# Patient Record
Sex: FEMALE | Race: WHITE | NOT HISPANIC OR LATINO | Employment: UNEMPLOYED | URBAN - NONMETROPOLITAN AREA
[De-identification: names, ages, dates, MRNs, and addresses within clinical notes are randomized per-mention and may not be internally consistent; named-entity substitution may affect disease eponyms.]

---

## 2017-02-08 ENCOUNTER — HOSPITAL ENCOUNTER (EMERGENCY)
Facility: HOSPITAL | Age: 37
Discharge: HOME | End: 2017-02-08
Attending: EMERGENCY MEDICINE | Admitting: EMERGENCY MEDICINE
Payer: COMMERCIAL

## 2017-02-08 VITALS
RESPIRATION RATE: 18 BRPM | DIASTOLIC BLOOD PRESSURE: 70 MMHG | SYSTOLIC BLOOD PRESSURE: 130 MMHG | BODY MASS INDEX: 21.79 KG/M2 | TEMPERATURE: 97 F | OXYGEN SATURATION: 100 % | HEART RATE: 70 BPM | WEIGHT: 135 LBS

## 2017-02-08 DIAGNOSIS — R53.83 FATIGUE: ICD-10-CM

## 2017-02-08 DIAGNOSIS — G47.00 INSOMNIA: Primary | ICD-10-CM

## 2017-02-08 LAB
ANION GAP SERPL CALCULATED.3IONS-SCNC: 9 MMOL/L (ref 4–13)
BASOPHILS # BLD AUTO: 0.02 THOUSANDS/ΜL (ref 0–0.1)
BASOPHILS NFR BLD AUTO: 0 % (ref 0–1)
BUN SERPL-MCNC: 7 MG/DL (ref 5–25)
CALCIUM SERPL-MCNC: 8.6 MG/DL (ref 8.3–10.1)
CHLORIDE SERPL-SCNC: 106 MMOL/L (ref 100–108)
CO2 SERPL-SCNC: 28 MMOL/L (ref 21–32)
CREAT SERPL-MCNC: 0.68 MG/DL (ref 0.6–1.3)
EOSINOPHIL # BLD AUTO: 0.14 THOUSAND/ΜL (ref 0–0.61)
EOSINOPHIL NFR BLD AUTO: 2 % (ref 0–6)
ERYTHROCYTE [DISTWIDTH] IN BLOOD BY AUTOMATED COUNT: 12.4 % (ref 11.6–15.1)
GFR SERPL CREATININE-BSD FRML MDRD: >60 ML/MIN/1.73SQ M
GLUCOSE SERPL-MCNC: 90 MG/DL (ref 65–140)
HCG UR QL: NORMAL
HCT VFR BLD AUTO: 37.8 % (ref 34.8–46.1)
HGB BLD-MCNC: 12.5 G/DL (ref 11.5–15.4)
LYMPHOCYTES # BLD AUTO: 1.35 THOUSANDS/ΜL (ref 0.6–4.47)
LYMPHOCYTES NFR BLD AUTO: 22 % (ref 14–44)
MAGNESIUM SERPL-MCNC: 1.9 MG/DL (ref 1.6–2.6)
MCH RBC QN AUTO: 31.4 PG (ref 26.8–34.3)
MCHC RBC AUTO-ENTMCNC: 33.1 G/DL (ref 31.4–37.4)
MCV RBC AUTO: 95 FL (ref 82–98)
MONOCYTES # BLD AUTO: 0.35 THOUSAND/ΜL (ref 0.17–1.22)
MONOCYTES NFR BLD AUTO: 6 % (ref 4–12)
NEUTROPHILS # BLD AUTO: 4.25 THOUSANDS/ΜL (ref 1.85–7.62)
NEUTS SEG NFR BLD AUTO: 70 % (ref 43–75)
PLATELET # BLD AUTO: 181 THOUSANDS/UL (ref 149–390)
PMV BLD AUTO: 9.4 FL (ref 8.9–12.7)
POTASSIUM SERPL-SCNC: 3.8 MMOL/L (ref 3.5–5.3)
RBC # BLD AUTO: 3.98 MILLION/UL (ref 3.81–5.12)
SODIUM SERPL-SCNC: 143 MMOL/L (ref 136–145)
TSH SERPL DL<=0.05 MIU/L-ACNC: 0.64 UIU/ML (ref 0.36–3.74)
WBC # BLD AUTO: 6.11 THOUSAND/UL (ref 4.31–10.16)

## 2017-02-08 PROCEDURE — 81025 URINE PREGNANCY TEST: CPT | Performed by: EMERGENCY MEDICINE

## 2017-02-08 PROCEDURE — 80048 BASIC METABOLIC PNL TOTAL CA: CPT | Performed by: EMERGENCY MEDICINE

## 2017-02-08 PROCEDURE — 36415 COLL VENOUS BLD VENIPUNCTURE: CPT | Performed by: EMERGENCY MEDICINE

## 2017-02-08 PROCEDURE — 83735 ASSAY OF MAGNESIUM: CPT | Performed by: EMERGENCY MEDICINE

## 2017-02-08 PROCEDURE — 84443 ASSAY THYROID STIM HORMONE: CPT | Performed by: EMERGENCY MEDICINE

## 2017-02-08 PROCEDURE — 99283 EMERGENCY DEPT VISIT LOW MDM: CPT

## 2017-02-08 PROCEDURE — 85025 COMPLETE CBC W/AUTO DIFF WBC: CPT | Performed by: EMERGENCY MEDICINE

## 2017-02-08 RX ORDER — ZALEPLON 5 MG/1
5 CAPSULE ORAL
COMMUNITY
End: 2017-06-17

## 2017-02-08 RX ORDER — ZALEPLON 10 MG/1
10 CAPSULE ORAL
Qty: 30 CAPSULE | Refills: 0 | Status: SHIPPED | OUTPATIENT
Start: 2017-02-08 | End: 2018-11-28

## 2017-06-17 ENCOUNTER — HOSPITAL ENCOUNTER (EMERGENCY)
Facility: HOSPITAL | Age: 37
Discharge: HOME | End: 2017-06-17
Attending: EMERGENCY MEDICINE
Payer: COMMERCIAL

## 2017-06-17 ENCOUNTER — APPOINTMENT (EMERGENCY)
Dept: CT IMAGING | Facility: HOSPITAL | Age: 37
End: 2017-06-17
Payer: COMMERCIAL

## 2017-06-17 VITALS
BODY MASS INDEX: 22.6 KG/M2 | DIASTOLIC BLOOD PRESSURE: 59 MMHG | HEART RATE: 50 BPM | TEMPERATURE: 97.9 F | OXYGEN SATURATION: 99 % | RESPIRATION RATE: 18 BRPM | HEIGHT: 66 IN | WEIGHT: 140.65 LBS | SYSTOLIC BLOOD PRESSURE: 91 MMHG

## 2017-06-17 DIAGNOSIS — N39.0 URINARY TRACT INFECTION: ICD-10-CM

## 2017-06-17 DIAGNOSIS — R51.9 HEADACHE: Primary | ICD-10-CM

## 2017-06-17 LAB
BACTERIA UR QL AUTO: ABNORMAL /HPF
BILIRUB UR QL STRIP: NEGATIVE
CLARITY UR: ABNORMAL
COLOR UR: ABNORMAL
GLUCOSE UR STRIP-MCNC: NEGATIVE MG/DL
HCG UR QL: NEGATIVE
HGB UR QL STRIP.AUTO: NEGATIVE
KETONES UR STRIP-MCNC: NEGATIVE MG/DL
LEUKOCYTE ESTERASE UR QL STRIP: NEGATIVE
NITRITE UR QL STRIP: POSITIVE
NON-SQ EPI CELLS URNS QL MICRO: ABNORMAL /HPF
PH UR STRIP.AUTO: 7.5 [PH] (ref 4.5–8)
PROT UR STRIP-MCNC: NEGATIVE MG/DL
RBC #/AREA URNS AUTO: ABNORMAL /HPF
SP GR UR STRIP.AUTO: 1.01 (ref 1–1.03)
UROBILINOGEN UR QL STRIP.AUTO: 0.2 E.U./DL
WBC #/AREA URNS AUTO: ABNORMAL /HPF

## 2017-06-17 PROCEDURE — 99284 EMERGENCY DEPT VISIT MOD MDM: CPT

## 2017-06-17 PROCEDURE — 81025 URINE PREGNANCY TEST: CPT | Performed by: EMERGENCY MEDICINE

## 2017-06-17 PROCEDURE — 96375 TX/PRO/DX INJ NEW DRUG ADDON: CPT

## 2017-06-17 PROCEDURE — 70496 CT ANGIOGRAPHY HEAD: CPT

## 2017-06-17 PROCEDURE — 96374 THER/PROPH/DIAG INJ IV PUSH: CPT

## 2017-06-17 PROCEDURE — 81001 URINALYSIS AUTO W/SCOPE: CPT | Performed by: EMERGENCY MEDICINE

## 2017-06-17 RX ORDER — KETOROLAC TROMETHAMINE 30 MG/ML
30 INJECTION, SOLUTION INTRAMUSCULAR; INTRAVENOUS ONCE
Status: COMPLETED | OUTPATIENT
Start: 2017-06-17 | End: 2017-06-17

## 2017-06-17 RX ORDER — BUPRENORPHINE HYDROCHLORIDE AND NALOXONE HYDROCHLORIDE DIHYDRATE 2; .5 MG/1; MG/1
0.5 TABLET SUBLINGUAL 2 TIMES DAILY
COMMUNITY

## 2017-06-17 RX ORDER — NITROFURANTOIN 25; 75 MG/1; MG/1
100 CAPSULE ORAL 2 TIMES DAILY
Qty: 14 CAPSULE | Refills: 0 | Status: SHIPPED | OUTPATIENT
Start: 2017-06-17 | End: 2017-06-24

## 2017-06-17 RX ORDER — METOCLOPRAMIDE HYDROCHLORIDE 5 MG/ML
10 INJECTION INTRAMUSCULAR; INTRAVENOUS ONCE
Status: COMPLETED | OUTPATIENT
Start: 2017-06-17 | End: 2017-06-17

## 2017-06-17 RX ORDER — BUTALBITAL, ACETAMINOPHEN AND CAFFEINE 50; 325; 40 MG/1; MG/1; MG/1
2 TABLET ORAL EVERY 6 HOURS PRN
Qty: 30 TABLET | Refills: 0 | Status: SHIPPED | OUTPATIENT
Start: 2017-06-17 | End: 2018-11-28

## 2017-06-17 RX ADMIN — IOHEXOL 100 ML: 350 INJECTION, SOLUTION INTRAVENOUS at 14:57

## 2017-06-17 RX ADMIN — METOCLOPRAMIDE 10 MG: 5 INJECTION, SOLUTION INTRAMUSCULAR; INTRAVENOUS at 14:24

## 2017-06-17 RX ADMIN — KETOROLAC TROMETHAMINE 30 MG: 30 INJECTION, SOLUTION INTRAMUSCULAR at 14:24

## 2017-12-20 ENCOUNTER — ALLSCRIPTS OFFICE VISIT (OUTPATIENT)
Dept: FAMILY MEDICINE CLINIC | Facility: CLINIC | Age: 37
End: 2017-12-20
Payer: COMMERCIAL

## 2017-12-20 PROCEDURE — T1015 CLINIC SERVICE: HCPCS | Performed by: FAMILY MEDICINE

## 2017-12-27 NOTE — PROGRESS NOTES
Assessment   1  Burning sensation of mouth (528 9) (R20 8)    Plan   Burning sensation of mouth    · Lidocaine Viscous 2 % Mouth/Throat Solution; USE 5ML EVERY 2 HOURS AS    NEEDED    Discussion/Summary      Burning sensation amounts-will trial lidocaine viscous wash  Did discuss with patient's this may not resolve current problem  Smoking cessation advised  Contributing aggravating factor  Will follow up in 4 weeks if symptoms persist     The patient was counseled regarding instructions for management,-- importance of compliance with treatment  total time of encounter was 20 minutes-- and-- 10 minutes was spent counseling  Possible side effects of new medications were reviewed with the patient/guardian today  The treatment plan was reviewed with the patient/guardian  The patient/guardian understands and agrees with the treatment plan      Chief Complaint   pt states her tongue burns, she states its been like that for 2 weeks  History of Present Illness   HPI: 40year old here today for her tongue burning, started two weeks ago  She reports her dog having same problems  Dog was treated for tongue infection, unsure of name  She reports dumping water down kitchen sink and is questing passing infection to her  She reports the burning is constant, she denies injury to tongue  Review of Systems        Constitutional: No fever, no chills, feels well, no tiredness, no recent weight gain or loss  ENT: no ear ache, no loss of hearing, no nosebleeds or nasal discharge, no sore throat or hoarseness  Cardiovascular: no complaints of slow or fast heart rate, no chest pain, no palpitations, no leg claudication or lower extremity edema  Respiratory: no complaints of shortness of breath, no wheezing, no dyspnea on exertion, no orthopnea or PND  Breasts: no complaints of breast pain, breast lump or nipple discharge        Gastrointestinal: no complaints of abdominal pain, no constipation, no nausea or diarrhea, no vomiting, no bloody stools  Genitourinary: no complaints of dysuria, no incontinence, no pelvic pain, no dysmenorrhea, no vaginal discharge or abnormal vaginal bleeding  Musculoskeletal: no complaints of arthralgia, no myalgia, no joint swelling or stiffness, no limb pain or swelling  Integumentary: no complaints of skin rash or lesion, no itching or dry skin, no skin wounds  Neurological: no complaints of headache, no confusion, no numbness or tingling, no dizziness or fainting  Other Symptoms: Burning tongue sensation  Active Problems   1  Abdominal bloating (787 3) (R14 0)   2  Acute lateral meniscal tear (836 1) (S83 289A)   3  Acute medial meniscus tear of right knee (836 0) (S83 241A)   4  Alcoholism (303 90) (F10 20)   5  Chronic constipation (564 00) (K59 09)   6  Chronic Lyme Disease (088 81)   7  Chronic pain due to trauma (338 21) (G89 21)   8  Constipation (564 00) (K59 00)   9  Dry eyes (375 15) (H04 123)   10  Dysuria (788 1) (R30 0)   11  Female pelvic pain (625 9) (R10 2)   12  Flu vaccine need (V04 81) (Z23)   13  Hepatitis (573 3) (K75 9)   14  History of fracture of clavicle (V15 51) (Z87 81)   15  IBS (irritable bowel syndrome) (564 1) (K58 9)   16  Insomnia (780 52) (G47 00)   17  Leukopenia (288 50) (D72 819)   18  Liver enlargement (789 1) (R16 0)   19  Misuse of drugs (305 90) (F19 99)   20  Musculoskeletal pain, chronic (729 1,338 29) (M79 1,G89 29)   21  Nonspecific abnormal toxicological finding (796 0) (R89 2)   22  Opioid abuse, in remission (305 53) (F11 11)   23  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)   24  Painful orthopaedic hardware (996 78) (T84 84XA)   25  Right clavicle fracture (810 00) (S42 001A)   26  Skin lesion (709 9) (L98 9)   27  Status post medial meniscectomy of right knee (V45 89) (Z98 890)   28  Thrombocytopenia (287 5) (D69 6)   29  Urinary frequency (788 41) (R35 0)   30  Urinary tract infection (599 0) (N39 0)   31   UTI (lower urinary tract infection) (599 0) (N39 0)   32  Well woman exam with routine gynecological exam (V72 31) (Z01 419)    Past Medical History   1  Alcoholism (303 90) (F10 20)   2  History of nausea and vomiting (V12 79) (Z87 898)   3  History of Lyme disease (088 81) (A69 20)   4  History of Opiate dependence (304 00) (F11 20)   5  UTI (lower urinary tract infection) (599 0) (N39 0)  Active Problems And Past Medical History Reviewed: The active problems and past medical history were reviewed and updated today  Family History   Mother    1  No pertinent family history  Father    2  No pertinent family history  Paternal Grandmother    1  Family history of Breast cancer   4  Family history of Cancer  Family History Reviewed: The family history was reviewed and updated today  Social History    · Alcohol Use (History)   · 1 litre of voda every 2 days   · Denied: Caffeine Use   · Current Every Day Smoker (305 1)   · Denied: Drug Use (305 90)   · History of Using Intravenous Drugs And Sharing Roslyn  The social history was reviewed and updated today  The social history was reviewed and is unchanged  Surgical History   1  History of Appendectomy (47 0)   2  History of Tonsillectomy (28 2)   3  History of Tubal Ligation  Surgical History Reviewed: The surgical history was reviewed and updated today  Current Meds    1  Promethazine HCl TABS; Therapy: (Recorded:67Mql3962) to Recorded   2  Suboxone 8-2 MG Sublingual Film; Take as directed by Dr Georgie Johnson; Therapy: [id number] to Recorded     The medication list was reviewed and updated today  Allergies   1  Penicillins    Vitals    Recorded: 65Kdj9773 02:32PM   Temperature 97 4 F    Heart Rate 66    Respiration 18    Systolic 261    Diastolic 74    Height 5 ft 5 in    Patient Refused Weight Yes Yes   O2 Saturation 98      Physical Exam        Constitutional      General appearance: No acute distress, well appearing and well nourished  Ears, Nose, Mouth, and Throat      External inspection of ears and nose: Normal        Otoscopic examination: Tympanic membranes translucent with normal light reflex  Canals patent without erythema  Nasal mucosa, septum, and turbinates: Normal without edema or erythema  Oropharynx: Normal with no erythema, edema, exudate or lesions  Pulmonary      Respiratory effort: No increased work of breathing or signs of respiratory distress  Auscultation of lungs: Clear to auscultation  Cardiovascular      Palpation of heart: Normal PMI, no thrills  Auscultation of heart: Normal rate and rhythm, normal S1 and S2, without murmurs  Examination of extremities for edema and/or varicosities: Normal        Abdomen      Abdomen: Non-tender, no masses  Liver and spleen: No hepatomegaly or splenomegaly  Lymphatic      Palpation of lymph nodes in neck: No lymphadenopathy  Musculoskeletal      Gait and station: Normal        Digits and nails: Normal without clubbing or cyanosis  Inspection/palpation of joints, bones, and muscles: Normal        Skin      Skin and subcutaneous tissue: Normal without rashes or lesions  Neurologic      Cranial nerves: Cranial nerves 2-12 intact  Reflexes: 2+ and symmetric  Sensation: No sensory loss  Psychiatric      Orientation to person, place, and time: Normal        Mood and affect: Normal           Future Appointments      Date/Time Provider Specialty Site   01/10/2018 10:00 AM Alana Sullivan, [facility]     Signatures    Electronically signed by :  BELLE Hernandez; Dec 20 2017  3:17PM EST                       (Author)     Electronically signed by : Floy Dakin, DO; Dec 26 2017 12:33PM EST

## 2018-01-06 ENCOUNTER — APPOINTMENT (EMERGENCY)
Dept: RADIOLOGY | Facility: HOSPITAL | Age: 38
End: 2018-01-06
Payer: COMMERCIAL

## 2018-01-06 ENCOUNTER — HOSPITAL ENCOUNTER (EMERGENCY)
Facility: HOSPITAL | Age: 38
Discharge: HOME | End: 2018-01-06
Admitting: EMERGENCY MEDICINE
Payer: COMMERCIAL

## 2018-01-06 VITALS
DIASTOLIC BLOOD PRESSURE: 70 MMHG | RESPIRATION RATE: 18 BRPM | BODY MASS INDEX: 21.21 KG/M2 | TEMPERATURE: 98.2 F | SYSTOLIC BLOOD PRESSURE: 113 MMHG | OXYGEN SATURATION: 100 % | HEART RATE: 78 BPM | WEIGHT: 131.39 LBS

## 2018-01-06 DIAGNOSIS — S61.459A DOG BITE, HAND: Primary | ICD-10-CM
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
## 2018-01-06 PROCEDURE — 90715 TDAP VACCINE 7 YRS/> IM: CPT | Performed by: PHYSICIAN ASSISTANT

## 2018-01-06 PROCEDURE — 73130 X-RAY EXAM OF HAND: CPT

## 2018-01-06 PROCEDURE — 90471 IMMUNIZATION ADMIN: CPT

## 2018-01-06 PROCEDURE — 99283 EMERGENCY DEPT VISIT LOW MDM: CPT

## 2018-01-06 RX ORDER — DOXYCYCLINE HYCLATE 100 MG/1
100 CAPSULE ORAL ONCE
Status: COMPLETED | OUTPATIENT
Start: 2018-01-06 | End: 2018-01-06

## 2018-01-06 RX ORDER — DOXYCYCLINE 100 MG/1
100 CAPSULE ORAL 2 TIMES DAILY
Qty: 28 CAPSULE | Refills: 0 | Status: SHIPPED | OUTPATIENT
Start: 2018-01-06 | End: 2018-01-20

## 2018-01-06 RX ADMIN — TETANUS TOXOID, REDUCED DIPHTHERIA TOXOID AND ACELLULAR PERTUSSIS VACCINE, ADSORBED 0.5 ML: 5; 2.5; 8; 8; 2.5 SUSPENSION INTRAMUSCULAR at 11:05

## 2018-01-06 RX ADMIN — DOXYCYCLINE HYCLATE 100 MG: 100 CAPSULE ORAL at 11:02

## 2018-01-06 NOTE — ED PROVIDER NOTES
History  Chief Complaint   Patient presents with    Dog Bite     dogbite right hand last night  Patient states was playing with own dog and was bit on right hand  Right hand swollen and painful; today     Patient presents to the emergency department today offering a chief complaint of right hand pain and swelling status post dog bite last evening around 2100 hours  Patient states the dog is her household head, she believes it is up-to-date on all of its immunizations, she was poking at the dog and playing with the dog while her  was eating food and the dog bit the right hand  She sustained a small puncture wound of the right hand and admits to localized swelling  She denies range of motion or sensation deficits  Prior to Admission Medications   Prescriptions Last Dose Informant Patient Reported? Taking? buprenorphine-naloxone (SUBOXONE) 2-0 5 mg per SL tablet   Yes No   Sig: Place 0 5 tablets under the tongue daily   butalbital-acetaminophen-caffeine (FIORICET) -40 mg per tablet   No No   Sig: Take 2 tablets by mouth every 6 (six) hours as needed for headaches   zaleplon (SONATA) 10 MG capsule   No No   Sig: Take 1 capsule by mouth daily at bedtime for 30 doses      Facility-Administered Medications: None       Past Medical History:   Diagnosis Date    Back pain     Chronic constipation 6/27/2016    Infectious viral hepatitis     Lyme disease        Past Surgical History:   Procedure Laterality Date    APPENDECTOMY      CLAVICLE SURGERY      KNEE ARTHROSCOPY      MULTIPLE TOOTH EXTRACTIONS      [facility] COLONOSCOPY FLX DX W/COLLJ SPEC WHEN PFRMD N/A 6/27/2016    Procedure: COLONOSCOPY;  Surgeon: Sindhu Hardin MD;  Location: [facility];  Service: Colorectal    TONSILLECTOMY AND ADENOIDECTOMY      TUBAL LIGATION         History reviewed  No pertinent family history  I have reviewed and agree with the history as documented      Social History   Substance Use Topics    Smoking status: Current Every Day Smoker     Packs/day: 0 50     Types: Cigarettes    Smokeless tobacco: Never Used    Alcohol use Yes      Comment: social        Review of Systems   Constitutional: Negative  HENT: Negative  Eyes: Negative  Respiratory: Negative  Cardiovascular: Negative  Gastrointestinal: Negative  Endocrine: Negative  Genitourinary: Negative  Musculoskeletal: Positive for joint swelling  Skin: Positive for wound  Allergic/Immunologic: Negative  Neurological: Negative  Hematological: Negative  Psychiatric/Behavioral: Negative  All other systems reviewed and are negative  Physical Exam  ED Triage Vitals   Temperature Pulse Respirations Blood Pressure SpO2   01/06/18 0952 01/06/18 0952 01/06/18 0952 01/06/18 0952 01/06/18 0952   98 2 °F (36 8 °C) 78 18 113/70 100 %      Temp Source Heart Rate Source Patient Position - Orthostatic VS BP Location FiO2 (%)   01/06/18 0952 01/06/18 0952 01/06/18 0952 01/06/18 0952 --   Temporal Monitor Sitting Left arm       Pain Score       01/06/18 0900       9           Orthostatic Vital Signs  Vitals:    01/06/18 0952   BP: 113/70   Pulse: 78   Patient Position - Orthostatic VS: Sitting       Physical Exam   Constitutional: She is oriented to person, place, and time  She appears well-developed and well-nourished  No distress  HENT:   Head: Atraumatic  Eyes: Pupils are equal, round, and reactive to light  Cardiovascular: Normal rate  Pulmonary/Chest: Effort normal    Abdominal: Soft  Musculoskeletal: She exhibits edema and tenderness  She exhibits no deformity  Patient exhibits tenderness and soft tissue swelling the dorsum of the right hand  Patient has normal range of motion of the digits and sensation distally with normal capillary refill  No erythema is noted  Neurological: She is alert and oriented to person, place, and time  Skin: Capillary refill takes less than 2 seconds  No rash noted   She is not diaphoretic  0 5 cm puncture wound on the dorsum of the right hand the area of the 3rd metacarpal   Psychiatric: She has a normal mood and affect  Vitals reviewed  ED Medications  Medications   tetanus-diphtheria-acellular pertussis (BOOSTRIX) IM injection 0 5 mL (not administered)   doxycycline hyclate (VIBRAMYCIN) capsule 100 mg (not administered)       Diagnostic Studies  Results Reviewed     None                 XR hand 3+ views RIGHT   Final Result by Ricki Marie MD (01/06 1038)      No acute osseous abnormality  Workstation performed: [id number]                    Procedures  Procedures       Phone Contacts  ED Phone Contact    ED Course  ED Course as of Jan 06 1055   Sat Jan 06, 2018   1023 We will x-ray to rule out foreign body for osseous abnormalities, cleansed extensively with Betadine, and started on antibiotics  Patient states her tetanus was updated last year    1027 I looked at the patient's records from last year and did not see any evidence of a tetanus update, we will updated today    9624 FINDINGS:    There is no acute fracture or dislocation  No degenerative changes  No lytic or blastic lesions are seen  Soft tissue swelling overlying the metacarpals  Impression       No acute osseous abnormality  1055 Wound was cleansed extensively with Betadine and dressing placed                                MDM  CritCare Time    Disposition  Final diagnoses:   Dog bite, hand     Time reflects when diagnosis was documented in both MDM as applicable and the Disposition within this note     Time User Action Codes Description Comment    1/6/2018 10:53 AM Odette Colón Add [E45 839S,  E26  0XXA] Dog bite, hand       ED Disposition     ED Disposition Condition Comment    Discharge  Jennifer Sharyn discharge to home/self care      Condition at discharge: Good        Follow-up Information     Follow up With Specialties Details Why [address], CRNP Family Medicine In 2 days For wound re-check Gil Mcmahon  [address]  [phone]          Patient's Medications   Discharge Prescriptions    DOXYCYCLINE MONOHYDRATE (MONODOX) 100 MG CAPSULE    Take 1 capsule by mouth 2 (two) times a day for 14 days       Start Date: 1/6/2018  End Date: 1/20/2018       Order Dose: 100 mg       Quantity: 28 capsule    Refills: 0     No discharge procedures on file      ED Provider  Electronically Signed by           Jas Angulo PA-C  01/06/18 [address] H[address] Davila PA-C  01/06/18 1052

## 2018-01-06 NOTE — DISCHARGE INSTRUCTIONS
Animal Bite   WHAT YOU NEED TO KNOW:   Animal bite injuries range from shallow cuts to deep, life-threatening wounds  An animal can cut or puncture the skin when it bites  Your skin may be torn from your body  Your skin may swell or bruise even if the bite does not break the skin  Animal bites occur more often on the hands, arms, legs, and face  Bites from dogs and cats are the most common injuries  DISCHARGE INSTRUCTIONS:   Seek care immediately if:   · You have a fever  · Your wound is red, swollen, and draining pus  · You see red streaks on the skin around the wound  · You can no longer move the bitten area  · Your heartbeat and breathing are much faster than usual     · You feel dizzy and confused  Contact your healthcare provider if:   · Your pain does not get better, even after you take pain medicine  · You have nightmares or flashbacks about the animal bite  · You have questions or concerns about your condition or care  Medicines: You may need any of the following:  · Antibiotics  prevent or treat a bacterial infection  · Prescription pain medicine  may be given  Ask how to take this medicine safely  · A tetanus vaccine  may be needed to prevent tetanus  Tetanus is a life-threatening bacterial infection that affects the nerves and muscles  The bacteria can be spread through animal bites  · A rabies vaccine  may be needed to prevent rabies  Rabies is a life-threatening viral infection  The virus can be spread through animal bites  · Take your medicine as directed  Contact your healthcare provider if you think your medicine is not helping or if you have side effects  Tell him or her if you are allergic to any medicine  Keep a list of the medicines, vitamins, and herbs you take  Include the amounts, and when and why you take them  Bring the list or the pill bottles to follow-up visits  Carry your medicine list with you in case of an emergency    Follow up with your healthcare provider in 1 to 2 days: You may need to return to have your stitches removed  Write down your questions so you remember to ask them during your visits  Self-care:   · Apply antibiotic ointment as directed  This helps prevent infection in minor skin wounds  It is available without a doctor's order  · Keep the wound clean and covered  Wash the wound every day with soap and water or germ-killing cleanser  Ask your healthcare provider about the kinds of bandages to use  · Apply ice on your wound  Ice helps decrease swelling and pain  Ice may also help prevent tissue damage  Use an ice pack, or put crushed ice in a plastic bag  Cover it with a towel and place it on your wound for 15 to 20 minutes every hour or as directed  · Elevate the wound area  Raise your wound above the level of your heart as often as you can  This will help decrease swelling and pain  Prop your wound on pillows or blankets to keep it elevated comfortably  Prevent another animal bite:   · Learn to recognize the signs of a scared or angry pet  Avoid quick, sudden movements  · Do not step between animals that are fighting  · Do not leave a pet alone with a young child  · Do not disturb an animal while it eats, sleeps, or cares for its young  · Do not approach an animal you do not know, especially one that is tied up or caged  · Stay away from animals that seem sick or act strangely  · Do not feed or capture wild animals  © 2017 [address] Information is for End User's use only and may not be sold, redistributed or otherwise used for commercial purposes  All illustrations and images included in CareNotes® are the copyrighted property of [employer] , [employer]  or [employer] O[employer]  The above information is an  only  It is not intended as medical advice for individual conditions or treatments   Talk to your doctor, nurse or pharmacist before following any medical regimen to see if it is safe and effective for you

## 2018-01-10 ENCOUNTER — GENERIC CONVERSION - ENCOUNTER (OUTPATIENT)
Dept: OTHER | Facility: OTHER | Age: 38
End: 2018-01-10

## 2018-01-10 ENCOUNTER — APPOINTMENT (OUTPATIENT)
Dept: FAMILY MEDICINE CLINIC | Facility: CLINIC | Age: 38
End: 2018-01-10
Payer: COMMERCIAL

## 2018-01-10 DIAGNOSIS — R20.8 OTHER DISTURBANCES OF SKIN SENSATION: ICD-10-CM

## 2018-01-10 PROCEDURE — T1015 CLINIC SERVICE: HCPCS | Performed by: FAMILY MEDICINE

## 2018-01-12 ENCOUNTER — APPOINTMENT (OUTPATIENT)
Dept: LAB | Facility: CLINIC | Age: 38
End: 2018-01-12
Payer: COMMERCIAL

## 2018-01-12 ENCOUNTER — TRANSCRIBE ORDERS (OUTPATIENT)
Dept: LAB | Facility: CLINIC | Age: 38
End: 2018-01-12

## 2018-01-12 DIAGNOSIS — R20.8 OTHER DISTURBANCES OF SKIN SENSATION: ICD-10-CM

## 2018-01-12 LAB
ERYTHROCYTE [DISTWIDTH] IN BLOOD BY AUTOMATED COUNT: 12.7 % (ref 11.6–15.1)
EST. AVERAGE GLUCOSE BLD GHB EST-MCNC: 100 MG/DL
HBA1C MFR BLD: 5.1 % (ref 4.2–6.3)
HCT VFR BLD AUTO: 38.8 % (ref 34.8–46.1)
HGB BLD-MCNC: 13.9 G/DL (ref 11.5–15.4)
IRON SERPL-MCNC: 65 UG/DL (ref 50–170)
MCH RBC QN AUTO: 34.4 PG (ref 26.8–34.3)
MCHC RBC AUTO-ENTMCNC: 35.8 G/DL (ref 31.4–37.4)
MCV RBC AUTO: 96 FL (ref 82–98)
PLATELET # BLD AUTO: 167 THOUSANDS/UL (ref 149–390)
PMV BLD AUTO: 11 FL (ref 8.9–12.7)
RBC # BLD AUTO: 4.04 MILLION/UL (ref 3.81–5.12)
VIT B12 SERPL-MCNC: 388 PG/ML (ref 100–900)
WBC # BLD AUTO: 4.74 THOUSAND/UL (ref 4.31–10.16)

## 2018-01-12 PROCEDURE — 36415 COLL VENOUS BLD VENIPUNCTURE: CPT

## 2018-01-12 PROCEDURE — 85027 COMPLETE CBC AUTOMATED: CPT

## 2018-01-12 PROCEDURE — 83540 ASSAY OF IRON: CPT

## 2018-01-12 PROCEDURE — 84630 ASSAY OF ZINC: CPT

## 2018-01-12 PROCEDURE — 83036 HEMOGLOBIN GLYCOSYLATED A1C: CPT

## 2018-01-12 PROCEDURE — 82607 VITAMIN B-12: CPT

## 2018-01-13 NOTE — RESULT NOTES
Message   please forward this report to Dr Akhil Johnson  Thanks     Verified Results  [facility] DEFACOGRAPHY [id number] 09:03AM Ronda Montanez Order Number: [id number]     Test Name Result Flag Reference   [facility] DEFACOGRAPHY (Report)     DEFECOGRAPHY     INDICATION: Constipation  COMPARISON: None     IMAGES: 727     FLUOROSCOPY TIME: 0 7 minutes     FINDINGS:     The study was performed during video fluoroscopy  After placement of contrast into the rectosigmoid , images were obtained following provocative maneuvers of coughing and Valsalva maneuver  Images were then obtained during active evacuation  Initial images reveal normal caliber and appearance of the rectosigmoid  On attempted voiding, moderate anterior rectocele is identified  The patient was unable to void during the exam despite multiple attempts  IMPRESSION:     Patient was unable to void during the exam despite multiple attempts  Moderate anterior rectocele was identified  Workstation performed: [id number]     Signed by:    Margarita Taylor MD   3/31/16   [phone]

## 2018-01-16 LAB — ZINC SERPL-MCNC: 74 UG/DL (ref 56–134)

## 2018-01-22 ENCOUNTER — ALLSCRIPTS OFFICE VISIT (OUTPATIENT)
Dept: FAMILY MEDICINE CLINIC | Facility: CLINIC | Age: 38
End: 2018-01-22
Payer: COMMERCIAL

## 2018-01-22 PROCEDURE — T1015 CLINIC SERVICE: HCPCS | Performed by: FAMILY MEDICINE

## 2018-01-23 VITALS
RESPIRATION RATE: 18 BRPM | HEART RATE: 66 BPM | HEIGHT: 65 IN | OXYGEN SATURATION: 98 % | TEMPERATURE: 97.4 F | SYSTOLIC BLOOD PRESSURE: 102 MMHG | DIASTOLIC BLOOD PRESSURE: 74 MMHG

## 2018-01-24 VITALS
TEMPERATURE: 98.1 F | DIASTOLIC BLOOD PRESSURE: 68 MMHG | RESPIRATION RATE: 18 BRPM | HEART RATE: 60 BPM | SYSTOLIC BLOOD PRESSURE: 96 MMHG | HEIGHT: 65 IN | OXYGEN SATURATION: 98 %

## 2018-01-24 NOTE — PROGRESS NOTES
Assessment   1  Burning sensation of mouth (528 9) (R20 8)    Plan   Burning sensation of mouth    · Otolaryngology Follow Up Evaluation and Treatment  Follow-up  Status: Hold For -    Scheduling  Requested for: [id number]    Discussion/Summary      Hand out provided on burning tongue cessation, further natural remedies suggested including baking soda, raw honey, and capsaicin powder  Refer to ENT for any further workup  The patient was counseled regarding instructions for management,-- importance of compliance with treatment  total time of encounter was 20 minutes-- and-- 10 minutes was spent counseling  Possible side effects of new medications were reviewed with the patient/guardian today  Chief Complaint   Patient is here today for a follow up and to go over labs  History of Present Illness   71-year-old female reports the office today for follow-up appointment  Patient has been having a burning sensation to her tongue over the last month  Recently had blood work completed  She has tried over-the-counter natural remedies for burning relief, she does not want any further medication  She has been avoiding salts and she feels as though this has aggravated the burning sensation  Review of Systems        Constitutional: No fever, no chills, feels well, no tiredness, no recent weight gain or weight loss  Eyes: No complaints of eye pain, no red eyes, no eyesight problems, no discharge, no dry eyes, no itching of eyes  ENT: no complaints of earache, no loss of hearing, no nose bleeds, no nasal discharge, no sore throat, no hoarseness  Cardiovascular: No complaints of slow heart rate, no fast heart rate, no chest pain, no palpitations, no leg claudication, no lower extremity edema  Respiratory: No complaints of shortness of breath, no wheezing, no cough, no SOB on exertion, no orthopnea, no PND        Gastrointestinal: No complaints of abdominal pain, no constipation, no nausea or vomiting, no diarrhea, no bloody stools  Genitourinary: No complaints of dysuria, no incontinence, no pelvic pain, no dysmenorrhea, no vaginal discharge or bleeding  Musculoskeletal: No complaints of arthralgias, no myalgias, no joint swelling or stiffness, no limb pain or swelling  Integumentary: No complaints of skin rash or lesions, no itching, no skin wounds, no breast pain or lump  Neurological: No complaints of headache, no confusion, no convulsions, no numbness, no dizziness or fainting, no tingling, no limb weakness, no difficulty walking  Psychiatric: Not suicidal, no sleep disturbance, no anxiety or depression, no change in personality, no emotional problems  Endocrine: No complaints of proptosis, no hot flashes, no muscle weakness, no deepening of the voice, no feelings of weakness  Hematologic/Lymphatic: No complaints of swollen glands, no swollen glands in the neck, does not bleed easily, does not bruise easily  Other Symptoms: burning tongue  Active Problems   1  Abdominal bloating (787 3) (R14 0)   2  Acute lateral meniscal tear (836 1) (S83 289A)   3  Acute medial meniscus tear of right knee (836 0) (S83 241A)   4  Alcoholism (303 90) (F10 20)   5  Burning sensation of mouth (528 9) (R20 8)   6  Chronic constipation (564 00) (K59 09)   7  Chronic Lyme Disease (088 81)   8  Chronic pain due to trauma (338 21) (G89 21)   9  Constipation (564 00) (K59 00)   10  Dry eyes (375 15) (H04 123)   11  Dry skin dermatitis (692 89) (L85 3)   12  Dysuria (788 1) (R30 0)   13  Female pelvic pain (625 9) (R10 2)   14  Flu vaccine need (V04 81) (Z23)   15  Hepatitis (573 3) (K75 9)   16  History of fracture of clavicle (V15 51) (Z87 81)   17  IBS (irritable bowel syndrome) (564 1) (K58 9)   18  Insomnia (780 52) (G47 00)   19  Leukopenia (288 50) (D72 819)   20  Liver enlargement (789 1) (R16 0)   21  Misuse of drugs (305 90) (F19 99)   22   Musculoskeletal pain, chronic (729 1,338 29) (M79 1,G89 29)   23  Nonspecific abnormal toxicological finding (796 0) (R89 2)   24  Opioid abuse, in remission (305 53) (F11 11)   25  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)   26  Painful orthopaedic hardware (996 78) (T84 84XA)   27  Right clavicle fracture (810 00) (S42 001A)   28  Skin lesion (709 9) (L98 9)   29  Status post medial meniscectomy of right knee (V45 89) (Z98 890)   30  Thrombocytopenia (287 5) (D69 6)   31  Urinary frequency (788 41) (R35 0)   32  Urinary tract infection (599 0) (N39 0)   33  UTI (lower urinary tract infection) (599 0) (N39 0)   34  Well woman exam with routine gynecological exam (V72 31) (Z01 419)    Past Medical History   1  Alcoholism (303 90) (F10 20)   2  History of nausea and vomiting (V12 79) (Z87 898)   3  History of Lyme disease (088 81) (A69 20)   4  History of Opiate dependence (304 00) (F11 20)   5  UTI (lower urinary tract infection) (599 0) (N39 0)     The active problems and past medical history were reviewed and updated today  Surgical History   1  History of Appendectomy (47 0)   2  History of Tonsillectomy (28 2)   3  History of Tubal Ligation     The surgical history was reviewed and updated today  Family History   Mother    1  No pertinent family history  Father    2  No pertinent family history  Paternal Grandmother    1  Family history of Breast cancer   4  Family history of Cancer     The family history was reviewed and updated today  Social History    · Alcohol Use (History)   · Denied: Caffeine Use   · Current Every Day Smoker (305 1)   · Denied: Drug Use (305 90)   · History of Using Intravenous Drugs And Sharing [place]  The social history was reviewed and updated today  The social history was reviewed and is unchanged  Current Meds    1  Eucerin External Lotion; Apply to affected area 2-3 times daily as needed; Therapy: 67JBG1896 to (Last Rx:10Jan2018)  Requested for: 69QXI6771 Ordered   2   Lidocaine Viscous 2 % Mouth/Throat Solution; USE 5ML EVERY 2 HOURS AS NEEDED; Therapy: 33Nmv1729 to (Evaluate:74Muz3431)  Requested for: 09Coo4169; Last     Rx:61Ykm2348 Ordered   3  Promethazine HCl - 50 MG Oral Tablet; TAKE 1 TABLET AT BEDTIME; Therapy: (Recorded:10Jan2018) to Recorded   4  Suboxone 8-2 MG Sublingual Film; Take as directed by Dr Roya Cristobal; Therapy: 08OMU2622 to Recorded   5  Zaleplon 10 MG Oral Capsule; TAKE 1 CAPSULE AT BEDTIME; Therapy: (Recorded:10Jan2018) to Recorded     The medication list was reviewed and updated today  Allergies   1  Penicillins    Vitals   Vital Signs    Recorded: 76PHS8814 11:06AM   Temperature 98 2 F    Heart Rate 65    Systolic 241    Diastolic 70    Height 5 ft 5 in    Patient Refused Weight Yes Yes   O2 Saturation 98      Physical Exam        Constitutional      General appearance: No acute distress, well appearing and well nourished  Pulmonary      Respiratory effort: No increased work of breathing or signs of respiratory distress  Auscultation of lungs: Clear to auscultation  Cardiovascular      Auscultation of heart: Normal rate and rhythm, normal S1 and S2, without murmurs  Lymphatic      Palpation of lymph nodes in neck: No lymphadenopathy  Skin      Skin and subcutaneous tissue: Normal without rashes or lesions  Psychiatric      Orientation to person, place, and time: Normal        Mood and affect: Normal           Signatures    Electronically signed by :  BELLE Patel; Jan 22 2018 12:21PM EST                       (Author)     Electronically signed by : Ebony Rouse DO; Jan 24 2018  6:59AM EST

## 2018-11-28 ENCOUNTER — OFFICE VISIT (OUTPATIENT)
Dept: FAMILY MEDICINE CLINIC | Facility: CLINIC | Age: 38
End: 2018-11-28
Payer: COMMERCIAL

## 2018-11-28 ENCOUNTER — HOSPITAL ENCOUNTER (OUTPATIENT)
Dept: RADIOLOGY | Facility: HOSPITAL | Age: 38
Discharge: HOME | End: 2018-11-28
Payer: COMMERCIAL

## 2018-11-28 VITALS
TEMPERATURE: 97.7 F | OXYGEN SATURATION: 99 % | RESPIRATION RATE: 18 BRPM | BODY MASS INDEX: 18.99 KG/M2 | HEART RATE: 72 BPM | WEIGHT: 114 LBS | HEIGHT: 65 IN | DIASTOLIC BLOOD PRESSURE: 72 MMHG | SYSTOLIC BLOOD PRESSURE: 102 MMHG

## 2018-11-28 DIAGNOSIS — M54.6 ACUTE BILATERAL THORACIC BACK PAIN: ICD-10-CM

## 2018-11-28 DIAGNOSIS — M54.6 ACUTE BILATERAL THORACIC BACK PAIN: Primary | ICD-10-CM

## 2018-11-28 PROCEDURE — 72072 X-RAY EXAM THORAC SPINE 3VWS: CPT

## 2018-11-28 PROCEDURE — T1015 CLINIC SERVICE: HCPCS | Performed by: FAMILY MEDICINE

## 2018-11-28 PROCEDURE — 72110 X-RAY EXAM L-2 SPINE 4/>VWS: CPT

## 2018-11-28 RX ORDER — BUSPIRONE HYDROCHLORIDE 10 MG/1
10 TABLET ORAL 2 TIMES DAILY
Refills: 1 | COMMUNITY
Start: 2018-09-04 | End: 2021-06-08 | Stop reason: ALTCHOICE

## 2018-11-28 RX ORDER — TOPIRAMATE 50 MG/1
50 TABLET, FILM COATED ORAL DAILY
Refills: 5 | COMMUNITY
Start: 2018-10-31

## 2018-11-28 RX ORDER — PROMETHAZINE HCL 50 MG
TABLET ORAL
Refills: 1 | COMMUNITY
Start: 2018-11-15

## 2018-11-28 RX ORDER — CALCIUM CARBONATE 300MG(750)
30 TABLET,CHEWABLE ORAL 2 TIMES DAILY
Qty: 30 EACH | Refills: 0 | Status: SHIPPED | OUTPATIENT
Start: 2018-11-28

## 2018-11-28 NOTE — PROGRESS NOTES
OFFICE VISIT  Omayra Whitten 41 y o  female MRN: [id number]      Assessment / Plan:  Diagnoses and all orders for this visit:    Acute bilateral thoracic back pain  -     XR spine lumbar minimum 4 views non injury; Future  -     XR spine thoracic 3 vw; Future  -     Ambulatory referral to Physical Therapy; Future  -     Nerve Stimulator (STANDARD TENS) VICKY; by Does not apply route 2 (two) times a day  -     Nerve Stimulator (TENS THERAPY REPLACE BACK PADS) MISC; 30 pad by Does not apply route 2 (two) times a day          Reason For Visit / Chief Complaint  Chief Complaint   Patient presents with    Back Pain     She states her back hurts really bad and she can't stand up straight  HPI:  Omayra Whitten is a 41 y o  female who presents today for lower back pain  She reports having lower back pain  She has treated for a uti one month ago  She reports having upper and lower back pain  She is employed at [employer], standing for long period of times  Just bought gel inserts, has not started wearing  Historical Information   Past Medical History:   Diagnosis Date    Back pain     Chronic constipation 6/27/2016    Infectious viral hepatitis     Lyme disease      Past Surgical History:   Procedure Laterality Date    APPENDECTOMY      CLAVICLE SURGERY      KNEE ARTHROSCOPY      MULTIPLE TOOTH EXTRACTIONS      TX COLONOSCOPY FLX DX W/COLLJ SPEC WHEN PFRMD N/A 6/27/2016    Procedure: COLONOSCOPY;  Surgeon: Tito Stevens MD;  Location: [facility];  Service: Colorectal    TONSILLECTOMY AND ADENOIDECTOMY      TUBAL LIGATION       Social History   History   Alcohol Use    Yes     Comment: social     History   Drug Use No     History   Smoking Status    Current Every Day Smoker    Packs/day: 0 50    Types: Cigarettes   Smokeless Tobacco    Never Used     No family history on file      Meds/Allergies   Allergies   Allergen Reactions    Penicillins GI Intolerance     Other reaction(s): Unknown Reaction Meds:    Current Outpatient Prescriptions:     buprenorphine-naloxone (SUBOXONE) 2-0 5 mg per SL tablet, Place 0 5 tablets under the tongue daily, Disp: , Rfl:     busPIRone (BUSPAR) 10 mg tablet, Take 10 mg by mouth 2 (two) times a day, Disp: , Rfl: 1    Nerve Stimulator (STANDARD TENS) VICKY, by Does not apply route 2 (two) times a day, Disp: 1 Device, Rfl: 0    Nerve Stimulator (TENS THERAPY REPLACE BACK PADS) MISC, 30 pad by Does not apply route 2 (two) times a day, Disp: 30 each, Rfl: 0    promethazine (PHENERGAN) 50 MG tablet, TAKE 1 TO 2 TABS AT BEDTIME AS NEEDED FOR INSOMNIA, Disp: , Rfl: 1    topiramate (TOPAMAX) 50 MG tablet, TAKE 1 TABLET BY MOUTH EVERY DAY FOR HEADACHE/SLEEP, Disp: , Rfl: 5      REVIEW OF SYSTEMS  Review of Systems   Constitutional: Negative for chills, fatigue and fever  HENT: Negative for congestion, ear discharge, ear pain, sore throat, trouble swallowing and voice change  Eyes: Negative for pain and redness  Respiratory: Negative for cough, chest tightness, shortness of breath and wheezing  Gastrointestinal: Negative for abdominal pain, blood in stool, constipation, diarrhea, nausea and vomiting  Endocrine: Negative for cold intolerance, heat intolerance, polydipsia, polyphagia and polyuria  Genitourinary: Negative for decreased urine volume, dysuria, frequency and urgency  Musculoskeletal: Positive for arthralgias and back pain  Negative for myalgias and neck pain  Skin: Negative for color change and rash  Neurological: Negative for dizziness, syncope, weakness, light-headedness, numbness and headaches  Psychiatric/Behavioral: Negative for sleep disturbance and suicidal ideas  The patient is not nervous/anxious              Current Vitals:   Blood Pressure: 102/72 (11/28/18 1319)  Pulse: 72 (11/28/18 1319)  Temperature: 97 7 °F (36 5 °C) (11/28/18 1319)  Respirations: 18 (11/28/18 1319)  Weight - Scale: 51 7 kg (114 lb) (11/28/18 1319)  SpO2: 99 % (11/28/18 1316)  [unfilled]    PHYSICAL EXAMS:  Physical Exam   Constitutional: She is oriented to person, place, and time  She appears well-developed and well-nourished  HENT:   Head: Normocephalic  Right Ear: External ear normal    Left Ear: External ear normal    Mouth/Throat: Oropharynx is clear and moist    Eyes: Pupils are equal, round, and reactive to light  Conjunctivae are normal    Neck: Neck supple  Cardiovascular: Normal rate and regular rhythm  Pulmonary/Chest: Effort normal and breath sounds normal    Abdominal: Soft  Bowel sounds are normal  She exhibits no distension  There is no tenderness  Musculoskeletal: Normal range of motion  She exhibits tenderness  Neurological: She is alert and oriented to person, place, and time  Skin: Skin is warm and dry  Psychiatric: She has a normal mood and affect  Follow up at this office in 2 weeks     Counseling / Coordination of Care  Total floor / unit time spent today 20 minutes  Greater than 50% of total time was spent with the patient and / or family counseling and / or coordination of care

## 2018-11-29 DIAGNOSIS — M51.36 DDD (DEGENERATIVE DISC DISEASE), LUMBAR: ICD-10-CM

## 2018-11-29 DIAGNOSIS — M51.36 DDD (DEGENERATIVE DISC DISEASE), LUMBAR: Primary | ICD-10-CM

## 2018-11-30 ENCOUNTER — TELEPHONE (OUTPATIENT)
Dept: FAMILY MEDICINE CLINIC | Facility: CLINIC | Age: 38
End: 2018-11-30

## 2018-12-01 ENCOUNTER — APPOINTMENT (EMERGENCY)
Dept: CT IMAGING | Facility: HOSPITAL | Age: 38
End: 2018-12-01
Payer: COMMERCIAL

## 2018-12-01 ENCOUNTER — HOSPITAL ENCOUNTER (EMERGENCY)
Facility: HOSPITAL | Age: 38
Discharge: HOME | End: 2018-12-01
Attending: EMERGENCY MEDICINE
Payer: COMMERCIAL

## 2018-12-01 VITALS
HEART RATE: 81 BPM | SYSTOLIC BLOOD PRESSURE: 117 MMHG | BODY MASS INDEX: 18.64 KG/M2 | OXYGEN SATURATION: 99 % | TEMPERATURE: 98.7 F | HEIGHT: 66 IN | WEIGHT: 116 LBS | DIASTOLIC BLOOD PRESSURE: 64 MMHG | RESPIRATION RATE: 18 BRPM

## 2018-12-01 DIAGNOSIS — N20.0 RENAL CALCULI: Primary | ICD-10-CM

## 2018-12-01 LAB
BILIRUB UR QL STRIP: NEGATIVE
CLARITY UR: NORMAL
COLOR UR: YELLOW
EXT PREG TEST URINE: NEGATIVE
GLUCOSE UR STRIP-MCNC: NEGATIVE MG/DL
HGB UR QL STRIP.AUTO: NEGATIVE
KETONES UR STRIP-MCNC: NEGATIVE MG/DL
LEUKOCYTE ESTERASE UR QL STRIP: NEGATIVE
NITRITE UR QL STRIP: NEGATIVE
PH UR STRIP.AUTO: 7.5 [PH] (ref 4.5–8)
PROT UR STRIP-MCNC: NEGATIVE MG/DL
SP GR UR STRIP.AUTO: 1.01 (ref 1–1.03)
UROBILINOGEN UR QL STRIP.AUTO: 0.2 E.U./DL

## 2018-12-01 PROCEDURE — 99284 EMERGENCY DEPT VISIT MOD MDM: CPT

## 2018-12-01 PROCEDURE — 81003 URINALYSIS AUTO W/O SCOPE: CPT | Performed by: PHYSICIAN ASSISTANT

## 2018-12-01 PROCEDURE — 74176 CT ABD & PELVIS W/O CONTRAST: CPT

## 2018-12-01 PROCEDURE — 81025 URINE PREGNANCY TEST: CPT | Performed by: PHYSICIAN ASSISTANT

## 2018-12-01 NOTE — ED PROVIDER NOTES
History  Chief Complaint   Patient presents with    Back Pain     Had outpatient x-ray on wednesday  Received a call  yesterday which notified her she had a kidney stone  Patient presents to the emergency department today offering a chief complaint bilateral back pain  She has been experiencing this back pain for many months  Saw primary care about a month ago for urinary symptoms and completed an unknown antibiotic  Then followed with another primary care this week and had thoracic and lumbar x-rays ordered  Lumbar x-rays normal   Thoracic x-rays revealed a 3 mm radiopaque density possibly in the area of the renal system  She was phone this and told that she had a kidney stone  She presents here for medication to dissolve her kidney stone  No history of fever or chills  No abdominal pain  She does admit to difficulty with urination however denies vaginal bleeding or discharge  Prior to Admission Medications   Prescriptions Last Dose Informant Patient Reported? Taking?    Nerve Stimulator (STANDARD TENS) VICKY   No No   Sig: by Does not apply route 2 (two) times a day   Nerve Stimulator (TENS THERAPY REPLACE BACK PADS) MISC   No No   Si pad by Does not apply route 2 (two) times a day   Nerve Stimulator VICKY   No No   Sig: by Does not apply route 2 (two) times a day   Nerve Stimulator Supplies MISC   No No   Si pad by Does not apply route 2 (two) times a day   buprenorphine-naloxone (SUBOXONE) 2-0 5 mg per SL tablet   Yes No   Sig: Place 0 5 tablets under the tongue daily   busPIRone (BUSPAR) 10 mg tablet   Yes No   Sig: Take 10 mg by mouth 2 (two) times a day   promethazine (PHENERGAN) 50 MG tablet   Yes No   Sig: TAKE 1 TO 2 TABS AT BEDTIME AS NEEDED FOR INSOMNIA   topiramate (TOPAMAX) 50 MG tablet   Yes No   Sig: TAKE 1 TABLET BY MOUTH EVERY DAY FOR HEADACHE/SLEEP      Facility-Administered Medications: None       Past Medical History:   Diagnosis Date    Back pain     Chronic constipation 6/27/2016    Infectious viral hepatitis     Lyme disease        Past Surgical History:   Procedure Laterality Date    APPENDECTOMY      CLAVICLE SURGERY      KNEE ARTHROSCOPY      MULTIPLE TOOTH EXTRACTIONS      CA COLONOSCOPY FLX DX W/COLLJ SPEC WHEN PFRMD N/A 6/27/2016    Procedure: COLONOSCOPY;  Surgeon: Britton Rivera MD;  Location: [facility];  Service: Colorectal    TONSILLECTOMY AND ADENOIDECTOMY      TUBAL LIGATION         History reviewed  No pertinent family history  I have reviewed and agree with the history as documented  Social History   Substance Use Topics    Smoking status: Current Every Day Smoker     Packs/day: 0 50     Types: Cigarettes    Smokeless tobacco: Never Used    Alcohol use Yes      Comment: social        Review of Systems   Constitutional: Negative  HENT: Negative  Eyes: Negative  Respiratory: Negative  Gastrointestinal: Negative  Endocrine: Negative  Genitourinary: Positive for difficulty urinating  Negative for decreased urine volume, dyspareunia, dysuria, enuresis, flank pain, frequency, genital sores, hematuria, menstrual problem, pelvic pain, urgency, vaginal bleeding, vaginal discharge and vaginal pain  Musculoskeletal: Positive for back pain  Negative for arthralgias, gait problem, joint swelling, myalgias, neck pain and neck stiffness  Skin: Negative  Allergic/Immunologic: Negative  Neurological: Negative  Hematological: Negative  Psychiatric/Behavioral: Negative  All other systems reviewed and are negative  Physical Exam  Physical Exam   Constitutional: She is oriented to person, place, and time  She appears well-developed and well-nourished  No distress  Eyes: Pupils are equal, round, and reactive to light  Neck: Normal range of motion  Cardiovascular: Normal rate, regular rhythm, normal heart sounds and intact distal pulses  Exam reveals no gallop and no friction rub      No murmur heard   Pulmonary/Chest: Effort normal and breath sounds normal  No respiratory distress  She has no wheezes  She has no rales  She exhibits no tenderness  Abdominal: Soft  Bowel sounds are normal  She exhibits no distension and no mass  There is no tenderness  There is no rebound and no guarding  No hernia  Musculoskeletal: She exhibits tenderness  She exhibits no edema or deformity  Patient exhibits generalized bilateral thoracic and lumbar tenderness   Neurological: She is alert and oriented to person, place, and time  Skin: Skin is warm  Capillary refill takes less than 2 seconds  She is not diaphoretic  Psychiatric: She has a normal mood and affect  Vitals reviewed        Vital Signs  ED Triage Vitals [12/01/18 1330]   Temperature Pulse Respirations Blood Pressure SpO2   98 7 °F (37 1 °C) 81 18 117/64 99 %      Temp Source Heart Rate Source Patient Position - Orthostatic VS BP Location FiO2 (%)   Temporal Monitor -- -- --      Pain Score       Worst Possible Pain           Vitals:    12/01/18 1330   BP: 117/64   Pulse: 81       Visual Acuity      ED Medications  Medications - No data to display    Diagnostic Studies  Results Reviewed     Procedure Component Value Units Date/Time    UA w Reflex to Microscopic [[id number]] Collected:  12/01/18 1401    Lab Status:  Final result Specimen:  Urine from Urine, Clean Catch Updated:  12/01/18 1408     Color, UA Yellow     Clarity, UA Slightly Cloudy     Specific [place], UA 1 010     pH, UA 7 5     Leukocytes, UA Negative     Nitrite, UA Negative     Protein, UA Negative mg/dl      Glucose, UA Negative mg/dl      Ketones, UA Negative mg/dl      Urobilinogen, UA 0 2 E U /dl      Bilirubin, UA Negative     Blood, UA Negative    POCT pregnancy, urine [[id number]]  (Normal) Resulted:  12/01/18 1403    Lab Status:  Final result Updated:  12/01/18 1403     EXT PREG TEST UR (Ref: Negative) Negative                 CT renal stone study abdomen pelvis wo contrast Final Result by Helen Yeager MD (12/01 1459)      No acute pathology  Tiny nonobstructing left renal calculus  Workstation performed: [id number]                    Procedures  Procedures       Phone Contacts  ED Phone Contact    ED Course  ED Course as of Dec 01 1510   Sat Dec 01, 2018   1404 PREGNANCY TEST URINE: Negative   1404 Blood Pressure: 117/64   1404 Temperature: 98 7 °F (37 1 °C)   1404 Pulse: 81   1405 Respirations: 18   1405 SpO2: 99 %   1437 PREGNANCY TEST URINE: Negative   1437 Blood, UA: Negative   1437 SL AMB POCT UROBILINOGEN: 0 2   1437 Glucose, UA: Negative   1437 Leukocytes, UA: Negative   1437 SL AMB SPECIFIC GRAVITY_URINE: 1 010                               MDM  CritCare Time    Disposition  Final diagnoses:   Renal calculi - Nonobstructing     Time reflects when diagnosis was documented in both MDM as applicable and the Disposition within this note     Time User Action Codes Description Comment    12/1/2018  3:09 PM Miah PALAFOX Add [N20 0] Renal calculi     12/1/2018  3:09 PM Miah PALAFOX Modify [N20 0] Renal calculi Nonobstructing      ED Disposition     ED Disposition Condition Comment    Discharge  Minnie Phillips discharge to home/self care  Condition at discharge: Good        Follow-up Information     Follow up With Specialties Details Why [address], [address] [facility], Nurse Practitioner Schedule an appointment as soon as possible for a visit  [facility]      Ryder Crabtree MD Urology Schedule an appointment as soon as possible for a visit As needed [address]  [facility] [address]  [phone]            Patient's Medications   Discharge Prescriptions    No medications on file     No discharge procedures on file      ED Provider  Electronically Signed by           Xander Lyn PA-C  12/01/18 1510

## 2018-12-06 ENCOUNTER — EVALUATION (OUTPATIENT)
Dept: PHYSICAL THERAPY | Facility: MEDICAL CENTER | Age: 38
End: 2018-12-06
Payer: COMMERCIAL

## 2018-12-06 DIAGNOSIS — M54.6 BILATERAL THORACIC BACK PAIN, UNSPECIFIED CHRONICITY: Primary | ICD-10-CM

## 2018-12-06 PROCEDURE — 97161 PT EVAL LOW COMPLEX 20 MIN: CPT

## 2018-12-06 PROCEDURE — G8991 OTHER PT/OT GOAL STATUS: HCPCS

## 2018-12-06 PROCEDURE — G8990 OTHER PT/OT CURRENT STATUS: HCPCS

## 2018-12-06 PROCEDURE — 97110 THERAPEUTIC EXERCISES: CPT
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
## 2018-12-12 ENCOUNTER — OFFICE VISIT (OUTPATIENT)
Dept: PHYSICAL THERAPY | Facility: MEDICAL CENTER | Age: 38
End: 2018-12-12
Payer: COMMERCIAL

## 2018-12-12 DIAGNOSIS — M54.6 BILATERAL THORACIC BACK PAIN, UNSPECIFIED CHRONICITY: Primary | ICD-10-CM

## 2018-12-12 PROCEDURE — 97110 THERAPEUTIC EXERCISES: CPT

## 2018-12-12 NOTE — PROGRESS NOTES
Daily Note     Today's date: 2018  Patient name: Artis Dumont  : 1980  MRN: [id number]  Referring provider: BELLE Morin  Dx:   Encounter Diagnosis     ICD-10-CM    1  Bilateral thoracic back pain, unspecified chronicity M54 6        Start Time: 1300  Stop Time: 1346  Total time in clinic (min): 46 minutes    Subjective: Pt c/o 5/10 Scapular and LBP today  Objective: See treatment diary below      Assessment: Tolerated treatment fair  Verbal and demonstrative cues for proper there ex performance  Issued handouts and reviewed with pt for HEP  Pt brought in home TENS unit and was instructed in proper electrode positioning for most effective relief  Patient would benefit from continued PT      Plan: Continue per plan of care         Precautions: hypermobility    Daily Treatment Diary         Exercise Diary        SKTC X 5 5x5"      Cat/ camel quadraped x5 x7      quadraped extremity extension on extremity at a time X 3 x5      Bridges  10 x 10 sec hold 15 x5"      Abdominal curls X 5 x7      thera band pulls Yellow x 30 Yellow x20

## 2018-12-19 ENCOUNTER — OFFICE VISIT (OUTPATIENT)
Dept: PHYSICAL THERAPY | Facility: MEDICAL CENTER | Age: 38
End: 2018-12-19
Payer: COMMERCIAL

## 2018-12-19 ENCOUNTER — OFFICE VISIT (OUTPATIENT)
Dept: FAMILY MEDICINE CLINIC | Facility: CLINIC | Age: 38
End: 2018-12-19

## 2018-12-19 VITALS
RESPIRATION RATE: 18 BRPM | WEIGHT: 115 LBS | DIASTOLIC BLOOD PRESSURE: 60 MMHG | SYSTOLIC BLOOD PRESSURE: 108 MMHG | HEIGHT: 66 IN | OXYGEN SATURATION: 98 % | TEMPERATURE: 98.3 F | HEART RATE: 78 BPM | BODY MASS INDEX: 18.48 KG/M2

## 2018-12-19 DIAGNOSIS — M54.6 BILATERAL THORACIC BACK PAIN, UNSPECIFIED CHRONICITY: Primary | ICD-10-CM

## 2018-12-19 DIAGNOSIS — M54.50 ACUTE BILATERAL LOW BACK PAIN WITHOUT SCIATICA: Primary | ICD-10-CM

## 2018-12-19 PROCEDURE — T1015 CLINIC SERVICE: HCPCS | Performed by: FAMILY MEDICINE

## 2018-12-19 PROCEDURE — 97110 THERAPEUTIC EXERCISES: CPT

## 2018-12-19 RX ORDER — LIDOCAINE 50 MG/G
1 PATCH TOPICAL DAILY
Qty: 30 PATCH | Refills: 0 | Status: SHIPPED | OUTPATIENT
Start: 2018-12-19 | End: 2021-04-19 | Stop reason: ALTCHOICE

## 2018-12-19 NOTE — PROGRESS NOTES
Daily Note     Today's date: 2018  Patient name: Katya Akers  : 1980  MRN: [id number]  Referring provider: BELLE Tinajero  Dx:   Encounter Diagnosis     ICD-10-CM    1  Bilateral thoracic back pain, unspecified chronicity M54 6        Start Time: 1107  Stop Time: 1140  Total time in clinic (min): 33 minutes    Subjective: I don't know why I stand like this  Patient has been seen for 3 outpatient physical therapy visits since her initial evaluation on 2018  Objective: See treatment diary below  Her posture at times is extremely odd at times with hyperlordosis in lumbar area increased hip flexion and forward trunk?    Lordosis can be reduced to neutral in supine  She uses a TNS unit at home  Assessment: Tolerated treatment well and Tried lumbar support to try to reduce lumbar lordosis, atient statedthis made her more uncomfortable  Patient would benefit from continued PT and core stabilzation exercises  She does not exhibit guarding during motions or transfers  Tried back support without improved results    Plan: Continue per plan of care  Patient may benefit from evaluation by physiatry          Precautions: hypermobility    Daily Treatment Diary     Reassessment 2019    Exercise Diary       SKTC X 5 5x5" 5 x 5sec     Cat/ camel quadraped x5 x7 X 10     quadraped extremity extension on extremity at a time X 3 x5 X 6     Bridges  10 x 10 sec hold 15 x5" 20 x 5     Abdominal curls X 5 x7 X 10     thera band pulls Yellow x 30 Yellow x20 Yellow x 30     DKTC   5 x 5 sec     Wall posture   10 sec

## 2018-12-19 NOTE — PROGRESS NOTES
OFFICE VISIT  Krysta Barahona 41 y o  female MRN: [id number]      Assessment / Plan:  Diagnoses and all orders for this visit:    Acute bilateral low back pain without sciatica  -     lidocaine (LIDODERM) 5 %; Apply 1 patch topically daily Remove & Discard patch within 12 hours or as directed by MD  -     Ambulatory referral to Physical Medicine Rehab; Future          Reason For Visit / Chief Complaint  Chief Complaint   Patient presents with    Follow-up        HPI:  Krysta Barahona is a 41 y o  female who presents today for low back pain  She is using a tens unit daily, she has started PT, 3 sessions  Therapy includes exercise  She reports feeling worse  Xrays on 11/28 mild intervertebral disc space narrowing at L4/L5 ad L5 and S1  She is unable to stand straight, she has opted not to use nsaids, muscle relaxers  Historical Information   Past Medical History:   Diagnosis Date    Back pain     Chronic constipation 6/27/2016    Infectious viral hepatitis     Lyme disease      Past Surgical History:   Procedure Laterality Date    APPENDECTOMY      CLAVICLE SURGERY      KNEE ARTHROSCOPY      MULTIPLE TOOTH EXTRACTIONS      NE COLONOSCOPY FLX DX W/COLLJ SPEC WHEN PFRMD N/A 6/27/2016    Procedure: COLONOSCOPY;  Surgeon: Anabel Zambrano MD;  Location: [facility];  Service: Colorectal    TONSILLECTOMY AND ADENOIDECTOMY      TUBAL LIGATION       Social History   History   Alcohol Use    Yes     Comment: social     History   Drug Use No     History   Smoking Status    Current Every Day Smoker    Packs/day: 0 50    Types: Cigarettes   Smokeless Tobacco    Never Used     No family history on file      Meds/Allergies   Allergies   Allergen Reactions    Penicillins GI Intolerance     Other reaction(s): Unknown Reaction       Meds:    Current Outpatient Prescriptions:     buprenorphine-naloxone (SUBOXONE) 2-0 5 mg per SL tablet, Place 0 5 tablets under the tongue daily, Disp: , Rfl:     busPIRone (BUSPAR) 10 mg tablet, Take 10 mg by mouth 2 (two) times a day, Disp: , Rfl: 1    lidocaine (LIDODERM) 5 %, Apply 1 patch topically daily Remove & Discard patch within 12 hours or as directed by MD, Disp: 30 patch, Rfl: 0    Nerve Stimulator (STANDARD TENS) VICKY, by Does not apply route 2 (two) times a day, Disp: 1 Device, Rfl: 0    Nerve Stimulator (TENS THERAPY REPLACE BACK PADS) MISC, 30 pad by Does not apply route 2 (two) times a day, Disp: 30 each, Rfl: 0    Nerve Stimulator VICKY, by Does not apply route 2 (two) times a day, Disp: 1 each, Rfl: 0    Nerve Stimulator Supplies MISC, 30 pad by Does not apply route 2 (two) times a day, Disp: 30 pad, Rfl: 0    promethazine (PHENERGAN) 50 MG tablet, TAKE 1 TO 2 TABS AT BEDTIME AS NEEDED FOR INSOMNIA, Disp: , Rfl: 1    topiramate (TOPAMAX) 50 MG tablet, TAKE 1 TABLET BY MOUTH EVERY DAY FOR HEADACHE/SLEEP, Disp: , Rfl: 5      REVIEW OF SYSTEMS  Review of Systems   Constitutional: Negative for chills, fatigue and fever  HENT: Negative for congestion, ear discharge, ear pain, sore throat, trouble swallowing and voice change  Eyes: Negative for pain and redness  Respiratory: Negative for cough, chest tightness, shortness of breath and wheezing  Gastrointestinal: Negative for abdominal pain, blood in stool, constipation, diarrhea, nausea and vomiting  Endocrine: Negative for cold intolerance, heat intolerance, polydipsia, polyphagia and polyuria  Genitourinary: Negative for decreased urine volume, dysuria, frequency and urgency  Musculoskeletal: Positive for back pain and gait problem  Negative for arthralgias, myalgias and neck pain  Skin: Negative for color change and rash  Neurological: Negative for dizziness, syncope, weakness, light-headedness, numbness and headaches  Psychiatric/Behavioral: Negative for sleep disturbance and suicidal ideas  The patient is not nervous/anxious              Current Vitals:   Blood Pressure: 108/60 (12/19/18 1356)  Pulse: 78 (12/19/18 1356)  Temperature: 98 3 °F (36 8 °C) (12/19/18 1356)  Respirations: 18 (12/19/18 1356)  Height: 5' 6" (167 6 cm) (12/19/18 1356)  Weight - Scale: 52 2 kg (115 lb) (12/19/18 1356)  SpO2: 98 % (12/19/18 1356)  [unfilled]    PHYSICAL EXAMS:  Physical Exam   Constitutional: She is oriented to person, place, and time  She appears well-developed and well-nourished  HENT:   Head: Normocephalic  Right Ear: External ear normal    Left Ear: External ear normal    Mouth/Throat: Oropharynx is clear and moist    Eyes: Pupils are equal, round, and reactive to light  Conjunctivae are normal    Neck: Neck supple  Cardiovascular: Normal rate and regular rhythm  Pulmonary/Chest: Effort normal and breath sounds normal    Abdominal: Soft  Bowel sounds are normal  She exhibits no distension  There is tenderness  Musculoskeletal: Normal range of motion  Right shoulder: She exhibits tenderness  Neurological: She is alert and oriented to person, place, and time  Skin: Skin is warm and dry  Psychiatric: She has a normal mood and affect  Follow up at this office in one month     Counseling / Coordination of Care  Total floor / unit time spent today 20 minutes  Greater than 50% of total time was spent with the patient and / or family counseling and / or coordination of care

## 2018-12-19 NOTE — LETTER
Subjective: I don't know why I stand like this  Patient has been seen for 3 outpatient physical therapy visits since her initial evaluation on 12/6 2018  Objective: See treatment diary below  Her posture at times is extremely odd at times with hyperlordosis in lumbar area increased hip flexion and forward trunk?    Lordosis can be reduced to neutral in supine  She uses a TNS unit at home  Assessment: Tolerated treatment well and Tried lumbar support to try to reduce lumbar lordosis, atient statedthis made her more uncomfortable  Patient would benefit from continued PT and core stabilzation exercises  She does not exhibit guarding during motions or transfers  Tried back support without improved results    Plan: Continue per plan of care  Patient may benefit from evaluation by physiatry          Precautions: hypermobility    Daily Treatment Diary     Reassessment 1/5/2019    Exercise Diary  12/6 12/12 12/19     SKTC X 5 5x5" 5 x 5sec     Cat/ camel quadraped x5 x7 X 10     quadraped extremity extension on extremity at a time X 3 x5 X 6     Bridges  10 x 10 sec hold 15 x5" 20 x 5     Abdominal curls X 5 x7 X 10     thera band pulls Yellow x 30 Yellow x20 Yellow x 30     DKTC   5 x 5 sec     Wall posture   10 sec

## 2018-12-20 ENCOUNTER — OFFICE VISIT (OUTPATIENT)
Dept: PHYSICAL THERAPY | Facility: MEDICAL CENTER | Age: 38
End: 2018-12-20
Payer: COMMERCIAL

## 2018-12-20 DIAGNOSIS — M54.6 BILATERAL THORACIC BACK PAIN, UNSPECIFIED CHRONICITY: Primary | ICD-10-CM

## 2018-12-20 PROCEDURE — 97110 THERAPEUTIC EXERCISES: CPT

## 2018-12-20 NOTE — PROGRESS NOTES
Daily Note     Today's date: 2018  Patient name: Hal Baca  : 1980  MRN: [id number]  Referring provider: BELLE Daniels  Dx:   Encounter Diagnosis     ICD-10-CM    1  Bilateral thoracic back pain, unspecified chronicity M54 6                   Subjective: Pt reports she had appt with [facility] yesterday [facility]  Objective: See treatment diary below      Assessment: Tolerated treatment well  Patient exhibited good technique with therapeutic exercises and would benefit from continued PT      Plan: Continue per plan of care         Precautions: hypermobility    Daily Treatment Diary     Reassessment 2019    Exercise Diary      SKTC X 5 5x5" 5 x 5sec 5x5"    Cat/ camel quadraped x5 x7 X 10 x15    quadraped extremity extension on extremity at a time X 3 x5 X 6 x10    Bridges  10 x 10 sec hold 15 x5" 20 x 5 25 x 5"    Abdominal curls X 5 x7 X 10 15 x5"    thera band pulls Yellow x 30 Yellow x20 Yellow x 30 Stand x20  Seated x10    DKTC   5 x 5 sec 5x5"    Wall posture   10 sec 20"    Supine resisted clamshells    blk x10

## 2018-12-26 ENCOUNTER — OFFICE VISIT (OUTPATIENT)
Dept: PHYSICAL THERAPY | Facility: MEDICAL CENTER | Age: 38
End: 2018-12-26
Payer: COMMERCIAL

## 2018-12-26 DIAGNOSIS — M54.6 BILATERAL THORACIC BACK PAIN, UNSPECIFIED CHRONICITY: Primary | ICD-10-CM

## 2018-12-26 PROCEDURE — 97110 THERAPEUTIC EXERCISES: CPT

## 2018-12-26 NOTE — PROGRESS NOTES
Daily Note     Today's date: 2018  Patient name: Chris Boyer  : 1980  MRN: [id number]  Referring provider: Darell Lennox, CRNP  Dx:   Encounter Diagnosis     ICD-10-CM    1  Bilateral thoracic back pain, unspecified chronicity M54 6                   Subjective: Pt c/o 6/10 LBP at present  States she plans to schedule with chiropractor  after the holidays  Reports no noticed  improvement with PT to date except for some decreased curve in LB in supine  Denies using TENS during short shifts at work due to difficulty with electrodes staying on  Objective: See treatment diary below      Assessment: Tolerated treatment well  Patient exhibited good technique with therapeutic exercises and would benefit from continued PT      Plan: Continue per plan of care             Precautions: hypermobility    Daily Treatment Diary     Reassessment 2019    Exercise Diary     SKTC X 5 5x5" 5 x 5sec 5x5" 5x5"   Cat/ camel quadraped x5 x7 X 10 x15 x20   quadraped extremity extension on extremity at a time X 3 x5 X 6 x10 x15 ea   Bridges  10 x 10 sec hold 15 x5" 20 x 5 25 x 5" 30 x5"   Abdominal curls X 5 x7 X 10 15 x5" 20 x5"   thera band pulls Yellow x 30 Yellow x20 Yellow x 30 Stand x20  Seated x10 Yellow x20  Red x10   DKTC   5 x 5 sec 5x5" 5x5"   Wall posture   10 sec 20" 30"   Supine resisted clamshells    blk x10 blk x15

## 2018-12-31 ENCOUNTER — APPOINTMENT (OUTPATIENT)
Dept: PHYSICAL THERAPY | Facility: MEDICAL CENTER | Age: 38
End: 2018-12-31
Payer: COMMERCIAL

## 2019-01-02 ENCOUNTER — OFFICE VISIT (OUTPATIENT)
Dept: FAMILY MEDICINE CLINIC | Facility: CLINIC | Age: 39
End: 2019-01-02
Payer: COMMERCIAL

## 2019-01-02 ENCOUNTER — OFFICE VISIT (OUTPATIENT)
Dept: PHYSICAL THERAPY | Facility: MEDICAL CENTER | Age: 39
End: 2019-01-02
Payer: COMMERCIAL

## 2019-01-02 VITALS
HEIGHT: 66 IN | TEMPERATURE: 98 F | DIASTOLIC BLOOD PRESSURE: 72 MMHG | OXYGEN SATURATION: 98 % | SYSTOLIC BLOOD PRESSURE: 118 MMHG | RESPIRATION RATE: 18 BRPM | HEART RATE: 79 BPM | BODY MASS INDEX: 18.48 KG/M2 | WEIGHT: 115 LBS

## 2019-01-02 DIAGNOSIS — G89.29 CHRONIC BILATERAL LOW BACK PAIN WITHOUT SCIATICA: Primary | ICD-10-CM

## 2019-01-02 DIAGNOSIS — M54.6 BILATERAL THORACIC BACK PAIN, UNSPECIFIED CHRONICITY: Primary | ICD-10-CM

## 2019-01-02 DIAGNOSIS — M54.50 CHRONIC BILATERAL LOW BACK PAIN WITHOUT SCIATICA: Primary | ICD-10-CM

## 2019-01-02 PROCEDURE — 97110 THERAPEUTIC EXERCISES: CPT

## 2019-01-02 PROCEDURE — T1015 CLINIC SERVICE: HCPCS | Performed by: FAMILY MEDICINE

## 2019-01-02 RX ORDER — LACTULOSE 10 G/15ML
SOLUTION ORAL
Refills: 5 | COMMUNITY
Start: 2018-12-13 | End: 2021-06-08 | Stop reason: ALTCHOICE

## 2019-01-02 NOTE — PROGRESS NOTES
OFFICE VISIT  Ramy Melendez 41 y o  female MRN: [id number]      Assessment / Plan:  Diagnoses and all orders for this visit:    Chronic bilateral low back pain without sciatica  -     MRI thoracic spine wo contrast; Future  -     MRI lumbar spine wo contrast; Future          Reason For Visit / Chief Complaint  Chief Complaint   Patient presents with    Follow-up     No changes  She states she will be seeing a chiropractor on Monday  HPI:  Ramy Melendez is a 41 y o  female who presents today for follow up  She has had gradual low back pain, she reports having upper and lower back pain, shooting, has gotten worse over the last week  She has attempted PT, but has been having middle back spasms  She has been attempting exercises if which has worsening the pain  She has tried the tens unit, nsaids, without relief  She is unable to stand straight  She will be seeing chiropractor next week  Historical Information   Past Medical History:   Diagnosis Date    Back pain     Chronic constipation 6/27/2016    Infectious viral hepatitis     Lyme disease      Past Surgical History:   Procedure Laterality Date    APPENDECTOMY      CLAVICLE SURGERY      KNEE ARTHROSCOPY      MULTIPLE TOOTH EXTRACTIONS      ID COLONOSCOPY FLX DX W/COLLJ SPEC WHEN PFRMD N/A 6/27/2016    Procedure: COLONOSCOPY;  Surgeon: Aaron Vitale MD;  Location: [facility];  Service: Colorectal    TONSILLECTOMY AND ADENOIDECTOMY      TUBAL LIGATION       Social History   History   Alcohol Use    Yes     Comment: social     History   Drug Use No     History   Smoking Status    Current Every Day Smoker    Packs/day: 0 50    Types: Cigarettes   Smokeless Tobacco    Never Used     No family history on file      Meds/Allergies   Allergies   Allergen Reactions    Penicillins GI Intolerance     Other reaction(s): Unknown Reaction       Meds:    Current Outpatient Prescriptions:     buprenorphine-naloxone (SUBOXONE) 2-0 5 mg per SL tablet, Place 0 5 tablets under the tongue daily, Disp: , Rfl:     busPIRone (BUSPAR) 10 mg tablet, Take 10 mg by mouth 2 (two) times a day, Disp: , Rfl: 1    lidocaine (LIDODERM) 5 %, Apply 1 patch topically daily Remove & Discard patch within 12 hours or as directed by MD, Disp: 30 patch, Rfl: 0    Nerve Stimulator (STANDARD TENS) VICKY, by Does not apply route 2 (two) times a day, Disp: 1 Device, Rfl: 0    Nerve Stimulator (TENS THERAPY REPLACE BACK PADS) MISC, 30 pad by Does not apply route 2 (two) times a day, Disp: 30 each, Rfl: 0    Nerve Stimulator VICKY, by Does not apply route 2 (two) times a day, Disp: 1 each, Rfl: 0    Nerve Stimulator Supplies MISC, 30 pad by Does not apply route 2 (two) times a day, Disp: 30 pad, Rfl: 0    promethazine (PHENERGAN) 50 MG tablet, TAKE 1 TO 2 TABS AT BEDTIME AS NEEDED FOR INSOMNIA, Disp: , Rfl: 1    topiramate (TOPAMAX) 50 MG tablet, TAKE 1 TABLET BY MOUTH EVERY DAY FOR HEADACHE/SLEEP, Disp: , Rfl: 5      REVIEW OF SYSTEMS  Review of Systems   Constitutional: Negative for chills, fatigue and fever  HENT: Negative for congestion, ear discharge, ear pain, sore throat, trouble swallowing and voice change  Eyes: Negative for pain and redness  Respiratory: Negative for cough, chest tightness, shortness of breath and wheezing  Gastrointestinal: Negative for abdominal pain, blood in stool, constipation, diarrhea, nausea and vomiting  Endocrine: Negative for cold intolerance, heat intolerance, polydipsia, polyphagia and polyuria  Genitourinary: Negative for decreased urine volume, dysuria, frequency and urgency  Musculoskeletal: Positive for back pain and gait problem  Negative for arthralgias, myalgias and neck pain  Skin: Negative for color change and rash  Neurological: Negative for dizziness, syncope, weakness, light-headedness, numbness and headaches  Psychiatric/Behavioral: Negative for sleep disturbance and suicidal ideas  The patient is not nervous/anxious  Current Vitals:   Blood Pressure: 118/72 (01/02/19 1316)  Pulse: 79 (01/02/19 1316)  Temperature: 98 °F (36 7 °C) (01/02/19 1316)  Respirations: 18 (01/02/19 1316)  Height: 5' 6" (167 6 cm) (01/02/19 1316)  Weight - Scale: 52 2 kg (115 lb) (01/02/19 1316)  SpO2: 98 % (01/02/19 1316)  [unfilled]    PHYSICAL EXAMS:  Physical Exam   Constitutional: She is oriented to person, place, and time  She appears well-developed and well-nourished  HENT:   Head: Normocephalic  Right Ear: External ear normal    Left Ear: External ear normal    Mouth/Throat: Oropharynx is clear and moist    Eyes: Pupils are equal, round, and reactive to light  Conjunctivae are normal    Neck: Neck supple  Cardiovascular: Normal rate and regular rhythm  Pulmonary/Chest: Effort normal and breath sounds normal    Abdominal: Soft  Bowel sounds are normal  She exhibits no distension  There is no tenderness  Musculoskeletal: Normal range of motion  She exhibits tenderness  Right shoulder: She exhibits tenderness  Neurological: She is alert and oriented to person, place, and time  Skin: Skin is warm and dry  Psychiatric: She has a normal mood and affect  Follow up at this office in one month    Counseling / Coordination of Care  Total floor / unit time spent today 20 minutes  Greater than 50% of total time was spent with the patient and / or family counseling and / or coordination of care

## 2019-01-02 NOTE — PROGRESS NOTES
Daily Note     Today's date: 2019  Patient name: Melissa Ramos  : 1980  MRN: [id number]  Referring provider: BELLE Polanco  Dx:   Encounter Diagnosis     ICD-10-CM    1  Bilateral thoracic back pain, unspecified chronicity M54 6                   Subjective: Has seen MD today  Referral to chiropractic and physiatry      Objective: See treatment diary below      Assessment: Tolerated treatment fair and poor proprioceptive ability  Cannot perform a plank  Patient demonstrated fatigue post treatment, would benefit from continued PT and needs constant verbal and tactile cueing to perform exercises correctly      Plan: Continue per plan of care  Progress treatment as tolerated          Reassessment 2019    Exercise Diary  2019       SKTC X 5       Cat/ camel quadraped x22       quadraped extremity extension on extremity at a time X 16       Bridges  30 x 10 sec hold       Abdominal curls 21 x 5 sec hold       thera band pulls Red x 30       DKTC 5 x 5 sec       Wall posture 35 sec       Supine resisted clamshells blk x 20       quadraped alt        plank 2 seconds

## 2019-01-09 ENCOUNTER — EVALUATION (OUTPATIENT)
Dept: PHYSICAL THERAPY | Facility: MEDICAL CENTER | Age: 39
End: 2019-01-09
Payer: COMMERCIAL

## 2019-01-09 DIAGNOSIS — M54.6 BILATERAL THORACIC BACK PAIN, UNSPECIFIED CHRONICITY: Primary | ICD-10-CM

## 2019-01-09 PROCEDURE — G8990 OTHER PT/OT CURRENT STATUS: HCPCS

## 2019-01-09 PROCEDURE — 97110 THERAPEUTIC EXERCISES: CPT

## 2019-01-09 PROCEDURE — G8991 OTHER PT/OT GOAL STATUS: HCPCS
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
## 2019-01-16 ENCOUNTER — OFFICE VISIT (OUTPATIENT)
Dept: PHYSICAL THERAPY | Facility: MEDICAL CENTER | Age: 39
End: 2019-01-16
Payer: COMMERCIAL

## 2019-01-16 DIAGNOSIS — M54.6 BILATERAL THORACIC BACK PAIN, UNSPECIFIED CHRONICITY: Primary | ICD-10-CM

## 2019-01-16 PROCEDURE — 97110 THERAPEUTIC EXERCISES: CPT

## 2019-01-16 NOTE — PROGRESS NOTES
Daily Note     Today's date: 2019  Patient name: Ariel Em  : 1980  MRN: [id number]  Referring provider: BELLE Dominguez  Dx:   Encounter Diagnosis     ICD-10-CM    1  Bilateral thoracic back pain, unspecified chronicity M54 6                   Subjective: Pt denies LBP today  States her back feels good even after working long hours past few days  Objective: See treatment diary below      Assessment: Tolerated treatment well  Improved core control noted with ex today  Patient exhibited good technique with therapeutic exercises and would benefit from continued PT      Plan: Continue per plan of care         Reassessment 2019    Exercise Diary  2019     SKTC X 5 X 5 5x5 sec     Cat/ camel quadraped x22 X 23 x25     quadraped extremity extension on extremity at a time X 16 X 17 x18'     Bridges  30 x 10 sec hold 30 x 10 sec hold 30 x10 sec  Hold      Abdominal curls 21 x 5 sec hold 22 x 5 sec 25 x5 sec     thera band pulls Red x 30 Red x 30 Red x30     DKTC 5 x 5 sec 5 x 5sec 5x5 sec     Wall posture 35 sec  1'     Supine resisted clamshells blk x 20 blk x 25 blk x30     quadraped alt  3 reps 4 reps     Plank on hands 2 seconds 10 sec 15 sec

## 2019-01-23 ENCOUNTER — OFFICE VISIT (OUTPATIENT)
Dept: PHYSICAL THERAPY | Facility: MEDICAL CENTER | Age: 39
End: 2019-01-23
Payer: COMMERCIAL

## 2019-01-23 DIAGNOSIS — M54.6 BILATERAL THORACIC BACK PAIN, UNSPECIFIED CHRONICITY: Primary | ICD-10-CM

## 2019-01-23 PROCEDURE — 97110 THERAPEUTIC EXERCISES: CPT

## 2019-01-23 NOTE — PROGRESS NOTES
Daily Note     Today's date: 2019  Patient name: Ivana Salcedo  : 1980  MRN: [id number]  Referring provider: BELLE Berger  Dx:   Encounter Diagnosis     ICD-10-CM    1  Bilateral thoracic back pain, unspecified chronicity M54 6                   Subjective: Pt continues to report noticed improvement, decreased pain LB  States she has had significant pain relief since 1/15  Objective: See treatment diary below      Assessment: Tolerated treatment well  Patient exhibited good technique with therapeutic exercises      Plan: Potential discharge next visit  As per conversation with Physical Therapist and pt      Reassessment 2019    Exercise Diary  2019    SKTC X 5 X 5 5x5 sec 5x5"    Cat/ camel quadraped x22 X 23 x25 x30    quadraped extremity extension on extremity at a time X 16 X 17 x18' ----    Bridges  30 x 10 sec hold 30 x 10 sec hold 30 x10 sec  Hold  30 x10"    Abdominal curls 21 x 5 sec hold 22 x 5 sec 25 x5 sec 30 x 5 sec    thera band pulls Red x 30 Red x 30 Red x30 green    DKTC 5 x 5 sec 5 x 5sec 5x5 sec 5x5 sec    Wall posture 35 sec  1'     Supine resisted clamshells blk x 20 blk x 25 blk x30 blk x30    quadraped alt  3 reps 4 reps X 7 reps    Plank on hands 2 seconds 10 sec 15 sec x25 sec

## 2019-01-30 ENCOUNTER — OFFICE VISIT (OUTPATIENT)
Dept: PHYSICAL THERAPY | Facility: MEDICAL CENTER | Age: 39
End: 2019-01-30
Payer: COMMERCIAL

## 2019-01-30 DIAGNOSIS — M54.6 BILATERAL THORACIC BACK PAIN, UNSPECIFIED CHRONICITY: Primary | ICD-10-CM

## 2019-01-30 PROCEDURE — 97110 THERAPEUTIC EXERCISES: CPT

## 2019-01-30 NOTE — PROGRESS NOTES
Daily Note     Today's date: 2019  Patient name: Jennifer Coles  : 1980  MRN: [id number]  Referring provider: BELLE Irvin  Dx:   Encounter Diagnosis     ICD-10-CM    1  Bilateral thoracic back pain, unspecified chronicity M54 6                   Subjective: Pt continues to deny LBP today  Reports today will be her last day of Physical Therapy as discussed last PT session  Objective: See treatment diary below      Assessment: Tolerated treatment well  Patient exhibited good technique with therapeutic exercises and significant improved posture noted with ex performance        Plan: Pt D/C'd from PT today as discussed last session with Patient and Physical Therapist       Reassessment 2019    Exercise Diary  2019   SKTC X 5 X 5 5x5 sec 5x5" 5x5" ea   Cat/ camel quadraped x22 X 23 x25 x30 x30   quadraped extremity extension on extremity at a time X 16 X 17 x18' ---- ----   Bridges  30 x 10 sec hold 30 x 10 sec hold 30 x10 sec  Hold  30 x10" 30 x10"   Abdominal curls 21 x 5 sec hold 22 x 5 sec 25 x5 sec 30 x 5 sec 30 x  5 sec   thera band pulls Red x 30 Red x 30 Red x30 Green x30 Green x30   DKTC 5 x 5 sec 5 x 5sec 5x5 sec 5x5 sec 5 x5 sec   Wall posture 35 sec  1' 1' 1'   Supine resisted clamshells blk x 20 blk x 25 blk x30 blk x30 blk x30    quadraped alt  3 reps 4 reps X 7 reps x8 reps   Plank on hands 2 seconds 10 sec 15 sec x25 sec x25 sec

## 2019-02-05 DIAGNOSIS — M54.6 ACUTE BILATERAL THORACIC BACK PAIN: ICD-10-CM

## 2019-02-05 DIAGNOSIS — M51.36 DDD (DEGENERATIVE DISC DISEASE), LUMBAR: Primary | ICD-10-CM

## 2019-02-28 DIAGNOSIS — M54.6 THORACIC SPINE PAIN: Primary | ICD-10-CM

## 2019-02-28 DIAGNOSIS — M51.36 DEGENERATION OF LUMBAR INTERVERTEBRAL DISC: ICD-10-CM

## 2019-04-17 ENCOUNTER — TRANSCRIBE ORDERS (OUTPATIENT)
Dept: ADMINISTRATIVE | Facility: HOSPITAL | Age: 39
End: 2019-04-17

## 2019-04-17 ENCOUNTER — APPOINTMENT (OUTPATIENT)
Dept: LAB | Facility: HOSPITAL | Age: 39
End: 2019-04-17
Payer: COMMERCIAL

## 2019-04-17 DIAGNOSIS — G47.00 INSOMNIA, UNSPECIFIED TYPE: ICD-10-CM

## 2019-04-17 DIAGNOSIS — Z72.0 TOBACCO USE: ICD-10-CM

## 2019-04-17 DIAGNOSIS — F15.11 HISTORY OF METHAMPHETAMINE ABUSE (HCC): ICD-10-CM

## 2019-04-17 DIAGNOSIS — F15.11 HISTORY OF METHAMPHETAMINE ABUSE (HCC): Primary | ICD-10-CM

## 2019-04-17 DIAGNOSIS — F11.29 OPIOID DEPENDENCE WITH OPIOID-INDUCED DISORDER (HCC): ICD-10-CM

## 2019-04-17 LAB
LIPASE SERPL-CCNC: 135 U/L (ref 73–393)
TSH SERPL DL<=0.05 MIU/L-ACNC: 1.29 UIU/ML (ref 0.36–3.74)

## 2019-04-17 PROCEDURE — 83036 HEMOGLOBIN GLYCOSYLATED A1C: CPT

## 2019-04-17 PROCEDURE — 82784 ASSAY IGA/IGD/IGG/IGM EACH: CPT

## 2019-04-17 PROCEDURE — 83516 IMMUNOASSAY NONANTIBODY: CPT

## 2019-04-17 PROCEDURE — 86255 FLUORESCENT ANTIBODY SCREEN: CPT

## 2019-04-17 PROCEDURE — 83690 ASSAY OF LIPASE: CPT

## 2019-04-17 PROCEDURE — 36415 COLL VENOUS BLD VENIPUNCTURE: CPT

## 2019-04-17 PROCEDURE — 84443 ASSAY THYROID STIM HORMONE: CPT

## 2019-04-18 LAB
EST. AVERAGE GLUCOSE BLD GHB EST-MCNC: 100 MG/DL
HBA1C MFR BLD: 5.1 % (ref 4.2–6.3)

## 2019-04-19 LAB
ENDOMYSIUM IGA SER QL: NEGATIVE
GLIADIN PEPTIDE IGA SER-ACNC: 4 UNITS (ref 0–19)
GLIADIN PEPTIDE IGG SER-ACNC: 4 UNITS (ref 0–19)
IGA SERPL-MCNC: 155 MG/DL (ref 87–352)
TTG IGA SER-ACNC: <2 U/ML (ref 0–3)
TTG IGG SER-ACNC: <2 U/ML (ref 0–5)

## 2019-04-26 ENCOUNTER — HOSPITAL ENCOUNTER (OUTPATIENT)
Dept: RADIOLOGY | Facility: HOSPITAL | Age: 39
Discharge: HOME | End: 2019-04-26
Payer: COMMERCIAL

## 2019-04-26 DIAGNOSIS — Z72.0 TOBACCO USE: ICD-10-CM

## 2019-04-26 DIAGNOSIS — F15.11 HISTORY OF METHAMPHETAMINE ABUSE (HCC): ICD-10-CM

## 2019-04-26 DIAGNOSIS — G47.00 INSOMNIA, UNSPECIFIED TYPE: ICD-10-CM

## 2019-04-26 DIAGNOSIS — F11.29 OPIOID DEPENDENCE WITH OPIOID-INDUCED DISORDER (HCC): ICD-10-CM

## 2019-04-26 PROCEDURE — 74249 HB CONTRST X-RAY UPPR GI TRACT (SMALL INTESTINE FOLLOW-THROUGH): CPT

## 2019-05-21 ENCOUNTER — EVALUATION (OUTPATIENT)
Dept: PHYSICAL THERAPY | Facility: CLINIC | Age: 39
End: 2019-05-21
Payer: COMMERCIAL

## 2019-05-21 DIAGNOSIS — R26.89 BALANCE PROBLEM: Primary | ICD-10-CM

## 2019-05-21 PROCEDURE — 97110 THERAPEUTIC EXERCISES: CPT | Performed by: PHYSICAL THERAPIST

## 2019-05-21 PROCEDURE — 97163 PT EVAL HIGH COMPLEX 45 MIN: CPT | Performed by: PHYSICAL THERAPIST

## 2019-05-23 ENCOUNTER — TRANSCRIBE ORDERS (OUTPATIENT)
Dept: PHYSICAL THERAPY | Facility: CLINIC | Age: 39
End: 2019-05-23

## 2019-05-23 DIAGNOSIS — R26.89 BALANCE PROBLEM: Primary | ICD-10-CM

## 2019-05-28 ENCOUNTER — APPOINTMENT (OUTPATIENT)
Dept: PHYSICAL THERAPY | Facility: CLINIC | Age: 39
End: 2019-05-28
Payer: COMMERCIAL

## 2019-05-29 ENCOUNTER — APPOINTMENT (OUTPATIENT)
Dept: LAB | Facility: HOSPITAL | Age: 39
End: 2019-05-29
Payer: COMMERCIAL

## 2019-05-29 ENCOUNTER — TRANSCRIBE ORDERS (OUTPATIENT)
Dept: ADMINISTRATIVE | Facility: HOSPITAL | Age: 39
End: 2019-05-29

## 2019-05-29 DIAGNOSIS — R26.81 UNSTEADY GAIT: ICD-10-CM

## 2019-05-29 DIAGNOSIS — R19.07 GENERALIZED ABDOMINAL SWELLING: Primary | ICD-10-CM

## 2019-05-29 DIAGNOSIS — F11.29 OPIOID DEPENDENCE WITH OPIOID-INDUCED DISORDER (HCC): ICD-10-CM

## 2019-05-29 DIAGNOSIS — K59.00 CONSTIPATION, UNSPECIFIED CONSTIPATION TYPE: ICD-10-CM

## 2019-05-29 DIAGNOSIS — G47.00 INSOMNIA, UNSPECIFIED TYPE: ICD-10-CM

## 2019-05-29 DIAGNOSIS — R19.07 GENERALIZED ABDOMINAL SWELLING: ICD-10-CM

## 2019-05-29 DIAGNOSIS — Z72.0 TOBACCO USER: ICD-10-CM

## 2019-05-29 DIAGNOSIS — F15.21 AMPHETAMINE DEPENDENCE, IN REMISSION (HCC): ICD-10-CM

## 2019-05-29 PROCEDURE — 36415 COLL VENOUS BLD VENIPUNCTURE: CPT

## 2019-05-29 PROCEDURE — 86618 LYME DISEASE ANTIBODY: CPT

## 2019-05-30 ENCOUNTER — APPOINTMENT (OUTPATIENT)
Dept: PHYSICAL THERAPY | Facility: CLINIC | Age: 39
End: 2019-05-30
Payer: COMMERCIAL

## 2019-05-30 LAB
B BURGDOR IGG SER IA-ACNC: 0.49
B BURGDOR IGM SER IA-ACNC: 0.53

## 2019-06-03 ENCOUNTER — OFFICE VISIT (OUTPATIENT)
Dept: PHYSICAL THERAPY | Facility: CLINIC | Age: 39
End: 2019-06-03
Payer: COMMERCIAL

## 2019-06-03 DIAGNOSIS — R26.9 NEUROLOGIC GAIT DYSFUNCTION: ICD-10-CM

## 2019-06-03 DIAGNOSIS — R26.89 BALANCE PROBLEM: Primary | ICD-10-CM

## 2019-06-03 PROCEDURE — 97112 NEUROMUSCULAR REEDUCATION: CPT | Performed by: PHYSICAL THERAPIST

## 2019-06-03 PROCEDURE — 97110 THERAPEUTIC EXERCISES: CPT | Performed by: PHYSICAL THERAPIST

## 2019-06-06 ENCOUNTER — APPOINTMENT (OUTPATIENT)
Dept: PHYSICAL THERAPY | Facility: CLINIC | Age: 39
End: 2019-06-06
Payer: COMMERCIAL

## 2019-06-20 ENCOUNTER — APPOINTMENT (OUTPATIENT)
Dept: PHYSICAL THERAPY | Facility: CLINIC | Age: 39
End: 2019-06-20
Payer: COMMERCIAL

## 2019-09-23 ENCOUNTER — TRANSCRIBE ORDERS (OUTPATIENT)
Dept: ADMINISTRATIVE | Facility: HOSPITAL | Age: 39
End: 2019-09-23

## 2019-09-23 ENCOUNTER — APPOINTMENT (OUTPATIENT)
Dept: LAB | Facility: HOSPITAL | Age: 39
End: 2019-09-23
Payer: COMMERCIAL

## 2019-09-23 DIAGNOSIS — K59.00 CONSTIPATION, UNSPECIFIED CONSTIPATION TYPE: ICD-10-CM

## 2019-09-23 DIAGNOSIS — R14.0 GASTRIC TYMPANY: ICD-10-CM

## 2019-09-23 DIAGNOSIS — K59.00 CONSTIPATION, UNSPECIFIED CONSTIPATION TYPE: Primary | ICD-10-CM

## 2019-09-23 LAB — IGA SERPL-MCNC: 194 MG/DL (ref 70–400)

## 2019-09-23 PROCEDURE — 36415 COLL VENOUS BLD VENIPUNCTURE: CPT

## 2019-09-23 PROCEDURE — 83516 IMMUNOASSAY NONANTIBODY: CPT

## 2019-09-23 PROCEDURE — 82784 ASSAY IGA/IGD/IGG/IGM EACH: CPT

## 2019-09-24 LAB — TTG IGA SER-ACNC: <2 U/ML (ref 0–3)

## 2019-11-04 ENCOUNTER — OFFICE VISIT (OUTPATIENT)
Dept: URGENT CARE | Facility: CLINIC | Age: 39
End: 2019-11-04
Payer: COMMERCIAL

## 2019-11-04 VITALS
WEIGHT: 114 LBS | BODY MASS INDEX: 18.32 KG/M2 | RESPIRATION RATE: 18 BRPM | SYSTOLIC BLOOD PRESSURE: 120 MMHG | DIASTOLIC BLOOD PRESSURE: 75 MMHG | TEMPERATURE: 98 F | HEART RATE: 88 BPM | OXYGEN SATURATION: 99 % | HEIGHT: 66 IN

## 2019-11-04 DIAGNOSIS — B02.9 HERPES ZOSTER WITHOUT COMPLICATION: Primary | ICD-10-CM

## 2019-11-04 PROCEDURE — 99213 OFFICE O/P EST LOW 20 MIN: CPT | Performed by: PHYSICIAN ASSISTANT

## 2019-11-04 PROCEDURE — S9088 SERVICES PROVIDED IN URGENT: HCPCS | Performed by: PHYSICIAN ASSISTANT

## 2019-11-04 RX ORDER — METHYLPREDNISOLONE 4 MG/1
TABLET ORAL
Qty: 1 EACH | Refills: 0 | Status: SHIPPED | OUTPATIENT
Start: 2019-11-04 | End: 2021-01-12 | Stop reason: ALTCHOICE

## 2019-11-04 RX ORDER — VALACYCLOVIR HYDROCHLORIDE 1 G/1
1000 TABLET, FILM COATED ORAL 3 TIMES DAILY
Qty: 21 TABLET | Refills: 0 | Status: SHIPPED | OUTPATIENT
Start: 2019-11-04 | End: 2021-01-12 | Stop reason: ALTCHOICE

## 2019-11-04 NOTE — PROGRESS NOTES
[facility]        NAME: Kady Osorio is a 46 y o  female  : 1980    MRN: [id number]  DATE: 2019  TIME: 1:34 PM    Assessment and Plan   Herpes zoster without complication [I19 6]  1  Herpes zoster without complication  methylPREDNISolone 4 MG tablet therapy pack    valACYclovir (VALTREX) 1,000 mg tablet         Patient Instructions     Differential diagnosis includes early herpes zoster, migraine, GCA  Will treat with glucocorticoids and also antivirals to cover for possible zoster as she her tingling is periorbital  However, discussed that if she develops any neuro symptoms she should   Follow up with PCP in 3-5 days  Proceed to  ER if symptoms worsen  Chief Complaint     Chief Complaint   Patient presents with    Numbness     States she has numbness left side scalp and left eyebrow  No pain no injury  History of Present Illness       45 y/o F presents for eval of L scalp numbness and tingling which extends down to L side of forehead onset last night  Pt states she was at work when symptoms occurred - she is 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  She does have h/o DDD in lumbar and thoracic spine, but no history or current neck pain  She denies any neuro sxs - no numbness or tingling in extremities, weakness, vision changes, headache  Pt states just pta area became burning and itchy  She notes +ve history of lyme  No history of migraines  No rash  Review of Systems   Review of Systems   All other systems reviewed and are negative          Current Medications       Current Outpatient Medications:     buprenorphine-naloxone (SUBOXONE) 2-0 5 mg per SL tablet, Place 0 5 tablets under the tongue daily, Disp: , Rfl:     busPIRone (BUSPAR) 10 mg tablet, Take 10 mg by mouth 2 (two) times a day, Disp: , Rfl: 1    Nerve Stimulator (STANDARD TENS) VICKY, by Does not apply route 2 (two) times a day, Disp: 1 Device, Rfl: 0    Nerve Stimulator (TENS THERAPY REPLACE BACK PADS) MISC, 30 pad by Does not apply route 2 (two) times a day, Disp: 30 each, Rfl: 0    Nerve Stimulator VICKY, by Does not apply route 2 (two) times a day, Disp: 1 each, Rfl: 0    Nerve Stimulator Supplies MISC, 30 pad by Does not apply route 2 (two) times a day, Disp: 30 pad, Rfl: 0    topiramate (TOPAMAX) 50 MG tablet, TAKE 1 TABLET BY MOUTH EVERY DAY FOR HEADACHE/SLEEP, Disp: , Rfl: 5    lactulose (CHRONULAC) 10 g/15 mL solution, TAKE 10ML BY MOUTH EVERY 3-4 DAYS, Disp: , Rfl: 5    lidocaine (LIDODERM) 5 %, Apply 1 patch topically daily Remove & Discard patch within 12 hours or as directed by MD (Patient not taking: Reported on 11/4/2019), Disp: 30 patch, Rfl: 0    methylPREDNISolone 4 MG tablet therapy pack, Use as directed on package, Disp: 1 each, Rfl: 0    promethazine (PHENERGAN) 50 MG tablet, TAKE 1 TO 2 TABS AT BEDTIME AS NEEDED FOR INSOMNIA, Disp: , Rfl: 1    valACYclovir (VALTREX) 1,000 mg tablet, Take 1 tablet (1,000 mg total) by mouth 3 (three) times a day for 7 days, Disp: 21 tablet, Rfl: 0    Current Allergies     Allergies as of 11/04/2019 - Reviewed 11/04/2019   Allergen Reaction Noted    Penicillins GI Intolerance 11/08/2013            The following portions of the patient's history were reviewed and updated as appropriate: allergies, current medications, past family history, past medical history, past social history, past surgical history and problem list      Past Medical History:   Diagnosis Date    Anxiety     Back pain     Chronic constipation 6/27/2016    Infectious viral hepatitis     Lyme disease        Past Surgical History:   Procedure Laterality Date    APPENDECTOMY      CLAVICLE SURGERY      KNEE ARTHROSCOPY      MULTIPLE TOOTH EXTRACTIONS      DC COLONOSCOPY FLX DX W/COLLJ SPEC WHEN PFRMD N/A 6/27/2016    Procedure: COLONOSCOPY;  Surgeon: Ramona Mota MD;  Location: [facility];  Service: Colorectal    TONSILLECTOMY AND ADENOIDECTOMY      TUBAL LIGATION         No family history on file  Medications have been verified  Objective   /75   Pulse 88   Temp 98 °F (36 7 °C) (Tympanic)   Resp 18   Ht 5' 6" (1 676 m)   Wt 51 7 kg (114 lb)   SpO2 99%   BMI 18 40 kg/m²        Physical Exam     Physical Exam   Constitutional: She is oriented to person, place, and time  She appears well-developed and well-nourished  No distress  HENT:   Head: Normocephalic and atraumatic  Right Ear: Hearing, tympanic membrane, external ear and ear canal normal    Left Ear: Hearing, tympanic membrane, external ear and ear canal normal    Nose: Nose normal    Mouth/Throat: Uvula is midline, oropharynx is clear and moist and mucous membranes are normal    Eyes: Pupils are equal, round, and reactive to light  Conjunctivae, EOM and lids are normal    Cardiovascular: Normal rate and regular rhythm  Exam reveals no gallop and no friction rub  No murmur heard  Pulmonary/Chest: Effort normal and breath sounds normal  No respiratory distress  She has no wheezes  She has no rales  Neurological: She is alert and oriented to person, place, and time  She has normal strength  No cranial nerve deficit or sensory deficit  She displays a negative Romberg sign  GCS eye subscore is 4  GCS verbal subscore is 5  GCS motor subscore is 6  Skin: Skin is warm and dry  No rash noted

## 2019-12-17 ENCOUNTER — TRANSCRIBE ORDERS (OUTPATIENT)
Dept: NEUROLOGY | Facility: CLINIC | Age: 39
End: 2019-12-17

## 2019-12-17 DIAGNOSIS — R26.89 SCISSOR GAIT: ICD-10-CM

## 2019-12-17 DIAGNOSIS — R26.9 UNSPECIFIED ABNORMALITIES OF GAIT AND MOBILITY: ICD-10-CM

## 2019-12-17 DIAGNOSIS — R26.89 BALANCE PROBLEMS: Primary | ICD-10-CM

## 2020-01-09 ENCOUNTER — TRANSCRIBE ORDERS (OUTPATIENT)
Dept: PHYSICAL THERAPY | Facility: CLINIC | Age: 40
End: 2020-01-09

## 2020-05-26 NOTE — DISCHARGE INSTRUCTIONS
----- Message from Trina Gómez sent at 5/26/2020  9:36 AM CDT -----  Contact: patient   Patient trying to schedule a well woman appointment. Call back number  [phone].Tks   Kidney Stones   WHAT YOU NEED TO KNOW:   Kidney stones form in the urinary system when the water and waste in your urine are out of balance  When this happens, certain types of waste crystals separate from the urine  The crystals build up and form kidney stones  You may have 1 or more kidney stones  DISCHARGE INSTRUCTIONS:   Return to the emergency department if:   · You have vomiting that is not relieved by medicine  Contact your healthcare provider if:   · You have a fever  · You have trouble passing urine  · You see blood in your urine  · You have severe pain  · You have any questions or concerns about your condition or care  Medicines:   · NSAIDs , such as ibuprofen, help decrease swelling, pain, and fever  This medicine is available with or without a doctor's order  NSAIDs can cause stomach bleeding or kidney problems in certain people  If you take blood thinner medicine, always ask your healthcare provider if NSAIDs are safe for you  Always read the medicine label and follow directions  · Prescription medicine  may be given  Ask how to take this medicine safely  · Medicines  to balance your electrolytes may be needed  · Take your medicine as directed  Contact your healthcare provider if you think your medicine is not helping or if you have side effects  Tell him or her if you are allergic to any medicine  Keep a list of the medicines, vitamins, and herbs you take  Include the amounts, and when and why you take them  Bring the list or the pill bottles to follow-up visits  Carry your medicine list with you in case of an emergency  Follow up with your healthcare provider as directed: You may need to return for more tests  Write down your questions so you remember to ask them during your visits  Self-care:   · Drink plenty of liquids  Your healthcare provider may tell you to drink at least 8 to 12 (eight-ounce) cups of liquids each day   This helps flush out the kidney stones when you urinate  Water is the best liquid to drink  · Strain your urine every time you go to the bathroom  Urinate through a strainer or a piece of thin cloth to catch the stones  Take the stones to your healthcare provider so they can be sent to the lab for tests  This will help your healthcare providers plan the best treatment for you  · Eat a variety of healthy foods  Healthy foods include fruits, vegetables, whole-grain breads, low-fat dairy products, beans, and fish  You may need to limit how much sodium (salt) or protein you eat  Ask for information about the best foods for you  · Stay active  Your stones may pass more easily by if you stay active  Ask about the best activities for you  After you pass your kidney stones:  Once you have passed your kidney stones, your healthcare provider may  order a 24-hour urine test  Results from a 24-hour urine test will help your healthcare provider plan ways to prevent more stones from forming  If you are told to do a 24-hour test, your healthcare provider will give you more instructions  © 2017 [facility] Information is for End User's use only and may not be sold, redistributed or otherwise used for commercial purposes  All illustrations and images included in CareNotes® are the copyrighted property of A D A M [employer] Inc  or [employer]  The above information is an  only  It is not intended as medical advice for individual conditions or treatments  Talk to your doctor, nurse or pharmacist before following any medical regimen to see if it is safe and effective for you

## 2020-06-08 ENCOUNTER — TELEPHONE (OUTPATIENT)
Dept: NEUROLOGY | Facility: CLINIC | Age: 40
End: 2020-06-08

## 2020-06-18 ENCOUNTER — LAB (OUTPATIENT)
Dept: LAB | Facility: HOSPITAL | Age: 40
End: 2020-06-18
Attending: PSYCHIATRY & NEUROLOGY
Payer: COMMERCIAL

## 2020-06-18 ENCOUNTER — CONSULT (OUTPATIENT)
Dept: NEUROLOGY | Facility: CLINIC | Age: 40
End: 2020-06-18
Payer: COMMERCIAL

## 2020-06-18 VITALS
SYSTOLIC BLOOD PRESSURE: 108 MMHG | WEIGHT: 115 LBS | DIASTOLIC BLOOD PRESSURE: 56 MMHG | HEIGHT: 66 IN | HEART RATE: 65 BPM | TEMPERATURE: 98.9 F | BODY MASS INDEX: 18.48 KG/M2

## 2020-06-18 DIAGNOSIS — R26.89 BALANCE PROBLEMS: ICD-10-CM

## 2020-06-18 DIAGNOSIS — M54.50 CHRONIC MIDLINE LOW BACK PAIN WITHOUT SCIATICA: ICD-10-CM

## 2020-06-18 DIAGNOSIS — R26.0 ATAXIC GAIT: ICD-10-CM

## 2020-06-18 DIAGNOSIS — R26.9 UNSPECIFIED ABNORMALITIES OF GAIT AND MOBILITY: ICD-10-CM

## 2020-06-18 DIAGNOSIS — G89.29 CHRONIC MIDLINE LOW BACK PAIN WITHOUT SCIATICA: ICD-10-CM

## 2020-06-18 DIAGNOSIS — R26.0 ATAXIC GAIT: Primary | ICD-10-CM

## 2020-06-18 LAB
ALBUMIN SERPL BCP-MCNC: 4.4 G/DL (ref 3.5–5)
ALP SERPL-CCNC: 110 U/L (ref 46–116)
ALT SERPL W P-5'-P-CCNC: 77 U/L (ref 12–78)
ANION GAP SERPL CALCULATED.3IONS-SCNC: 12 MMOL/L (ref 4–13)
AST SERPL W P-5'-P-CCNC: 124 U/L (ref 5–45)
BILIRUB SERPL-MCNC: 0.6 MG/DL (ref 0.2–1)
BUN SERPL-MCNC: 9 MG/DL (ref 5–25)
CALCIUM SERPL-MCNC: 9.5 MG/DL (ref 8.3–10.1)
CHLORIDE SERPL-SCNC: 103 MMOL/L (ref 100–108)
CK SERPL-CCNC: 71 U/L (ref 26–192)
CO2 SERPL-SCNC: 26 MMOL/L (ref 21–32)
CREAT SERPL-MCNC: 0.61 MG/DL (ref 0.6–1.3)
CRP SERPL QL: <0.5 MG/L
ERYTHROCYTE [SEDIMENTATION RATE] IN BLOOD: 9 MM/HOUR (ref 0–20)
GFR SERPL CREATININE-BSD FRML MDRD: 115 ML/MIN/1.73SQ M
GLUCOSE SERPL-MCNC: 114 MG/DL (ref 65–140)
POTASSIUM SERPL-SCNC: 3.4 MMOL/L (ref 3.5–5.3)
PROT SERPL-MCNC: 8 G/DL (ref 6.4–8.2)
SODIUM SERPL-SCNC: 141 MMOL/L (ref 136–145)
VIT B12 SERPL-MCNC: 507 PG/ML (ref 100–900)

## 2020-06-18 PROCEDURE — 99244 OFF/OP CNSLTJ NEW/EST MOD 40: CPT | Performed by: PSYCHIATRY & NEUROLOGY

## 2020-06-18 PROCEDURE — 84446 ASSAY OF VITAMIN E: CPT

## 2020-06-18 PROCEDURE — 80053 COMPREHEN METABOLIC PANEL: CPT

## 2020-06-18 PROCEDURE — 86140 C-REACTIVE PROTEIN: CPT

## 2020-06-18 PROCEDURE — 82550 ASSAY OF CK (CPK): CPT

## 2020-06-18 PROCEDURE — 36415 COLL VENOUS BLD VENIPUNCTURE: CPT

## 2020-06-18 PROCEDURE — 85652 RBC SED RATE AUTOMATED: CPT

## 2020-06-18 PROCEDURE — 82607 VITAMIN B-12: CPT

## 2020-06-18 RX ORDER — MELOXICAM 7.5 MG/1
TABLET ORAL
Qty: 60 TABLET | Refills: 3 | Status: SHIPPED | OUTPATIENT
Start: 2020-06-18 | End: 2021-06-08 | Stop reason: ALTCHOICE

## 2020-06-18 RX ORDER — CLONIDINE HYDROCHLORIDE 0.2 MG/1
0.2 TABLET ORAL
COMMUNITY

## 2020-06-20 LAB
A-TOCOPHEROL VIT E SERPL-MCNC: 8 MG/L (ref 5.9–19.4)
GAMMA TOCOPHEROL SERPL-MCNC: 1.4 MG/L (ref 0.7–4.9)

## 2020-06-22 ENCOUNTER — TELEPHONE (OUTPATIENT)
Dept: NEUROLOGY | Facility: CLINIC | Age: 40
End: 2020-06-22

## 2020-07-11 ENCOUNTER — HOSPITAL ENCOUNTER (OUTPATIENT)
Dept: MRI IMAGING | Facility: HOSPITAL | Age: 40
Discharge: HOME | End: 2020-07-11
Attending: PSYCHIATRY & NEUROLOGY
Payer: COMMERCIAL

## 2020-07-11 DIAGNOSIS — R26.9 UNSPECIFIED ABNORMALITIES OF GAIT AND MOBILITY: ICD-10-CM

## 2020-07-11 DIAGNOSIS — G89.29 CHRONIC MIDLINE LOW BACK PAIN WITHOUT SCIATICA: ICD-10-CM

## 2020-07-11 DIAGNOSIS — M54.50 CHRONIC MIDLINE LOW BACK PAIN WITHOUT SCIATICA: ICD-10-CM

## 2020-07-11 DIAGNOSIS — R26.89 BALANCE PROBLEMS: ICD-10-CM

## 2020-07-11 PROCEDURE — 72157 MRI CHEST SPINE W/O & W/DYE: CPT

## 2020-07-11 PROCEDURE — 72158 MRI LUMBAR SPINE W/O & W/DYE: CPT

## 2020-07-11 PROCEDURE — A9585 GADOBUTROL INJECTION: HCPCS | Performed by: PSYCHIATRY & NEUROLOGY

## 2020-07-11 RX ADMIN — GADOBUTROL 5 ML: 604.72 INJECTION INTRAVENOUS at 11:00

## 2020-07-14 NOTE — RESULT ENCOUNTER NOTE
Pt does not have MyChart  Please let her know that the MRI scans of her mid and lower back for her walking and pain problems show mainly mild degenerative changes of age  This includes the mention of mild disc bulging without compression or bottlenecking of the nerves or cord  I do not see a clear source for the back pain due to pinched nerves, or gait instability from spinal cord trauma  Her back pain may be more musculoskeletal as we thought  Next step would be to move upward and seeing if the brain or neck have signs of damage  If she is amenable to that and continues to have problems, we could order them  Please proceed with rest of the same plan including with meloxicam and PT  Thank you

## 2020-07-15 ENCOUNTER — TELEPHONE (OUTPATIENT)
Dept: NEUROLOGY | Facility: CLINIC | Age: 40
End: 2020-07-15

## 2020-07-15 DIAGNOSIS — R26.0 ATAXIC GAIT: Primary | ICD-10-CM

## 2020-07-15 DIAGNOSIS — G89.29 CHRONIC MIDLINE LOW BACK PAIN WITHOUT SCIATICA: ICD-10-CM

## 2020-07-15 DIAGNOSIS — R26.9 UNSPECIFIED ABNORMALITIES OF GAIT AND MOBILITY: ICD-10-CM

## 2020-07-15 DIAGNOSIS — M54.50 CHRONIC MIDLINE LOW BACK PAIN WITHOUT SCIATICA: ICD-10-CM

## 2020-07-15 NOTE — TELEPHONE ENCOUNTER
----- Message from [facility], RN sent at 7/15/2020 11:54 AM EDT -----      ----- Message -----  From: Robert Zambrano MD  Sent: 7/13/2020   8:19 PM EDT  To: [facility] [place] [facility]    Pt does not have MyChart  Please let her know that the MRI scans of her mid and lower back for her walking and pain problems show mainly mild degenerative changes of age  This includes the mention of mild disc bulging without compression or bottlenecking of the nerves or cord  I do not see a clear source for the back pain due to pinched nerves, or gait instability from spinal cord trauma  Her back pain may be more musculoskeletal as we thought  Next step would be to move upward and seeing if the brain or neck have signs of damage  If she is amenable to that and continues to have problems, we could order them  Please proceed with rest of the same plan including with meloxicam and PT  Thank you

## 2020-07-17 NOTE — TELEPHONE ENCOUNTER
Spoke to patient  Reviewed imaging results  Patient verbalized understanding with results and recommendations  Patient would like to proceed with imaging of neck and brain  Please order

## 2020-07-23 ENCOUNTER — TELEPHONE (OUTPATIENT)
Dept: NEUROLOGY | Facility: CLINIC | Age: 40
End: 2020-07-23

## 2020-07-23 NOTE — TELEPHONE ENCOUNTER
Patient calling requesting we send MRI results from 7/11/20 to PCP office   Faxed both thoracic and lumbar MRI reports to PCP Dr Kavitha Carcamo at [phone]

## 2020-08-01 ENCOUNTER — HOSPITAL ENCOUNTER (OUTPATIENT)
Dept: MRI IMAGING | Facility: HOSPITAL | Age: 40
Discharge: HOME | End: 2020-08-01
Attending: PSYCHIATRY & NEUROLOGY
Payer: COMMERCIAL

## 2020-08-01 DIAGNOSIS — R26.0 ATAXIC GAIT: ICD-10-CM

## 2020-08-01 DIAGNOSIS — R26.9 UNSPECIFIED ABNORMALITIES OF GAIT AND MOBILITY: ICD-10-CM

## 2020-08-01 DIAGNOSIS — G89.29 CHRONIC MIDLINE LOW BACK PAIN WITHOUT SCIATICA: ICD-10-CM

## 2020-08-01 DIAGNOSIS — M54.50 CHRONIC MIDLINE LOW BACK PAIN WITHOUT SCIATICA: ICD-10-CM

## 2020-08-01 PROCEDURE — 72156 MRI NECK SPINE W/O & W/DYE: CPT

## 2020-08-01 PROCEDURE — A9585 GADOBUTROL INJECTION: HCPCS | Performed by: PSYCHIATRY & NEUROLOGY

## 2020-08-01 PROCEDURE — 70553 MRI BRAIN STEM W/O & W/DYE: CPT

## 2020-08-01 RX ADMIN — GADOBUTROL 5 ML: 604.72 INJECTION INTRAVENOUS at 13:54

## 2020-08-03 ENCOUNTER — TELEPHONE (OUTPATIENT)
Dept: NEUROLOGY | Facility: CLINIC | Age: 40
End: 2020-08-03

## 2020-08-03 DIAGNOSIS — M54.50 CHRONIC MIDLINE LOW BACK PAIN WITHOUT SCIATICA: Primary | ICD-10-CM

## 2020-08-03 DIAGNOSIS — G37.9 DEMYELINATING CHANGES IN BRAIN (HCC): ICD-10-CM

## 2020-08-03 DIAGNOSIS — R26.0 ATAXIC GAIT: Primary | ICD-10-CM

## 2020-08-03 DIAGNOSIS — G89.29 CHRONIC MIDLINE LOW BACK PAIN WITHOUT SCIATICA: Primary | ICD-10-CM

## 2020-08-03 NOTE — TELEPHONE ENCOUNTER
Call received from radiology reading room  Immediate findings/addendum noted to MRI brain  Please advise if we can assist any further
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
## 2020-08-04 NOTE — TELEPHONE ENCOUNTER
Patient returning call  Discussed results with patient  Patient states understanding but wanted to let you know that her alcohol is no longer a problem  Lizbeth Felipe that this is in the past  Patient is agreeable to MRI of the brain  Please place order  Patient requesting callback once placed to schedule MRI

## 2020-08-14 RX ORDER — PREGABALIN 50 MG/1
50 CAPSULE ORAL 3 TIMES DAILY
Qty: 90 CAPSULE | Refills: 1 | Status: SHIPPED | OUTPATIENT
Start: 2020-08-14 | End: 2022-03-15

## 2020-08-14 NOTE — TELEPHONE ENCOUNTER
Patient calling in  Made her aware of new Mri order  Patient began getting tearful on the phone  Patient states that she is getting worse every day    She states her balance is off  She can hardly walk any longer  After walking for about 10 mins, she states her L leg gets numb  She states she can't walk or move  She takes 4-5 tabs of Mobic for it to be effective  She had to take crutches with her while she was on a trip with her dad  Is there a stronger dose or is there anything else that she can take that will be effective for her? Patient also noted that she was angered by the alcohol comment as she states she used to have alcoholism in the past but she hardly ever drinks now  She states she is very scared as her symptoms worsen every day and her legs keep giving out on her  She states she will schedule MRI after she calms down     Dr Yasmin Fernandez -  Please advise    [phone]   Dary Casillas to leave a detailed message

## 2020-08-14 NOTE — TELEPHONE ENCOUNTER
I don't know how to help her effectively at this time unless I have more information unfortunately  She is using many times the maximum amount of meloxicam advised for her age  I can offer her Lyrica 50mg TID to help in case there is a neurogenic component  Script is in, if she is interested  The point I made about alcohol is based upon her known hx of use before and the positive blood alcohol testing on records made available to us, including during the same traffic stop she said she was not drinking  She does have a complex medical history and it makes it difficult to tease apart what is the underlying cause of symptoms  If she is no longer drinking, then I apologize and mean no disrespect but given the clinical impression I have to consider every avenue that explains her symptoms  If she is having multiple falls due to leg numbness, then for safety sake, please go to ER for evaluation, especially for vascular claudication of some kind  We already examined her lumbar spine and found no compression of the cord or nerves  We have not examined the blood flow in the legs  If that cannot be done, I will order an ultrasound of the leg  If the contrasted MRI can be done and shows evidence of demyelination, we can then order the spinal tap, if she is willing, for confirmation

## 2020-08-14 NOTE — TELEPHONE ENCOUNTER
Called patient and discussed message  She is agreeable to trying lyrica  She is thankful for the detailed explanation of her plan of care  States that she will have her MRI completed the beginning of September  No further questions

## 2020-08-20 ENCOUNTER — TELEPHONE (OUTPATIENT)
Dept: NEUROLOGY | Facility: CLINIC | Age: 40
End: 2020-08-20

## 2020-08-20 DIAGNOSIS — M62.838 MUSCLE SPASM: Primary | ICD-10-CM

## 2020-08-20 NOTE — TELEPHONE ENCOUNTER
[id number] Louisa Leyva  Before making a new script I want to try finding a dose that works better for her  She still has many capsules of Lyrica on hand, since it has only been five days since we started it  She can go to 100mg TID of Lyrica (2 capsules taken at a time) of her current pill supply  Try this for a week, and if it works better, then new script with this final dose will be written for  Thanks

## 2020-08-20 NOTE — TELEPHONE ENCOUNTER
See encounter from 8/3 for more info  pt called and states that she started pregabalin and it is not helping    she saw pcp yesterday and they recommended that she call our office and see if a higher dose of pregabalin can be prescribed until she sees PM who pcp will be referring her to   pregabalin  50mg 1 tab tid  Please advise  [phone] to leave a detailed message

## 2020-08-20 NOTE — TELEPHONE ENCOUNTER
Called her back as requested  We advised to   - Reduce her meloxicam use to 2 tablets (15mg) a day at most if needed (max dose recommended in pharmacopedia)  Keeping it on because there is some noted benefit  - Advised to continue the plan with the Lyrica 100mg BID for the next four days  She will call back Monday 8/24/20 to report  - If increased Lyrica helps then continue dose and eliminate meloxicam  - Else if no better, then discontinue meloxicam and Lyrica   Plan then to start on tizanidine muscle relaxant 5mg according to following:    Week 1:  Start on 1 tab three times a day   Week 2:  Take 1 5 tab three times a day   Week 3:  Take 2 tab three times a day

## 2020-08-20 NOTE — TELEPHONE ENCOUNTER
Patient called the office requesting to speak with you about message on her chart      Best [phone]

## 2020-08-24 RX ORDER — CYCLOBENZAPRINE HCL 5 MG
TABLET ORAL
Qty: 180 TABLET | Refills: 1 | Status: SHIPPED | OUTPATIENT
Start: 2020-08-24 | End: 2020-11-05 | Stop reason: ALTCHOICE

## 2020-08-24 NOTE — TELEPHONE ENCOUNTER
Correction: I've changed my mind that instead of tizanidine, the titration instruction will be for Flexeril instead  First, discontinue meloxicam for a day  Then lower Lyrica to 100mg daily for a day then off before starting on Flexeril 5mg  Week 1:  Start on 1 tab three times a day  Week 2:  Take 1 5 tab three times a day  Week 3:  Take 2 tab three times a day    Script for Flexeril in with instructions also listed there

## 2020-08-24 NOTE — TELEPHONE ENCOUNTER
Patient calling back in to give update  States that she is still in the same amount of pain  She would like to try the two medications you recommended to her on 8/20  I only see tizanidine below  When we call her back, she needs tizanidine instructions below  Any other medication?   Also How would you like her to stop lyrica and meloxicam?    [phone]  ok to leave a detailed message

## 2020-08-26 NOTE — TELEPHONE ENCOUNTER
Pt called and advised pt of all of the below  She verbalized clear understanding of all instructions  Pt states that she took 2 tabs of lyrica (50 mg) and 1 tab flexeril (5 mg) this morning around 0900 and it made her feel really good  Denies side effects  "I haven't felt this good in a long time  I thank Dr Mane Khoury very much"  Pt is asking if she can take both meds together- lyrica 50 mg 2 tabs bid and flexeril 5 mg 1 tab bid?

## 2020-08-26 NOTE — TELEPHONE ENCOUNTER
Thank you for the update! Yes, she may take them both at the same time but take care in monitoring for any potential dizziness / drowsiness / dry mouth if she has those side effects after taking it a little longer  No other contraindications for their use together         We wish her well too

## 2020-09-03 ENCOUNTER — HOSPITAL ENCOUNTER (OUTPATIENT)
Dept: MRI IMAGING | Facility: HOSPITAL | Age: 40
Discharge: HOME | End: 2020-09-03
Attending: PSYCHIATRY & NEUROLOGY
Payer: COMMERCIAL

## 2020-09-03 DIAGNOSIS — G37.9 DEMYELINATING CHANGES IN BRAIN (HCC): ICD-10-CM

## 2020-09-03 DIAGNOSIS — R26.0 ATAXIC GAIT: ICD-10-CM

## 2020-09-03 PROCEDURE — A9585 GADOBUTROL INJECTION: HCPCS | Performed by: PSYCHIATRY & NEUROLOGY

## 2020-09-03 PROCEDURE — 70553 MRI BRAIN STEM W/O & W/DYE: CPT

## 2020-09-03 PROCEDURE — G1004 CDSM NDSC: HCPCS

## 2020-09-03 RX ADMIN — GADOBUTROL 5 ML: 604.72 INJECTION INTRAVENOUS at 14:47

## 2020-09-10 ENCOUNTER — TELEPHONE (OUTPATIENT)
Dept: NEUROLOGY | Facility: CLINIC | Age: 40
End: 2020-09-10

## 2020-09-10 NOTE — TELEPHONE ENCOUNTER
Patient did not answer, left message on the machine to call the office  I did review the MRI, continued evidence of potential white matter disease, question of CNS versus demyelination  I would like Patient to be seen by Dr Sophia Lopez   (apt on hold for pt  Nov9 8:30 in [facility])

## 2020-09-10 NOTE — TELEPHONE ENCOUNTER
Just update, if patient returns the call, the November 9th appointment is not available, there is a hold on November 10th at 12:30 p m

## 2020-09-21 NOTE — TELEPHONE ENCOUNTER
Patient calling in  She states she would like some more information regarding white matter disease  She does state she has a few questions and requesting to speak to Amelie Hayden, could you call pt at below #?  ty        [phone]   Ok to leave a detailed message

## 2020-09-30 ENCOUNTER — OFFICE VISIT (OUTPATIENT)
Dept: PHYSICAL THERAPY | Facility: CLINIC | Age: 40
End: 2020-09-30
Payer: COMMERCIAL

## 2020-09-30 DIAGNOSIS — M51.86 OTHER INTERVERTEBRAL DISC DISORDERS, LUMBAR REGION: ICD-10-CM

## 2020-09-30 DIAGNOSIS — R26.81 GAIT INSTABILITY: Primary | ICD-10-CM

## 2020-09-30 PROCEDURE — 97750 PHYSICAL PERFORMANCE TEST: CPT | Performed by: PHYSICAL THERAPIST

## 2020-10-10 DIAGNOSIS — M54.50 CHRONIC MIDLINE LOW BACK PAIN WITHOUT SCIATICA: ICD-10-CM

## 2020-10-10 DIAGNOSIS — G89.29 CHRONIC MIDLINE LOW BACK PAIN WITHOUT SCIATICA: ICD-10-CM

## 2020-10-23 DIAGNOSIS — M62.838 MUSCLE SPASM: ICD-10-CM

## 2020-11-05 ENCOUNTER — OFFICE VISIT (OUTPATIENT)
Dept: NEUROLOGY | Facility: CLINIC | Age: 40
End: 2020-11-05
Payer: COMMERCIAL

## 2020-11-05 VITALS
HEART RATE: 72 BPM | BODY MASS INDEX: 18.69 KG/M2 | SYSTOLIC BLOOD PRESSURE: 100 MMHG | DIASTOLIC BLOOD PRESSURE: 60 MMHG | TEMPERATURE: 98.5 F | WEIGHT: 115.8 LBS

## 2020-11-05 DIAGNOSIS — R26.9 UNSPECIFIED ABNORMALITIES OF GAIT AND MOBILITY: ICD-10-CM

## 2020-11-05 DIAGNOSIS — R90.89 ABNORMAL FINDING ON MRI OF BRAIN: ICD-10-CM

## 2020-11-05 DIAGNOSIS — R26.89 BALANCE PROBLEMS: Primary | ICD-10-CM

## 2020-11-05 PROCEDURE — 99215 OFFICE O/P EST HI 40 MIN: CPT | Performed by: NURSE PRACTITIONER

## 2020-11-05 RX ORDER — BACLOFEN 10 MG/1
10 TABLET ORAL 3 TIMES DAILY
Qty: 90 TABLET | Refills: 0 | Status: SHIPPED | OUTPATIENT
Start: 2020-11-05 | End: 2022-03-15

## 2020-11-05 RX ORDER — GLUCOSAMINE/D3/BOSWELLIA SERRA 1500MG-400
TABLET ORAL
COMMUNITY
End: 2021-06-08 | Stop reason: ALTCHOICE

## 2020-11-10 ENCOUNTER — OFFICE VISIT (OUTPATIENT)
Dept: NEUROLOGY | Facility: CLINIC | Age: 40
End: 2020-11-10
Payer: COMMERCIAL

## 2020-11-10 VITALS
RESPIRATION RATE: 14 BRPM | TEMPERATURE: 98.6 F | HEART RATE: 66 BPM | HEIGHT: 66 IN | WEIGHT: 116.8 LBS | SYSTOLIC BLOOD PRESSURE: 100 MMHG | BODY MASS INDEX: 18.77 KG/M2 | DIASTOLIC BLOOD PRESSURE: 66 MMHG

## 2020-11-10 DIAGNOSIS — R26.2 AMBULATORY DYSFUNCTION: ICD-10-CM

## 2020-11-10 DIAGNOSIS — G37.9 DEMYELINATING CHANGES IN BRAIN (HCC): ICD-10-CM

## 2020-11-10 DIAGNOSIS — G95.9 CERVICAL MYELOPATHY (HCC): ICD-10-CM

## 2020-11-10 DIAGNOSIS — N31.9 NEUROGENIC BLADDER: ICD-10-CM

## 2020-11-10 DIAGNOSIS — Z86.19 HISTORY OF LYME DISEASE: Primary | ICD-10-CM

## 2020-11-10 PROCEDURE — 99215 OFFICE O/P EST HI 40 MIN: CPT | Performed by: PSYCHIATRY & NEUROLOGY

## 2020-11-10 PROCEDURE — 3008F BODY MASS INDEX DOCD: CPT | Performed by: PSYCHIATRY & NEUROLOGY

## 2020-11-10 RX ORDER — DIAZEPAM 10 MG/1
TABLET ORAL
Qty: 2 TABLET | Refills: 0 | Status: SHIPPED | OUTPATIENT
Start: 2020-11-10 | End: 2021-04-19 | Stop reason: ALTCHOICE

## 2020-11-10 RX ORDER — DOCUSATE SODIUM 100 MG/1
100 CAPSULE, LIQUID FILLED ORAL 3 TIMES DAILY
COMMUNITY

## 2020-11-10 RX ORDER — IBUPROFEN 200 MG
200 TABLET ORAL AS NEEDED
COMMUNITY

## 2020-11-10 RX ORDER — ERGOCALCIFEROL 1.25 MG/1
50000 CAPSULE ORAL WEEKLY
Qty: 12 CAPSULE | Refills: 0 | Status: SHIPPED | OUTPATIENT
Start: 2020-11-10 | End: 2021-01-13

## 2020-11-10 RX ORDER — BIOTIN 1 MG
1 TABLET ORAL DAILY
COMMUNITY
End: 2021-06-08 | Stop reason: ALTCHOICE

## 2020-11-11 ENCOUNTER — TELEPHONE (OUTPATIENT)
Dept: NEUROLOGY | Facility: CLINIC | Age: 40
End: 2020-11-11

## 2020-11-12 ENCOUNTER — LAB (OUTPATIENT)
Dept: LAB | Facility: HOSPITAL | Age: 40
End: 2020-11-12
Attending: PSYCHIATRY & NEUROLOGY
Payer: COMMERCIAL

## 2020-11-12 DIAGNOSIS — G37.9 DEMYELINATING CHANGES IN BRAIN (HCC): ICD-10-CM

## 2020-11-12 DIAGNOSIS — G95.9 CERVICAL MYELOPATHY (HCC): ICD-10-CM

## 2020-11-12 PROCEDURE — 36415 COLL VENOUS BLD VENIPUNCTURE: CPT

## 2020-11-12 PROCEDURE — 86255 FLUORESCENT ANTIBODY SCREEN: CPT

## 2020-11-21 LAB
Lab: NORMAL
MISCELLANEOUS LAB TEST RESULT: NORMAL
STONE ANALYSIS-IMP: NORMAL

## 2020-11-25 ENCOUNTER — HOSPITAL ENCOUNTER (OUTPATIENT)
Dept: RADIOLOGY | Facility: HOSPITAL | Age: 40
Discharge: HOME | End: 2020-11-25
Attending: PSYCHIATRY & NEUROLOGY
Payer: COMMERCIAL

## 2020-11-25 ENCOUNTER — TELEPHONE (OUTPATIENT)
Dept: NEUROLOGY | Facility: CLINIC | Age: 40
End: 2020-11-25

## 2020-11-25 VITALS
BODY MASS INDEX: 18.48 KG/M2 | OXYGEN SATURATION: 96 % | RESPIRATION RATE: 20 BRPM | HEIGHT: 66 IN | DIASTOLIC BLOOD PRESSURE: 60 MMHG | WEIGHT: 115 LBS | HEART RATE: 65 BPM | TEMPERATURE: 98.2 F | SYSTOLIC BLOOD PRESSURE: 105 MMHG

## 2020-11-25 DIAGNOSIS — G95.9 CERVICAL MYELOPATHY (HCC): ICD-10-CM

## 2020-11-25 DIAGNOSIS — G37.9 DEMYELINATING CHANGES IN BRAIN (HCC): ICD-10-CM

## 2020-11-25 DIAGNOSIS — Z86.19 HISTORY OF LYME DISEASE: ICD-10-CM

## 2020-11-25 LAB
APPEARANCE CSF: NORMAL
GLUCOSE CSF-MCNC: 55 MG/DL (ref 50–80)
GRAM STN SPEC: NORMAL
INR PPP: 1.01 (ref 0.84–1.19)
PLATELET # BLD AUTO: 63 THOUSANDS/UL (ref 149–390)
PMV BLD AUTO: 10.8 FL (ref 8.9–12.7)
PROT CSF-MCNC: 37 MG/DL (ref 15–45)
PROTHROMBIN TIME: 13.3 SECONDS (ref 11.6–14.5)
RBC # CSF MANUAL: 0 UL (ref 0–10)
TOTAL CELLS COUNTED BLD: NO
TUBE # CSF: 4
WBC # CSF AUTO: 0 /UL (ref 0–5)

## 2020-11-25 PROCEDURE — 88108 CYTOPATH CONCENTRATE TECH: CPT | Performed by: PATHOLOGY

## 2020-11-25 PROCEDURE — 86592 SYPHILIS TEST NON-TREP QUAL: CPT | Performed by: PSYCHIATRY & NEUROLOGY

## 2020-11-25 PROCEDURE — 82042 OTHER SOURCE ALBUMIN QUAN EA: CPT | Performed by: PSYCHIATRY & NEUROLOGY

## 2020-11-25 PROCEDURE — 87070 CULTURE OTHR SPECIMN AEROBIC: CPT | Performed by: PSYCHIATRY & NEUROLOGY

## 2020-11-25 PROCEDURE — 89051 BODY FLUID CELL COUNT: CPT | Performed by: PSYCHIATRY & NEUROLOGY

## 2020-11-25 PROCEDURE — 86618 LYME DISEASE ANTIBODY: CPT | Performed by: PSYCHIATRY & NEUROLOGY

## 2020-11-25 PROCEDURE — 84157 ASSAY OF PROTEIN OTHER: CPT | Performed by: PSYCHIATRY & NEUROLOGY

## 2020-11-25 PROCEDURE — 85610 PROTHROMBIN TIME: CPT | Performed by: PSYCHIATRY & NEUROLOGY

## 2020-11-25 PROCEDURE — 85049 AUTOMATED PLATELET COUNT: CPT | Performed by: PSYCHIATRY & NEUROLOGY

## 2020-11-25 PROCEDURE — 83916 OLIGOCLONAL BANDS: CPT | Performed by: PSYCHIATRY & NEUROLOGY

## 2020-11-25 PROCEDURE — 82164 ANGIOTENSIN I ENZYME TEST: CPT | Performed by: PSYCHIATRY & NEUROLOGY

## 2020-11-25 PROCEDURE — 82945 GLUCOSE OTHER FLUID: CPT | Performed by: PSYCHIATRY & NEUROLOGY

## 2020-11-25 PROCEDURE — 83873 ASSAY OF CSF PROTEIN: CPT | Performed by: PSYCHIATRY & NEUROLOGY

## 2020-11-25 PROCEDURE — 89050 BODY FLUID CELL COUNT: CPT | Performed by: PSYCHIATRY & NEUROLOGY

## 2020-11-25 PROCEDURE — 82784 ASSAY IGA/IGD/IGG/IGM EACH: CPT | Performed by: PSYCHIATRY & NEUROLOGY

## 2020-11-25 PROCEDURE — 82040 ASSAY OF SERUM ALBUMIN: CPT | Performed by: PSYCHIATRY & NEUROLOGY

## 2020-11-25 PROCEDURE — 87798 DETECT AGENT NOS DNA AMP: CPT | Performed by: PSYCHIATRY & NEUROLOGY

## 2020-11-25 PROCEDURE — 62328 DX LMBR SPI PNXR W/FLUOR/CT: CPT

## 2020-11-25 RX ORDER — ACETAMINOPHEN 325 MG/1
650 TABLET ORAL EVERY 6 HOURS PRN
Status: DISCONTINUED | OUTPATIENT
Start: 2020-11-25 | End: 2020-11-26 | Stop reason: HOSPADM

## 2020-11-27 ENCOUNTER — TELEPHONE (OUTPATIENT)
Dept: RADIOLOGY | Facility: HOSPITAL | Age: 40
End: 2020-11-27

## 2020-11-28 LAB — BACTERIA CSF CULT: NO GROWTH

## 2020-11-30 LAB
ACE CSF-CCNC: <1.5 U/L (ref 0–2.5)
REAGIN AB CSF QL: NON REACTIVE

## 2020-12-01 LAB
ALB CSF/SERPL: 5 {RATIO} (ref 0–8)
ALBUMIN CSF-MCNC: 22 MG/DL (ref 11–48)
ALBUMIN SERPL-MCNC: 4.6 G/DL (ref 3.8–4.8)
IGG CSF-MCNC: 3.7 MG/DL (ref 0–8.6)
IGG SERPL-MCNC: 960 MG/DL (ref 586–1602)
IGG SYNTH RATE SER+CSF CALC-MRATE: 4.6 MG/DAY
IGG/ALB CLEAR SER+CSF-RTO: 0.8 (ref 0–0.7)
IGG/ALB CSF: 0.17 {RATIO} (ref 0–0.25)
MBP CSF-MCNC: 4.7 NG/ML (ref 0–3.7)
OLIGOCLONAL BANDS.IT SER+CSF QL: ABNORMAL

## 2020-12-02 LAB
B BURGDOR IGG CSF IA-ACNC: <0.08 INDEX (ref 0–0.09)
B BURGDOR IGM CSF IA-ACNC: <0.06 INDEX (ref 0–0.06)

## 2020-12-03 LAB
PCR AMPLIFICATION + DETECTION: NORMAL
WNV RNA SPEC QL NAA+PROBE: NEGATIVE

## 2020-12-10 ENCOUNTER — TELEPHONE (OUTPATIENT)
Dept: SURGICAL ONCOLOGY | Facility: CLINIC | Age: 40
End: 2020-12-10

## 2020-12-22 ENCOUNTER — EVALUATION (OUTPATIENT)
Dept: PHYSICAL THERAPY | Facility: CLINIC | Age: 40
End: 2020-12-22
Payer: COMMERCIAL

## 2020-12-22 DIAGNOSIS — M62.89 PELVIC FLOOR DYSFUNCTION: Primary | ICD-10-CM

## 2020-12-22 DIAGNOSIS — K59.09 CHRONIC CONSTIPATION: ICD-10-CM

## 2020-12-22 PROCEDURE — 97530 THERAPEUTIC ACTIVITIES: CPT | Performed by: PHYSICAL THERAPIST

## 2020-12-23 ENCOUNTER — TRANSCRIBE ORDERS (OUTPATIENT)
Dept: PHYSICAL THERAPY | Facility: CLINIC | Age: 40
End: 2020-12-23

## 2020-12-23 DIAGNOSIS — K59.09 CHRONIC CONSTIPATION: ICD-10-CM

## 2020-12-23 DIAGNOSIS — M62.89 PELVIC FLOOR DYSFUNCTION: Primary | ICD-10-CM

## 2020-12-23 PROCEDURE — 97163 PT EVAL HIGH COMPLEX 45 MIN: CPT | Performed by: PHYSICAL THERAPIST

## 2020-12-30 ENCOUNTER — OFFICE VISIT (OUTPATIENT)
Dept: PHYSICAL THERAPY | Facility: CLINIC | Age: 40
End: 2020-12-30
Payer: COMMERCIAL

## 2020-12-30 DIAGNOSIS — M62.89 PELVIC FLOOR DYSFUNCTION: ICD-10-CM

## 2020-12-30 DIAGNOSIS — K59.09 CHRONIC CONSTIPATION: Primary | ICD-10-CM

## 2020-12-30 PROCEDURE — 97140 MANUAL THERAPY 1/> REGIONS: CPT | Performed by: PHYSICAL THERAPIST

## 2020-12-30 PROCEDURE — 97112 NEUROMUSCULAR REEDUCATION: CPT | Performed by: PHYSICAL THERAPIST

## 2020-12-30 PROCEDURE — 97530 THERAPEUTIC ACTIVITIES: CPT | Performed by: PHYSICAL THERAPIST

## 2021-01-04 ENCOUNTER — OFFICE VISIT (OUTPATIENT)
Dept: PHYSICAL THERAPY | Facility: CLINIC | Age: 41
End: 2021-01-04
Payer: COMMERCIAL

## 2021-01-04 DIAGNOSIS — M62.89 PELVIC FLOOR DYSFUNCTION: ICD-10-CM

## 2021-01-04 DIAGNOSIS — K59.09 CHRONIC CONSTIPATION: Primary | ICD-10-CM

## 2021-01-04 PROCEDURE — 97110 THERAPEUTIC EXERCISES: CPT | Performed by: PHYSICAL THERAPIST

## 2021-01-04 PROCEDURE — 97112 NEUROMUSCULAR REEDUCATION: CPT | Performed by: PHYSICAL THERAPIST

## 2021-01-04 NOTE — PROGRESS NOTES
Daily Note     Today's date: 2021  Patient name: Gisselle Dawkins  : 1980  MRN: [id number]  Referring provider: Pablo Fregoso, REGINALD  Dx:   Encounter Diagnosis     ICD-10-CM    1  Chronic constipation  K59 09    2  Pelvic floor dysfunction  M62 89                   Subjective: Pt reporting that she scheduled a follow up with GI in March and is finding out if there is any counseling to talk to re: eating habits/fears  Objective: See treatment diary below      Assessment: Pt needing verbal cues for improved sequencing with diaphragmatic breathing and pelvic floor contraction on exhales  Improved technique with practice and pt reporting feeling more comfortable to continue at home  TA and pelvic tilt added to HEP for generalized strengthening  Plan: Continue per plan of care  Re-eval Date: 21    Date      Visit Count 1 2 3     FOTO Completed        Pain In See IE        Pain Out See IE             Precautions: GI      * Pt provided consent at beginning of session and piror to internal pelvic exam and application of Biofeedback     Manuals      *Pelvic exam   15 minutes                               Neuro Re-Ed         *Biofeedback   30 minutes       PFM + diaphragmatic breathing    Pillow prop under hips   X 10 PFM     TA    5" x 10      TA + marches    X 10 alt                              Ther Ex        Pelvic tilt    5" x 10      Daja pose    10" x 3                                                      Ther Activity        Education  Pelvic floor anatomy and function   X 25 minutes  Diaphragmatic breathing + PRM   X 15 minutes         Importance of team approach to address issues and possible need for follow up with MDs:  GI, urology, psych   X 15 minutes       Gait Training                        Modalities

## 2021-01-11 ENCOUNTER — OFFICE VISIT (OUTPATIENT)
Dept: PHYSICAL THERAPY | Facility: CLINIC | Age: 41
End: 2021-01-11
Payer: COMMERCIAL

## 2021-01-11 DIAGNOSIS — M62.89 PELVIC FLOOR DYSFUNCTION: ICD-10-CM

## 2021-01-11 DIAGNOSIS — K59.09 CHRONIC CONSTIPATION: Primary | ICD-10-CM

## 2021-01-11 PROCEDURE — 90913 BFB TRAINING EA ADDL 15 MIN: CPT | Performed by: PHYSICAL THERAPIST

## 2021-01-11 PROCEDURE — 90912 BFB TRAINING 1ST 15 MIN: CPT | Performed by: PHYSICAL THERAPIST

## 2021-01-11 PROCEDURE — 97112 NEUROMUSCULAR REEDUCATION: CPT | Performed by: PHYSICAL THERAPIST

## 2021-01-11 RX ORDER — CYCLOBENZAPRINE HCL 5 MG
TABLET ORAL
Qty: 180 TABLET | Refills: 1 | OUTPATIENT
Start: 2021-01-11

## 2021-01-11 NOTE — PROGRESS NOTES
Daily Note     Today's date: 2021  Patient name: Gallo Akers  : 1980  MRN: [id number]  Referring provider: No ref  provider found  Dx:   Encounter Diagnosis     ICD-10-CM    1  Chronic constipation  K59 09    2  Pelvic floor dysfunction  M62 89                   Subjective: Pt reporting that she feels like she does a better pelvic floor contraction without the pillow propped under her  Objective: See treatment diary below      Assessment: PT utilized biofeedback this session with pt demosntrating good improvement in peak/aveage work phases (outlined below)  She did however also demonstrate contraction of the pelvic floor when asked to relax and bare down; flower visualization used to show pt she is doing the opposite of what is needed for proper bowel and bladder voiding  Work Peak: 7 4   With diaphragmatic breathing  Work peak: 10 5  Work Avg: 3 4       Plan: Continue per plan of care  Re-eval Date: 21    Date     Visit Count 1 2 3 4    FOTO Completed        Pain In See IE        Pain Out See IE             Precautions: GI      * Pt provided consent at beginning of session and piror to internal pelvic exam and application of Biofeedback     Manuals     *Pelvic exam   15 minutes                               Neuro Re-Ed         *Biofeedback   30 minutes   45 minutes     PFM + diaphragmatic breathing    Pillow prop under hips   X 10 PFM     TA    5" x 10  Reviewed for HEP     TA + marches    X 10 alt      PFM relaxation techniques     Reviewed for home applications   Use of mirror or finger for tactile cues for proper 'bare down'                    Ther Ex        Pelvic tilt    5" x 10      Daja pose    10" x 3                                                      Ther Activity        Education  Pelvic floor anatomy and function   X 25 minutes  Diaphragmatic breathing + PRM   X 15 minutes         Importance of team approach to address issues and possible need for follow up with MDs:  GI, urology, psych   X 15 minutes       Gait Training                        Modalities

## 2021-01-12 ENCOUNTER — APPOINTMENT (OUTPATIENT)
Dept: LAB | Facility: HOSPITAL | Age: 41
End: 2021-01-12
Attending: INTERNAL MEDICINE
Payer: COMMERCIAL

## 2021-01-12 ENCOUNTER — CONSULT (OUTPATIENT)
Dept: HEMATOLOGY ONCOLOGY | Facility: CLINIC | Age: 41
End: 2021-01-12
Payer: COMMERCIAL

## 2021-01-12 VITALS
BODY MASS INDEX: 18.9 KG/M2 | RESPIRATION RATE: 18 BRPM | HEIGHT: 66 IN | DIASTOLIC BLOOD PRESSURE: 70 MMHG | WEIGHT: 117.6 LBS | OXYGEN SATURATION: 99 % | TEMPERATURE: 98.2 F | SYSTOLIC BLOOD PRESSURE: 108 MMHG | HEART RATE: 77 BPM

## 2021-01-12 DIAGNOSIS — G37.9 DEMYELINATING CHANGES IN BRAIN (HCC): ICD-10-CM

## 2021-01-12 DIAGNOSIS — R26.89 BALANCE PROBLEMS: ICD-10-CM

## 2021-01-12 DIAGNOSIS — F10.20 ALCOHOLISM (HCC): ICD-10-CM

## 2021-01-12 DIAGNOSIS — D69.6 THROMBOCYTOPENIA (HCC): ICD-10-CM

## 2021-01-12 DIAGNOSIS — D69.6 THROMBOCYTOPENIA (HCC): Primary | ICD-10-CM

## 2021-01-12 LAB
ALBUMIN SERPL BCP-MCNC: 3.5 G/DL (ref 3.5–5)
ALP SERPL-CCNC: 72 U/L (ref 46–116)
ALT SERPL W P-5'-P-CCNC: 31 U/L (ref 12–78)
ANION GAP SERPL CALCULATED.3IONS-SCNC: 11 MMOL/L (ref 4–13)
AST SERPL W P-5'-P-CCNC: 37 U/L (ref 5–45)
BASOPHILS # BLD AUTO: 0.02 THOUSANDS/ΜL (ref 0–0.1)
BASOPHILS NFR BLD AUTO: 1 % (ref 0–1)
BILIRUB SERPL-MCNC: 0.4 MG/DL (ref 0.2–1)
BUN SERPL-MCNC: 5 MG/DL (ref 5–25)
CALCIUM SERPL-MCNC: 8.7 MG/DL (ref 8.3–10.1)
CHLORIDE SERPL-SCNC: 106 MMOL/L (ref 100–108)
CO2 SERPL-SCNC: 25 MMOL/L (ref 21–32)
CREAT SERPL-MCNC: 0.63 MG/DL (ref 0.6–1.3)
EOSINOPHIL # BLD AUTO: 0.13 THOUSAND/ΜL (ref 0–0.61)
EOSINOPHIL NFR BLD AUTO: 4 % (ref 0–6)
ERYTHROCYTE [DISTWIDTH] IN BLOOD BY AUTOMATED COUNT: 12.2 % (ref 11.6–15.1)
GFR SERPL CREATININE-BSD FRML MDRD: 113 ML/MIN/1.73SQ M
GLUCOSE SERPL-MCNC: 72 MG/DL (ref 65–140)
HCT VFR BLD AUTO: 35 % (ref 34.8–46.1)
HGB BLD-MCNC: 11.6 G/DL (ref 11.5–15.4)
IMM GRANULOCYTES # BLD AUTO: 0 THOUSAND/UL (ref 0–0.2)
IMM GRANULOCYTES NFR BLD AUTO: 0 % (ref 0–2)
LYMPHOCYTES # BLD AUTO: 1.52 THOUSANDS/ΜL (ref 0.6–4.47)
LYMPHOCYTES NFR BLD AUTO: 44 % (ref 14–44)
MCH RBC QN AUTO: 32.9 PG (ref 26.8–34.3)
MCHC RBC AUTO-ENTMCNC: 33.1 G/DL (ref 31.4–37.4)
MCV RBC AUTO: 99 FL (ref 82–98)
MONOCYTES # BLD AUTO: 0.23 THOUSAND/ΜL (ref 0.17–1.22)
MONOCYTES NFR BLD AUTO: 7 % (ref 4–12)
NEUTROPHILS # BLD AUTO: 1.46 THOUSANDS/ΜL (ref 1.85–7.62)
NEUTS SEG NFR BLD AUTO: 44 % (ref 43–75)
NRBC BLD AUTO-RTO: 0 /100 WBCS
PLATELET # BLD AUTO: 54 THOUSANDS/UL (ref 149–390)
PMV BLD AUTO: 11 FL (ref 8.9–12.7)
POTASSIUM SERPL-SCNC: 3.5 MMOL/L (ref 3.5–5.3)
PROT SERPL-MCNC: 6.5 G/DL (ref 6.4–8.2)
RBC # BLD AUTO: 3.53 MILLION/UL (ref 3.81–5.12)
SODIUM SERPL-SCNC: 142 MMOL/L (ref 136–145)
WBC # BLD AUTO: 3.36 THOUSAND/UL (ref 4.31–10.16)

## 2021-01-12 PROCEDURE — 85025 COMPLETE CBC W/AUTO DIFF WBC: CPT

## 2021-01-12 PROCEDURE — 80053 COMPREHEN METABOLIC PANEL: CPT

## 2021-01-12 PROCEDURE — 36415 COLL VENOUS BLD VENIPUNCTURE: CPT

## 2021-01-12 PROCEDURE — 99245 OFF/OP CONSLTJ NEW/EST HI 55: CPT | Performed by: INTERNAL MEDICINE

## 2021-01-12 RX ORDER — CYCLOBENZAPRINE HCL 5 MG
5 TABLET ORAL 3 TIMES DAILY PRN
Status: ON HOLD | COMMUNITY
End: 2021-11-15 | Stop reason: ALTCHOICE

## 2021-01-12 NOTE — PROGRESS NOTES
Grzegorz Harper  1980  HEM ONC [id number]  [address] [facility] [address] [phone]    Chief Complaint   Patient presents with    Follow-up           Oncology History    No history exists  History of Present Illness:  Patient has been under evaluation for gait disturbance going back 2-3 years  Prior to spinal tap in November 2020 she had a platelet count of 63, normal PT INR  Prior values in 2014 show occasional borderline thrombocytopenia, 120 for example, and hemoglobin 7 6, white blood count 11 0  These were during hospital admission for trauma/fractures of the ribs  CT of the abdomen pelvis in 2014 showed no liver or spleen radiographic abnormalities  Patient tells me she had had HIV testing at sometime in the past   She is unsure of other hepatitis viral testing  Review of Systems   Constitutional: Negative for appetite change, diaphoresis, fatigue and fever  HENT: Negative for sinus pain  Eyes: Negative for discharge  Respiratory: Negative for cough and shortness of breath  Cardiovascular: Negative for chest pain  Gastrointestinal: Negative for abdominal pain, constipation and diarrhea  Endocrine: Negative for cold intolerance  Genitourinary: Negative for difficulty urinating and hematuria  Musculoskeletal: Negative for joint swelling  Skin: Negative for rash  Allergic/Immunologic: Negative for environmental allergies  Neurological: Negative for dizziness and headaches  Hematological: Negative for adenopathy  Psychiatric/Behavioral: Negative for agitation         Patient Active Problem List   Diagnosis    Chronic constipation    Encounter for screening colonoscopy    Alcoholism ([facility]    Lyme disease    Chronic pain due to trauma    IBS (irritable bowel syndrome)    Insomnia    Leukopenia    Musculoskeletal pain, chronic    Nondependent opioid abuse in remission ([facility]    Unspecified abnormalities of gait and mobility    Balance problems    Chronic midline low back pain without sciatica    History of Lyme disease    Cervical myelopathy (HCC)    Demyelinating changes in brain ([address]    Neurogenic bladder    Ambulatory dysfunction     Past Medical History:   Diagnosis Date    Anxiety     Back pain     Chronic constipation 6/27/2016    Infectious viral hepatitis     Lyme disease      Past Surgical History:   Procedure Laterality Date    APPENDECTOMY      CLAVICLE SURGERY      FL LUMBAR PUNCTURE DIAGNOSTIC  11/25/2020    KNEE ARTHROSCOPY      MULTIPLE TOOTH EXTRACTIONS      SD COLONOSCOPY FLX DX W/COLLJ SPEC WHEN PFRMD N/A 6/27/2016    Procedure: COLONOSCOPY;  Surgeon: Pieter Madrigal MD;  Location: [facility];  Service: Colorectal    TONSILLECTOMY AND ADENOIDECTOMY      TUBAL LIGATION       History reviewed  No pertinent family history    Social History     Socioeconomic History    Marital status: /Civil Union     Spouse name: Not on file    Number of children: Not on file    Years of education: Not on file    Highest education level: Not on file   Occupational History    Not on file   Social Needs    Financial resource strain: Not on file    Food insecurity     Worry: Not on file     Inability: Not on file   [employer] needs     Medical: Not on file     Non-medical: Not on file   Tobacco Use    Smoking status: Current Every Day Smoker     Packs/day: 0 50     Types: Cigarettes    Smokeless tobacco: Never Used   Substance and Sexual Activity    Alcohol use: Yes     Comment: social    Drug use: No    Sexual activity: Not on file   Lifestyle    Physical activity     Days per week: Not on file     Minutes per session: Not on file    Stress: Not on file   Relationships    Social connections     Talks on phone: Not on file     Gets together: Not on file     Attends [employer] service: Not on file     Active member of club or organization: Not on file Attends meetings of clubs or organizations: Not on file     Relationship status: Not on file    Intimate partner violence     Fear of current or ex partner: Not on file     Emotionally abused: Not on file     Physically abused: Not on file     Forced sexual activity: Not on file   Other Topics Concern    Not on file   Social History Narrative    Not on file       Current Outpatient Medications:     b complex vitamins tablet, Take 1 tablet by mouth daily, Disp: , Rfl:     baclofen 10 mg tablet, Take 1 tablet (10 mg total) by mouth 3 (three) times a day, Disp: 90 tablet, Rfl: 0    Boswellia-Glucosamine-Vit D (Osteo Bi-Flex One Per Day) TABS, Take by mouth, Disp: , Rfl:     buprenorphine-naloxone (SUBOXONE) 2-0 5 mg per SL tablet, Place 0 5 tablets under the tongue daily, Disp: , Rfl:     Cholecalciferol (Vitamin D3) 25 MCG (1000 UT) CAPS, Take 1 capsule by mouth daily, Disp: , Rfl:     cloNIDine (CATAPRES) 0 2 mg tablet, Take 0 2 mg by mouth 2 (two) times a day, Disp: , Rfl:     cyclobenzaprine (FLEXERIL) 5 mg tablet, Take 5 mg by mouth 3 (three) times a day as needed for muscle spasms, Disp: , Rfl:     docusate sodium (COLACE) 100 mg capsule, Take 100 mg by mouth 3 (three) times a day, Disp: , Rfl:     ergocalciferol (VITAMIN D2) 50,000 units, Take 1 capsule (50,000 Units total) by mouth once a week, Disp: 12 capsule, Rfl: 0    ibuprofen (MOTRIN) 200 mg tablet, Take 200 mg by mouth as needed for mild pain, Disp: , Rfl:     lidocaine (LIDODERM) 5 %, Apply 1 patch topically daily Remove & Discard patch within 12 hours or as directed by MD, Disp: 30 patch, Rfl: 0    Liver Extract (LIVER PO), Take 1 tablet by mouth daily, Disp: , Rfl:     lubiprostone (AMITIZA) 24 mcg capsule, Take 24 mcg by mouth 2 (two) times a day with meals, Disp: , Rfl:     meloxicam (MOBIC) 7 5 mg tablet, Take one tablet daily for a week prn for mid back pain, and can increase to 2 tabs a day if needed  , Disp: 60 tablet, Rfl: 3   Nerve Stimulator (STANDARD TENS) VICKY, by Does not apply route 2 (two) times a day, Disp: 1 Device, Rfl: 0    Nerve Stimulator (TENS THERAPY REPLACE BACK PADS) MISC, 30 pad by Does not apply route 2 (two) times a day, Disp: 30 each, Rfl: 0    Nerve Stimulator VICKY, by Does not apply route 2 (two) times a day, Disp: 1 each, Rfl: 0    Nerve Stimulator Supplies MISC, 30 pad by Does not apply route 2 (two) times a day, Disp: 30 pad, Rfl: 0    Omega-3 Fatty Acids (FISH OIL OMEGA-3 PO), Take 1,200 mg by mouth daily, Disp: , Rfl:     Potassium 99 MG TABS, Take 99 mg by mouth, Disp: , Rfl:     pregabalin (LYRICA) 50 mg capsule, Take 1 capsule (50 mg total) by mouth 3 (three) times a day, Disp: 90 capsule, Rfl: 1    Probiotic Product (PROBIOTIC DAILY PO), Take 1 tablet by mouth daily, Disp: , Rfl:     promethazine (PHENERGAN) 50 MG tablet, TAKE 1 TO 2 TABS AT BEDTIME AS NEEDED FOR INSOMNIA, Disp: , Rfl: 1    topiramate (TOPAMAX) 50 MG tablet, TAKE 1 TABLET BY MOUTH EVERY DAY FOR HEADACHE/SLEEP, Disp: , Rfl: 5    busPIRone (BUSPAR) 10 mg tablet, Take 10 mg by mouth 2 (two) times a day, Disp: , Rfl: 1    diazepam (VALIUM) 10 mg tablet, Take 1 tab 60 min prior to spinal tap, and second tab 30 min prior to LP if anxiety persists (Patient not taking: Reported on 1/12/2021), Disp: 2 tablet, Rfl: 0    lactulose (CHRONULAC) 10 g/15 mL solution, TAKE 10ML BY MOUTH EVERY 3-4 DAYS, Disp: , Rfl: 5  Allergies   Allergen Reactions    Penicillins GI Intolerance     Other reaction(s): Unknown Reaction     Vitals:    01/12/21 0848   BP: 108/70   Pulse: 77   Resp: 18   Temp: 98 2 °F (36 8 °C)   SpO2: 99%       Physical Exam  Constitutional:       Appearance: She is well-developed  HENT:      Head: Normocephalic and atraumatic  Eyes:      Pupils: Pupils are equal, round, and reactive to light  Neck:      Musculoskeletal: Neck supple  Cardiovascular:      Rate and Rhythm: Normal rate  Heart sounds: No murmur  Pulmonary:      Effort: No respiratory distress  Breath sounds: No wheezing or rales  Abdominal:      General: There is no distension  Palpations: Abdomen is soft  Tenderness: There is no abdominal tenderness  There is no rebound  Musculoskeletal:         General: No tenderness  Lymphadenopathy:      Cervical: No cervical adenopathy  Skin:     General: Skin is warm  Findings: No rash  Neurological:      Mental Status: She is alert and oriented to person, place, and time  Deep Tendon Reflexes: Reflexes normal    Psychiatric:         Thought Content: Thought content normal            Labs:  CBC, Coags, BMP, Mg, Phos      Imaging  No results found  I reviewed the above laboratory and imaging data  Discussion/Summary:  In summary, this is a 81-year-old female history of thrombocytopenia as outlined above  Previous leukocytosis and anemia during her 2014 hospitalization were probably related to trauma itself  By the time she left the hospital her numbers had improved/normalized  She has had infrequent CBCs since that time  She had 1 episode of thrombocytopenia as documented  Repeat CBC and differential with CMP are requested  She had been a heavy drinker until approximately 2013  Hepatopathy could certainly lead to decreased thrombopoietin and resultant thrombocytopenia  I would expect this to be more subacute in onset although the discontinuity of lab values may complicate temporal interpretation  If thrombocytopenia is persistent, further investigation would follow accordingly  If her CBC and differential are normal I would say that observation is more appropriate  Thrombocytopenia in November may have been related to some other medication/illness that had since been discontinued and or resolved  I reviewed the above with the patient  She voiced understanding and agreement  We will be in contact in the next 48 hours regarding further evaluation, as applicable

## 2021-01-13 RX ORDER — ERGOCALCIFEROL 1.25 MG/1
CAPSULE ORAL
Qty: 12 CAPSULE | Refills: 0 | Status: SHIPPED | OUTPATIENT
Start: 2021-01-13 | End: 2021-01-18 | Stop reason: SDUPTHER

## 2021-01-15 ENCOUNTER — TELEPHONE (OUTPATIENT)
Dept: NEUROLOGY | Facility: CLINIC | Age: 41
End: 2021-01-15

## 2021-01-15 NOTE — TELEPHONE ENCOUNTER
Pt made aware  She is agreeable to virtual visit  [email or web address] if any difficulties call pt at [phone] or call [phone]  appt changed to virtual and antione  aware

## 2021-01-15 NOTE — TELEPHONE ENCOUNTER
LMOM for pt to call back in reference to schedule appointment on 1/18/21 with Dr Emre Ramirez in [facility]  Provider is switching all in office visit to virtual video

## 2021-01-18 ENCOUNTER — TELEPHONE (OUTPATIENT)
Dept: NEUROLOGY | Facility: CLINIC | Age: 41
End: 2021-01-18

## 2021-01-18 ENCOUNTER — TELEMEDICINE (OUTPATIENT)
Dept: NEUROLOGY | Facility: CLINIC | Age: 41
End: 2021-01-18
Payer: COMMERCIAL

## 2021-01-18 VITALS — HEIGHT: 66 IN | WEIGHT: 113 LBS | BODY MASS INDEX: 18.16 KG/M2

## 2021-01-18 DIAGNOSIS — R26.2 AMBULATORY DYSFUNCTION: Primary | ICD-10-CM

## 2021-01-18 DIAGNOSIS — F10.20 ALCOHOLISM (HCC): ICD-10-CM

## 2021-01-18 DIAGNOSIS — G37.9 DEMYELINATING CHANGES IN BRAIN (HCC): ICD-10-CM

## 2021-01-18 DIAGNOSIS — D69.6 THROMBOCYTOPENIA (HCC): ICD-10-CM

## 2021-01-18 DIAGNOSIS — N31.9 NEUROGENIC BLADDER: ICD-10-CM

## 2021-01-18 PROCEDURE — 99215 OFFICE O/P EST HI 40 MIN: CPT | Performed by: PSYCHIATRY & NEUROLOGY

## 2021-01-18 RX ORDER — ERGOCALCIFEROL 1.25 MG/1
50000 CAPSULE ORAL WEEKLY
Qty: 12 CAPSULE | Refills: 0 | Status: SHIPPED | OUTPATIENT
Start: 2021-01-18 | End: 2021-06-08 | Stop reason: ALTCHOICE

## 2021-01-18 NOTE — PROGRESS NOTES
She has a [facility]  PATIENT:  Alisson Mckenzie  MRN:  [id number]  :  1980  DATE OF SERVICE:  2021  Virtual Regular Visit      Assessment/Plan:    Problem List Items Addressed This Visit        Nervous and Auditory    Demyelinating changes in brain [facility])    Relevant Medications    ergocalciferol (VITAMIN D2) 50,000 units    Other Relevant Orders    STRATIFY JCV(TM) AB W/RFX TO INHIBITION ASSAY       Other    Alcoholism ([facility] )    Neurogenic bladder    Ambulatory dysfunction - Primary    Thrombocytopenia ([facility] )               Reason for visit is Follow up Visit   Chief Complaint   Patient presents with    Virtual Regular Visit        Encounter provider Kai Edwards MD    Provider located at [address] [facility]  [place]  [phone]      Recent Visits  No visits were found meeting these conditions  Showing recent visits within past 7 days and meeting all other requirements     Today's Visits  Date Type Provider Dept   21 Telephone Kai Edwards MD [facility]   21 Telemedicine Kai Edwards MD [facility]   Showing today's visits and meeting all other requirements     Future Appointments  No visits were found meeting these conditions  Showing future appointments within next 150 days and meeting all other requirements        The patient was identified by name and date of birth  Alisson Mckenzie was informed that this is a telemedicine visit and that the visit is being conducted through [employer] and patient was informed that this is a secure, HIPAA-compliant platform  She agrees to proceed     My office door was closed  The patient was notified the following individuals were present in the room Dr Kay Alvarez  She acknowledged consent and understanding of privacy and security of the video platform   The patient has agreed to participate and understands they can discontinue the visit at any time  Patient is aware this is a billable service  Patient agrees to participate in a virtual check in via telephone/video visit instead of presenting to the office to address urgent/immediate medical needs  Patient is aware this is a billable service  Patient acknowledged consent and understood privacy and security of the virtual check -in visit  I informed the patient that I have reviewed the records in Epic and presented the opportunity to ask any questions regarding the visit today  The patient initiated communication and agreed to participate  We discussed the limitations of the telemedicine platform and that my medical advice and examination would be limited as a consequence, the patient expressed full understanding  The patient initiated communication and agreed to participate  Subjective  Genevieve Lainez is a 37 y o  female with recently diagnosed MS  HPI     Mrs Baron Lopez is a 36 yo female who has presented to [address] [place] [address] for follow-up on ambulatory dysfunction and CNS demyelination  Patient's mother was present during this virtual encounter with several questions were answered to the patient and her mother satisfaction  Patient was previously evaluated by Dr Carin Morataya and Christina Cortes  Patient was previously recognize having cervical cord demyelination bilaterally, at the level of C2-C3 and C4-C6, with left cerebellar demyelinating plaque and left periventricular lesion; concern is for demyelinating disease versus history of Lyme, headache, EtOH, chronic opioid use  Left cerebellar lesion with ataxic gait since childhood ( age 11-10 yo) - ADEM is considered in addition to NMO/MOG and MS  Lyme disease at age of 18 yo - may contribute for immune dysregulation  Patient had completed spinal tap and she is here today to discuss her findings     MRI T-spine has no signs of demyelination;     Serologic workup was reviewed with NMO/MOG negative; CSF MS Panel positive with 8 OCB  Alona Velásquez is f/u current sxs are numbness on hand, balance issues  Pt states her feet are always cold  Patient has numbness involving her right hand, with no new focal neurological deficit or progression of her ambulatory dysfunction since last office visit  Patient establish her care with hematology team Dr Wang Vazquez her evaluation of Thrombocytopenia  Patient has been working with physical therapy team for chronic constipation and pelvic floor dysfunction  Lamberto Co    Mrs Valerie Huntley has presented to [facility] [address] for follow-up on CNS demyelination  Ablation of her clinical presentation, radiographic findings, as well as CSF analysis suggest patient has multiple sclerosis; MS mimickers were rule out with extensive workup been completed, with negative NMO/MOG reported  Patient agreed to initiate disease modifying therapy to store progression of her multiple sclerosis, considering young age, as we extensively discussed immuno suppressive therapy during public health emergency  We briefly overview potential treatment choices for the patient considering her young age, [address] pandemic, as well as underlying liver dysfunction likely due to chronic alcohol use; we agreed patient would preferably avoid interferons, Aubagio, Gilenya which may also cause liver dysfunction  Patient was advised against B-cell depleting therapy up until she has vaccinated for COVID-19;   Best and most reasonable choice with the patient will be Tysabri infusion, with high efficacy and reasonable safety profile  Risk and benefits of medication been extensively discussed, patient will proceed with evaluation of LETI virus  Other concerns with initiating Tysabri was low platelet count, as we also recognized mild iatrogenic thrombocytopenia in the patient's taking Tysabri, which might be problematic initiating with this medication at this point    Patient will be signed Tysabri booklet with form in addition to [address] virus test     Dr Dougie Hewitt for 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  Patient should follow primary care physician to likely status her care with hematology team to rule out alcohol-induced level cirrhosis  Patient is to consider adjusting her diet, with eating more vegetable; patient is to continue working with physical therapy for pelvic floor dysfunction  Patient did have good response to steroid previously, we may consider intermittent use of steroids instead of initiating disease modifying therapy  Patient is to follow with [address] [place] [address] Neurology within 4 weeks  Patient is to continue practicing safety measures during the novel coronovirus public health emergency  While the Covid-19 vaccine was not specifically studied in MS patients, it is probably safe for patients with MS, off or on disease modifying therapy, 25years of age and older  The benefits  of the Covid-19 vaccine outweigh the risks in this evolving pandemic      Please follow the link below to the [facility] MS Society's statement on the vaccine in MS patients:  [email or web address]    Past Medical History:   Diagnosis Date    Anxiety     Back pain     Chronic constipation 6/27/2016    Dyssynergic defecation     Type 1    Infectious viral hepatitis     Lyme disease     Thrombocytopenia (HCC)        Past Surgical History:   Procedure Laterality Date    APPENDECTOMY      CLAVICLE SURGERY      FL LUMBAR PUNCTURE DIAGNOSTIC  11/25/2020    KNEE ARTHROSCOPY      MULTIPLE TOOTH EXTRACTIONS      [place] COLONOSCOPY FLX DX W/COLLJ SPEC WHEN PFRMD N/A 6/27/2016    Procedure: COLONOSCOPY;  Surgeon: Joe Loaiza MD;  Location: [facility];  Service: Colorectal    TONSILLECTOMY AND ADENOIDECTOMY      TUBAL LIGATION         Current Outpatient Medications   Medication Sig Dispense Refill    b complex vitamins tablet Take 1 tablet by mouth daily      baclofen 10 mg tablet Take 1 tablet (10 mg total) by mouth 3 (three) times a day (Patient taking differently: Take 10 mg by mouth as needed ) 90 tablet 0    Boswellia-Glucosamine-Vit D (Osteo Bi-Flex One Per Day) TABS Take by mouth      buprenorphine-naloxone (SUBOXONE) 2-0 5 mg per SL tablet Place 0 5 tablets under the tongue daily      busPIRone (BUSPAR) 10 mg tablet Take 10 mg by mouth 2 (two) times a day  1    Cholecalciferol (Vitamin D3) 25 MCG (1000 UT) CAPS Take 1 capsule by mouth daily      cloNIDine (CATAPRES) 0 2 mg tablet Take 0 2 mg by mouth 2 (two) times a day      cyclobenzaprine (FLEXERIL) 5 mg tablet Take 5 mg by mouth as needed for muscle spasms       docusate sodium (COLACE) 100 mg capsule Take 100 mg by mouth 3 (three) times a day      ibuprofen (MOTRIN) 200 mg tablet Take 200 mg by mouth as needed for mild pain      lidocaine (LIDODERM) 5 % Apply 1 patch topically daily Remove & Discard patch within 12 hours or as directed by MD 30 patch 0    Liver Extract (LIVER PO) Take 1 tablet by mouth daily      lubiprostone (AMITIZA) 24 mcg capsule Take 24 mcg by mouth 2 (two) times a day with meals      Nerve Stimulator (STANDARD TENS) VICKY by Does not apply route 2 (two) times a day 1 Device 0    Nerve Stimulator (TENS THERAPY REPLACE BACK PADS) MISC 30 pad by Does not apply route 2 (two) times a day 30 each 0    Nerve Stimulator VICKY by Does not apply route 2 (two) times a day 1 each 0    Nerve Stimulator Supplies MISC 30 pad by Does not apply route 2 (two) times a day 30 pad 0    Nutritional Supplements (METABOLIC LIVER FORMULA PO) Take 1 tablet by mouth daily      Omega-3 Fatty Acids (FISH OIL OMEGA-3 PO) Take 1,200 mg by mouth daily      Potassium 99 MG TABS Take 99 mg by mouth      Probiotic Product (PROBIOTIC DAILY PO) Take 1 tablet by mouth daily      promethazine (PHENERGAN) 50 MG tablet TAKE 1 TO 2 TABS AT BEDTIME AS NEEDED FOR INSOMNIA  1  topiramate (TOPAMAX) 50 MG tablet TAKE 1 TABLET BY MOUTH EVERY DAY FOR HEADACHE/SLEEP  5    diazepam (VALIUM) 10 mg tablet Take 1 tab 60 min prior to spinal tap, and second tab 30 min prior to LP if anxiety persists (Patient not taking: Reported on 1/12/2021) 2 tablet 0    ergocalciferol (VITAMIN D2) 50,000 units Take 1 capsule (50,000 Units total) by mouth once a week 12 capsule 0    lactulose (CHRONULAC) 10 g/15 mL solution TAKE 10ML BY MOUTH EVERY 3-4 DAYS  5    meloxicam (MOBIC) 7 5 mg tablet Take one tablet daily for a week prn for mid back pain, and can increase to 2 tabs a day if needed  (Patient not taking: Reported on 1/18/2021) 60 tablet 3    pregabalin (LYRICA) 50 mg capsule Take 1 capsule (50 mg total) by mouth 3 (three) times a day (Patient not taking: Reported on 1/18/2021) 90 capsule 1     No current facility-administered medications for this visit  Allergies   Allergen Reactions    Penicillins GI Intolerance     Other reaction(s): Unknown Reaction       Review of Systems   Constitutional: Negative  Negative for appetite change and fever  HENT: Negative  Negative for hearing loss, tinnitus, trouble swallowing and voice change  Eyes: Negative  Negative for photophobia and pain  Respiratory: Negative  Negative for shortness of breath  Cardiovascular: Negative  Negative for palpitations  Gastrointestinal: Negative  Negative for nausea and vomiting  Endocrine: Negative  Negative for cold intolerance  Genitourinary: Negative  Negative for dysuria, frequency and urgency  Musculoskeletal: Positive for gait problem  Negative for myalgias and neck pain  Skin: Negative  Negative for rash  Allergic/Immunologic: Negative  Neurological: Positive for numbness (fingers and feet  Feet feel cold)  Negative for dizziness, tremors, seizures, syncope, facial asymmetry, speech difficulty, weakness, light-headedness and headaches  Hematological: Negative    Does not bruise/bleed easily  Psychiatric/Behavioral: Negative  Negative for confusion, hallucinations and sleep disturbance  Video Exam    Vitals:    01/18/21 1107   Weight: 51 3 kg (113 lb)   Height: 5' 6" (1 676 m)       Physical Exam   CONSTITUTIONAL: NAD, pleasant  NECK: supple,PSYCH: appropriate mood and affect  NEUROLOGIC COMPREHENSIVE EXAM: Patient is oriented to person, place and time, NAD; appropriate affect  CN II, III, IV, V, VI, VII,VIII,IX,X,XI-XII intact with EOMI, PERRLA,  symmetric face noted  Motor: grossly intact UE/LE bilateral symmetric;  Coordination within normal limits on FTN and CECY testing; Tandem gait is abnormal  Romberg: negative  I spent 40 minutes with patient today in which greater than 50% of the time was spent in counseling/coordination of care regarding MS and related issues       VIRTUAL VISIT DISCLAIMER    Lily Joseph acknowledges that she has consented to an online visit or consultation  She understands that the online visit is based solely on information provided by her, and that, in the absence of a face-to-face physical evaluation by the physician, the diagnosis she receives is both limited and provisional in terms of accuracy and completeness  This is not intended to replace a full medical face-to-face evaluation by the physician  Lily Joseph understands and accepts these terms

## 2021-01-19 ENCOUNTER — OFFICE VISIT (OUTPATIENT)
Dept: PHYSICAL THERAPY | Facility: CLINIC | Age: 41
End: 2021-01-19
Payer: COMMERCIAL

## 2021-01-19 DIAGNOSIS — M62.89 PELVIC FLOOR DYSFUNCTION: ICD-10-CM

## 2021-01-19 DIAGNOSIS — K59.09 CHRONIC CONSTIPATION: Primary | ICD-10-CM

## 2021-01-19 PROCEDURE — 97530 THERAPEUTIC ACTIVITIES: CPT | Performed by: PHYSICAL THERAPIST

## 2021-01-19 PROCEDURE — 97112 NEUROMUSCULAR REEDUCATION: CPT | Performed by: PHYSICAL THERAPIST

## 2021-01-19 NOTE — PROGRESS NOTES
Daily Note     Today's date: 2021  Patient name: Luis Leon  : 1980  MRN: [id number]  Referring provider: No ref  provider found  Dx:   Encounter Diagnosis     ICD-10-CM    1  Chronic constipation  K59 09    2  Pelvic floor dysfunction  M62 89                   Subjective: Pt reporting that she saw the neurologist who told her that she does have MS  Objective: See treatment diary below      Assessment: Pt was unclear on home application for relaxation techniques; PT spent ample time reviewing today with handout provided and pt verbalized better understanding  Generalized weakness t/o B LE contributing to difficulty with hip strengthening to support the pelvic floor  Plan: Continue per plan of care  Re-eval Date: 21    Date    Visit Count 1 2 3 4 5   FOTO Completed        Pain In See IE        Pain Out See IE             Precautions: GI      * Pt provided consent at beginning of session and piror to internal pelvic exam and application of Biofeedback     Manuals    *Pelvic exam   15 minutes                               Neuro Re-Ed         *Biofeedback   30 minutes   45 minutes     PFM + diaphragmatic breathing    Pillow prop under hips   X 10 PFM  Reviewed for HEP    TA    5" x 10  Reviewed for HEP  5" x 10    TA + marches    X 10 alt      TA + hip fall outs      5" x 5 jemima    PFM relaxation techniques     Reviewed for home applications   Use of mirror or finger for tactile cues for proper 'bare down' Reviewed previous session home instruction as pt had follow up questions on application                    Ther Ex        Pelvic tilt    5" x 10      Daja pose    10" x 3      Glut sets      5" x 10                                            Ther Activity        Education  Pelvic floor anatomy and function   X 25 minutes  Diaphragmatic breathing + PRM   X 15 minutes         Importance of team approach to address issues and possible need for follow up with MDs:  GI, urology, psych   X 15 minutes       Gait Training                        Modalities

## 2021-01-21 ENCOUNTER — TELEPHONE (OUTPATIENT)
Dept: HEMATOLOGY ONCOLOGY | Facility: CLINIC | Age: 41
End: 2021-01-21

## 2021-01-22 ENCOUNTER — TELEPHONE (OUTPATIENT)
Dept: HEMATOLOGY ONCOLOGY | Facility: CLINIC | Age: 41
End: 2021-01-22

## 2021-01-22 DIAGNOSIS — D72.819 LEUKOPENIA, UNSPECIFIED TYPE: ICD-10-CM

## 2021-01-22 DIAGNOSIS — D69.6 THROMBOCYTOPENIA (HCC): Primary | ICD-10-CM

## 2021-01-22 NOTE — TELEPHONE ENCOUNTER
I talked with the patient by phone today  She is clinically stable  Plan is in place for treatment of multiple sclerosis, pending improvement in her blood counts  She has leukopenia and thrombocytopenia  Differential diagnosis includes substrate deficiencies, autoimmune processes, lymphoproliferative processes less likely  Peripheral blood work is requested  I asked her to call 2 days afterward to review the results and make further recommendations  Patient voiced understanding and agreement

## 2021-01-27 ENCOUNTER — LAB (OUTPATIENT)
Dept: LAB | Facility: HOSPITAL | Age: 41
End: 2021-01-27
Attending: INTERNAL MEDICINE
Payer: COMMERCIAL

## 2021-01-27 DIAGNOSIS — D69.6 THROMBOCYTOPENIA (HCC): ICD-10-CM

## 2021-01-27 DIAGNOSIS — D72.819 LEUKOPENIA, UNSPECIFIED TYPE: ICD-10-CM

## 2021-01-27 LAB
CRP SERPL QL: <0.5 MG/L
ERYTHROCYTE [SEDIMENTATION RATE] IN BLOOD: 6 MM/HOUR (ref 0–19)
FERRITIN SERPL-MCNC: 26 NG/ML (ref 8–388)
FOLATE SERPL-MCNC: >20 NG/ML (ref 3.1–17.5)
IRON SATN MFR SERPL: 43 %
IRON SERPL-MCNC: 156 UG/DL (ref 50–170)
TIBC SERPL-MCNC: 360 UG/DL (ref 250–450)

## 2021-01-27 PROCEDURE — 86140 C-REACTIVE PROTEIN: CPT

## 2021-01-27 PROCEDURE — 86803 HEPATITIS C AB TEST: CPT

## 2021-01-27 PROCEDURE — 86430 RHEUMATOID FACTOR TEST QUAL: CPT

## 2021-01-27 PROCEDURE — 87389 HIV-1 AG W/HIV-1&-2 AB AG IA: CPT

## 2021-01-27 PROCEDURE — 88184 FLOWCYTOMETRY/ TC 1 MARKER: CPT

## 2021-01-27 PROCEDURE — 88185 FLOWCYTOMETRY/TC ADD-ON: CPT

## 2021-01-27 PROCEDURE — 83540 ASSAY OF IRON: CPT

## 2021-01-27 PROCEDURE — 36415 COLL VENOUS BLD VENIPUNCTURE: CPT

## 2021-01-27 PROCEDURE — 82746 ASSAY OF FOLIC ACID SERUM: CPT

## 2021-01-27 PROCEDURE — 86038 ANTINUCLEAR ANTIBODIES: CPT

## 2021-01-27 PROCEDURE — 85652 RBC SED RATE AUTOMATED: CPT

## 2021-01-27 PROCEDURE — 82728 ASSAY OF FERRITIN: CPT

## 2021-01-27 PROCEDURE — 83918 ORGANIC ACIDS TOTAL QUANT: CPT

## 2021-01-27 PROCEDURE — 83550 IRON BINDING TEST: CPT

## 2021-01-27 NOTE — TELEPHONE ENCOUNTER
Patient questioning if/when she needs to get the MRIs that were ordered by Vega Polk back in Nov 2020  Please advise

## 2021-01-28 LAB
HCV AB SER QL: NORMAL
HIV 1+2 AB+HIV1 P24 AG SERPL QL IA: NORMAL
RHEUMATOID FACT SER QL LA: NEGATIVE

## 2021-01-28 NOTE — TELEPHONE ENCOUNTER
Patient calling in  She wants to confirm with Dr HILL that she starts tysabri prior to to getting MRIs? Please advise, thanks!     [phone]   Ok to leave a detailed message

## 2021-01-28 NOTE — TELEPHONE ENCOUNTER
Left patient detailed message regarding below  Provided her with number to Central Scheduling as well

## 2021-01-29 LAB — RYE IGE QN: NEGATIVE

## 2021-01-31 LAB — SCAN RESULT: NORMAL

## 2021-02-03 LAB
JCPYV AB SERPL QL IA: ABNORMAL
SL AMB INDEX VALUE: 0.22
SL AMB JCV AB BY INHIBITION: ABNORMAL

## 2021-02-03 NOTE — TELEPHONE ENCOUNTER
Patient called again regarding the timing of infusion and imaging  Informed her per Dr Arlin Schmitz, to get the infusion prior to f/u imaging  Patient verbalized understanding  She requested the MRI orders be mailed to her  Mailed them to her home as requested  She stated she had her labs done  Patient aware we will be in contact to schedule her for tysabri infusions

## 2021-02-04 ENCOUNTER — TRANSCRIBE ORDERS (OUTPATIENT)
Dept: ADMINISTRATIVE | Facility: HOSPITAL | Age: 41
End: 2021-02-04

## 2021-02-04 LAB
METHYLMALONATE SERPL-SCNC: 149 NMOL/L (ref 0–378)
SL AMB DISCLAIMER: NORMAL

## 2021-02-04 NOTE — TELEPHONE ENCOUNTER
Dr Princess Banuelos, please advise if pt needs any additional labs completed  Lata, please assist pt with Tysabri start form  Pt cannot start Tysabri until start form is submitted  Benefits investigation will occur to see if PA is needed prior to scheduling

## 2021-02-04 NOTE — TELEPHONE ENCOUNTER
Patient calling in wondering if there are other labs that she should have completed? Dr HILL - any further recommendations?     [phone]   Ok to leave a detailed message     Janis cosby patient is wondering when she can begin [place]

## 2021-02-09 ENCOUNTER — TELEMEDICINE (OUTPATIENT)
Dept: HEMATOLOGY ONCOLOGY | Facility: CLINIC | Age: 41
End: 2021-02-09
Payer: COMMERCIAL

## 2021-02-09 ENCOUNTER — TELEPHONE (OUTPATIENT)
Dept: HEMATOLOGY ONCOLOGY | Facility: CLINIC | Age: 41
End: 2021-02-09

## 2021-02-09 DIAGNOSIS — D69.6 THROMBOCYTOPENIA (HCC): Primary | ICD-10-CM

## 2021-02-09 PROCEDURE — 99214 OFFICE O/P EST MOD 30 MIN: CPT | Performed by: INTERNAL MEDICINE

## 2021-02-09 NOTE — LETTER
February 9, 2021     Abiola Holland DO  [address]  [address] [id number]    Patient: Drew Loya   YOB: 1980   Date of Visit: 2/9/2021       Dear Dr Rolanda Weber: Thank you for referring Drew Loya to me for evaluation  Below are my notes for this consultation  If you have questions, please do not hesitate to call me  I look forward to following your patient along with you  Sincerely,        Nikolay Browne DO        CC: MD Nikolay Edmond DO  2/9/2021 11:02 AM  Incomplete    Virtual Regular Visit      Assessment/Plan:  In summary, this is a 55-year-old female history of thrombocytopenia and mild leukopenia  Recent blood work showed normal sed rate, CRP, VARINDER, rheumatoid factor, HCV, HIV  Flow cytometry was likewise negative  In short, there is no plausible explanation for her thrombocytopenia and borderline leukopenia at this time  I note that on previous CMP she had an AST of 124  Most recent abdominal imaging in December 2018 was a CT scan which showed no hepatic or splenic abnormalities  She reports that she does have 2 mixed drinks every night  This could be the cause of her cytopenias  I asked her to stop that completely at this time  Repeat CBC in 1 week  I asked her to call a day after the blood work is done  If platelets have improved/normalized alcohol is the likely culprit here  If not, ultrasound of the abdomen or bone marrow biopsy would be reasonable to consider  I reviewed the above with the patient  I reviewed her medications, medical conditions, laboratory studies      Problem List Items Addressed This Visit     None               Reason for visit is   Chief Complaint   Patient presents with    Virtual Regular Visit        Encounter provider Nikolay Browne DO    Provider located at [address] [place] [address] [place] [address]  [phone]      Recent Visits  No visits were found meeting these conditions  Showing recent visits within past 7 days and meeting all other requirements     Future Appointments  No visits were found meeting these conditions  Showing future appointments within next 150 days and meeting all other requirements        The patient was identified by name and date of birth  Dylan Wray was informed that this is a telemedicine visit and that the visit is being conducted through [facility] and patient was informed that this is a secure, HIPAA-compliant platform  She agrees to proceed     My office door was closed  No one else was in the room  She acknowledged consent and understanding of privacy and security of the video platform  The patient has agreed to participate and understands they can discontinue the visit at any time  Patient is aware this is a billable service  Subjective  Dylan Wray is a 37 y o  female  With a history of thrombocytopenia  He is clinically stable  No new medications  She has no bleeding symptoms  Nakita Serum HPI see above      Past Medical History:   Diagnosis Date    Anxiety     Back pain     Chronic constipation 6/27/2016    Dyssynergic defecation     Type 1    Infectious viral hepatitis     Lyme disease     Thrombocytopenia (HCC)        Past Surgical History:   Procedure Laterality Date    APPENDECTOMY      CLAVICLE SURGERY      FL LUMBAR PUNCTURE DIAGNOSTIC  11/25/2020    KNEE ARTHROSCOPY      MULTIPLE TOOTH EXTRACTIONS      DE COLONOSCOPY FLX DX W/COLLJ SPEC WHEN PFRMD N/A 6/27/2016    Procedure: COLONOSCOPY;  Surgeon: Adalberto Lee MD;  Location: [facility];  Service: Colorectal    TONSILLECTOMY AND ADENOIDECTOMY      TUBAL LIGATION         Current Outpatient Medications   Medication Sig Dispense Refill    b complex vitamins tablet Take 1 tablet by mouth daily      baclofen 10 mg tablet Take 1 tablet (10 mg total) by mouth 3 (three) times a day (Patient taking differently: Take 10 mg by mouth as needed ) 90 tablet 0    Boswellia-Glucosamine-Vit D (Osteo Bi-Flex One Per Day) TABS Take by mouth      buprenorphine-naloxone (SUBOXONE) 2-0 5 mg per SL tablet Place 0 5 tablets under the tongue daily      busPIRone (BUSPAR) 10 mg tablet Take 10 mg by mouth 2 (two) times a day  1    Cholecalciferol (Vitamin D3) 25 MCG (1000 UT) CAPS Take 1 capsule by mouth daily      cloNIDine (CATAPRES) 0 2 mg tablet Take 0 2 mg by mouth 2 (two) times a day      cyclobenzaprine (FLEXERIL) 5 mg tablet Take 5 mg by mouth as needed for muscle spasms       diazepam (VALIUM) 10 mg tablet Take 1 tab 60 min prior to spinal tap, and second tab 30 min prior to LP if anxiety persists (Patient not taking: Reported on 1/12/2021) 2 tablet 0    docusate sodium (COLACE) 100 mg capsule Take 100 mg by mouth 3 (three) times a day      ergocalciferol (VITAMIN D2) 50,000 units Take 1 capsule (50,000 Units total) by mouth once a week 12 capsule 0    ibuprofen (MOTRIN) 200 mg tablet Take 200 mg by mouth as needed for mild pain      lactulose (CHRONULAC) 10 g/15 mL solution TAKE 10ML BY MOUTH EVERY 3-4 DAYS  5    lidocaine (LIDODERM) 5 % Apply 1 patch topically daily Remove & Discard patch within 12 hours or as directed by MD 30 patch 0    Liver Extract (LIVER PO) Take 1 tablet by mouth daily      lubiprostone (AMITIZA) 24 mcg capsule Take 24 mcg by mouth 2 (two) times a day with meals      meloxicam (MOBIC) 7 5 mg tablet Take one tablet daily for a week prn for mid back pain, and can increase to 2 tabs a day if needed   (Patient not taking: Reported on 1/18/2021) 60 tablet 3    Nerve Stimulator (STANDARD TENS) VICKY by Does not apply route 2 (two) times a day 1 Device 0    Nerve Stimulator (TENS THERAPY REPLACE BACK PADS) MISC 30 pad by Does not apply route 2 (two) times a day 30 each 0    Nerve Stimulator VICKY by Does not apply route 2 (two) times a day 1 each 0    Nerve Stimulator Supplies MISC 30 pad by Does not apply route 2 (two) times a day 30 pad 0    Nutritional Supplements (METABOLIC LIVER FORMULA PO) Take 1 tablet by mouth daily      Omega-3 Fatty Acids (FISH OIL OMEGA-3 PO) Take 1,200 mg by mouth daily      Potassium 99 MG TABS Take 99 mg by mouth      pregabalin (LYRICA) 50 mg capsule Take 1 capsule (50 mg total) by mouth 3 (three) times a day (Patient not taking: Reported on 1/18/2021) 90 capsule 1    Probiotic Product (PROBIOTIC DAILY PO) Take 1 tablet by mouth daily      promethazine (PHENERGAN) 50 MG tablet TAKE 1 TO 2 TABS AT BEDTIME AS NEEDED FOR INSOMNIA  1    topiramate (TOPAMAX) 50 MG tablet TAKE 1 TABLET BY MOUTH EVERY DAY FOR HEADACHE/SLEEP  5     No current facility-administered medications for this visit  Allergies   Allergen Reactions    Penicillins GI Intolerance     Other reaction(s): Unknown Reaction       Review of Systems   Constitutional: Negative for appetite change, diaphoresis, fatigue and fever  HENT: Negative for sinus pain  Eyes: Negative for discharge  Respiratory: Negative for cough and shortness of breath  Cardiovascular: Negative for chest pain  Gastrointestinal: Negative for abdominal pain, constipation and diarrhea  Endocrine: Negative for cold intolerance  Genitourinary: Negative for difficulty urinating and hematuria  Musculoskeletal: Negative for joint swelling  Skin: Negative for rash  Allergic/Immunologic: Negative for environmental allergies  Neurological: Negative for dizziness and headaches  Hematological: Negative for adenopathy  Psychiatric/Behavioral: Negative for agitation  Video Exam    There were no vitals filed for this visit  Physical Exam  Constitutional:       Appearance: She is well-developed  HENT:      Head: Atraumatic  Neck:      Musculoskeletal: Normal range of motion  Pulmonary:      Effort: Pulmonary effort is normal    Skin:     Findings: No rash     Neurological:      Mental Status: She is alert and oriented to person, place, and time  I spent 25 minutes directly with the patient during this visit      VIRTUAL VISIT DISCLAIMER    Jamar Hunter acknowledges that she has consented to an online visit or consultation  She understands that the online visit is based solely on information provided by her, and that, in the absence of a face-to-face physical evaluation by the physician, the diagnosis she receives is both limited and provisional in terms of accuracy and completeness  This is not intended to replace a full medical face-to-face evaluation by the physician  Jamar Hunter understands and accepts these terms  Sarah Shirley DO  2/9/2021 10:59 AM  Signed    Virtual Regular Visit      Assessment/Plan:  In summary, this is a 81-year-old female history of thrombocytopenia and mild leukopenia  Recent blood work showed normal sed rate, CRP, VARINDER, rheumatoid factor, HCV, HIV  Flow cytometry was likewise negative  In short, there is no plausible explanation for her thrombocytopenia and borderline leukopenia at this time  I note that on previous CMP she had an AST of 124  Most recent abdominal imaging in December 2018 was a CT scan which showed no hepatic or splenic abnormalities  She reports that she does have 2 mixed drinks every night  This could be the cause of her cytopenias  I asked her to stop that completely at this time  Repeat CBC in 1 week  I asked her to call a day after the blood work is done  If platelets have improved/normalized alcohol is the likely culprit here  If not, ultrasound of the abdomen or bone marrow biopsy would be reasonable to consider  I reviewed the above with the patient  I reviewed her medications, medical conditions, laboratory studies      Problem List Items Addressed This Visit     None               Reason for visit is   Chief Complaint   Patient presents with    Virtual Regular Visit        Encounter provider Sarah Shirley DO    Provider located at [address] [place] [id number] [phone]      Recent Visits  No visits were found meeting these conditions  Showing recent visits within past 7 days and meeting all other requirements     Future Appointments  No visits were found meeting these conditions  Showing future appointments within next 150 days and meeting all other requirements        The patient was identified by name and date of birth  Timothy Jackson was informed that this is a telemedicine visit and that the visit is being conducted through [facility] and patient was informed that this is a secure, HIPAA-compliant platform  She agrees to proceed     My office door was closed  No one else was in the room  She acknowledged consent and understanding of privacy and security of the video platform  The patient has agreed to participate and understands they can discontinue the visit at any time  Patient is aware this is a billable service  Subjective  Timothy Jackson is a 37 y o  female  With a history of thrombocytopenia  He is clinically stable  No new medications  She has no bleeding symptoms  Donovan Youssef HPI see above      Past Medical History:   Diagnosis Date    Anxiety     Back pain     Chronic constipation 6/27/2016    Dyssynergic defecation     Type 1    Infectious viral hepatitis     Lyme disease     Thrombocytopenia (HCC)        Past Surgical History:   Procedure Laterality Date    APPENDECTOMY      CLAVICLE SURGERY      FL LUMBAR PUNCTURE DIAGNOSTIC  11/25/2020    KNEE ARTHROSCOPY      MULTIPLE TOOTH EXTRACTIONS      AL COLONOSCOPY FLX DX W/COLLJ SPEC WHEN PFRMD N/A 6/27/2016    Procedure: COLONOSCOPY;  Surgeon: Tiny Johnson MD;  [facility]: [facility];  Service: Colorectal    TONSILLECTOMY AND ADENOIDECTOMY      TUBAL LIGATION         Current Outpatient Medications   Medication Sig Dispense Refill    b complex vitamins tablet Take 1 tablet by mouth daily      baclofen 10 mg tablet Take 1 tablet (10 mg total) by mouth 3 (three) times a day (Patient taking differently: Take 10 mg by mouth as needed ) 90 tablet 0    Boswellia-Glucosamine-Vit D (Osteo Bi-Flex One Per Day) TABS Take by mouth      buprenorphine-naloxone (SUBOXONE) 2-0 5 mg per SL tablet Place 0 5 tablets under the tongue daily      busPIRone (BUSPAR) 10 mg tablet Take 10 mg by mouth 2 (two) times a day  1    Cholecalciferol (Vitamin D3) 25 MCG (1000 UT) CAPS Take 1 capsule by mouth daily      cloNIDine (CATAPRES) 0 2 mg tablet Take 0 2 mg by mouth 2 (two) times a day      cyclobenzaprine (FLEXERIL) 5 mg tablet Take 5 mg by mouth as needed for muscle spasms       diazepam (VALIUM) 10 mg tablet Take 1 tab 60 min prior to spinal tap, and second tab 30 min prior to LP if anxiety persists (Patient not taking: Reported on 1/12/2021) 2 tablet 0    docusate sodium (COLACE) 100 mg capsule Take 100 mg by mouth 3 (three) times a day      ergocalciferol (VITAMIN D2) 50,000 units Take 1 capsule (50,000 Units total) by mouth once a week 12 capsule 0    ibuprofen (MOTRIN) 200 mg tablet Take 200 mg by mouth as needed for mild pain      lactulose (CHRONULAC) 10 g/15 mL solution TAKE 10ML BY MOUTH EVERY 3-4 DAYS  5    lidocaine (LIDODERM) 5 % Apply 1 patch topically daily Remove & Discard patch within 12 hours or as directed by MD 30 patch 0    Liver Extract (LIVER PO) Take 1 tablet by mouth daily      lubiprostone (AMITIZA) 24 mcg capsule Take 24 mcg by mouth 2 (two) times a day with meals      meloxicam (MOBIC) 7 5 mg tablet Take one tablet daily for a week prn for mid back pain, and can increase to 2 tabs a day if needed   (Patient not taking: Reported on 1/18/2021) 60 tablet 3    Nerve Stimulator (STANDARD TENS) VICKY by Does not apply route 2 (two) times a day 1 Device 0    Nerve Stimulator (TENS THERAPY REPLACE BACK PADS) MISC 30 pad by Does not apply route 2 (two) times a day 30 each 0    Nerve Stimulator VICKY by Does not apply route 2 (two) times a day 1 each 0    Nerve Stimulator Supplies MISC 30 pad by Does not apply route 2 (two) times a day 30 pad 0    Nutritional Supplements (METABOLIC LIVER FORMULA PO) Take 1 tablet by mouth daily      Omega-3 Fatty Acids (FISH OIL OMEGA-3 PO) Take 1,200 mg by mouth daily      Potassium 99 MG TABS Take 99 mg by mouth      pregabalin (LYRICA) 50 mg capsule Take 1 capsule (50 mg total) by mouth 3 (three) times a day (Patient not taking: Reported on 1/18/2021) 90 capsule 1    Probiotic Product (PROBIOTIC DAILY PO) Take 1 tablet by mouth daily      promethazine (PHENERGAN) 50 MG tablet TAKE 1 TO 2 TABS AT BEDTIME AS NEEDED FOR INSOMNIA  1    topiramate (TOPAMAX) 50 MG tablet TAKE 1 TABLET BY MOUTH EVERY DAY FOR HEADACHE/SLEEP  5     No current facility-administered medications for this visit  Allergies   Allergen Reactions    Penicillins GI Intolerance     Other reaction(s): Unknown Reaction       Review of Systems    Video Exam    There were no vitals filed for this visit  Physical Exam     I spent 25 minutes directly with the patient during this visit      VIRTUAL VISIT DISCLAIMER    Claudene Epp acknowledges that she has consented to an online visit or consultation  She understands that the online visit is based solely on information provided by her, and that, in the absence of a face-to-face physical evaluation by the physician, the diagnosis she receives is both limited and provisional in terms of accuracy and completeness  This is not intended to replace a full medical face-to-face evaluation by the physician  Claudene Epp understands and accepts these terms

## 2021-02-09 NOTE — PROGRESS NOTES
Virtual Regular Visit      Assessment/Plan:  In summary, this is a 50-year-old female history of thrombocytopenia and mild leukopenia  Recent blood work showed normal sed rate, CRP, VARINDER, rheumatoid factor, HCV, HIV  Flow cytometry was likewise negative  In short, there is no plausible explanation for her thrombocytopenia and borderline leukopenia at this time  I note that on previous CMP she had an AST of 124  Most recent abdominal imaging in December 2018 was a CT scan which showed no hepatic or splenic abnormalities  She reports that she does have 2 mixed drinks every night  This could be the cause of her cytopenias  I asked her to stop that completely at this time  Repeat CBC in 1 week  I asked her to call a day after the blood work is done  If platelets have improved/normalized alcohol is the likely culprit here  If not, ultrasound of the abdomen or bone marrow biopsy would be reasonable to consider  I reviewed the above with the patient  I reviewed her medications, medical conditions, laboratory studies  Problem List Items Addressed This Visit     None               Reason for visit is   Chief Complaint   Patient presents with    Virtual Regular Visit        Encounter provider Alix Mortimer, DO    Provider located at [address] [place] [id number] [phone]      Recent Visits  No visits were found meeting these conditions  Showing recent visits within past 7 days and meeting all other requirements     Future Appointments  No visits were found meeting these conditions  Showing future appointments within next 150 days and meeting all other requirements        The patient was identified by name and date of birth  Timothy Jackson was informed that this is a telemedicine visit and that the visit is being conducted through [facility] and patient was informed that this is a secure, HIPAA-compliant platform  She agrees to proceed     My office door was closed  No one else was in the room  She acknowledged consent and understanding of privacy and security of the video platform  The patient has agreed to participate and understands they can discontinue the visit at any time  Patient is aware this is a billable service  Subjective  Elana Irvin is a 37 y o  female  With a history of thrombocytopenia  He is clinically stable  No new medications  She has no bleeding symptoms  Galindo Carey HPI see above      Past Medical History:   Diagnosis Date    Anxiety     Back pain     Chronic constipation 6/27/2016    Dyssynergic defecation     Type 1    Infectious viral hepatitis     Lyme disease     Thrombocytopenia (HCC)        Past Surgical History:   Procedure Laterality Date    APPENDECTOMY      CLAVICLE SURGERY      FL LUMBAR PUNCTURE DIAGNOSTIC  11/25/2020    KNEE ARTHROSCOPY      MULTIPLE TOOTH EXTRACTIONS      AZ COLONOSCOPY FLX DX W/COLLJ SPEC WHEN PFRMD N/A 6/27/2016    Procedure: COLONOSCOPY;  Surgeon: Daniel Lynn MD;  [facility]: [facility];  Service: Colorectal    TONSILLECTOMY AND ADENOIDECTOMY      TUBAL LIGATION         Current Outpatient Medications   Medication Sig Dispense Refill    b complex vitamins tablet Take 1 tablet by mouth daily      baclofen 10 mg tablet Take 1 tablet (10 mg total) by mouth 3 (three) times a day (Patient taking differently: Take 10 mg by mouth as needed ) 90 tablet 0    Boswellia-Glucosamine-Vit D (Osteo Bi-Flex One Per Day) TABS Take by mouth      buprenorphine-naloxone (SUBOXONE) 2-0 5 mg per SL tablet Place 0 5 tablets under the tongue daily      busPIRone (BUSPAR) 10 mg tablet Take 10 mg by mouth 2 (two) times a day  1    Cholecalciferol (Vitamin D3) 25 MCG (1000 UT) CAPS Take 1 capsule by mouth daily      cloNIDine (CATAPRES) 0 2 mg tablet Take 0 2 mg by mouth 2 (two) times a day      cyclobenzaprine (FLEXERIL) 5 mg tablet Take 5 mg by mouth as needed for muscle spasms       diazepam (VALIUM) 10 mg tablet Take 1 tab 60 min prior to spinal tap, and second tab 30 min prior to LP if anxiety persists (Patient not taking: Reported on 1/12/2021) 2 tablet 0    docusate sodium (COLACE) 100 mg capsule Take 100 mg by mouth 3 (three) times a day      ergocalciferol (VITAMIN D2) 50,000 units Take 1 capsule (50,000 Units total) by mouth once a week 12 capsule 0    ibuprofen (MOTRIN) 200 mg tablet Take 200 mg by mouth as needed for mild pain      lactulose (CHRONULAC) 10 g/15 mL solution TAKE 10ML BY MOUTH EVERY 3-4 DAYS  5    lidocaine (LIDODERM) 5 % Apply 1 patch topically daily Remove & Discard patch within 12 hours or as directed by MD 30 patch 0    Liver Extract (LIVER PO) Take 1 tablet by mouth daily      lubiprostone (AMITIZA) 24 mcg capsule Take 24 mcg by mouth 2 (two) times a day with meals      meloxicam (MOBIC) 7 5 mg tablet Take one tablet daily for a week prn for mid back pain, and can increase to 2 tabs a day if needed   (Patient not taking: Reported on 1/18/2021) 60 tablet 3    Nerve Stimulator (STANDARD TENS) VICKY by Does not apply route 2 (two) times a day 1 Device 0    Nerve Stimulator (TENS THERAPY REPLACE BACK PADS) MISC 30 pad by Does not apply route 2 (two) times a day 30 each 0    Nerve Stimulator VICKY by Does not apply route 2 (two) times a day 1 each 0    Nerve Stimulator Supplies MISC 30 pad by Does not apply route 2 (two) times a day 30 pad 0    Nutritional Supplements (METABOLIC LIVER FORMULA PO) Take 1 tablet by mouth daily      Omega-3 Fatty Acids (FISH OIL OMEGA-3 PO) Take 1,200 mg by mouth daily      Potassium 99 MG TABS Take 99 mg by mouth      pregabalin (LYRICA) 50 mg capsule Take 1 capsule (50 mg total) by mouth 3 (three) times a day (Patient not taking: Reported on 1/18/2021) 90 capsule 1    Probiotic Product (PROBIOTIC DAILY PO) Take 1 tablet by mouth daily      promethazine (PHENERGAN) 50 MG tablet TAKE 1 TO 2 TABS AT BEDTIME AS NEEDED FOR INSOMNIA  1    topiramate (TOPAMAX) 50 MG tablet TAKE 1 TABLET BY MOUTH EVERY DAY FOR HEADACHE/SLEEP  5     No current facility-administered medications for this visit  Allergies   Allergen Reactions    Penicillins GI Intolerance     Other reaction(s): Unknown Reaction       Review of Systems   Constitutional: Negative for appetite change, diaphoresis, fatigue and fever  HENT: Negative for sinus pain  Eyes: Negative for discharge  Respiratory: Negative for cough and shortness of breath  Cardiovascular: Negative for chest pain  Gastrointestinal: Negative for abdominal pain, constipation and diarrhea  Endocrine: Negative for cold intolerance  Genitourinary: Negative for difficulty urinating and hematuria  Musculoskeletal: Negative for joint swelling  Skin: Negative for rash  Allergic/Immunologic: Negative for environmental allergies  Neurological: Negative for dizziness and headaches  Hematological: Negative for adenopathy  Psychiatric/Behavioral: Negative for agitation  Video Exam    There were no vitals filed for this visit  Physical Exam  Constitutional:       Appearance: She is well-developed  HENT:      Head: Atraumatic  Neck:      Musculoskeletal: Normal range of motion  Pulmonary:      Effort: Pulmonary effort is normal    Skin:     Findings: No rash  Neurological:      Mental Status: She is alert and oriented to person, place, and time  I spent 25 minutes directly with the patient during this visit      VIRTUAL VISIT DISCLAIMER    Delia Dubois acknowledges that she has consented to an online visit or consultation  She understands that the online visit is based solely on information provided by her, and that, in the absence of a face-to-face physical evaluation by the physician, the diagnosis she receives is both limited and provisional in terms of accuracy and completeness   This is not intended to replace a full medical face-to-face evaluation by the physician  eG Andrew understands and accepts these terms

## 2021-02-10 ENCOUNTER — OFFICE VISIT (OUTPATIENT)
Dept: PHYSICAL THERAPY | Facility: HOME HEALTHCARE | Age: 41
End: 2021-02-10
Payer: COMMERCIAL

## 2021-02-10 DIAGNOSIS — K59.09 CHRONIC CONSTIPATION: Primary | ICD-10-CM

## 2021-02-10 DIAGNOSIS — M62.89 PELVIC FLOOR DYSFUNCTION: ICD-10-CM

## 2021-02-10 PROCEDURE — 97164 PT RE-EVAL EST PLAN CARE: CPT | Performed by: PHYSICAL THERAPIST

## 2021-02-10 PROCEDURE — 97530 THERAPEUTIC ACTIVITIES: CPT | Performed by: PHYSICAL THERAPIST

## 2021-02-10 PROCEDURE — 97112 NEUROMUSCULAR REEDUCATION: CPT | Performed by: PHYSICAL THERAPIST

## 2021-02-10 NOTE — PROGRESS NOTES
PT Discharge    Today's date: 2/10/2021  Patient name: Adri Logan  : 1980  MRN: [id number]  Referring provider: Candace Vela PA-C  Dx:   Encounter Diagnosis     ICD-10-CM    1  Chronic constipation  K59 09    2  Pelvic floor dysfunction  M62 89                   Assessment  Assessment details: Pt Adri oLgan is a 45 y o  who presents to OPPT with s/s consistent with pelvic floor dysfunction with mixed urinary and bowel involvement  She has chronic constipation, leading to pain and straining with both bowel and bladder voiding  Significant GI concerns with abdominal pain, bloating, and constipation with eating almost all foods per pt report  PT will address the noted impairments by performing pelvic floor and hip strengthening, stretching, breathing exercises, biofeedback, functional activities and manual techniques to allow the patient to return to her PLOF  Update 2/10/21: Pt notes that she doesn't really notice any changes since starting therapy  She notes that she is trying the home program and feels like the breathing does help some when trying to void but continued straining and hesitancy noted  PT feels pt would benefit from return to neurology to discuss MS dx impact on pelvic floor and also suggested seeing urology to assess bladder structure/function  Pt verbalized understanding  She will be D/C from OPPT at this time due to lack of significant progress at present  She will return to therapy as needed after further MD assessment  Impairments: lacks appropriate home exercise program  Other impairment: pelvic floor dysfunction     Goals  STGs to be met in 4 weeks:  * Patient will be compliant with introductory HEP as prescribed  - met  * Patient will report 40% less pain with activities  - not met  LTGs to be met by discharge:  * Normalize findings on sEMG to indicate PFM strength average of at least 12uV and resting average < 2 5uV   - not met  * Implements relaxation strategies on a daily basis  - not met  * Patient will report 90 % reduction in discomfort with either palpation or intimacy  - not met  * Patient will be proficient and compliant with use of vaginal dilator (s) for progression of self PFM stretching in 3-4 visits if indicated during treatment   - not met  * Patient will be compliant with comprehensive home exercise program for self management of condition - not met     Plan  Plan details: Pt to be D/C from OPPT   Duration in visits: 1  Plan of Care beginning date: 2/10/2021  Plan of Care expiration date: 2/10/2021  Treatment plan discussed with: patient        PT Pelvic Floor Subjective:   History of Present Illness:   Pt reporting that she has been having issues with constipation since she was 89 years old  She notes that the problem has been worsening over the past several years and "can only eat egg whites and certain frozen veggies"  She states that she is currently doing an enema every other day for bowel movements; without enema use she would not have bowel movements without manual extraction  She notes that she strains a lot when having to void (urine and bowel) and it can be painful at times  She has been to multiple doctors, with various testing/imaging completed with (-) findings  She was finally diagnosed with dyssynergic defecation type 1  Neurology follow ups 3/3/21 and 7/1/21   MRI updated scheduled for 3/1/21  GI follow up 5/11/21Quality of life: poor    Social Support:     Lives with:  Spouse    Relationship status: /committed  Diet and Exercise:      Exercise type: no activity  OB/ gyn History    Gestational History:     Number of prior pregnancies: 3    Delivery Type: vaginal delivery      Delivery Complications:  Tearing with 1st birth   Bladder Function:     Voiding Difficulties positive for: hesitancy and straining      Voiding Difficulties comments:     Urinary leakage: no urine leakage    Nocturia (episodes per night): 0    Fluid Intake Type:   Water Intake (ounces): Water: 48,   Bowel Function:     Voiding DIfficulties: urgency, painful defecating, unfinished feeling after defecating and constipation      Bowel frequency: every 2 days    Furnas Stool Scale: type 1 and type 2    Stool softener use: stool softeners    Enema use: enema  Sexual Function:     Sexually Active:  Sexually active    Pain during intercourse: No    Pain:     At best pain ratin    At worst pain rating:  10    Onset:  More than 2 years ago    Aggravating factors: Bowel movements, urination and eating certain foods    Progression:  Worsening  Treatments:     Current treatment: physical therapy    Patient Goals:     Patient goals for therapy:  Decreased pain, fully empty bladder or bowels, improved bladder or bowel function, improved comfort, relaxation, improved quality of life and improved pain management      Objective           Re-eval Date: 21    Date 2/10       Visit Count 6       FOTO        Pain In        Pain Out             Precautions: GI      * Pt provided consent at beginning of session and piror to internal pelvic exam and application of Biofeedback     Manuals 2/10       *Pelvic exam                                 Neuro Re-Ed         *Biofeedback         PFM + diaphragmatic breathing  Reviewed for HEP        TA         TA + marches         TA + hip fall outs         PFM relaxation techniques                         Ther Ex        Pelvic tilt         Daja pose         Glut sets                                                 Ther Activity        Education  Bladder anatomy and function; discussed possible urology consult                Gait Training                        Modalities

## 2021-02-11 NOTE — TELEPHONE ENCOUNTER
No updates received on pt's start form  Bita and spoke with Yvan  She reports they spoke with someone in our office yesterday- provider signature needs to be updated on pages 3 and 5  Will look into this tomorrow

## 2021-02-12 ENCOUNTER — TELEPHONE (OUTPATIENT)
Dept: UROLOGY | Facility: CLINIC | Age: 41
End: 2021-02-12

## 2021-02-12 NOTE — TELEPHONE ENCOUNTER
NP referred for Bladder issue by outside Doctor Dr Hernandez [place]  DX: abdominal distention    Would it be ok to schedule an appt  with you?

## 2021-02-12 NOTE — TELEPHONE ENCOUNTER
After review of start form, multiple questions were not answered on page 3  Date was also missing for provider's signature  Info on Tysabri infusion site is missing from page 5  Requested closest  infusion center to the patient that is touch authorized  Form updated and faxed to [employer]

## 2021-02-15 NOTE — TELEPHONE ENCOUNTER
April 15 at 1:30 with iwona in the [place] office spot on hold and I cant not override    Time date location confirmed

## 2021-02-16 ENCOUNTER — TELEPHONE (OUTPATIENT)
Dept: OTHER | Facility: OTHER | Age: 41
End: 2021-02-16

## 2021-02-16 ENCOUNTER — LAB (OUTPATIENT)
Dept: LAB | Facility: HOSPITAL | Age: 41
End: 2021-02-16
Attending: INTERNAL MEDICINE
Payer: COMMERCIAL

## 2021-02-16 DIAGNOSIS — D69.6 THROMBOCYTOPENIA (HCC): ICD-10-CM

## 2021-02-16 LAB
BASOPHILS # BLD AUTO: 0.03 THOUSANDS/ΜL (ref 0–0.1)
BASOPHILS NFR BLD AUTO: 1 % (ref 0–1)
EOSINOPHIL # BLD AUTO: 0.18 THOUSAND/ΜL (ref 0–0.61)
EOSINOPHIL NFR BLD AUTO: 4 % (ref 0–6)
ERYTHROCYTE [DISTWIDTH] IN BLOOD BY AUTOMATED COUNT: 11.6 % (ref 11.6–15.1)
HCT VFR BLD AUTO: 36.3 % (ref 34.8–46.1)
HGB BLD-MCNC: 12 G/DL (ref 11.5–15.4)
IMM GRANULOCYTES # BLD AUTO: 0.02 THOUSAND/UL (ref 0–0.2)
IMM GRANULOCYTES NFR BLD AUTO: 1 % (ref 0–2)
LYMPHOCYTES # BLD AUTO: 1.72 THOUSANDS/ΜL (ref 0.6–4.47)
LYMPHOCYTES NFR BLD AUTO: 39 % (ref 14–44)
MCH RBC QN AUTO: 33.3 PG (ref 26.8–34.3)
MCHC RBC AUTO-ENTMCNC: 33.1 G/DL (ref 31.4–37.4)
MCV RBC AUTO: 101 FL (ref 82–98)
MONOCYTES # BLD AUTO: 0.36 THOUSAND/ΜL (ref 0.17–1.22)
MONOCYTES NFR BLD AUTO: 8 % (ref 4–12)
NEUTROPHILS # BLD AUTO: 2.12 THOUSANDS/ΜL (ref 1.85–7.62)
NEUTS SEG NFR BLD AUTO: 47 % (ref 43–75)
NRBC BLD AUTO-RTO: 0 /100 WBCS
PLATELET # BLD AUTO: 71 THOUSANDS/UL (ref 149–390)
PMV BLD AUTO: 11.2 FL (ref 8.9–12.7)
RBC # BLD AUTO: 3.6 MILLION/UL (ref 3.81–5.12)
WBC # BLD AUTO: 4.43 THOUSAND/UL (ref 4.31–10.16)

## 2021-02-16 PROCEDURE — 85025 COMPLETE CBC W/AUTO DIFF WBC: CPT

## 2021-02-16 PROCEDURE — 36415 COLL VENOUS BLD VENIPUNCTURE: CPT

## 2021-02-16 NOTE — TELEPHONE ENCOUNTER
Received call from Graciela Santos with [address]  She reports form was not yet received  She does note that some of the company was off yesterday and they may not have gotten to pt's form yet  Form faxed again to ensure this is received  Graciela Santos is watching for this

## 2021-02-17 DIAGNOSIS — D69.6 THROMBOCYTOPENIA (HCC): Primary | ICD-10-CM

## 2021-02-17 DIAGNOSIS — D72.819 LEUKOPENIA, UNSPECIFIED TYPE: ICD-10-CM

## 2021-02-17 NOTE — TELEPHONE ENCOUNTER
Spoke with pt and let her know per Dr Guevara Filter PLT count went up to 71k  He thinks this is related to alcohol, pt is to remain drinking no alcohol and recheck cbc in 2 weeks  She voiced understanding

## 2021-02-17 NOTE — TELEPHONE ENCOUNTER
Received call from [place] with [phone]  Pt's start form was received and they are actively working on this  Consent is on file

## 2021-02-22 NOTE — TELEPHONE ENCOUNTER
Completed [facility] PA form and signed by Dr Kyle Diez with cover sheet, last office note, recent lab results, JCV result, and MRI results  Awaiting determination

## 2021-02-22 NOTE — TELEPHONE ENCOUNTER
Romeo Ferris from [facility] called and states that they only received the cover sheet       Ritu Vásquez, can you pls refax to [phone]        thanks

## 2021-02-23 DIAGNOSIS — G35 MS (MULTIPLE SCLEROSIS) (HCC): Primary | ICD-10-CM

## 2021-02-23 DIAGNOSIS — G37.9 DEMYELINATING CHANGES IN BRAIN (HCC): Primary | ICD-10-CM

## 2021-02-23 RX ORDER — SODIUM CHLORIDE 9 MG/ML
20 INJECTION, SOLUTION INTRAVENOUS ONCE
Status: CANCELLED | OUTPATIENT
Start: 2021-03-03

## 2021-02-23 RX ORDER — ACETAMINOPHEN 325 MG/1
650 TABLET ORAL ONCE
Status: CANCELLED | OUTPATIENT
Start: 2021-03-03

## 2021-02-23 NOTE — TELEPHONE ENCOUNTER
Duncan Guadalupe with [facility] calling to confirm receipt of PA request  Confirmed provider address and fax #

## 2021-02-23 NOTE — TELEPHONE ENCOUNTER
Received voicemail from Shefali Irwin  Also received faxed approval:    Tysabri is approved with [employer] from 2/23/21 to 8/29/21, Johnny Zavaleta # [id number]  Buy and bill was requested, request has been approved  Therapy plan entered and sent for signature  Dr Laurie Dinh- please also add MS diagnosis to pt's chart as this must be associated with therapy plan for insurance purposes

## 2021-02-24 NOTE — TELEPHONE ENCOUNTER
Called [facility], pt is scheduled for the following:    3/3/21 at 10:30am    Left detailed message for patient making her aware  Asked her to call back to confirm receipt of message and to provide her with further details

## 2021-02-24 NOTE — TELEPHONE ENCOUNTER
Referral updated with approval information  Called [facility] center, reached voicemail  Will try again later

## 2021-02-24 NOTE — TELEPHONE ENCOUNTER
Pt is scheduled for her first Tysabri infusion on 3/3/21 (if pt accepts appt)  Infusions are ordered every 4 weeks  Last CBC 2/16/21 CMP 1/12/21  Last JCV 1/28/21 0 22 (final result negative)  Last MRI brain 9/3/20    Okay to proceed with infusion? Alee Siddiqui is approved with [employer] from 2/23/21 to 8/29/21, Tonio Manuel # [id number]  Touch auth valid at [facility] infusion center  Awaiting call back from pt to confirm appt  If she accepts, may need to reschedule neuro appt scheduled that same day

## 2021-02-25 ENCOUNTER — TELEPHONE (OUTPATIENT)
Dept: NEUROLOGY | Facility: CLINIC | Age: 41
End: 2021-02-25

## 2021-02-25 NOTE — TELEPHONE ENCOUNTER
Called patient confirming the appointment with [facility] in the [place] office on 3/3/21 at 12:30 pm  Since patient will be having her infusion that day she would like to make the visit virtual and do it through [employer]  She is asking that when we call her to prep her chart for the visit to either leave a message with the number to call back or call her back 10 minutes later  I stated that we could call her around 11:30 to review everything in the chart so she will be all settled

## 2021-02-25 NOTE — TELEPHONE ENCOUNTER
Received voicemail from the patient asking for a call back  Notes she has neuro appt scheduled at a similar time to the infusion  Left pt a voicemail asking for call back confirming if she is okay with scheduled infusion  Offered to reschedule neuro appt to virtual visit or reschedule date or time depending on pt preference  Awaiting call back

## 2021-02-25 NOTE — TELEPHONE ENCOUNTER
[facility]  Called pt, she is agreeable to infusion appt and would like to keep her neuro appt as scheduled  She is agreeable to a virtual video visit as she will likely still be in the infusion center  Provided her with instructions on using [employer]  Email: [email or web address]    Phone # on file is correct  [facility] will add pt to TEAMS

## 2021-03-01 ENCOUNTER — HOSPITAL ENCOUNTER (OUTPATIENT)
Dept: MRI IMAGING | Facility: HOSPITAL | Age: 41
Discharge: HOME | End: 2021-03-01
Payer: COMMERCIAL

## 2021-03-01 DIAGNOSIS — R26.89 BALANCE PROBLEMS: ICD-10-CM

## 2021-03-01 DIAGNOSIS — R90.89 ABNORMAL FINDING ON MRI OF BRAIN: ICD-10-CM

## 2021-03-01 PROCEDURE — 70551 MRI BRAIN STEM W/O DYE: CPT

## 2021-03-01 PROCEDURE — 72141 MRI NECK SPINE W/O DYE: CPT

## 2021-03-01 PROCEDURE — G1004 CDSM NDSC: HCPCS

## 2021-03-02 ENCOUNTER — TELEPHONE (OUTPATIENT)
Dept: HEMATOLOGY ONCOLOGY | Facility: CLINIC | Age: 41
End: 2021-03-02

## 2021-03-02 ENCOUNTER — TELEPHONE (OUTPATIENT)
Dept: NEUROLOGY | Facility: CLINIC | Age: 41
End: 2021-03-02

## 2021-03-02 ENCOUNTER — APPOINTMENT (OUTPATIENT)
Dept: LAB | Facility: HOSPITAL | Age: 41
End: 2021-03-02
Attending: INTERNAL MEDICINE
Payer: COMMERCIAL

## 2021-03-02 DIAGNOSIS — D72.819 LEUKOPENIA, UNSPECIFIED TYPE: ICD-10-CM

## 2021-03-02 DIAGNOSIS — D69.6 THROMBOCYTOPENIA (HCC): ICD-10-CM

## 2021-03-02 LAB
BASOPHILS # BLD AUTO: 0.04 THOUSANDS/ΜL (ref 0–0.1)
BASOPHILS NFR BLD AUTO: 1 % (ref 0–1)
EOSINOPHIL # BLD AUTO: 0.12 THOUSAND/ΜL (ref 0–0.61)
EOSINOPHIL NFR BLD AUTO: 3 % (ref 0–6)
ERYTHROCYTE [DISTWIDTH] IN BLOOD BY AUTOMATED COUNT: 11.4 % (ref 11.6–15.1)
HCT VFR BLD AUTO: 37.7 % (ref 34.8–46.1)
HGB BLD-MCNC: 12.6 G/DL (ref 11.5–15.4)
IMM GRANULOCYTES # BLD AUTO: 0.01 THOUSAND/UL (ref 0–0.2)
IMM GRANULOCYTES NFR BLD AUTO: 0 % (ref 0–2)
LYMPHOCYTES # BLD AUTO: 1.41 THOUSANDS/ΜL (ref 0.6–4.47)
LYMPHOCYTES NFR BLD AUTO: 32 % (ref 14–44)
MCH RBC QN AUTO: 33.2 PG (ref 26.8–34.3)
MCHC RBC AUTO-ENTMCNC: 33.4 G/DL (ref 31.4–37.4)
MCV RBC AUTO: 100 FL (ref 82–98)
MONOCYTES # BLD AUTO: 0.27 THOUSAND/ΜL (ref 0.17–1.22)
MONOCYTES NFR BLD AUTO: 6 % (ref 4–12)
NEUTROPHILS # BLD AUTO: 2.59 THOUSANDS/ΜL (ref 1.85–7.62)
NEUTS SEG NFR BLD AUTO: 58 % (ref 43–75)
NRBC BLD AUTO-RTO: 0 /100 WBCS
PLATELET # BLD AUTO: 69 THOUSANDS/UL (ref 149–390)
PMV BLD AUTO: 11.1 FL (ref 8.9–12.7)
RBC # BLD AUTO: 3.79 MILLION/UL (ref 3.81–5.12)
WBC # BLD AUTO: 4.44 THOUSAND/UL (ref 4.31–10.16)

## 2021-03-02 PROCEDURE — 85025 COMPLETE CBC W/AUTO DIFF WBC: CPT

## 2021-03-02 PROCEDURE — 36415 COLL VENOUS BLD VENIPUNCTURE: CPT

## 2021-03-02 NOTE — TELEPHONE ENCOUNTER
Discussed with Dr Iman Cardenas  Discussed platelet count with Brad Watts  Pt denies bleeding  Brad Watts discussed her bloating with me and I recommended that she sees her PCP or GI  She was in agreement with this

## 2021-03-02 NOTE — TELEPHONE ENCOUNTER
Patient calling because she got her blood work done today and is upset because she said it came back low  Said that you wanted it to be above 100  Please review  Patient is asking if she should proceed with her infusion tomorrow  I advised that she should plan on keeping her infusion until she hears back from our office  Patient states understanding  Please advise    [phone]: Geno Haji to leave a detailed message

## 2021-03-02 NOTE — TELEPHONE ENCOUNTER
Patient would like their lab results     Person calling in  Patient   Lab Draw Date 03/02   Lab Draw Place [place]    Call Back Number [phone]

## 2021-03-02 NOTE — TELEPHONE ENCOUNTER
Please reach the patient - platelet count is low, agree with the patient, and I appreciate she completed her evaluation by hematology team already     We will proceed with Tysabri tomorrow as scheduled, despite her findings

## 2021-03-02 NOTE — TELEPHONE ENCOUNTER
Patient advised that her platelets are 69 on today's CBC  Patient is questioning if she will be able to get her treatment tomorrow? Patient advised to call her MS- Dr Noelle Cardenas to see if she can get her treatment tomorrow  I did not see holding parameters in [place]  Denies any active signs of bleeding  Patient reports abdominal bloating post her menses that has not resolved  Per patient's request please review with Dr Jose Alberto Epperson what can be done for her low platelet levels  Patient reports that she did not have any alcohol this week prior to her blood work  Christie's call back # [phone] (H)

## 2021-03-03 ENCOUNTER — HOSPITAL ENCOUNTER (OUTPATIENT)
Dept: INFUSION CENTER | Facility: CLINIC | Age: 41
Discharge: HOME | End: 2021-03-03
Payer: COMMERCIAL

## 2021-03-03 ENCOUNTER — TELEMEDICINE (OUTPATIENT)
Dept: NEUROLOGY | Facility: CLINIC | Age: 41
End: 2021-03-03
Payer: COMMERCIAL

## 2021-03-03 VITALS
SYSTOLIC BLOOD PRESSURE: 109 MMHG | HEART RATE: 66 BPM | RESPIRATION RATE: 18 BRPM | DIASTOLIC BLOOD PRESSURE: 70 MMHG | TEMPERATURE: 98.2 F

## 2021-03-03 VITALS — WEIGHT: 113 LBS | HEIGHT: 66 IN | BODY MASS INDEX: 18.16 KG/M2

## 2021-03-03 DIAGNOSIS — R26.2 AMBULATORY DYSFUNCTION: ICD-10-CM

## 2021-03-03 DIAGNOSIS — G35 MS (MULTIPLE SCLEROSIS) (HCC): Primary | ICD-10-CM

## 2021-03-03 DIAGNOSIS — R41.3 COMPLAINTS OF MEMORY DISTURBANCE: ICD-10-CM

## 2021-03-03 LAB
ALBUMIN SERPL BCP-MCNC: 3.7 G/DL (ref 3.5–5.7)
ALP SERPL-CCNC: 62 U/L (ref 46–116)
ALT SERPL W P-5'-P-CCNC: 10 U/L (ref 12–78)
ANION GAP SERPL CALCULATED.3IONS-SCNC: 8 MMOL/L (ref 4–13)
AST SERPL W P-5'-P-CCNC: 28 U/L (ref 5–45)
BASOPHILS # BLD AUTO: 0.02 THOUSANDS/ΜL (ref 0–0.1)
BASOPHILS NFR BLD AUTO: 1 % (ref 0–1)
BILIRUB SERPL-MCNC: 0.7 MG/DL (ref 0.2–1)
BUN SERPL-MCNC: 7 MG/DL (ref 5–25)
CALCIUM SERPL-MCNC: 10.3 MG/DL (ref 8.3–10.1)
CHLORIDE SERPL-SCNC: 106 MMOL/L (ref 98–108)
CO2 SERPL-SCNC: 28 MMOL/L (ref 21–32)
CREAT SERPL-MCNC: 0.9 MG/DL (ref 0.6–1.3)
EOSINOPHIL # BLD AUTO: 0.12 THOUSAND/ΜL (ref 0–0.61)
EOSINOPHIL NFR BLD AUTO: 3 % (ref 0–6)
ERYTHROCYTE [DISTWIDTH] IN BLOOD BY AUTOMATED COUNT: 11.2 % (ref 11.6–15.1)
GFR SERPL CREATININE-BSD FRML MDRD: 80 ML/MIN/1.73SQ M
GLUCOSE SERPL-MCNC: 86 MG/DL (ref 65–140)
HCT VFR BLD AUTO: 38.9 % (ref 34.8–46.1)
HGB BLD-MCNC: 13.3 G/DL (ref 11.5–15.4)
LYMPHOCYTES # BLD AUTO: 1.29 THOUSANDS/ΜL (ref 0.6–4.47)
LYMPHOCYTES NFR BLD AUTO: 30 % (ref 14–44)
MCH RBC QN AUTO: 32.9 PG (ref 26.8–34.3)
MCHC RBC AUTO-ENTMCNC: 34.2 G/DL (ref 31.4–37.4)
MCV RBC AUTO: 96 FL (ref 82–98)
MONOCYTES # BLD AUTO: 0.23 THOUSAND/ΜL (ref 0.17–1.22)
MONOCYTES NFR BLD AUTO: 5 % (ref 4–12)
NEUTROPHILS # BLD AUTO: 2.66 THOUSANDS/ΜL (ref 1.85–7.62)
NEUTS SEG NFR BLD AUTO: 61 % (ref 43–75)
PLATELET # BLD AUTO: 64 THOUSANDS/UL (ref 149–390)
PMV BLD AUTO: 11.1 FL (ref 8.9–12.7)
POTASSIUM SERPL-SCNC: 3.7 MMOL/L (ref 3.5–5.3)
PROT SERPL-MCNC: 7.4 G/DL (ref 6.4–8.2)
RBC # BLD AUTO: 4.04 MILLION/UL (ref 3.81–5.12)
SODIUM SERPL-SCNC: 142 MMOL/L (ref 136–145)
WBC # BLD AUTO: 4.32 THOUSAND/UL (ref 4.31–10.16)

## 2021-03-03 PROCEDURE — 85025 COMPLETE CBC W/AUTO DIFF WBC: CPT | Performed by: PSYCHIATRY & NEUROLOGY

## 2021-03-03 PROCEDURE — 96367 TX/PROPH/DG ADDL SEQ IV INF: CPT

## 2021-03-03 PROCEDURE — 99214 OFFICE O/P EST MOD 30 MIN: CPT | Performed by: PHYSICIAN ASSISTANT

## 2021-03-03 PROCEDURE — 96413 CHEMO IV INFUSION 1 HR: CPT

## 2021-03-03 PROCEDURE — 80053 COMPREHEN METABOLIC PANEL: CPT | Performed by: PSYCHIATRY & NEUROLOGY

## 2021-03-03 RX ORDER — ACETAMINOPHEN 325 MG/1
650 TABLET ORAL ONCE
Status: CANCELLED | OUTPATIENT
Start: 2021-03-31

## 2021-03-03 RX ORDER — SODIUM CHLORIDE 9 MG/ML
20 INJECTION, SOLUTION INTRAVENOUS ONCE
Status: CANCELLED | OUTPATIENT
Start: 2021-03-31

## 2021-03-03 RX ORDER — ACETAMINOPHEN 325 MG/1
650 TABLET ORAL ONCE
Status: COMPLETED | OUTPATIENT
Start: 2021-03-03 | End: 2021-03-03

## 2021-03-03 RX ORDER — SODIUM CHLORIDE 9 MG/ML
20 INJECTION, SOLUTION INTRAVENOUS ONCE
Status: COMPLETED | OUTPATIENT
Start: 2021-03-03 | End: 2021-03-03

## 2021-03-03 RX ADMIN — DIPHENHYDRAMINE HYDROCHLORIDE 50 MG: 50 INJECTION, SOLUTION INTRAMUSCULAR; INTRAVENOUS at 11:40

## 2021-03-03 RX ADMIN — NATALIZUMAB 300 MG: 300 INJECTION INTRAVENOUS at 12:13

## 2021-03-03 RX ADMIN — SODIUM CHLORIDE 20 ML/HR: 0.9 INJECTION, SOLUTION INTRAVENOUS at 11:28

## 2021-03-03 RX ADMIN — ACETAMINOPHEN 650 MG: 325 TABLET, FILM COATED ORAL at 11:42

## 2021-03-03 NOTE — PATIENT INSTRUCTIONS
Continue monthly Tysabri infusions  Our office will contact you with the results of MRIs  Will refer to PT to work on balance, gait  Agree with seeing urology  Will order additional labs due to memory complaints  Follow up in July with Nadira as scheduled, or sooner if needed  Call the office for any new or worsening symptoms

## 2021-03-03 NOTE — PROGRESS NOTES
Patient tolerated Tysabri without incident  Remained as additional hour for observation  Denies any discomforts  Aware of next appointment  AVS given to patient  Mother driving home

## 2021-03-03 NOTE — PROGRESS NOTES
Patient arrived on unit for first Tysabri  Patient stated that she has been having increased balance issues, falls, difficulty urinating and increased constipation  Dr Paula Carrizales aware of these symptoms  Answered all of patient's questions prior to initiating therapy  MS preinfusion checklist reviewed with patient  Cleared for treatment today  CBC/CMP drawn as ordered   Treatment initiated

## 2021-03-03 NOTE — PROGRESS NOTES
Virtual Regular Visit      Assessment/Plan:  45-year-old female who initially presented to our [facility] with abnormal gait and balance and was found to have white matter lesions in the brain and cervical spine, as well as a positive  MS panel in her CSF  Diagnosis of MS made  She has longstanding issues with balance and gait, as well as history of Lyme disease, history of chronic opioid and alcohol abuse  Decision was made to start the patient on Tysabri as her 1st disease modifying therapy  She is actually currently in the infusion center getting her 1st Tysabri infusion right now  She is JCV negative as of Jan 2021  Repeat JCV in 6 months  She had an MRI brain and cervical spine completed a few days ago,   Formal radiology read is pending  Will contact her with the results  She has several ongoing complaints,  Most notable is her ambulatory dysfunction  Will refer her to PT to work on balance and gait  She also complains of some difficulty focusing and memory difficulties  Will order some labs including B12, B1, TSH  Explained her long history of alcohol abuse may have something to do with this  Exam was extremely limited today due to she was in the infusion center and I could not do a formal exam on her  She will return to the clinic in 3 months or sooner if needed  She was advised to call the office for any new or worsening symptoms      Problem List Items Addressed This Visit        Nervous and Auditory    MS (multiple sclerosis) ([facility] [address] ) - Primary    Relevant Orders    Ambulatory referral to Physical Therapy    Vitamin D 25 hydroxy       Other    Ambulatory dysfunction    Relevant Orders    Ambulatory referral to Physical Therapy      Other Visit Diagnoses     Complaints of memory disturbance        Relevant Orders    TSH, 3rd generation with Free T4 reflex    Vitamin B12    Vitamin B1, whole blood               Reason for visit is   Chief Complaint   Patient presents with  Virtual Regular Visit        Encounter provider Ricky Snider PA-C    Provider located at [address]  [facility] [address]      Recent Visits  Date Type Provider Dept   03/09/21 Telephone Gabbie Plunkett RN [facility]   Showing recent visits within past 7 days and meeting all other requirements     Future Appointments  No visits were found meeting these conditions  Showing future appointments within next 150 days and meeting all other requirements        The patient was identified by name and date of birth  Kevin Matias was informed that this is a telemedicine visit and that the visit is being conducted through [facility] and patient was informed that this is a secure, HIPAA-compliant platform  She agrees to proceed     My office door was closed  No one else was in the room  She acknowledged consent and understanding of privacy and security of the video platform  The patient has agreed to participate and understands they can discontinue the visit at any time  Patient is aware this is a billable service  Subjective  Kevin Matias is a 37 y o  female who presents today for MS follow up  She was last seen via telemedicine on 1/18/21  Of note, patient is currently at the infusion center and actively receiving an infusion of Tysabri during the visit today  She had not wanted to cancel our visit  Unable to perform a thorough exam due to she was hooked up to the IV and in a bed, unable to prop up her phone for me to examine her  To review, patient initially presented to our office in June 2020 and was seen by Dr Steve Bonilla for evaluation of gait and balance difficulty  She had reported altered gait since childhood without falls, supposed history of Lyme disease treated in her teenage years, along with multi-trauma in 2014    She also has self admitted history of prescription opioid addiction in the past for chronic pain, heavy alcohol intake  Patient had been pulled over by police over a year before her consult him here and failed a DUI test at that time  She went to balance therapy afterwards and the therapist noticed incoordination  She also reported chronic back pain starting in 2017, along with leg weakness  She also reported some difficulty controlling her bowels  Eventual testing revealed MRI brain compatible with subtle white matter hyperintensities in the immediate periventricular region of both cerebral hemispheres and subtle changes within the left cerebellum  MRI cervical spine demonstrated moderate, abnormal cord signal within the lateral aspects of the cervical cord bilaterally at C2-3, C4-6 suspicious for chronic demyelinating disease  MRI thoracic spine with normal cord signal   MRI lumbar spine with only mild degenerative spondylosis  She was then seen in the [place] [address] center  She had a lumbar puncture in November of 2020  CSF Lyme was negative  MS panel with 8 oligoclonal bands, elevated IgG synthesis rate and elevated myelin basic protein  NMO negative  JCV negative 1/28/21 with index 0 22  Decision was made for patient to start Tysabri  As mentioned above, today is her first infusion  Today, patient reports she is about the same  She still continues to complain of difficulty with balance and gait  She has not had PT recently  She has an upcoming appointment with Urology for neurogenic bladder  She complains of difficulty focusing and trouble with her memory  She says she has not been drinking alcohol  She had MRI brain and C-spine completed on 03/01/2021, which have not yet been read by Radiology  Denies any new symptoms at this time        Past Medical History:   Diagnosis Date    Anxiety     Back pain     Chronic constipation 6/27/2016    Dyssynergic defecation     Type 1    Infectious viral hepatitis     Lyme disease     Multiple sclerosis (HCC)     Thrombocytopenia (HCC)        Past Surgical History:   Procedure Laterality Date    APPENDECTOMY      CLAVICLE SURGERY      FL LUMBAR PUNCTURE DIAGNOSTIC  11/25/2020    KNEE ARTHROSCOPY      MULTIPLE TOOTH EXTRACTIONS      SD COLONOSCOPY FLX DX W/COLLJ SPEC WHEN PFRMD N/A 6/27/2016    Procedure: COLONOSCOPY;  Surgeon: Zulma Dos Santos MD;  Location: [facility];  Service: Colorectal    TONSILLECTOMY AND ADENOIDECTOMY      TUBAL LIGATION         Current Outpatient Medications   Medication Sig Dispense Refill    b complex vitamins tablet Take 1 tablet by mouth daily      baclofen 10 mg tablet Take 1 tablet (10 mg total) by mouth 3 (three) times a day (Patient taking differently: Take 10 mg by mouth as needed ) 90 tablet 0    Boswellia-Glucosamine-Vit D (Osteo Bi-Flex One Per Day) TABS Take by mouth      buprenorphine-naloxone (SUBOXONE) 2-0 5 mg per SL tablet Place 0 5 tablets under the tongue daily      Cholecalciferol (Vitamin D3) 25 MCG (1000 UT) CAPS Take 1 capsule by mouth daily      cloNIDine (CATAPRES) 0 2 mg tablet Take 0 2 mg by mouth daily at bedtime       cyclobenzaprine (FLEXERIL) 5 mg tablet Take 5 mg by mouth as needed for muscle spasms       docusate sodium (COLACE) 100 mg capsule Take 100 mg by mouth 3 (three) times a day      ergocalciferol (VITAMIN D2) 50,000 units Take 1 capsule (50,000 Units total) by mouth once a week 12 capsule 0    ibuprofen (MOTRIN) 200 mg tablet Take 200 mg by mouth as needed for mild pain      lidocaine (LIDODERM) 5 % Apply 1 patch topically daily Remove & Discard patch within 12 hours or as directed by MD 30 patch 0    Liver Extract (LIVER PO) Take 1 tablet by mouth daily      lubiprostone (AMITIZA) 24 mcg capsule Take 24 mcg by mouth 2 (two) times a day with meals      Nerve Stimulator (STANDARD TENS) VICKY by Does not apply route 2 (two) times a day 1 Device 0    Nerve Stimulator (TENS THERAPY REPLACE BACK PADS) MISC 30 pad by Does not apply route 2 (two) times a day 30 each 0    Nerve Stimulator VICKY by Does not apply route 2 (two) times a day 1 each 0    Nerve Stimulator Supplies MISC 30 pad by Does not apply route 2 (two) times a day 30 pad 0    Nutritional Supplements (METABOLIC LIVER FORMULA PO) Take 1 tablet by mouth daily      Omega-3 Fatty Acids (FISH OIL OMEGA-3 PO) Take 1,200 mg by mouth daily      Potassium 99 MG TABS Take 99 mg by mouth      Probiotic Product (PROBIOTIC DAILY PO) Take 1 tablet by mouth daily      promethazine (PHENERGAN) 50 MG tablet TAKE 1 TO 2 TABS AT BEDTIME AS NEEDED FOR INSOMNIA  1    topiramate (TOPAMAX) 50 MG tablet TAKE 1 TABLET BY MOUTH EVERY DAY FOR HEADACHE/SLEEP  5    busPIRone (BUSPAR) 10 mg tablet Take 10 mg by mouth 2 (two) times a day  1    diazepam (VALIUM) 10 mg tablet Take 1 tab 60 min prior to spinal tap, and second tab 30 min prior to LP if anxiety persists (Patient not taking: Reported on 1/12/2021) 2 tablet 0    lactulose (CHRONULAC) 10 g/15 mL solution TAKE 10ML BY MOUTH EVERY 3-4 DAYS  5    meloxicam (MOBIC) 7 5 mg tablet Take one tablet daily for a week prn for mid back pain, and can increase to 2 tabs a day if needed  60 tablet 3    pregabalin (LYRICA) 50 mg capsule Take 1 capsule (50 mg total) by mouth 3 (three) times a day 90 capsule 1     No current facility-administered medications for this visit  Allergies   Allergen Reactions    Penicillins GI Intolerance     Other reaction(s): Unknown Reaction       Review of Systems   Constitutional: Negative  Negative for appetite change and fever  HENT: Negative  Negative for hearing loss, tinnitus, trouble swallowing and voice change  Eyes: Negative  Negative for photophobia and pain  Respiratory: Negative  Negative for shortness of breath  Cardiovascular: Negative  Negative for palpitations  Gastrointestinal: Positive for constipation  Negative for nausea and vomiting  Endocrine: Negative  Negative for cold intolerance     Genitourinary: Positive for difficulty urinating  Negative for dysuria, frequency and urgency  Musculoskeletal: Negative  Negative for myalgias and neck pain  Skin: Negative  Negative for rash  Allergic/Immunologic: Negative  Neurological: Negative  Negative for dizziness, tremors, seizures, syncope, facial asymmetry, speech difficulty, weakness, light-headedness, numbness and headaches  Hematological: Negative  Does not bruise/bleed easily  Psychiatric/Behavioral: Negative  Negative for confusion, hallucinations and sleep disturbance  Memory issues       Video Exam    Vitals:    03/03/21 0857   Weight: 51 3 kg (113 lb)   Height: 5' 6" (1 676 m)       Physical Exam  Constitutional:       Appearance: Normal appearance  Neurological:      Mental Status: She is alert  Comments: Mental status: AO x 3, able to follow commands, no dysarthria or aphasia    CN 1: not tested  CN 2: not tested  CN 3, 4, 6: no noticeable palsy, EOMs intact  CN 5: not tested  CN 7: face symmetrical  CN 8: hearing intact to voice  CN 9: not tested  CN 10: not tested  CN 11: shoulder shrug symmetric   CN 12: tongue midline     Motor:  No formal motor exam done due to nature of today's exam   Gait not tested as she was at the infusion center on an IV   Psychiatric:         Mood and Affect: Mood normal          Behavior: Behavior normal           I spent 20 minutes directly with the patient during this visit      VIRTUAL VISIT DISCLAIMER    Carmen Hendricks acknowledges that she has consented to an online visit or consultation  She understands that the online visit is based solely on information provided by her, and that, in the absence of a face-to-face physical evaluation by the physician, the diagnosis she receives is both limited and provisional in terms of accuracy and completeness  This is not intended to replace a full medical face-to-face evaluation by the physician  Carmen Hendricks understands and accepts these terms

## 2021-03-09 ENCOUNTER — TELEPHONE (OUTPATIENT)
Dept: NEUROLOGY | Facility: CLINIC | Age: 41
End: 2021-03-09

## 2021-03-09 NOTE — TELEPHONE ENCOUNTER
Yes, that is fine  When I saw her via telemedicine last week the MRIs were still pending  I ordered labs at her visit, not due to the MRI results  MRI brain is stable, no disease progression  MRI c-spine is also stable, 2 lesions noted that were there previously

## 2021-03-09 NOTE — TELEPHONE ENCOUNTER
Patient calling regarding MRI results from 3/1  Advised patient that labs were ordered  Patient requesting [employer] vincent  Provided activation code  Danyell Bunch for me to go over MRI results w/patient?

## 2021-03-11 ENCOUNTER — TELEPHONE (OUTPATIENT)
Dept: NEUROLOGY | Facility: CLINIC | Age: 41
End: 2021-03-11

## 2021-03-19 ENCOUNTER — TELEPHONE (OUTPATIENT)
Dept: NEUROLOGY | Facility: CLINIC | Age: 41
End: 2021-03-19

## 2021-03-19 NOTE — TELEPHONE ENCOUNTER
Pt is scheduled for Tysabri on 3/31/21  Last infusion 3/3/21, infusions every 4 weeks  Confirmed scheduling is correct  Last CBC/CMP 3/3/21  Last JCV 1/28/21 0 22 (final result negative)  Last MRI 3/1/21    Okay to proceed with infusion? Melina Bone is approved with [employer] from 2/23/21 to 8/29/21, Kamran Galloway # [id number]      Touch auth valid at [facility] infusion center

## 2021-03-25 RX ORDER — MELOXICAM 7.5 MG/1
TABLET ORAL
Qty: 60 TABLET | Refills: 3 | OUTPATIENT
Start: 2021-03-25

## 2021-03-26 NOTE — TELEPHONE ENCOUNTER
Received forwarded TT from Dr Iván Olivia:    Peg Hutchins([facility])  Hi, Dr Christin Hudson! Kady Osorio is scheduled with us for Tysabri next wednesday 3/31/21  Was Tysabri ruled out as a possible cause of her thrombocytopenia? Per 1/18/21 office note, "Patient establish her care with hematology team Dr Megan Pina her evaluation of Thrombocytopenia " Pt had history of thrombocytopenia prior to starting Tysabri  Discussed with Dr Iván Olivia, no further concerns at this time  Will continue to monitor the pt  Message sent to Katie Kaiser

## 2021-03-30 ENCOUNTER — APPOINTMENT (OUTPATIENT)
Dept: LAB | Facility: HOSPITAL | Age: 41
End: 2021-03-30
Attending: PSYCHIATRY & NEUROLOGY
Payer: COMMERCIAL

## 2021-03-30 DIAGNOSIS — G35 MS (MULTIPLE SCLEROSIS) (HCC): ICD-10-CM

## 2021-03-30 LAB
ALBUMIN SERPL BCP-MCNC: 3.9 G/DL (ref 3.5–5)
ALP SERPL-CCNC: 82 U/L (ref 46–116)
ALT SERPL W P-5'-P-CCNC: 24 U/L (ref 12–78)
ANION GAP SERPL CALCULATED.3IONS-SCNC: 9 MMOL/L (ref 4–13)
AST SERPL W P-5'-P-CCNC: 22 U/L (ref 5–45)
BASOPHILS # BLD AUTO: 0.05 THOUSANDS/ΜL (ref 0–0.1)
BASOPHILS NFR BLD AUTO: 1 % (ref 0–1)
BILIRUB SERPL-MCNC: 0.5 MG/DL (ref 0.2–1)
BUN SERPL-MCNC: 10 MG/DL (ref 5–25)
CALCIUM SERPL-MCNC: 10.1 MG/DL (ref 8.3–10.1)
CHLORIDE SERPL-SCNC: 104 MMOL/L (ref 100–108)
CO2 SERPL-SCNC: 29 MMOL/L (ref 21–32)
CREAT SERPL-MCNC: 0.78 MG/DL (ref 0.6–1.3)
EOSINOPHIL # BLD AUTO: 0.19 THOUSAND/ΜL (ref 0–0.61)
EOSINOPHIL NFR BLD AUTO: 3 % (ref 0–6)
ERYTHROCYTE [DISTWIDTH] IN BLOOD BY AUTOMATED COUNT: 11.4 % (ref 11.6–15.1)
GFR SERPL CREATININE-BSD FRML MDRD: 95 ML/MIN/1.73SQ M
GLUCOSE SERPL-MCNC: 103 MG/DL (ref 65–140)
HCT VFR BLD AUTO: 37.2 % (ref 34.8–46.1)
HGB BLD-MCNC: 12.6 G/DL (ref 11.5–15.4)
IMM GRANULOCYTES # BLD AUTO: 0.03 THOUSAND/UL (ref 0–0.2)
IMM GRANULOCYTES NFR BLD AUTO: 1 % (ref 0–2)
LYMPHOCYTES # BLD AUTO: 2.67 THOUSANDS/ΜL (ref 0.6–4.47)
LYMPHOCYTES NFR BLD AUTO: 40 % (ref 14–44)
MCH RBC QN AUTO: 32.9 PG (ref 26.8–34.3)
MCHC RBC AUTO-ENTMCNC: 33.9 G/DL (ref 31.4–37.4)
MCV RBC AUTO: 97 FL (ref 82–98)
MONOCYTES # BLD AUTO: 0.44 THOUSAND/ΜL (ref 0.17–1.22)
MONOCYTES NFR BLD AUTO: 7 % (ref 4–12)
NEUTROPHILS # BLD AUTO: 3.28 THOUSANDS/ΜL (ref 1.85–7.62)
NEUTS SEG NFR BLD AUTO: 48 % (ref 43–75)
NRBC BLD AUTO-RTO: 0 /100 WBCS
PLATELET # BLD AUTO: 66 THOUSANDS/UL (ref 149–390)
PMV BLD AUTO: 11 FL (ref 8.9–12.7)
POTASSIUM SERPL-SCNC: 3.7 MMOL/L (ref 3.5–5.3)
PROT SERPL-MCNC: 7.5 G/DL (ref 6.4–8.2)
RBC # BLD AUTO: 3.83 MILLION/UL (ref 3.81–5.12)
SODIUM SERPL-SCNC: 142 MMOL/L (ref 136–145)
WBC # BLD AUTO: 6.66 THOUSAND/UL (ref 4.31–10.16)

## 2021-03-30 PROCEDURE — 80053 COMPREHEN METABOLIC PANEL: CPT

## 2021-03-30 PROCEDURE — 36415 COLL VENOUS BLD VENIPUNCTURE: CPT

## 2021-03-30 PROCEDURE — 85025 COMPLETE CBC W/AUTO DIFF WBC: CPT

## 2021-03-30 NOTE — TELEPHONE ENCOUNTER
Original refill request was sent 5 months ago  Not refilled since 6/18/20  Called pt, she is not taking meloxicam anymore  She is aware to call PCP for refill if needed

## 2021-03-31 ENCOUNTER — HOSPITAL ENCOUNTER (OUTPATIENT)
Dept: INFUSION CENTER | Facility: CLINIC | Age: 41
Discharge: HOME | End: 2021-03-31
Payer: COMMERCIAL

## 2021-03-31 VITALS
RESPIRATION RATE: 20 BRPM | HEART RATE: 76 BPM | TEMPERATURE: 98 F | SYSTOLIC BLOOD PRESSURE: 110 MMHG | DIASTOLIC BLOOD PRESSURE: 72 MMHG

## 2021-03-31 DIAGNOSIS — G35 MS (MULTIPLE SCLEROSIS) (HCC): Primary | ICD-10-CM

## 2021-03-31 PROCEDURE — 96367 TX/PROPH/DG ADDL SEQ IV INF: CPT

## 2021-03-31 PROCEDURE — 96413 CHEMO IV INFUSION 1 HR: CPT

## 2021-03-31 RX ORDER — ACETAMINOPHEN 325 MG/1
650 TABLET ORAL ONCE
Status: CANCELLED | OUTPATIENT
Start: 2021-04-27

## 2021-03-31 RX ORDER — SODIUM CHLORIDE 9 MG/ML
20 INJECTION, SOLUTION INTRAVENOUS ONCE
Status: COMPLETED | OUTPATIENT
Start: 2021-03-31 | End: 2021-03-31

## 2021-03-31 RX ORDER — SODIUM CHLORIDE 9 MG/ML
20 INJECTION, SOLUTION INTRAVENOUS ONCE
Status: CANCELLED | OUTPATIENT
Start: 2021-04-27

## 2021-03-31 RX ORDER — ACETAMINOPHEN 325 MG/1
650 TABLET ORAL ONCE
Status: COMPLETED | OUTPATIENT
Start: 2021-03-31 | End: 2021-03-31

## 2021-03-31 RX ADMIN — NATALIZUMAB 300 MG: 300 INJECTION INTRAVENOUS at 13:18

## 2021-03-31 RX ADMIN — DIPHENHYDRAMINE HYDROCHLORIDE 50 MG: 50 INJECTION, SOLUTION INTRAMUSCULAR; INTRAVENOUS at 12:50

## 2021-03-31 RX ADMIN — ACETAMINOPHEN 650 MG: 325 TABLET, FILM COATED ORAL at 12:55

## 2021-03-31 RX ADMIN — SODIUM CHLORIDE 20 ML/HR: 0.9 INJECTION, SOLUTION INTRAVENOUS at 12:50

## 2021-03-31 NOTE — PLAN OF CARE
Problem: Potential for Falls  Goal: Patient will remain free of falls  Description: INTERVENTIONS:  - Assess patient frequently for physical needs  -  Identify cognitive and physical deficits and behaviors that affect risk of falls    -  [place] fall precautions as indicated by assessment   - Educate patient/family on patient safety including physical limitations  - Instruct patient to call for assistance with activity based on assessment  - Modify environment to reduce risk of injury  - Consider OT/PT consult to assist with strengthening/mobility  Outcome: Progressing

## 2021-03-31 NOTE — PROGRESS NOTES
Pt arrived to unit without complaint  Pt tolerated Tysabri without incident  Pt observed for an hour as ordered  AVS provided, aware of next appt  Pt left unit in stable condition

## 2021-04-03 NOTE — PROGRESS NOTES
PT Evaluation     Today's date: 2021  Patient name: Ann Hamilton  : 1980  MRN: [id number]  Referring provider: Kraig Ponce PA-C  Dx:   Encounter Diagnosis     ICD-10-CM    1  MS (multiple sclerosis) ([facility] [address] )  [address] Ambulatory referral to Physical Therapy   2  Ambulatory dysfunction  R26 2 Ambulatory referral to Physical Therapy                  Assessment  Assessment details: Patient is a 37y o  year old female presenting to OPPT s/p diagnosis of multiple sclerosis, gait dysfunction, LE weakness  Patient demonstrates decreased strength, endurance, balance, limited endurance and functional independence  Patient is unable to return to work at this time and requires assistance for ADLs and IADLs  She will benefit from skilled PT interventions to maximize return to PLOF and independence as able  Thank you for this referral and the ability to participate in the care of this patient  Impairments: abnormal coordination, abnormal gait, abnormal movement, activity intolerance, impaired balance, impaired physical strength, lacks appropriate home exercise program, safety issue and weight-bearing intolerance  Understanding of Dx/Px/POC: good   Prognosis: good  Prognosis details: Positive prognostic indicators include positive attitude toward recovery and good understanding of diagnosis and treatment plan options  Negative prognostic indicators include chronicity of symptoms and co-morbidities  Goals  In 4 weeks, patient will:  1  Demonstrate ability to perform 30 minutes of activity without requiring seated rest break  2   Demonstrate ability to maintain upright balance unsupported by UEs while reaching above shoulder height without resistance to promote stability with ADLs  3  Demonstrate ability to lift up to 10 lbs from  knees to waist unsupported by UEs to promote stability with ADLs    4   Demonstrate increased strength of bilateral LEs to allow for improved transfer quality and stability    In 4-10 weeks, patient will:  1  Demonstrate reduced fall risk based on outcome measures  2  Demonstrate consistent carryover with HEP  3  Return to community ambulation with least restrictive AD for at least 500+ feet      Plan  Patient would benefit from: skilled physical therapy  Other planned modality interventions: Modalities prn for symptom management  Planned therapy interventions: manual therapy, neuromuscular re-education, therapeutic activities, therapeutic exercise, gait training and home exercise program  Frequency: 2x week  Duration in visits: 8  Duration in weeks: 4  Plan of Care beginning date: 4/5/2021  Plan of Care expiration date: 5/5/2021  Treatment plan discussed with: patient and PTA        Subjective Evaluation    History of Present Illness  Mechanism of injury: 62-year-old female who initially presented to [facility] [address] [place] [address] with abnormal gait and balance and was found to have white matter lesions in the brain and cervical spine, as well as a positive  MS panel in her CSF  Diagnosis of MS made  She has longstanding issues with balance and gait, as well as history of Lyme disease, history of chronic opioid and alcohol abuse      Decision was made to start the patient on Tysabri as her 1st disease modifying therapy  She is actually currently in the infusion center getting her 1st Tysabri infusion right now  She is JCV negative as of Jan 2021  Repeat JCV in 6 months      She had an MRI brain and cervical spine completed a few days ago,   Formal radiology read is pending  Will contact her with the results        She has several ongoing complaints,  Most notable is her ambulatory dysfunction  Will refer her to PT to work on balance and gait  She also complains of some difficulty focusing and memory difficulties  Will order some labs including B12, B1, TSH  Explained her long history of   alcohol abuse may have something to do with this      Last infusion 3/31/2021, scheduled every 28 days  Functional deficits:  Standing  Cooking  Cleaning  Walking  Multiple and frequent falls  Quality of life: fair    Pain  Current pain ratin  At best pain ratin  At worst pain rating: 10  Location: Every joint in her body, whole body  Quality: pressure (Legs get weak, back gets painful)  Relieving factors: rest  Aggravating factors: standing, walking, stair climbing and lifting  Progression: worsening    Social Support  Steps to enter house: yes (FF)  Stairs in house: yes   Lives in: multiple-level home  Lives with: young children and spouse    Employment status: not working  Hand dominance: right      Diagnostic Tests    FCE comments: MRI BRAIN WITHOUT CONTRAST     INDICATION:  R26 89: Other abnormalities of gait and mobility  R90 89: Other abnormal findings on diagnostic imaging of central nervous system  Demyelinating disease      COMPARISON:  9/3/2020     TECHNIQUE:  Sagittal T1, axial T2, axial FLAIR, axial T1  Axial diffusion-weighted imaging  Axial [place], Sagittal FLAIR CUBE      IMAGE QUALITY:  Diagnostic      FINDINGS:     BRAIN PARENCHYMA:  The appearance of the brain is stable  A few scattered periventricular and subcortical white matter lesions are unchanged  No new or infratentorial signal abnormality      Exam ordered without contrast      There is no discrete mass, mass effect or midline shift  There is no intracranial hemorrhage  There is no evidence of acute infarction      VENTRICLES:  Normal      SELLA AND PITUITARY GLAND:  Normal      ORBITS:  Partial obscured by dental artifact      PARANASAL SINUSES:  Partial obscured by dental artifact  Visualized paranasal sinuses are free of signal abnormality      VASCULATURE:  Evaluation of the major intracranial vasculature demonstrates appropriate flow voids      CALVARIUM AND SKULL BASE:  Normal      EXTRACRANIAL SOFT TISSUES:  Normal      IMPRESSION:     1  No acute infarction, intracranial hemorrhage or mass effect    2  Minimal, chronic white matter signal abnormality correlating to the history of demyelinating disease  Overall plaque burden is very mild  No evidence of progressive demyelination  MRI CERVICAL SPINE WITHOUT CONTRAST     INDICATION: R26 89: Other abnormalities of gait and mobility  R90 89: Other abnormal findings on diagnostic imaging of central nervous system      COMPARISON:  8/1/2020     TECHNIQUE:  Sagittal T1, sagittal T2, sagittal inversion recovery, axial T2, axial  2D merge     IMAGE QUALITY:  Diagnostic     FINDINGS:     ALIGNMENT:  Normal alignment of the cervical spine  No compression fracture  No subluxation  No scoliosis      MARROW SIGNAL:  Normal marrow signal is identified within the visualized bony structures  No discrete marrow lesion      CERVICAL AND VISUALIZED THORACIC CORD:  Comparison the spinal cord is stable with bilateral laterally located T2 hyperintense lesions noted at the C2-3 levels and C4-C6 levels, stable  No definite cord expansion or new cord signal abnormality        PREVERTEBRAL AND PARASPINAL SOFT TISSUES:  Normal      VISUALIZED POSTERIOR FOSSA:  The visualized posterior fossa demonstrates no abnormal signal      CERVICAL DISC SPACES:     C2-C3:  Normal      C3-C4:  Normal      C4-C5:  Normal      C5-C6:  Small central disc herniation, protrusion type  Mild central canal narrowing  Neural foramina patent bilaterally      C6-C7:  Normal      C7-T1:  Normal      UPPER THORACIC DISC SPACES:  Normal      IMPRESSION:        1  Stable cord signal abnormality is nonspecific possibly chronic demyelinating disease  Other etiologies including subacute combined degeneration could be considered in the differential diagnosis  Does the patient have vitamin B12 deficiency? 2    Minimal spondylosis is stable             Treatments  Previous treatment: medication  Current treatment: physical therapy  Patient Goals  Patient goals for therapy: increased strength, independence with ADLs/IADLs, improved balance, decreased pain, return to sport/leisure activities and increased motion          Objective     Concurrent Complaints  Positive for headaches, visual change, memory loss (Fogginess, lost memories) and peripheral neuropathy (Heaviness)  Strength/Myotome Testing     Left Hip   Planes of Motion   Flexion: 3  Extension: 3-  Abduction: 3    Right Hip   Planes of Motion   Flexion: 3  Extension: 3  Abduction: 3    Left Knee   Flexion: 3  Extension: 3    Right Knee   Flexion: 3+  Extension: 3+    Left Ankle/Foot   Dorsiflexion: 3+  Plantar flexion: 3+    Right Ankle/Foot   Dorsiflexion: 3+  Plantar flexion: 3+  Neuro Exam:     Dizziness  Negative for disequilibrium, oscillopsia, light-headedness and diplopia  Headaches   Patient reports headaches: Yes     Frequency: Feels like she is getting them more recently, contributes to lack of sleep    Oculomotor exam   Oculomotor ROM: WNL  Resting nystagmus: not present   Gaze holding nystagmus: not present left  and not present right  Smooth pursuits: within normal limits  Vertical saccades: normal  Horizontal saccades: normal  Convergence: abnormal    Sensation   Light touch LE: left impaired and right impaired  Proprioception LE: left impaired and right impaired    Coordination   Rapid alternating movements: UE impaired and LE impaired    Transfers Sit to stand: independent Sit to supine: independent Supine to sit: independent   Roll: independentInto the car: independent Out of the car: independent Chair to floor: independent   Floor to chair: independent     Functional outcomes   Functional outcome comment: Mini Best     Sit to Stand  1=Moderate: Comes to stand WITH use of hands on first attempt     Rise to Toes  1=Moderate:  Heels up, but not full range (smaller than when holding hands) OR noticeable instability for 3 seconds     Stand on one leg  1=Moderate: < 20 seconds  Right:  8 seconds  Left:  0 seconds      Compensatory Stepping Correction - Forward  1=Moderate: More than one step used to recover equilibrium    Compensatory Stepping Correction - Backward  1=Moderate: More than one step used to recover equilibrium     Compensatory Stepping Correction - Lateral  1=Moderate: Several steps to recover equilibrium    Stance (Feet Together); Eyes open, Firm surface  2= Normal:  30 seconds     Stance (Feet Together); Eyes closed, Foam surface  0=Severe:  Unable    Incline - Eyes Closed  1=Moderate:  Stands independently <30 sec OR aligns with surface    Change in gait speed  0= Severe: Unable to achieve significant change in walking speed AND signs of imbalance     Walk with Head Turns - Horizontal  1= Moderate:  Performs head turns with reduction in gait speed     Walk with Pivot Turns  0= Severe:  Cannot turn with feet close at any speed without imbalance     Step over obstacles  1= Moderate: step over box but touches box OR displays cautious behavior by slowing gait     Timed up and go with dual task (3 meter walk)  TU 9 Seconds  Dual Task TU 8 Seconds  1= Moderate: Dual Task affects counting OR walking (>10%) when compared to the TUG without Dual Task    Total Score:     Functional outcome gait comment: Using walking stick, intermittent knee buckling    LEs fatigue easily             Precautions: Falls, multiple sclerosis  Re-eval Date: 2021    Date        Visit Count 1       FOTO See IE       Pain In See IE       Pain Out See IE               Manuals        LEs prn                               Neuro Re-Ed        Dynavision        Natus         Obstacle Course 15 mins walking, WBOS, NBOS, foam, inclines, EO an EC       Star Drill        PWR focus                        Ther Ex Vitals  110/62, HR 73       Aerobic L3  5 mins       SLR x 4        HR/TR        Mini Squats        Single Leg progression                                        Ther Activity        Kitchen/ADL                Gait Training        Divided Attention Head turns        Modalities         prn

## 2021-04-05 ENCOUNTER — EVALUATION (OUTPATIENT)
Dept: PHYSICAL THERAPY | Facility: CLINIC | Age: 41
End: 2021-04-05
Payer: COMMERCIAL

## 2021-04-05 DIAGNOSIS — R26.2 AMBULATORY DYSFUNCTION: ICD-10-CM

## 2021-04-05 DIAGNOSIS — G35 MS (MULTIPLE SCLEROSIS) (HCC): ICD-10-CM

## 2021-04-05 PROCEDURE — 97112 NEUROMUSCULAR REEDUCATION: CPT | Performed by: PHYSICAL THERAPIST

## 2021-04-05 PROCEDURE — 97162 PT EVAL MOD COMPLEX 30 MIN: CPT | Performed by: PHYSICAL THERAPIST

## 2021-04-09 NOTE — PROGRESS NOTES
Daily Note     Today's date: 2021  Patient name: Aime Butterfield  : 1980  MRN: [id number]  Referring provider: Charlotte Raphael PA-C  Dx:   Encounter Diagnosis     ICD-10-CM    1  MS (multiple sclerosis) ([facility] [address] )  G35    2  Ambulatory dysfunction  R26 2                   Subjective: Reports she believes she has a ball at home, but the kids may have wrecked it  Will look for it  Objective: See treatment diary below      Assessment: Tolerated treatment well  Patient demonstrated fatigue post treatment and would benefit from continued PT  Difficulty with core stabilization program on uneven surfaces  Motivated to improve  Plan: Continue per plan of care  Precautions: Falls, multiple sclerosis  Re-eval Date: 2021    Date       Visit Count 1 2      FOTO See IE       Pain In See IE 0      Pain Out See IE 0              Manuals        LEs prn Gentle stretch with nerve glide at end range for HS, HC  Trial of piriformis, but unable to tolerate    10 mins                              Neuro Re-Ed        Dynavision        Natus         Obstacle Course 15 mins walking, WBOS, NBOS, foam, inclines, EO an EC       Star Drill        PWR focus        Dynamic balance  Seated on red disc  Biceps 2x10, 1#  Punch 2x10, 1#  GHJ ABd 2x10, 2#  Focus on AH              Ther Ex Vitals  110/62, HR 73       Aerobic L3  5 mins       SLR x 4  Seated on disc  juan hip ADd  Hip ABd with L1 band  March AROM  2x10 ea      HR/TR  Seated on disc  2x10 ea      Mini Squats        Single Leg progression                                        Ther Activity        Kitchen/ADL                Gait Training        Divided Attention        Head turns        Modalities         prn

## 2021-04-12 ENCOUNTER — TELEPHONE (OUTPATIENT)
Dept: UROLOGY | Facility: CLINIC | Age: 41
End: 2021-04-12

## 2021-04-12 ENCOUNTER — OFFICE VISIT (OUTPATIENT)
Dept: PHYSICAL THERAPY | Facility: CLINIC | Age: 41
End: 2021-04-12
Payer: COMMERCIAL

## 2021-04-12 DIAGNOSIS — G35 MS (MULTIPLE SCLEROSIS) (HCC): Primary | ICD-10-CM

## 2021-04-12 DIAGNOSIS — R26.2 AMBULATORY DYSFUNCTION: ICD-10-CM

## 2021-04-12 PROCEDURE — 97112 NEUROMUSCULAR REEDUCATION: CPT | Performed by: PHYSICAL THERAPIST

## 2021-04-12 PROCEDURE — 97110 THERAPEUTIC EXERCISES: CPT | Performed by: PHYSICAL THERAPIST

## 2021-04-12 PROCEDURE — 97140 MANUAL THERAPY 1/> REGIONS: CPT | Performed by: PHYSICAL THERAPIST

## 2021-04-12 NOTE — PROGRESS NOTES
Daily Note     Today's date: 2021  Patient name: Katrin Berumen  : 1980  MRN: [id number]  Referring provider: Sherice Lopez PA-C  Dx: No diagnosis found  Subjective: ***      Objective: See treatment diary below      Assessment: Tolerated treatment {Tolerated treatment :}   Patient {assessment:[id number]}      Plan: {PLAN:}     Precautions: Falls, multiple sclerosis  Re-eval Date: 2021    Date       Visit Count 1 2      FOTO See IE       Pain In See IE 0      Pain Out See IE 0              Manuals        LEs prn                               Neuro Re-Ed        Dynavision        Natus         Obstacle Course 15 mins walking, WBOS, NBOS, foam, inclines, EO an EC       Star Drill        PWR focus        Dynamic balance  Seated on red disc  Biceps 2x10, 1#  Punch 2x10, 1#  GHJ ABd 2x10, 2#  Focus on AH              Ther Ex Vitals  110/62, HR 73       Aerobic L3  5 mins       SLR x 4  Seated on disc  juan hip ADd  Hip ABd with L1 band  March with L1 band  2x10 ea      HR/TR  Seated on disc        Mini Squats        Single Leg progression                                        Ther Activity        Kitchen/ADL                Gait Training        Divided Attention        Head turns        Modalities         prn

## 2021-04-13 ENCOUNTER — APPOINTMENT (OUTPATIENT)
Dept: PHYSICAL THERAPY | Facility: CLINIC | Age: 41
End: 2021-04-13
Payer: COMMERCIAL

## 2021-04-15 ENCOUNTER — OFFICE VISIT (OUTPATIENT)
Dept: UROLOGY | Facility: CLINIC | Age: 41
End: 2021-04-15
Payer: COMMERCIAL

## 2021-04-15 VITALS
DIASTOLIC BLOOD PRESSURE: 80 MMHG | WEIGHT: 113 LBS | HEIGHT: 66 IN | BODY MASS INDEX: 18.16 KG/M2 | SYSTOLIC BLOOD PRESSURE: 122 MMHG

## 2021-04-15 DIAGNOSIS — R39.198 ABNORMAL URINARY STREAM: ICD-10-CM

## 2021-04-15 DIAGNOSIS — R14.0 ABDOMINAL DISTENTION: Primary | ICD-10-CM

## 2021-04-15 LAB — POST-VOID RESIDUAL VOLUME, ML POC: 93 ML

## 2021-04-15 PROCEDURE — 51798 US URINE CAPACITY MEASURE: CPT | Performed by: PHYSICIAN ASSISTANT

## 2021-04-15 PROCEDURE — 87086 URINE CULTURE/COLONY COUNT: CPT | Performed by: PHYSICIAN ASSISTANT

## 2021-04-15 PROCEDURE — 99244 OFF/OP CNSLTJ NEW/EST MOD 40: CPT | Performed by: PHYSICIAN ASSISTANT

## 2021-04-15 PROCEDURE — 87186 SC STD MICRODIL/AGAR DIL: CPT | Performed by: PHYSICIAN ASSISTANT

## 2021-04-15 PROCEDURE — 87077 CULTURE AEROBIC IDENTIFY: CPT | Performed by: PHYSICIAN ASSISTANT

## 2021-04-15 RX ORDER — NITROFURANTOIN 25; 75 MG/1; MG/1
100 CAPSULE ORAL 2 TIMES DAILY
Qty: 10 CAPSULE | Refills: 0 | Status: SHIPPED | OUTPATIENT
Start: 2021-04-15 | End: 2021-04-20

## 2021-04-15 NOTE — PROGRESS NOTES
4/15/2021      No chief complaint on file  Assessment and Plan    37 y o  female    1  Abnormal urinary stream  - poct urine dip with +leuk+nitr+blood  - pvr 93ml    Recommend cystoscopy to evaluate for any physical urethral obstruction, followed by urodynamics to assess her bladder capacity and contractility  At end of visit she also has positive urinalysis with leuk/nitr/blood will begin empiric macrobid and followup c&s Monday  History of Present Illness  Ivana Salcedo is a 37 y o  female here for evaluation of   New consultation urinary some symptoms  She reports no frequency urgency dysuria or incontinence  She feels a general sensation of abdominal bloating worse for the past 2 weeks is somewhat concerned for possible UTI  For months/years she feels her urinary stream is hesitant and she has to strain/bear down to void more times than not  She feels it takes long time to empty  Once going, the stream is good and consistent  PVR today is 93 mL  She has not had gross hematuria nor urinary tract infection the past   She has not been catheterized for retention in the past aside for during a trauma/injury in 2014  She does have risk factors for neurogenic bladder with history of Lyme disease and multiple sclerosis managed with tysabri infusions  She had one documented e coli UTI in 2016 but no frequent infections  She is sexually active with her  with no dyspareunia  She does not feel any bulge or tissue in the vagina or behind the urethra  She has been working several months with [facility] at [facility] for constipation after referral from GI but has not noted improvement in her urination at all  Review of Systems   Constitutional: Negative for activity change, appetite change, chills, fever and unexpected weight change  HENT: Negative  Respiratory: Negative  Negative for shortness of breath  Cardiovascular: Negative  Negative for chest pain     Gastrointestinal: Positive for constipation  Negative for abdominal pain, diarrhea, nausea and vomiting  Bloating   Endocrine: Negative  Genitourinary: Positive for difficulty urinating  Negative for decreased urine volume, dyspareunia, dysuria, flank pain, frequency, hematuria, pelvic pain, urgency and vaginal pain  Musculoskeletal: Negative for back pain and gait problem  Skin: Negative  Allergic/Immunologic: Negative  Neurological: Negative  Hematological: Negative for adenopathy  Does not bruise/bleed easily  Urinary Incontinence Screening      Most Recent Value   Urinary Incontinence   Urinary Incontinence? No   Incomplete emptying? No   Urinary frequency? No   Urinary urgency? No   Urinary hesitancy? Yes   Dysuria (painful difficult urination)? No   Nocturia (waking up to use the bathroom)? No   Straining (having to push to go)? Yes   Weak stream?  No   Intermittent stream?  No   Post void dribbling? No               Vitals  Vitals:    04/15/21 1331   BP: 122/80   Weight: 51 3 kg (113 lb)   Height: 5' 6" (1 676 m)       Physical Exam  Vitals signs and nursing note reviewed  Constitutional:       General: She is not in acute distress  Appearance: Normal appearance  She is well-developed  She is not diaphoretic  HENT:      Head: Normocephalic and atraumatic  Pulmonary:      Effort: Pulmonary effort is normal    Musculoskeletal:      Right lower leg: No edema  Left lower leg: No edema  Skin:     General: Skin is warm and dry  Neurological:      General: No focal deficit present  Mental Status: She is alert and oriented to person, place, and time  Gait: Gait abnormal (walks with cane)     Psychiatric:         Mood and Affect: Mood normal          Speech: Speech normal          Behavior: Behavior normal            Past History  Past Medical History:   Diagnosis Date    Anxiety     Back pain     Chronic constipation 6/27/2016    Dyssynergic defecation     Type 1    Infectious viral hepatitis     Lyme disease     Multiple sclerosis (HCC)     Thrombocytopenia ([facility] [address] )      Social History     Socioeconomic History    Marital status: /Civil Union     Spouse name: Not on file    Number of children: Not on file    Years of education: Not on file    Highest education level: Not on file   Occupational History    Not on file   Social Needs    Financial resource strain: Not on file    Food insecurity     Worry: Not on file     Inability: Not on file   [employer] needs     Medical: Not on file     Non-medical: Not on file   Tobacco Use    Smoking status: Current Every Day Smoker     Packs/day: 0 50     Types: Cigarettes    Smokeless tobacco: Never Used   Substance and Sexual Activity    Alcohol use: Yes     Comment: social    Drug use: No    Sexual activity: Not on file   Lifestyle    Physical activity     Days per week: Not on file     Minutes per session: Not on file    Stress: Not on file   Relationships    Social connections     Talks on phone: Not on file     Gets together: Not on file     Attends Christianity service: Not on file     Active member of club or organization: Not on file     Attends meetings of clubs or organizations: Not on file     Relationship status: Not on file    Intimate partner violence     Fear of current or ex partner: Not on file     Emotionally abused: Not on file     Physically abused: Not on file     Forced sexual activity: Not on file   Other Topics Concern    Not on file   Social History Narrative    Not on file     Social History     Tobacco Use   Smoking Status Current Every Day Smoker    Packs/day: 0 50    Types: Cigarettes   Smokeless Tobacco Never Used     No family history on file      The following portions of the patient's history were reviewed and updated as appropriate: allergies, current medications, past medical history, past social history, past surgical history and problem list     Results  Recent Results (from the past 1 hour(s))   POCT Measure PVR    Collection Time: 04/15/21  1:37 PM   Result Value Ref Range    POST-VOID RESIDUAL VOLUME, ML POC 93 mL   ]  No results found for: PSA  Lab Results   Component Value Date    GLUCOSE 81 11/12/2015    CALCIUM 10 1 03/30/2021     11/12/2015    K 3 7 03/30/2021    CO2 29 03/30/2021     03/30/2021    BUN 10 03/30/2021    CREATININE 0 78 03/30/2021     Lab Results   Component Value Date    WBC 6 66 03/30/2021    HGB 12 6 03/30/2021    HCT 37 2 03/30/2021    MCV 97 03/30/2021    PLT 66 (L) 03/30/2021

## 2021-04-16 ENCOUNTER — TELEPHONE (OUTPATIENT)
Dept: NEUROLOGY | Facility: CLINIC | Age: 41
End: 2021-04-16

## 2021-04-16 NOTE — TELEPHONE ENCOUNTER
Pt is scheduled for Tysabri on 4/28/21  Last infusion 3/31/21, infusions every 4 weeks  Confirmed scheduling is correct  Last CBC/CMP 3/30/21  Last JCV 1/28/21 0 22 (final result negative)  Last MRI 3/1/21    Pt saw urology yesterday and was given nitrofurantoin for 5 days  Okay to proceed with infusion? Or would you like to postpone? Tysabri is approved with [employer] from 2/23/21 to 8/29/21, Karyna Wade # [id number]      Touch auth valid until 9/1/21 at [facility]

## 2021-04-17 LAB — BACTERIA UR CULT: ABNORMAL

## 2021-04-19 ENCOUNTER — TELEPHONE (OUTPATIENT)
Dept: UROLOGY | Facility: CLINIC | Age: 41
End: 2021-04-19

## 2021-04-19 ENCOUNTER — PROCEDURE VISIT (OUTPATIENT)
Dept: UROLOGY | Facility: CLINIC | Age: 41
End: 2021-04-19
Payer: COMMERCIAL

## 2021-04-19 VITALS
SYSTOLIC BLOOD PRESSURE: 124 MMHG | DIASTOLIC BLOOD PRESSURE: 70 MMHG | BODY MASS INDEX: 18.16 KG/M2 | WEIGHT: 113 LBS | HEIGHT: 66 IN

## 2021-04-19 DIAGNOSIS — N31.9 NEUROGENIC BLADDER: ICD-10-CM

## 2021-04-19 DIAGNOSIS — R39.198 ABNORMAL URINARY STREAM: Primary | ICD-10-CM

## 2021-04-19 DIAGNOSIS — R39.11 URINARY HESITANCY: ICD-10-CM

## 2021-04-19 PROCEDURE — 52000 CYSTOURETHROSCOPY: CPT | Performed by: UROLOGY

## 2021-04-19 PROCEDURE — 99214 OFFICE O/P EST MOD 30 MIN: CPT | Performed by: UROLOGY

## 2021-04-19 RX ORDER — CIPROFLOXACIN 500 MG/1
500 TABLET, FILM COATED ORAL ONCE
Qty: 1 TABLET | Refills: 0 | Status: SHIPPED | OUTPATIENT
Start: 2021-04-19 | End: 2021-04-19

## 2021-04-19 NOTE — PROGRESS NOTES
[address] [facility]  [facility]  [address] [facility]  [place], [address] [place] [address]  [phone]  www  [facility]  org      NAME: Hoang Perez  AGE: 37 y o  SEX: female  : 1980   MRN: [id number]    DATE: 2021  TIME: 3:07 PM    Assessment and Plan:    Neurogenic bladder due to multiple sclerosis, with hesitancy and weakness of stream   Her only incontinence is some mild postvoid dribbling occasionally  Also has neurogenic bowel with lower extremity muscle wasting and gait dysfunction  We will check a renal ultrasound and I will send her the results  We will check a urodynamic study as well to make sure that her bladder compliance is normal   Otherwise I currently do not have anything to help her with the hesitancy weakness of the stream, but I do wish to make sure that her compliance is normal and she does not have hydronephrosis  She also has a history of a renal calculus which we assess with the ultrasound  From the previous report, it was tiny and did not require treatment  Follow-up 6 months  Recent E coli UTI:  Treated with Macrobid, will take an extra dose of Cipro for prophylaxis for today's procedure  Chief Complaint     Chief Complaint   Patient presents with    Cystoscopy       History of Present Illness   52-year-old woman with multiple sclerosis, dyssynergy defecation, and multiple other medical problems was seen by Clarissa Franco 4/15/2021 for an abnormal urinary stream   Dipstick showed positive leukocytes, positive nitrites and blood  PVR was 93 cc  She was recommended for cystoscopy to rule out any physical urethral obstruction followed by urodynamics  She was started empirically on Macrobid  Urine culture showed greater than 100,000 colonies of E coli which is sensitive to nitrofurantoin  Upon interviewing the patient, it seems that her multiple sclerosis is quite severe    She has significant ambulating difficulties and uses a stick to assist with ambulation  She has lost a lot of muscle mass in her lower extremities to include her thighs  She used to be an avid hiker and runner  She has to push for quite a long time to initiate her stream and it takes her a long time to empty her bladder  Her bowels are the same way  She uses an enema every other day to have bowel movements  She has been working with physical therapist in pelvic health through [facility] for quite some time  She denies actual culture positive urinary tract infections, but she has had several instances where she felt she had a urinary tract infection but they just went away  She has had 3 children, all vaginal deliveries  She is currently menstruating  No recent upper tract imaging  Cystoscopy     Date/Time 4/19/2021 3:05 PM     Performed by  Penelope Mccrary MD     Authorized by Penelope Mccrary MD      Universal Protocol:  Consent: Verbal consent obtained  Written consent obtained  Procedure Details:  Procedure type: cystoscopy    Additional Procedure Details: Cystoscopy Procedure Note        Pre-operative Diagnosis: Weak urinary stream    Post-operative Diagnosis: same neurogenic bladder    Procedure: Flexible cystoscopy    Surgeon: Cuong Pulido MD    Anesthesia: 1% Xylocaine per urethra    EBL: Minimal    Complications: none    Procedure Details   The risks, benefits, complications, treatment options, and expected outcomes were discussed with the patient  The patient concurred with the proposed plan, giving informed consent  Cystoscopy was performed today under local anesthesia, using sterile technique  The patient was placed in the supine position, prepped with Betadine, and draped in the usual sterile fashion  The flexible cystocope was used to inspect both the urethra and bladder    Findings:  Urethra:  Normal without stricture  No lesions  Bladder:  Smooth, not trabeculated and there were no stones tumors or other lesions    The orifices were orthotopic and intact  Specimens: none                 Complications:  None           Disposition: To home            Condition:  Stable          The following portions of the patient's history were reviewed and updated as appropriate: allergies, current medications, past family history, past medical history, past social history, past surgical history and problem list   Past Medical History:   Diagnosis Date    Anxiety     Back pain     Chronic constipation 6/27/2016    Dyssynergic defecation     Type 1    Infectious viral hepatitis     Lyme disease     [facility] sclerosis [facility] [address]     Thrombocytopenia ([facility] [address] [facility]      Past Surgical History:   Procedure Laterality Date    APPENDECTOMY      CLAVICLE SURGERY      FL LUMBAR PUNCTURE DIAGNOSTIC  11/25/2020    KNEE ARTHROSCOPY      MULTIPLE TOOTH EXTRACTIONS      ID COLONOSCOPY FLX DX W/COLLJ SPEC WHEN PFRMD N/A 6/27/2016    Procedure: COLONOSCOPY;  Surgeon: Dedra Burleson MD;  Location: [facility] MAIN [facility];  Service: Colorectal    TONSILLECTOMY AND ADENOIDECTOMY      TUBAL LIGATION       shoulder  Review of Systems   Review of Systems   Gastrointestinal: Positive for abdominal distention and constipation     Genitourinary:        As per HPI       Active Problem List     Patient Active Problem List   Diagnosis    Chronic constipation    Encounter for screening colonoscopy    Alcoholism ([facility] [address] [facility]    Lyme disease    Chronic pain due to trauma    IBS (irritable bowel syndrome)    Insomnia    Leukopenia    Musculoskeletal pain, chronic    Nondependent opioid abuse in remission ([facility] [address] [facility]    Unspecified abnormalities of gait and mobility    Balance problems    Chronic midline low back pain without sciatica    History of Lyme disease    Cervical myelopathy (HCC)    MS (multiple sclerosis) (HCC)    Neurogenic bladder    Ambulatory dysfunction    Thrombocytopenia (HCC)       Objective   /70   Ht 5' 6" (1 676 m)   Wt 51 3 kg (113 lb)   BMI 18 24 kg/m² Physical Exam  Vitals signs reviewed  Constitutional:       Appearance: Normal appearance  HENT:      Head: Normocephalic and atraumatic  Eyes:      Extraocular Movements: Extraocular movements intact  Neck:      Musculoskeletal: Normal range of motion  Pulmonary:      Effort: Pulmonary effort is normal    Abdominal:      General: There is distension  Palpations: There is no mass  Tenderness: There is no abdominal tenderness  There is no right CVA tenderness, left CVA tenderness, guarding or rebound  Hernia: No hernia is present  Genitourinary:     General: Normal vulva  Vagina: No vaginal discharge  Comments: Actively menstruating  Normal urethral meatus  No lesions  Musculoskeletal: Normal range of motion  Skin:     Coloration: Skin is not jaundiced or pale  Neurological:      General: No focal deficit present  Mental Status: She is alert and oriented to person, place, and time  Psychiatric:         Mood and Affect: Mood normal          Behavior: Behavior normal          Thought Content:  Thought content normal          Judgment: Judgment normal              Current Medications     Current Outpatient Medications:     b complex vitamins tablet, Take 1 tablet by mouth daily, Disp: , Rfl:     baclofen 10 mg tablet, Take 1 tablet (10 mg total) by mouth 3 (three) times a day (Patient taking differently: Take 10 mg by mouth as needed ), Disp: 90 tablet, Rfl: 0    Boswellia-Glucosamine-Vit D (Osteo Bi-Flex One Per Day) TABS, Take by mouth, Disp: , Rfl:     buprenorphine-naloxone (SUBOXONE) 2-0 5 mg per SL tablet, Place 0 5 tablets under the tongue daily, Disp: , Rfl:     busPIRone (BUSPAR) 10 mg tablet, Take 10 mg by mouth 2 (two) times a day, Disp: , Rfl: 1    Cholecalciferol (Vitamin D3) 25 MCG (1000 UT) CAPS, Take 1 capsule by mouth daily, Disp: , Rfl:     ciprofloxacin (Cipro) 500 mg tablet, Take 1 tablet (500 mg total) by mouth once for 1 dose Take 1 dose after procedure, Disp: 1 tablet, Rfl: 0    cloNIDine (CATAPRES) 0 2 mg tablet, Take 0 2 mg by mouth daily at bedtime , Disp: , Rfl:     cyclobenzaprine (FLEXERIL) 5 mg tablet, Take 5 mg by mouth as needed for muscle spasms , Disp: , Rfl:     diazepam (VALIUM) 10 mg tablet, Take 1 tab 60 min prior to spinal tap, and second tab 30 min prior to LP if anxiety persists (Patient not taking: Reported on 1/12/2021), Disp: 2 tablet, Rfl: 0    docusate sodium (COLACE) 100 mg capsule, Take 100 mg by mouth 3 (three) times a day, Disp: , Rfl:     ergocalciferol (VITAMIN D2) 50,000 units, Take 1 capsule (50,000 Units total) by mouth once a week, Disp: 12 capsule, Rfl: 0    ibuprofen (MOTRIN) 200 mg tablet, Take 200 mg by mouth as needed for mild pain, Disp: , Rfl:     lactulose (CHRONULAC) 10 g/15 mL solution, TAKE 10ML BY MOUTH EVERY 3-4 DAYS, Disp: , Rfl: 5    lidocaine (LIDODERM) 5 %, Apply 1 patch topically daily Remove & Discard patch within 12 hours or as directed by MD (Patient not taking: Reported on 3/31/2021), Disp: 30 patch, Rfl: 0    Liver Extract (LIVER PO), Take 1 tablet by mouth daily, Disp: , Rfl:     lubiprostone (AMITIZA) 24 mcg capsule, Take 24 mcg by mouth 2 (two) times a day with meals, Disp: , Rfl:     meloxicam (MOBIC) 7 5 mg tablet, Take one tablet daily for a week prn for mid back pain, and can increase to 2 tabs a day if needed   (Patient not taking: Reported on 3/31/2021), Disp: 60 tablet, Rfl: 3    Nerve Stimulator (STANDARD TENS) VICKY, by Does not apply route 2 (two) times a day, Disp: 1 Device, Rfl: 0    Nerve Stimulator (TENS THERAPY REPLACE BACK PADS) MISC, 30 pad by Does not apply route 2 (two) times a day, Disp: 30 each, Rfl: 0    Nerve Stimulator VICKY, by Does not apply route 2 (two) times a day, Disp: 1 each, Rfl: 0    Nerve Stimulator Supplies MISC, 30 pad by Does not apply route 2 (two) times a day, Disp: 30 pad, Rfl: 0    nitrofurantoin (MACROBID) 100 mg capsule, Take 1 capsule (100 mg total) by mouth 2 (two) times a day for 5 days, Disp: 10 capsule, Rfl: 0    Nutritional Supplements (METABOLIC LIVER FORMULA PO), Take 1 tablet by mouth daily, Disp: , Rfl:     Omega-3 Fatty Acids (FISH OIL OMEGA-3 PO), Take 1,200 mg by mouth daily, Disp: , Rfl:     Potassium 99 MG TABS, Take 99 mg by mouth, Disp: , Rfl:     pregabalin (LYRICA) 50 mg capsule, Take 1 capsule (50 mg total) by mouth 3 (three) times a day, Disp: 90 capsule, Rfl: 1    Probiotic Product (PROBIOTIC DAILY PO), Take 1 tablet by mouth daily, Disp: , Rfl:     promethazine (PHENERGAN) 50 MG tablet, TAKE 1 TO 2 TABS AT BEDTIME AS NEEDED FOR INSOMNIA, Disp: , Rfl: 1    topiramate (TOPAMAX) 50 MG tablet, TAKE 1 TABLET BY MOUTH EVERY DAY FOR HEADACHE/SLEEP, Disp: , Rfl: 5        Janny Wren MD

## 2021-04-19 NOTE — PATIENT INSTRUCTIONS
Finish the Macrodantin and take the Cipro after you leaves the office as directed  Have the urodynamics done and the renal ultrasound I will send you the results

## 2021-04-19 NOTE — LETTER
2021     Ramses King DO  [address]  [address] [place] [address]    Patient: Ivana Salcedo   YOB: 1980   Date of Visit: 2021       Dear Dr Isis Cannon: Thank you for referring Ivana Salcedo to me for evaluation  Below are my notes for this consultation  If you have questions, please do not hesitate to call me  I look forward to following your patient along with you  Sincerely,        Colin Philip MD        CC: No Recipients  Colin Philip MD  2021  3:32 PM  Sign when Signing Visit  [address] [facility]  [place] [phone]  www  [facility]  org      NAME: Ivana Salcedo  AGE: 37 y o  SEX: female  : 1980   MRN: [id number]    DATE: 2021  TIME: 3:07 PM    Assessment and Plan:    Neurogenic bladder due to multiple sclerosis, with hesitancy and weakness of stream   Her only incontinence is some mild postvoid dribbling occasionally  Also has neurogenic bowel with lower extremity muscle wasting and gait dysfunction  We will check a renal ultrasound and I will send her the results  We will check a urodynamic study as well to make sure that her bladder compliance is normal   Otherwise I currently do not have anything to help her with the hesitancy weakness of the stream, but I do wish to make sure that her compliance is normal and she does not have hydronephrosis  She also has a history of a renal calculus which we assess with the ultrasound  From the previous report, it was tiny and did not require treatment  Follow-up 6 months  Recent E coli UTI:  Treated with Macrobid, will take an extra dose of Cipro for prophylaxis for today's procedure                 Chief Complaint     Chief Complaint   Patient presents with    Cystoscopy       History of Present Illness   62-year-old woman with multiple sclerosis, dyssynergy defecation, and multiple other medical problems was seen by Yohan Torres 4/15/2021 for an abnormal urinary stream   Dipstick showed positive leukocytes, positive nitrites and blood  PVR was 93 cc  She was recommended for cystoscopy to rule out any physical urethral obstruction followed by urodynamics  She was started empirically on Macrobid  Urine culture showed greater than 100,000 colonies of E coli which is sensitive to nitrofurantoin  Upon interviewing the patient, it seems that her multiple sclerosis is quite severe  She has significant ambulating difficulties and uses a stick to assist with ambulation  She has lost a lot of muscle mass in her lower extremities to include her thighs  She used to be an avid hiker and runner  She has to push for quite a long time to initiate her stream and it takes her a long time to empty her bladder  Her bowels are the same way  She uses an enema every other day to have bowel movements  She has been working with physical therapist in pelvic health through [facility] for quite some time  She denies actual culture positive urinary tract infections, but she has had several instances where she felt she had a urinary tract infection but they just went away  She has had 3 children, all vaginal deliveries  She is currently menstruating  No recent upper tract imaging  Cystoscopy     Date/Time 4/19/2021 3:05 PM     Performed by  Deepika Maguire MD     Authorized by Deepika Maguire MD      Universal Protocol:  Consent: Verbal consent obtained  Written consent obtained        Procedure Details:  Procedure type: cystoscopy    Additional Procedure Details: Cystoscopy Procedure Note        Pre-operative Diagnosis: Weak urinary stream    Post-operative Diagnosis: same neurogenic bladder    Procedure: Flexible cystoscopy    Surgeon: Cristian Hughes MD    Anesthesia: 1% Xylocaine per urethra    EBL: Minimal    Complications: none    Procedure Details   The risks, benefits, complications, treatment options, and expected outcomes were discussed with the patient  The patient concurred with the proposed plan, giving informed consent  Cystoscopy was performed today under local anesthesia, using sterile technique  The patient was placed in the supine position, prepped with Betadine, and draped in the usual sterile fashion  The flexible cystocope was used to inspect both the urethra and bladder    Findings:  Urethra:  Normal without stricture  No lesions  Bladder:  Smooth, not trabeculated and there were no stones tumors or other lesions  The orifices were orthotopic and intact  Specimens: none                 Complications:  None           Disposition: To home            Condition:  Stable          The following portions of the patient's history were reviewed and updated as appropriate: allergies, current medications, past family history, past medical history, past social history, past surgical history and problem list   Past Medical History:   Diagnosis Date    Anxiety     Back pain     Chronic constipation 6/27/2016    Dyssynergic defecation     Type 1    Infectious viral hepatitis     Lyme disease     [facility] sclerosis ([facility] [address] [facility]     Thrombocytopenia ([facility] [address] )      Past Surgical History:   Procedure Laterality Date    APPENDECTOMY      CLAVICLE SURGERY      FL LUMBAR PUNCTURE DIAGNOSTIC  11/25/2020    KNEE ARTHROSCOPY      MULTIPLE TOOTH EXTRACTIONS      DE COLONOSCOPY FLX DX W/COLLJ SPEC WHEN PFRMD N/A 6/27/2016    Procedure: COLONOSCOPY;  Surgeon: Анна Mckeon MD;  Location: [facility];  Service: Colorectal    TONSILLECTOMY AND ADENOIDECTOMY      TUBAL LIGATION       shoulder  Review of Systems   Review of Systems   Gastrointestinal: Positive for abdominal distention and constipation     Genitourinary:        As per HPI       Active Problem List     Patient Active Problem List   Diagnosis    Chronic constipation    Encounter for screening colonoscopy    Alcoholism ([facility] [address] [facility]    Lyme disease    Chronic pain due to trauma    IBS (irritable bowel syndrome)    Insomnia    Leukopenia    Musculoskeletal pain, chronic    Nondependent opioid abuse in remission ([facility] [address] [facility]    Unspecified abnormalities of gait and mobility    Balance problems    Chronic midline low back pain without sciatica    History of Lyme disease    Cervical myelopathy (HCC)    MS (multiple sclerosis) (HCC)    Neurogenic bladder    Ambulatory dysfunction    Thrombocytopenia (HCC)       Objective   /70   Ht 5' 6" (1 676 m)   Wt 51 3 kg (113 lb)   BMI 18 24 kg/m²     Physical Exam  Vitals signs reviewed  Constitutional:       Appearance: Normal appearance  HENT:      Head: Normocephalic and atraumatic  Eyes:      Extraocular Movements: Extraocular movements intact  Neck:      Musculoskeletal: Normal range of motion  Pulmonary:      Effort: Pulmonary effort is normal    Abdominal:      General: There is distension  Palpations: There is no mass  Tenderness: There is no abdominal tenderness  There is no right CVA tenderness, left CVA tenderness, guarding or rebound  Hernia: No hernia is present  Genitourinary:     General: Normal vulva  Vagina: No vaginal discharge  Comments: Actively menstruating  Normal urethral meatus  No lesions  Musculoskeletal: Normal range of motion  Skin:     Coloration: Skin is not jaundiced or pale  Neurological:      General: No focal deficit present  Mental Status: She is alert and oriented to person, place, and time  Psychiatric:         Mood and Affect: Mood normal          Behavior: Behavior normal          Thought Content:  Thought content normal          Judgment: Judgment normal              Current Medications     Current Outpatient Medications:     b complex vitamins tablet, Take 1 tablet by mouth daily, Disp: , Rfl:     baclofen 10 mg tablet, Take 1 tablet (10 mg total) by mouth 3 (three) times a day (Patient taking differently: Take 10 mg by mouth as needed ), Disp: 90 tablet, Rfl: 0    Boswellia-Glucosamine-Vit D (Osteo Bi-Flex One Per Day) TABS, Take by mouth, Disp: , Rfl:     buprenorphine-naloxone (SUBOXONE) 2-0 5 mg per SL tablet, Place 0 5 tablets under the tongue daily, Disp: , Rfl:     busPIRone (BUSPAR) 10 mg tablet, Take 10 mg by mouth 2 (two) times a day, Disp: , Rfl: 1    Cholecalciferol (Vitamin D3) 25 MCG (1000 UT) CAPS, Take 1 capsule by mouth daily, Disp: , Rfl:     ciprofloxacin (Cipro) 500 mg tablet, Take 1 tablet (500 mg total) by mouth once for 1 dose Take 1 dose after procedure, Disp: 1 tablet, Rfl: 0    cloNIDine (CATAPRES) 0 2 mg tablet, Take 0 2 mg by mouth daily at bedtime , Disp: , Rfl:     cyclobenzaprine (FLEXERIL) 5 mg tablet, Take 5 mg by mouth as needed for muscle spasms , Disp: , Rfl:     diazepam (VALIUM) 10 mg tablet, Take 1 tab 60 min prior to spinal tap, and second tab 30 min prior to LP if anxiety persists (Patient not taking: Reported on 1/12/2021), Disp: 2 tablet, Rfl: 0    docusate sodium (COLACE) 100 mg capsule, Take 100 mg by mouth 3 (three) times a day, Disp: , Rfl:     ergocalciferol (VITAMIN D2) 50,000 units, Take 1 capsule (50,000 Units total) by mouth once a week, Disp: 12 capsule, Rfl: 0    ibuprofen (MOTRIN) 200 mg tablet, Take 200 mg by mouth as needed for mild pain, Disp: , Rfl:     lactulose (CHRONULAC) 10 g/15 mL solution, TAKE 10ML BY MOUTH EVERY 3-4 DAYS, Disp: , Rfl: 5    lidocaine (LIDODERM) 5 %, Apply 1 patch topically daily Remove & Discard patch within 12 hours or as directed by MD (Patient not taking: Reported on 3/31/2021), Disp: 30 patch, Rfl: 0    Liver Extract (LIVER PO), Take 1 tablet by mouth daily, Disp: , Rfl:     lubiprostone (AMITIZA) 24 mcg capsule, Take 24 mcg by mouth 2 (two) times a day with meals, Disp: , Rfl:     meloxicam (MOBIC) 7 5 mg tablet, Take one tablet daily for a week prn for mid back pain, and can increase to 2 tabs a day if needed   (Patient not taking: Reported on 3/31/2021), Disp: 60 tablet, Rfl: 3    Nerve Stimulator (STANDARD TENS) VICKY, by Does not apply route 2 (two) times a day, Disp: 1 Device, Rfl: 0    Nerve Stimulator (TENS THERAPY REPLACE BACK PADS) MISC, 30 pad by Does not apply route 2 (two) times a day, Disp: 30 each, Rfl: 0    Nerve Stimulator VICKY, by Does not apply route 2 (two) times a day, Disp: 1 each, Rfl: 0    Nerve Stimulator Supplies MISC, 30 pad by Does not apply route 2 (two) times a day, Disp: 30 pad, Rfl: 0    nitrofurantoin (MACROBID) 100 mg capsule, Take 1 capsule (100 mg total) by mouth 2 (two) times a day for 5 days, Disp: 10 capsule, Rfl: 0    Nutritional Supplements (METABOLIC LIVER FORMULA PO), Take 1 tablet by mouth daily, Disp: , Rfl:     Omega-3 Fatty Acids (FISH OIL OMEGA-3 PO), Take 1,200 mg by mouth daily, Disp: , Rfl:     Potassium 99 MG TABS, Take 99 mg by mouth, Disp: , Rfl:     pregabalin (LYRICA) 50 mg capsule, Take 1 capsule (50 mg total) by mouth 3 (three) times a day, Disp: 90 capsule, Rfl: 1    Probiotic Product (PROBIOTIC DAILY PO), Take 1 tablet by mouth daily, Disp: , Rfl:     promethazine (PHENERGAN) 50 MG tablet, TAKE 1 TO 2 TABS AT BEDTIME AS NEEDED FOR INSOMNIA, Disp: , Rfl: 1    topiramate (TOPAMAX) 50 MG tablet, TAKE 1 TABLET BY MOUTH EVERY DAY FOR HEADACHE/SLEEP, Disp: , Rfl: 5        Zana Westbrook MD

## 2021-04-20 NOTE — TELEPHONE ENCOUNTER
Patient with h/o NGB and multiple sclerosis  Urodynamics ordered for further evaluation  Called and spoke with patient  Offered several date in may and patient had conflicting appts  Scheduled 6/23 at the [facility] office for urodynamic testing  This was the first available [place] date that patient did not have a conflicting appt  My chart message sent with what to expect for urodynamics testing but patient is requesting the information mailed  She confirmed appt details and has 6 month fu scheduled with Dr Dalton Kearns already

## 2021-04-20 NOTE — TELEPHONE ENCOUNTER
Attempted to contact [facility] and reached their voicemail  Will call back later to reschedule the patient

## 2021-04-20 NOTE — PROGRESS NOTES
Daily Note     Today's date: 2021  Patient name: Jie Bains  : 1980  MRN: [id number]  Referring provider: Valerie Herndon PA-C  Dx:   Encounter Diagnosis     ICD-10-CM    1  MS (multiple sclerosis) ([facility] [address] )  G35    2  Ambulatory dysfunction  R26 2                   Subjective: Got very weak after last session  Also saw urology and had a procedure  Pending another appointment in [facility], also has kidney and bladder scan upcoming  Infusion pushed back because of infection  Objective: See treatment diary below      Assessment: Tolerated treatment fair  Patient demonstrated fatigue post treatment and would benefit from continued PT  Program modified for patient tolerance  Plan: Continue per plan of care        Precautions: Falls, multiple sclerosis  Re-eval Date: 2021    Date      Visit Count 1 2 3     FOTO See IE       Pain In See IE 0      Pain Out See IE 0              Manuals        LEs prn                               Neuro Re-Ed        Dynavision        Natus         Obstacle Course 15 mins walking, WBOS, NBOS, foam, inclines, EO an EC       Star Drill        PWR focus        Dynamic balance  Seated on red disc  Biceps 2x10, 1#  Punch 2x10, 1#  GHJ ABd 2x10, 2#  Focus on AH Seated on red disc  Biceps 2x10, 2#  Punch 2x10, 1#  GHJ ABd 2x10, 2#  Tricep extn  Orange Tband  1x10  MTP  Orange Tband  1x10  Focus on AH             Ther Ex Vitals  110/62, HR 73       Aerobic L3  5 mins       SLR x 4  Seated on disc  juan hip ADd  Hip ABd with L1 band  March with L1 band  2x10 ea Seated on yellow foam  juan hip ADd  Hip ABd with L1 band  March with L1 band  2x10 ea     HR/TR  Seated on disc        Mini Squats        Single Leg progression                                        Ther Activity        Kitchen/ADL                Gait Training        Divided Attention        Head turns        Modalities         prn

## 2021-04-20 NOTE — TELEPHONE ENCOUNTER
Called [facility] and spoke with Mar  Pt has been rescheduled to 5/5/21 at 10am      Pt made aware, she is agreeable  She will call our office with any problems  Also provided her with infusion center phone # in case she would need to reschedule  Plan date updated and sent for signature

## 2021-04-21 ENCOUNTER — OFFICE VISIT (OUTPATIENT)
Dept: PHYSICAL THERAPY | Facility: CLINIC | Age: 41
End: 2021-04-21
Payer: COMMERCIAL

## 2021-04-21 DIAGNOSIS — R26.2 AMBULATORY DYSFUNCTION: ICD-10-CM

## 2021-04-21 DIAGNOSIS — G35 MS (MULTIPLE SCLEROSIS) (HCC): Primary | ICD-10-CM

## 2021-04-21 PROCEDURE — 97110 THERAPEUTIC EXERCISES: CPT | Performed by: PHYSICAL THERAPIST

## 2021-04-21 PROCEDURE — 97112 NEUROMUSCULAR REEDUCATION: CPT | Performed by: PHYSICAL THERAPIST

## 2021-04-22 ENCOUNTER — TELEPHONE (OUTPATIENT)
Dept: NEUROLOGY | Facility: CLINIC | Age: 41
End: 2021-04-22

## 2021-04-22 NOTE — TELEPHONE ENCOUNTER
Patient called to request refill of Vitamin D2  Patient was ordered labs in March  Advised patient to have labs completed  Dr Steven Natarajan - Not sure if therapy is complete  I did not pend script  Do you want to wait for Vitamin D level? Patient is trying to get SSI benefits  She was denied with the reason given that "her MS will go away within 12 months "     I told patient we do not get involved in permanent disability cases  Patient wants to know if we will write a generic letter to her stating that her MS will NOT go away in 12 months  That it is a lifelong diagnosis  Dr Steven Natarajan- please advise  [phone] to leave detailed msg

## 2021-04-23 DIAGNOSIS — G35 MS (MULTIPLE SCLEROSIS) (HCC): Primary | ICD-10-CM

## 2021-04-23 RX ORDER — ACETAMINOPHEN 325 MG/1
650 TABLET ORAL ONCE
Status: CANCELLED | OUTPATIENT
Start: 2021-05-05

## 2021-04-23 RX ORDER — SODIUM CHLORIDE 9 MG/ML
20 INJECTION, SOLUTION INTRAVENOUS ONCE
Status: CANCELLED | OUTPATIENT
Start: 2021-05-05

## 2021-04-23 NOTE — TELEPHONE ENCOUNTER
Will check with patient closer to infusion to ensure no further infection/ illness and that UTI has resolved

## 2021-04-26 ENCOUNTER — APPOINTMENT (OUTPATIENT)
Dept: LAB | Facility: HOSPITAL | Age: 41
End: 2021-04-26
Payer: COMMERCIAL

## 2021-04-26 DIAGNOSIS — G35 MS (MULTIPLE SCLEROSIS) (HCC): ICD-10-CM

## 2021-04-26 DIAGNOSIS — R41.3 COMPLAINTS OF MEMORY DISTURBANCE: ICD-10-CM

## 2021-04-26 LAB
25(OH)D3 SERPL-MCNC: 110.2 NG/ML (ref 30–100)
ALBUMIN SERPL BCP-MCNC: 4 G/DL (ref 3.5–5)
ALP SERPL-CCNC: 81 U/L (ref 46–116)
ALT SERPL W P-5'-P-CCNC: 21 U/L (ref 12–78)
ANION GAP SERPL CALCULATED.3IONS-SCNC: 8 MMOL/L (ref 4–13)
AST SERPL W P-5'-P-CCNC: 22 U/L (ref 5–45)
BASOPHILS # BLD AUTO: 0.03 THOUSANDS/ΜL (ref 0–0.1)
BASOPHILS NFR BLD AUTO: 1 % (ref 0–1)
BILIRUB SERPL-MCNC: 0.62 MG/DL (ref 0.2–1)
BUN SERPL-MCNC: 11 MG/DL (ref 5–25)
CALCIUM SERPL-MCNC: 9.3 MG/DL (ref 8.3–10.1)
CHLORIDE SERPL-SCNC: 105 MMOL/L (ref 100–108)
CO2 SERPL-SCNC: 30 MMOL/L (ref 21–32)
CREAT SERPL-MCNC: 0.95 MG/DL (ref 0.6–1.3)
EOSINOPHIL # BLD AUTO: 0.21 THOUSAND/ΜL (ref 0–0.61)
EOSINOPHIL NFR BLD AUTO: 4 % (ref 0–6)
ERYTHROCYTE [DISTWIDTH] IN BLOOD BY AUTOMATED COUNT: 12.2 % (ref 11.6–15.1)
GFR SERPL CREATININE-BSD FRML MDRD: 75 ML/MIN/1.73SQ M
GLUCOSE SERPL-MCNC: 83 MG/DL (ref 65–140)
HCT VFR BLD AUTO: 32.8 % (ref 34.8–46.1)
HGB BLD-MCNC: 11 G/DL (ref 11.5–15.4)
IMM GRANULOCYTES # BLD AUTO: 0.01 THOUSAND/UL (ref 0–0.2)
IMM GRANULOCYTES NFR BLD AUTO: 0 % (ref 0–2)
LYMPHOCYTES # BLD AUTO: 2.11 THOUSANDS/ΜL (ref 0.6–4.47)
LYMPHOCYTES NFR BLD AUTO: 40 % (ref 14–44)
MCH RBC QN AUTO: 32.4 PG (ref 26.8–34.3)
MCHC RBC AUTO-ENTMCNC: 33.5 G/DL (ref 31.4–37.4)
MCV RBC AUTO: 97 FL (ref 82–98)
MONOCYTES # BLD AUTO: 0.38 THOUSAND/ΜL (ref 0.17–1.22)
MONOCYTES NFR BLD AUTO: 7 % (ref 4–12)
NEUTROPHILS # BLD AUTO: 2.57 THOUSANDS/ΜL (ref 1.85–7.62)
NEUTS SEG NFR BLD AUTO: 48 % (ref 43–75)
NRBC BLD AUTO-RTO: 1 /100 WBCS
PLATELET # BLD AUTO: 83 THOUSANDS/UL (ref 149–390)
PMV BLD AUTO: 10.5 FL (ref 8.9–12.7)
POTASSIUM SERPL-SCNC: 3.3 MMOL/L (ref 3.5–5.3)
PROT SERPL-MCNC: 7.3 G/DL (ref 6.4–8.2)
RBC # BLD AUTO: 3.39 MILLION/UL (ref 3.81–5.12)
SODIUM SERPL-SCNC: 143 MMOL/L (ref 136–145)
TSH SERPL DL<=0.05 MIU/L-ACNC: 0.99 UIU/ML (ref 0.36–3.74)
VIT B12 SERPL-MCNC: 597 PG/ML (ref 100–900)
WBC # BLD AUTO: 5.31 THOUSAND/UL (ref 4.31–10.16)

## 2021-04-26 PROCEDURE — 82607 VITAMIN B-12: CPT

## 2021-04-26 PROCEDURE — 84443 ASSAY THYROID STIM HORMONE: CPT

## 2021-04-26 PROCEDURE — 36415 COLL VENOUS BLD VENIPUNCTURE: CPT

## 2021-04-26 PROCEDURE — 80053 COMPREHEN METABOLIC PANEL: CPT

## 2021-04-26 PROCEDURE — 82306 VITAMIN D 25 HYDROXY: CPT

## 2021-04-26 PROCEDURE — 84425 ASSAY OF VITAMIN B-1: CPT

## 2021-04-26 PROCEDURE — 85025 COMPLETE CBC W/AUTO DIFF WBC: CPT

## 2021-04-26 NOTE — TELEPHONE ENCOUNTER
Patricia Lewis - is there advise on the patient case?  Patient may consider re-submitting her SSi documents with Office visit notes are available for her

## 2021-04-26 NOTE — TELEPHONE ENCOUNTER
MSW spoke with the patient  She explained that she is in the final stages of applying for disability and has been working with a   As stated below in Lexie's note, she was denied this time because it was stated that her "MS will go away in 12 months"  Patient became emotional      MSW explained that unfortunately we do not get involved with legal documents  MSW advised [facility] notes to be used  Patient's concern is that they do not clearly state there is no cure for MS in the notes  MSW stated that MSW will reach out to Dr Maura Mixon and ask if she would sign off on a letter if a credible resources was cited  Patient expressed gratitude  MSW will follow up after Dr Brain Spicer response

## 2021-04-27 ENCOUNTER — TELEPHONE (OUTPATIENT)
Dept: NEUROLOGY | Facility: CLINIC | Age: 41
End: 2021-04-27

## 2021-04-27 DIAGNOSIS — G89.29 CHRONIC MIDLINE LOW BACK PAIN WITHOUT SCIATICA: ICD-10-CM

## 2021-04-27 DIAGNOSIS — R26.2 AMBULATORY DYSFUNCTION: ICD-10-CM

## 2021-04-27 DIAGNOSIS — M54.50 CHRONIC MIDLINE LOW BACK PAIN WITHOUT SCIATICA: ICD-10-CM

## 2021-04-27 DIAGNOSIS — G35 MS (MULTIPLE SCLEROSIS) (HCC): Primary | ICD-10-CM

## 2021-04-27 NOTE — TELEPHONE ENCOUNTER
Low back pain likely unrelated to her MS  Looks like she had MRI lumbar spine done last July with some degenerative changes seen  Has she done PT recently for her low back? Would suggest trying PT

## 2021-04-27 NOTE — TELEPHONE ENCOUNTER
Spoke with patient  Advised of results and recommendations below  Patient verbalized understanding  Patient is complaining of lower back pain  She states she has had this pain for appx two years at least  Patient thought this pain was work related (standing on feet, heavy lifting, etc); however, patient has not worked in one year and pain remains the same  Patient only takes OTC medications for pain relief      Patient takes Flexeril 5mg PRN    Patient is not taking the following medications:    Lyrica 50mg (states this is ineffective)  Meloxicam 7 5mg (adverse side effects)  Baclofen 10mg (adverse side effects)      MRI c-spine 3/2021  MRI l-spine  7/2020

## 2021-04-27 NOTE — TELEPHONE ENCOUNTER
Pt made aware, she verbalizes understanding  She states that she has not tried PT for her back but would be agreeable to trying  Notes she is currently in PT for balance problems only but does not feel this is helpful  Asking if new referral can be placed for PT for her back  Pt then notes her legs feel weak with any physical exertion  Notes she can only walk with crutches or a walking stick  This has been occurring for a year now  Also notes constant numbness and tingling in her legs, they feel heavy to her  Reports continued issues with constipation and the need to push to urinate  States she is seeing urology and GI for these ongoing issues  Notes worsening fatigue recently  Denies changes in vision or increased stress  Pt did have recent UTI and has completed her antibiotics  She is feeling better  Last Tysabri infusion 3/31/21  Last MRIs 3/1/21  Pt reports she previously had PO steroids that helped with weakness in her legs but eventually wore off  Does not believe she had IV steroids  Denies diabetes or psych history  Denies current drug use  Please advise if there are further recommendations       [phone]  Okay to leave detailed message

## 2021-04-27 NOTE — TELEPHONE ENCOUNTER
----- Message from Ying Keating PA-C sent at 4/27/2021 11:38 AM EDT -----  Please let patient know B12 level is normal   Vit D level is high, and should not need the Rx vit D anymore    Would have her stop Vit D completely for 2 weeks, then start taking OTC vit D3 2000IU daily

## 2021-04-27 NOTE — TELEPHONE ENCOUNTER
Her ongoing weakness and bowel/bladder issues are from her MS  This was newly diagnosed within the last year and she was just started on therapy  Unfortunately, the Tysabri will not take away the deficits already accumulated but will hopefully help prevent further disease progression/new symptoms  Would not suggest IV steroids (or PO steroids) for her chronic symptoms  She may benefit from ongoing PT and will place a new referral for PT for her low back  She should continue to follow with GI and urology as well

## 2021-04-28 NOTE — TELEPHONE ENCOUNTER
See yesterday's telephone encounter  Pt reported she completed her antibiotics for her UTI and is feeling better  Okay to proceed with Tysabri on 5/5/21?

## 2021-04-29 ENCOUNTER — HOSPITAL ENCOUNTER (OUTPATIENT)
Dept: ULTRASOUND IMAGING | Facility: HOSPITAL | Age: 41
Discharge: HOME | End: 2021-04-29
Attending: UROLOGY
Payer: COMMERCIAL

## 2021-04-29 DIAGNOSIS — R39.198 ABNORMAL URINARY STREAM: ICD-10-CM

## 2021-04-29 DIAGNOSIS — R39.11 URINARY HESITANCY: ICD-10-CM

## 2021-04-29 DIAGNOSIS — N31.9 NEUROGENIC BLADDER: ICD-10-CM

## 2021-04-29 PROCEDURE — 76770 US EXAM ABDO BACK WALL COMP: CPT

## 2021-04-29 NOTE — PROGRESS NOTES
Patient reports she is not doing well with her legs and back  Reports she takes a long time to recover, ruins her days  Not tolerating well  May benefit from trial of aquatic program to progress to land program upcoming  [facility] contacted on behalf of patient  Will discharge current episode of care

## 2021-04-29 NOTE — TELEPHONE ENCOUNTER
Signed letter has been scanned to the patient's chart  MSW called the patient to provide her with this update and to inform her that it can be mailed to her home  The patient did not answer the phone  MSW left her a VM explaining the reason for the call and asked her to please call MSW back

## 2021-04-30 ENCOUNTER — OFFICE VISIT (OUTPATIENT)
Dept: PHYSICAL THERAPY | Facility: CLINIC | Age: 41
End: 2021-04-30
Payer: COMMERCIAL

## 2021-04-30 DIAGNOSIS — R26.2 AMBULATORY DYSFUNCTION: ICD-10-CM

## 2021-04-30 DIAGNOSIS — G35 MS (MULTIPLE SCLEROSIS) (HCC): Primary | ICD-10-CM

## 2021-04-30 NOTE — TELEPHONE ENCOUNTER
MSW made a 2nd attempt to contact the patent but she did not answer the phone  ROGELIO left a VM explaining the reason for the call and asked her to please call ROGELIO back

## 2021-04-30 NOTE — PROGRESS NOTES
PT Discharge    Today's date: 2021  Patient name: Estelita Meza  : 1980  MRN: [id number]  Referring provider: Edrie Mortimer, PA-C  Dx:   Encounter Diagnosis     ICD-10-CM    1  [facility]  G35    2  Ambulatory dysfunction  R26 2                   Assessment  Assessment details: Patient with decreased tolerance to land based program   Discharge at this time  Consider aquatic based program  No formal objectives assessed  Prognosis details: Positive prognostic indicators include positive attitude toward recovery and good understanding of diagnosis and treatment plan options  Negative prognostic indicators include chronicity of symptoms and co-morbidities  Goals  Not met at this time  In 4 weeks, patient will:  1  Demonstrate ability to perform 30 minutes of activity without requiring seated rest break  2   Demonstrate ability to maintain upright balance unsupported by UEs while reaching above shoulder height without resistance to promote stability with ADLs  3  Demonstrate ability to lift up to 10 lbs from  knees to waist unsupported by UEs to promote stability with ADLs  4   Demonstrate increased strength of bilateral LEs to allow for improved transfer quality and stability    In 4-10 weeks, patient will:  1  Demonstrate reduced fall risk based on outcome measures  2  Demonstrate consistent carryover with HEP  3  Return to community ambulation with least restrictive AD for at least 500+ feet      Plan  Duration in visits: 1  Duration in weeks: 1  Plan of Care beginning date: 2021  Plan of Care expiration date: 2021        Subjective Evaluation    History of Present Illness  Mechanism of injury: 16-year-old female who initially presented to our [facility] with abnormal gait and balance and was found to have white matter lesions in the brain and cervical spine, as well as a positive  MS panel in her CSF  Diagnosis of MS made    She has longstanding issues with balance and gait, as well as history of Lyme disease, history of chronic opioid and alcohol abuse      Decision was made to start the patient on Tysabri as her 1st disease modifying therapy  She is actually currently in the infusion center getting her 1st Tysabri infusion right now  She is JCV negative as of 2021  Repeat JCV in 6 months      She had an MRI brain and cervical spine completed a few days ago,   Formal radiology read is pending  Will contact her with the results        She has several ongoing complaints,  Most notable is her ambulatory dysfunction  Will refer her to PT to work on balance and gait  She also complains of some difficulty focusing and memory difficulties  Will order some labs including B12, B1, TSH  Explained her long history of   alcohol abuse may have something to do with this  Last infusion 3/31/2021, scheduled every 28 days  Functional deficits:  Standing  Cooking  Cleaning  Walking  Multiple and frequent falls  Quality of life: fair    Pain  Current pain ratin  At best pain ratin  At worst pain rating: 10  Location: Every joint in her body, whole body  Quality: pressure (Legs get weak, back gets painful)  Relieving factors: rest  Aggravating factors: standing, walking, stair climbing and lifting  Progression: worsening    Social Support  Steps to enter house: yes (FF)  Stairs in house: yes   Lives in: multiple-level home  Lives with: young children and spouse    Employment status: not working  Hand dominance: right      Diagnostic Tests    FCE comments: MRI BRAIN WITHOUT CONTRAST     INDICATION:  R26 89: Other abnormalities of gait and mobility  R90 89: Other abnormal findings on diagnostic imaging of central nervous system  Demyelinating disease      COMPARISON:  9/3/2020     TECHNIQUE:  Sagittal T1, axial T2, axial FLAIR, axial T1  Axial diffusion-weighted imaging    Axial Winn, Sagittal FLAIR CUBE      IMAGE QUALITY: Diagnostic      FINDINGS:     BRAIN PARENCHYMA:  The appearance of the brain is stable  A few scattered periventricular and subcortical white matter lesions are unchanged  No new or infratentorial signal abnormality      Exam ordered without contrast      There is no discrete mass, mass effect or midline shift  There is no intracranial hemorrhage  There is no evidence of acute infarction      VENTRICLES:  Normal      SELLA AND PITUITARY GLAND:  Normal      ORBITS:  Partial obscured by dental artifact      PARANASAL SINUSES:  Partial obscured by dental artifact  Visualized paranasal sinuses are free of signal abnormality      VASCULATURE:  Evaluation of the major intracranial vasculature demonstrates appropriate flow voids      CALVARIUM AND SKULL BASE:  Normal      EXTRACRANIAL SOFT TISSUES:  Normal      IMPRESSION:     1  No acute infarction, intracranial hemorrhage or mass effect  2   Minimal, chronic white matter signal abnormality correlating to the history of demyelinating disease  Overall plaque burden is very mild  No evidence of progressive demyelination  MRI CERVICAL SPINE WITHOUT CONTRAST     INDICATION: R26 89: Other abnormalities of gait and mobility  R90 89: Other abnormal findings on diagnostic imaging of central nervous system      COMPARISON:  8/1/2020     TECHNIQUE:  Sagittal T1, sagittal T2, sagittal inversion recovery, axial T2, axial  2D merge     IMAGE QUALITY:  Diagnostic     FINDINGS:     ALIGNMENT:  Normal alignment of the cervical spine  No compression fracture  No subluxation  No scoliosis      MARROW SIGNAL:  Normal marrow signal is identified within the visualized bony structures  No discrete marrow lesion      CERVICAL AND VISUALIZED THORACIC CORD:  Comparison the spinal cord is stable with bilateral laterally located T2 hyperintense lesions noted at the C2-3 levels and C4-C6 levels, stable  No definite cord expansion or new cord signal abnormality        PREVERTEBRAL AND PARASPINAL SOFT TISSUES:  Normal      VISUALIZED POSTERIOR FOSSA:  The visualized posterior fossa demonstrates no abnormal signal      CERVICAL DISC SPACES:     C2-C3:  Normal      C3-C4:  Normal      C4-C5:  Normal      C5-C6:  Small central disc herniation, protrusion type  Mild central canal narrowing  Neural foramina patent bilaterally      C6-C7:  Normal      C7-T1:  Normal      UPPER THORACIC DISC SPACES:  Normal      IMPRESSION:        1  Stable cord signal abnormality is nonspecific possibly chronic demyelinating disease  Other etiologies including subacute combined degeneration could be considered in the differential diagnosis  Does the patient have vitamin B12 deficiency? 2  Minimal spondylosis is stable             Treatments  Previous treatment: medication  Current treatment: physical therapy  Patient Goals  Patient goals for therapy: increased strength, independence with ADLs/IADLs, improved balance, decreased pain, return to sport/leisure activities and increased motion          Objective     Concurrent Complaints  Positive for headaches, visual change, memory loss (Fogginess, lost memories) and peripheral neuropathy (Heaviness)  Strength/Myotome Testing     Left Hip   Planes of Motion   Flexion: 3  Extension: 3-  Abduction: 3    Right Hip   Planes of Motion   Flexion: 3  Extension: 3  Abduction: 3    Left Knee   Flexion: 3  Extension: 3    Right Knee   Flexion: 3+  Extension: 3+    Left Ankle/Foot   Dorsiflexion: 3+  Plantar flexion: 3+    Right Ankle/Foot   Dorsiflexion: 3+  Plantar flexion: 3+  Neuro Exam:     Dizziness  Negative for disequilibrium, oscillopsia, light-headedness and diplopia  Headaches   Patient reports headaches: Yes     Frequency: Feels like she is getting them more recently, contributes to lack of sleep    Oculomotor exam   Oculomotor ROM: WNL  Resting nystagmus: not present   Gaze holding nystagmus: not present left  and not present right  Smooth pursuits: within normal limits  Vertical saccades: normal  Horizontal saccades: normal  Convergence: abnormal    Sensation   Light touch LE: left impaired and right impaired  Proprioception LE: left impaired and right impaired    Coordination   Rapid alternating movements: UE impaired and LE impaired    Transfers Sit to stand: independent Sit to supine: independent Supine to sit: independent   Roll: independentInto the car: independent Out of the car: independent Chair to floor: independent   Floor to chair: independent     Functional outcomes   Functional outcome comment: Mini Best     Sit to Stand  1=Moderate: Comes to stand WITH use of hands on first attempt     Rise to Toes  1=Moderate:  Heels up, but not full range (smaller than when holding hands) OR noticeable instability for 3 seconds     Stand on one leg  1=Moderate: < 20 seconds  Right:  8 seconds  Left:  0 seconds      Compensatory Stepping Correction - Forward  1=Moderate: More than one step used to recover equilibrium    Compensatory Stepping Correction - Backward  1=Moderate: More than one step used to recover equilibrium     Compensatory Stepping Correction - Lateral  1=Moderate: Several steps to recover equilibrium    Stance (Feet Together); Eyes open, Firm surface  2= Normal:  30 seconds     Stance (Feet Together);  Eyes closed, Foam surface  0=Severe:  Unable    Incline - Eyes Closed  1=Moderate:  Stands independently <30 sec OR aligns with surface    Change in gait speed  0= Severe: Unable to achieve significant change in walking speed AND signs of imbalance     Walk with Head Turns - Horizontal  1= Moderate:  Performs head turns with reduction in gait speed     Walk with Pivot Turns  0= Severe:  Cannot turn with feet close at any speed without imbalance     Step over obstacles  1= Moderate: step over box but touches box OR displays cautious behavior by slowing gait     Timed up and go with dual task (3 meter walk)  TU 9 Seconds  Dual Task TU 8 Seconds  1= Moderate: Dual Task affects counting OR walking (>10%) when compared to the TUG without Dual Task    Total Score: 12/28    Functional outcome gait comment: Using walking stick, intermittent knee buckling    LEs fatigue easily

## 2021-05-01 LAB — VIT B1 BLD-SCNC: 114.3 NMOL/L (ref 66.5–200)

## 2021-05-03 NOTE — TELEPHONE ENCOUNTER
Patient will be advised against steroid treatment as steroid may cause liver damage in addition to that described by hematology team  I would agree to continue Wilmer with a hope aquatic therapy strengthen the patient

## 2021-05-03 NOTE — TELEPHONE ENCOUNTER
Pt made aware of Dr Pravin Mena message  We also discussed that steroids are used for active relapses (demyelinating lesions)  Explained typical course of a relapse and possible remaining disability depending on relapse and treatment received  Discussed use of DMT to prevent further relapse and disability  Also discussed that there are different types of MS  Explained that different medications and therapies can be used to help with lingering symptoms  Pt states she feels she has been getting worse over the last year, asking if she has been relapsing the entire time or if this is permanent  Asking why she could climb a mountain 1 year ago and now cannot walk more than 2 blocks without crutches but has no new lesions  Asking if she will ever be able to climb a mountain again  Asking if MS is just progressively getting worse  If she is just getting worse, why does she not have any disease progression shown on MRIs? Please advise  Pt also asking if there are any closer infusion sites to her  Made her aware [facility] authorized  infusion site was requested  Discussed that infusion sites must be enrolled in the [facility] program  Pt will call our office if she would need to look into other sites outside of   No further questions about infusion sites at this time

## 2021-05-03 NOTE — TELEPHONE ENCOUNTER
MSW received a call back from the patient  She was very grateful for Dr Jaquelin Raphael willingness to sign off on the letter  She asked for MSW to mail it to her home  MSW will mail the letter to the patient's home

## 2021-05-03 NOTE — TELEPHONE ENCOUNTER
Spoke with the patient this morning about a different need  At the end of the call the patient stated that she is still experiencing the same symptoms that are mentioned below  Patient started PT last week but it was too much for her body and the therapist decided that she should do aqua therapy first  Patient has her first aqua therapy appointment on 5/10  Patient had questions about why she was not able to begin steroids  She understands that Waleska Olsen felt that it would better for her to do PT first but she does not understand why  She also wanted to know if Dr Gloria Cristobal had the same opinion  She requested that MSW ask her as well  She is also asking for someone to explain to her why steroids is not a good option for her  Patient stated that she is not trying to be difficult, she is just feels scared especially since her first PT appointment was not a success  She is worried that she is experiencing an MS flare up

## 2021-05-05 ENCOUNTER — HOSPITAL ENCOUNTER (OUTPATIENT)
Dept: INFUSION CENTER | Facility: CLINIC | Age: 41
Discharge: HOME | End: 2021-05-05
Payer: COMMERCIAL

## 2021-05-05 ENCOUNTER — TELEPHONE (OUTPATIENT)
Dept: UROLOGY | Facility: AMBULATORY SURGERY CENTER | Age: 41
End: 2021-05-05

## 2021-05-05 VITALS
HEART RATE: 94 BPM | TEMPERATURE: 97.5 F | RESPIRATION RATE: 18 BRPM | SYSTOLIC BLOOD PRESSURE: 131 MMHG | DIASTOLIC BLOOD PRESSURE: 89 MMHG

## 2021-05-05 DIAGNOSIS — G35 MS (MULTIPLE SCLEROSIS) (HCC): Primary | ICD-10-CM

## 2021-05-05 PROCEDURE — 96413 CHEMO IV INFUSION 1 HR: CPT

## 2021-05-05 PROCEDURE — 96367 TX/PROPH/DG ADDL SEQ IV INF: CPT

## 2021-05-05 RX ORDER — ACETAMINOPHEN 325 MG/1
650 TABLET ORAL ONCE
Status: COMPLETED | OUTPATIENT
Start: 2021-05-05 | End: 2021-05-05

## 2021-05-05 RX ORDER — ACETAMINOPHEN 325 MG/1
650 TABLET ORAL ONCE
Status: CANCELLED | OUTPATIENT
Start: 2021-06-02

## 2021-05-05 RX ORDER — SODIUM CHLORIDE 9 MG/ML
20 INJECTION, SOLUTION INTRAVENOUS ONCE
Status: CANCELLED | OUTPATIENT
Start: 2021-06-02

## 2021-05-05 RX ORDER — SODIUM CHLORIDE 9 MG/ML
20 INJECTION, SOLUTION INTRAVENOUS ONCE
Status: COMPLETED | OUTPATIENT
Start: 2021-05-05 | End: 2021-05-05

## 2021-05-05 RX ADMIN — SODIUM CHLORIDE 20 ML/HR: 0.9 INJECTION, SOLUTION INTRAVENOUS at 11:08

## 2021-05-05 RX ADMIN — NATALIZUMAB 300 MG: 300 INJECTION INTRAVENOUS at 11:44

## 2021-05-05 RX ADMIN — DIPHENHYDRAMINE HYDROCHLORIDE 50 MG: 50 INJECTION, SOLUTION INTRAMUSCULAR; INTRAVENOUS at 11:08

## 2021-05-05 RX ADMIN — ACETAMINOPHEN 650 MG: 325 TABLET, FILM COATED ORAL at 11:08

## 2021-05-05 NOTE — TELEPHONE ENCOUNTER
----- Message from Codie Ziegler MD sent at 5/4/2021  1:04 PM EDT -----  Please let her know that her  Kidney bladder ultrasound looks normal

## 2021-05-05 NOTE — TELEPHONE ENCOUNTER
Called and spoke with patient  Informed patient that ultrasound results came back normal  Patient verbalized understanding

## 2021-05-05 NOTE — PROGRESS NOTES
Pt arrived to unit without new complaint  Dr Gloria Cristobal aware of recent worsening of MS symptoms,her progress note 5/3/21 states OK for Tysabri today  Preinfusion checklist completed otherwise  Pt tolerated Tysabri infusion without adverse reaction, monitored for additional hour, no adverse reaction noted  Pt given AVS with next appt scheduled  Pt without question or concern

## 2021-05-05 NOTE — PLAN OF CARE
Problem: Potential for Falls  Goal: Patient will remain free of falls  Description: INTERVENTIONS:  - Assess patient frequently for physical needs  -  Identify cognitive and physical deficits and behaviors that affect risk of falls    -  [place] fall precautions as indicated by assessment   - Educate patient/family on patient safety including physical limitations  - Instruct patient to call for assistance with activity based on assessment  - Modify environment to reduce risk of injury  - Consider OT/PT consult to assist with strengthening/mobility  Outcome: Progressing

## 2021-05-05 NOTE — TELEPHONE ENCOUNTER
Patient has advanced MS with moderate to severe burden of the demyelination, with expected advanced progression indicating progressive form of MS

## 2021-05-07 NOTE — TELEPHONE ENCOUNTER
Thank you- will further discuss Graham Contreras for progressive MS once patient is in the office  Please let the patient review [employer] website to review data for Graham Contreras prior to her visit

## 2021-05-07 NOTE — TELEPHONE ENCOUNTER
Pt made aware, she verbalizes understanding  Discussed typical dosing schedule with Ocrevus  She also asked about progressive MS, I advised that she look at the [facility] website for this as well  I did try to explain the disease process for MS multiple times to the patient  Pt then asked if white matter disease is MS   I advised that typically MS will cause demyelination seen on imaging, she will discuss this further at her upcoming visit with Dr Fab Garcia

## 2021-05-07 NOTE — TELEPHONE ENCOUNTER
Pt made aware, she began to cry  Questioning if Antony Altamirano is making her worse? I advised that Tysabri is used to prevent further lesions  She states again that a year ago she would have good and bad days  States she has not had a good day in a very long time  Notes she cannot drive along  Pt would like to see Dr Korin Gray, requesting first available appt  She accepted appt on 5/20/21 in [place] office  Transferred call to [address] [place] [address] to verify pt's information  Dr Lei Champion  Pt is scheduled for sooner appt to discuss her disease progression and treatment

## 2021-05-10 ENCOUNTER — EVALUATION (OUTPATIENT)
Dept: PHYSICAL THERAPY | Age: 41
End: 2021-05-10
Payer: COMMERCIAL

## 2021-05-10 DIAGNOSIS — G35 MS (MULTIPLE SCLEROSIS) (HCC): Primary | ICD-10-CM

## 2021-05-10 DIAGNOSIS — R26.2 AMBULATORY DYSFUNCTION: ICD-10-CM

## 2021-05-10 PROCEDURE — 97113 AQUATIC THERAPY/EXERCISES: CPT | Performed by: PHYSICAL THERAPIST

## 2021-05-10 PROCEDURE — 97164 PT RE-EVAL EST PLAN CARE: CPT | Performed by: PHYSICAL THERAPIST

## 2021-05-10 NOTE — PROGRESS NOTES
PT Evaluation     Today's date: 5/10/2021  Patient name: Christin Weir  : 1980  MRN: [id number]  Referring provider: Zuleyka Brown PA-C  Dx:   Encounter Diagnosis     ICD-10-CM    1  MS (multiple sclerosis) ([facility] [address] )  G35    2  Ambulatory dysfunction  R26 2        Start Time: 1000  Stop Time: 1100  Total time in clinic (min): 60 minutes    Assessment  Assessment details: Christin Weir is a 37 y o  female who presents with decreased gross motor strength of LEs>UEs, ambulatory dysfunction, postural  dysfunction and imbalance  Due to these impairments, Patient has difficulty performing a/iadls, recreational activities and engaging in social activities  Patient's clinical presentation is consistent with their referring diagnosis of ambulatory dysfunction and MS  Patient would benefit from skilled physical therapy to address the aforementioned impairments, improve her level of function and to improve her overall quality of life  Impairments: abnormal muscle tone, activity intolerance, impaired balance, impaired physical strength, lacks appropriate home exercise program, pain with function and poor posture   Functional limitations: difficulty walking, climbing stairs, getting in/out car, transfers, household choresUnderstanding of Dx/Px/POC: fair   Prognosis: fair    Goals  ST-3 WEEKS  1  Pt will be able to tolerate > 30 minutes of aquatic PT program   2   Increase core activation with minimal verbal cuing with pt able to maintain in upright posture for >3'  3   Increase gross motor LE by 1/2 MMT grade in all deficient planes  LT WEEKS  1  Improved DGI by 5 points  2  Pt will be able to tolerate 10 minutes of WW   3  Independent with HEP to perform in family pool       Plan  Patient would benefit from: skilled physical therapy  Planned modality interventions: cryotherapy, electrical stimulation/Russian stimulation, thermotherapy: hydrocollator packs and unattended electrical stimulation  Planned therapy interventions: activity modification, behavior modification, body mechanics training, aquatic therapy, flexibility, functional ROM exercises, home exercise program, IADL retraining, joint mobilization, manual therapy, neuromuscular re-education, patient education, postural training, strengthening, stretching, therapeutic activities and therapeutic exercise  Frequency: 2-3x week  Duration in weeks: 12  Treatment plan discussed with: patient        Subjective Evaluation    History of Present Illness  Mechanism of injury: Pt noted she began to lose her balance and had frequent falls  She had and MRI and lumbar puncture that confirmed MS  She has been on meds for approximately 1 year  She is using a walking stick  She has difficulty walking  She does have a history of pain after physical activity in her back and joints    Treatments  Previous treatment: physical therapy  Patient Goals  Patient goals for therapy: increased strength and improved balance          Objective     Static Posture     Comments  Forward flexed at hips    Strength/Myotome Testing     Left Shoulder     Planes of Motion   Flexion: 4-   Abduction: 4-     Right Shoulder     Planes of Motion   Flexion: 4-   Abduction: 4-     Left Elbow   Flexion: 4  Extension: 4-    Right Elbow   Flexion: 4  Extension: 4-    Left Wrist/Hand   Wrist extension: 4  Wrist flexion: 4    Right Wrist/Hand   Wrist flexion: 4    Left Hip   Planes of Motion   Flexion: 3-  Abduction: 4-    Right Hip   Planes of Motion   Flexion: 3  Abduction: 4-    Left Knee   Extension: 2+    Right Knee   Extension: 3-    Left Ankle/Foot   Dorsiflexion: 3-  Plantar flexion: 3-    Right Ankle/Foot   Dorsiflexion: 3-  Plantar flexion: 3-    Ambulation   Weight-Bearing Status     Additional Weight-Bearing Status Details  Pt roseline walton walking stick    Ambulation: Level Surfaces   Ambulation with assistive device: independent  Ambulation without assistive device: independent (short distances with evidence of imbalance)    Ambulation: Stairs   Ascend stairs: independent  Pattern: non-reciprocal  Railings: one rail  Descend stairs: independent  Pattern: non-reciprocal  Railings: one rail    Additional Stairs Ambulation Details  Relies on arms to pull up    Observational Gait   Decreased walking speed, stride length, left step length and right step length  Left foot contact pattern: foot flat  Right foot contact pattern: foot flat  Left arm swing: decreased    Quality of Movement During Gait   Trunk  Forward lean       Additional Quality of Movement During Gait Details  limited foot clearance, shuffling gait pattern  ataxic    Comments   DGI 10/24      Neuro Exam:     Sensation   Light touch LE: left impaired and right impaired  Proprioception LE: left impaired and right impaired    Coordination   Heel to shin: left dysmetric and right dysmetric  Finger to nose: right dysmetria  Finger to nose: left WNL  Rapid alternating movements: UE WNL and LE impaired     Transfers Sit to stand: independent (uses arms) Sit to supine: independent Supine to sit: independent Into the car: independent (uses arms to move legs) Out of the car: independent (uses arms to pull legs in)     Functional outcomes   TU 35 without walking stick, 15 82 with walking stick (seconds)      Balance assessments   MCTSIB   Eyes open level surface: 30  Eyes open foam surface: unable  Eyes closed level surface: 8  Eyes closed foam surface: unable             Precautions: MS/falls    Daily Treatment Diary     Exercise Diary  5/10            Water walking 3'            Postural training             Gait training             Home exercise pgm/patient education 5'            Wall: t/h raises             Hip abd/add             Marching             squats             Knee flex/ext             Step-ups (fwd/bkwd/ss)             SLS (eyes open/closed)             SLS w UE mvmt  AROM/ball toss             Weight shifting             UE Noodle work x 4  2' sit            UE AROM             Resistive UE work (paddles, bells, TB)             Core work on noodle (sitting/stdg)             Sit on noodle with movement             Seated on pool bench w proper posture             Ankle df/pf 2'            marching 2'            Hip Ab/add 1'            Knee flex/ext 2'            Deep water mvmt 3' bike            Deep water tx/stretching 5'            Specific self - stretches wall/steps                 Modalities              whirlpool

## 2021-05-19 ENCOUNTER — APPOINTMENT (OUTPATIENT)
Dept: PHYSICAL THERAPY | Age: 41
End: 2021-05-19
Payer: COMMERCIAL

## 2021-05-20 ENCOUNTER — OFFICE VISIT (OUTPATIENT)
Dept: NEUROLOGY | Facility: CLINIC | Age: 41
End: 2021-05-20
Payer: COMMERCIAL

## 2021-05-20 ENCOUNTER — DOCUMENTATION (OUTPATIENT)
Dept: PULMONOLOGY | Facility: CLINIC | Age: 41
End: 2021-05-20

## 2021-05-20 ENCOUNTER — TELEPHONE (OUTPATIENT)
Dept: NEUROLOGY | Facility: CLINIC | Age: 41
End: 2021-05-20

## 2021-05-20 VITALS
DIASTOLIC BLOOD PRESSURE: 64 MMHG | HEIGHT: 66 IN | SYSTOLIC BLOOD PRESSURE: 104 MMHG | HEART RATE: 90 BPM | WEIGHT: 119 LBS | BODY MASS INDEX: 19.13 KG/M2

## 2021-05-20 DIAGNOSIS — G35 MS (MULTIPLE SCLEROSIS) (HCC): Primary | ICD-10-CM

## 2021-05-20 DIAGNOSIS — M79.18 MUSCULOSKELETAL PAIN, CHRONIC: ICD-10-CM

## 2021-05-20 DIAGNOSIS — F11.11 NONDEPENDENT OPIOID ABUSE IN REMISSION (HCC): ICD-10-CM

## 2021-05-20 DIAGNOSIS — G95.9 CERVICAL MYELOPATHY (HCC): ICD-10-CM

## 2021-05-20 DIAGNOSIS — D69.6 THROMBOCYTOPENIA (HCC): ICD-10-CM

## 2021-05-20 DIAGNOSIS — R26.2 AMBULATORY DYSFUNCTION: ICD-10-CM

## 2021-05-20 DIAGNOSIS — G89.29 MUSCULOSKELETAL PAIN, CHRONIC: ICD-10-CM

## 2021-05-20 DIAGNOSIS — N31.9 NEUROGENIC BLADDER: ICD-10-CM

## 2021-05-20 PROCEDURE — 99215 OFFICE O/P EST HI 40 MIN: CPT | Performed by: PSYCHIATRY & NEUROLOGY

## 2021-05-20 RX ORDER — POTASSIUM CHLORIDE 20MEQ/15ML
LIQUID (ML) ORAL
COMMUNITY
Start: 2021-05-06 | End: 2021-06-08 | Stop reason: ALTCHOICE

## 2021-05-20 NOTE — PROGRESS NOTES
[facility] MULTIPLE SCLEROSIS CENTER  PATIENT:  Estelita Meza  MRN:  [id number]  :  1980  DATE OF SERVICE:  2021    Assessment/Plan:           Problem List Items Addressed This Visit        Nervous and Auditory    Cervical myelopathy (HCC)    MS (multiple sclerosis) ([facility] [address] ) - Primary    Relevant Orders    MRI brain MS wo and w contrast    MRI cervical spine with and without contrast    BUN    Creatinine, serum    MRI thoracic spine with and without contrast       Other    Musculoskeletal pain, chronic    Relevant Orders    MRI brain MS wo and w contrast    MRI cervical spine with and without contrast    Nondependent opioid abuse in remission [facility])    Neurogenic bladder    Ambulatory dysfunction    Relevant Orders    MRI brain MS wo and w contrast    MRI cervical spine with and without contrast    Thrombocytopenia [facility])          Mrs Lawyer Oquendo has presented to [address] for follow-up:  - be patient presented to [facility] for follow-up on multiple sclerosis and related issues  Patient is using cane for her ambulation  - patient started Tysabi infusion with no infusion reaction has been described, platelet count has been gradually improving   - patient described heaviness and weakness in her lower extremities bilaterally in the setting of recently recovering from urine tract infection, patient completed antibacterial therapy provided by urology team   - patient will be advised against switching to B-cell depleting therapy in the light of patient is not interested with COVID-19 vaccination and immunosuppressive therapy will not be advised at this point  - patient will have imaging studies of the brain and spine in 2021, based on her findings we may adjust medical regimen  Patient will be advised against switching to Tecfidera considering underlying liver dysfunction likely due to alcoholism opioid overuse    - the we may require having EMG for evaluation of lower extremities if worsening ambulatory function will be continued to be described  - patient is planning SSI,  team will be working with the patient in adjusting her letter  - follow-up with [facility] advanced practitioner team within 2 months  Patient is to continue practicing safety measures during the novel coronovirus public health emergency  Subjective:  Multiple sclerosis and related issues    HPI  Mrs Heather Ledesma is a 36 yo female who has presented to [address] [place] [address] for follow-up on ambulatory dysfunction and CNS demyelination  Patient's mother was present during this virtual encounter with several questions were answered to the patient and her mother satisfaction  Patient was previously evaluated by Dr Terral Severin and Kati Moody  Patient was previously recognize having cervical cord demyelination bilaterally, at the level of C2-C3 and C4-C6, with left cerebellar demyelinating plaque and left periventricular lesion; concern is for demyelinating disease versus history of Lyme, headache, EtOH, chronic opioid use    Left cerebellar lesion with ataxic gait since childhood ( age 7-9 yo) - ADEM is considered in addition to NMO/MOG and MS  Lyme disease at age of 16 yo - may contribute for immune dysregulation  Patient had completed spinal tap and she is here today to discuss her findings  MRI T-spine has no signs of demyelination;      Serologic workup was reviewed with NMO/MOG negative; CSF MS Panel positive with 8 OCB  She reports worsening gait since the IV infusion and notes difficulty emptying her bladder;    Urology visit on 4/19/2021: Recent E coli UTI:  Treated with Macrobid, will take an extra dose of Cipro for prophylaxis for today's Cytoscopy procedure  MRI brain 3/01/2021: 1  No acute infarction, intracranial hemorrhage or mass effect  2   Minimal, chronic white matter signal abnormality correlating to the history of demyelinating disease  Overall plaque burden is very mild  No evidence of progressive demyelination  MRI C-spine : Stable cord signal abnormality is nonspecific possibly chronic demyelinating disease  Other etiologies including subacute combined degeneration could be considered in the differential diagnosis  Does the patient have vitamin B12 deficiency? Minimal spondylosis is stable  The following portions of the patient's history were reviewed and updated as appropriate:   She  has a past medical history of Anxiety, Back pain, Chronic constipation (6/27/2016), Collar bone fracture, Dyssynergic defecation, ETOH abuse, Head injury (2014), Infectious viral hepatitis, Lyme disease, Meningitis spinal (2007?), Multiple sclerosis ([facility] [address] ), Opioid abuse ([facility] [address] ), Rib fractures, and Thrombocytopenia ([facility] )  She   Patient Active Problem List    Diagnosis Date Noted    Thrombocytopenia ([facility] ) 01/12/2021    History of Lyme disease 11/10/2020    Cervical myelopathy ([facility] [address] ) 11/10/2020    MS (multiple sclerosis) ([facility] ) 11/10/2020    Neurogenic bladder 11/10/2020    Ambulatory dysfunction 11/10/2020    Unspecified abnormalities of gait and mobility 06/18/2020    Balance problems 06/18/2020    Chronic midline low back pain without sciatica 06/18/2020    Chronic constipation 06/27/2016    Encounter for screening colonoscopy 06/27/2016    IBS (irritable bowel syndrome) 07/15/2015    Chronic pain due to trauma 10/28/2014    Musculoskeletal pain, chronic 10/28/2014    Insomnia 07/08/2014    Leukopenia 11/12/2013    Alcoholism ([facility] [address] ) 11/08/2013    Lyme disease 11/08/2013    [facility]ond[facility] 11/08/2013     She  has a past surgical history that includes Clavicle surgery; Knee arthroscopy; Tonsillectomy and adenoidectomy; Appendectomy; Tubal ligation; pr colonoscopy flx dx w/collj spec when pfrmd (N/A, 6/27/2016); Multiple tooth extractions; and [place] lumbar puncture diagnostic (11/25/2020)    Her family history includes No Known Problems in her brother; Skin cancer in her mother  She  reports that she has been smoking cigarettes  She has been smoking about 0 50 packs per day  She has never used smokeless tobacco  She reports current alcohol use of about 14 0 standard drinks of alcohol per week  She reports that she does not use drugs    Current Outpatient Medications   Medication Sig Dispense Refill    baclofen 10 mg tablet Take 1 tablet (10 mg total) by mouth 3 (three) times a day (Patient taking differently: Take 10 mg by mouth as needed ) 90 tablet 0    buprenorphine-naloxone (SUBOXONE) 2-0 5 mg per SL tablet Place 0 5 tablets under the tongue 2 (two) times a day       busPIRone (BUSPAR) 10 mg tablet Take 10 mg by mouth 2 (two) times a day  1    Cholecalciferol (Vitamin D3) 25 MCG (1000 UT) CAPS Take 1 capsule by mouth daily      cloNIDine (CATAPRES) 0 2 mg tablet Take 0 2 mg by mouth daily at bedtime       cyclobenzaprine (FLEXERIL) 5 mg tablet Take 5 mg by mouth 3 (three) times a day as needed for muscle spasms       lubiprostone (AMITIZA) 24 mcg capsule Take 24 mcg by mouth 2 (two) times a day with meals      Nerve Stimulator (STANDARD TENS) VICKY by Does not apply route 2 (two) times a day 1 Device 0    Nerve Stimulator (TENS THERAPY REPLACE BACK PADS) MISC 30 pad by Does not apply route 2 (two) times a day 30 each 0    Nerve Stimulator VICKY by Does not apply route 2 (two) times a day 1 each 0    Nerve Stimulator Supplies MISC 30 pad by Does not apply route 2 (two) times a day 30 pad 0    Nutritional Supplements (METABOLIC LIVER FORMULA PO) Take 1 tablet by mouth daily      Omega-3 Fatty Acids (FISH OIL OMEGA-3 PO) Take 1,200 mg by mouth daily      potassium chloride 10% oral solution       pregabalin (LYRICA) 50 mg capsule Take 1 capsule (50 mg total) by mouth 3 (three) times a day 90 capsule 1    Probiotic Product (PROBIOTIC DAILY PO) Take 1 tablet by mouth daily      promethazine (PHENERGAN) 50 MG tablet TAKE 1 TO 2 TABS AT BEDTIME AS NEEDED FOR INSOMNIA  1    topiramate (TOPAMAX) 50 MG tablet TAKE 1 TABLET BY MOUTH EVERY DAY FOR HEADACHE/SLEEP  5    b complex vitamins tablet Take 1 tablet by mouth daily      Boswellia-Glucosamine-Vit D (Osteo Bi-Flex One Per Day) TABS Take by mouth      docusate sodium (COLACE) 100 mg capsule Take 100 mg by mouth 3 (three) times a day      ergocalciferol (VITAMIN D2) 50,000 units Take 1 capsule (50,000 Units total) by mouth once a week 12 capsule 0    ibuprofen (MOTRIN) 200 mg tablet Take 200 mg by mouth as needed for mild pain      lactulose (CHRONULAC) 10 g/15 mL solution TAKE 10ML BY MOUTH EVERY 3-4 DAYS  5    Liver Extract (LIVER PO) Take 1 tablet by mouth daily      meloxicam (MOBIC) 7 5 mg tablet Take one tablet daily for a week prn for mid back pain, and can increase to 2 tabs a day if needed  (Patient not taking: Reported on 3/31/2021) 60 tablet 3    Potassium 99 MG TABS Take 99 mg by mouth       No current facility-administered medications for this visit        Current Outpatient Medications on File Prior to Visit   Medication Sig    baclofen 10 mg tablet Take 1 tablet (10 mg total) by mouth 3 (three) times a day (Patient taking differently: Take 10 mg by mouth as needed )    buprenorphine-naloxone (SUBOXONE) 2-0 5 mg per SL tablet Place 0 5 tablets under the tongue 2 (two) times a day     busPIRone (BUSPAR) 10 mg tablet Take 10 mg by mouth 2 (two) times a day    Cholecalciferol (Vitamin D3) 25 MCG (1000 UT) CAPS Take 1 capsule by mouth daily    cloNIDine (CATAPRES) 0 2 mg tablet Take 0 2 mg by mouth daily at bedtime     cyclobenzaprine (FLEXERIL) 5 mg tablet Take 5 mg by mouth 3 (three) times a day as needed for muscle spasms     lubiprostone (AMITIZA) 24 mcg capsule Take 24 mcg by mouth 2 (two) times a day with meals    Nerve Stimulator (STANDARD TENS) VICKY by Does not apply route 2 (two) times a day    Nerve Stimulator (TENS THERAPY REPLACE BACK PADS) MISC 30 pad by Does not apply route 2 (two) times a day    Nerve Stimulator VICKY by Does not apply route 2 (two) times a day    Nerve Stimulator Supplies MISC 30 pad by Does not apply route 2 (two) times a day    Nutritional Supplements (METABOLIC LIVER FORMULA PO) Take 1 tablet by mouth daily    Omega-3 Fatty Acids (FISH OIL OMEGA-3 PO) Take 1,200 mg by mouth daily    potassium chloride 10% oral solution     pregabalin (LYRICA) 50 mg capsule Take 1 capsule (50 mg total) by mouth 3 (three) times a day    Probiotic Product (PROBIOTIC DAILY PO) Take 1 tablet by mouth daily    promethazine (PHENERGAN) 50 MG tablet TAKE 1 TO 2 TABS AT BEDTIME AS NEEDED FOR INSOMNIA    topiramate (TOPAMAX) 50 MG tablet TAKE 1 TABLET BY MOUTH EVERY DAY FOR HEADACHE/SLEEP    b complex vitamins tablet Take 1 tablet by mouth daily    Boswellia-Glucosamine-Vit D (Osteo Bi-Flex One Per Day) TABS Take by mouth    docusate sodium (COLACE) 100 mg capsule Take 100 mg by mouth 3 (three) times a day    ergocalciferol (VITAMIN D2) 50,000 units Take 1 capsule (50,000 Units total) by mouth once a week    ibuprofen (MOTRIN) 200 mg tablet Take 200 mg by mouth as needed for mild pain    lactulose (CHRONULAC) 10 g/15 mL solution TAKE 10ML BY MOUTH EVERY 3-4 DAYS    Liver Extract (LIVER PO) Take 1 tablet by mouth daily    meloxicam (MOBIC) 7 5 mg tablet Take one tablet daily for a week prn for mid back pain, and can increase to 2 tabs a day if needed  (Patient not taking: Reported on 3/31/2021)    Potassium 99 MG TABS Take 99 mg by mouth     No current facility-administered medications on file prior to visit  She is allergic to penicillins            Objective:    Blood pressure 104/64, pulse 90, height 5' 6" (1 676 m), weight 54 kg (119 lb)  Physical Exam    Neurological Exam    Gait  [id number]=3 98 sec  CONSTITUTIONAL: NAD, pleasant  NECK: supple, no lymphadenopathy, no thyromegaly, no JVD   CARDIOVASCULAR: RRR, normal S1S2, no murmurs, no rubs  RESP: clear to auscultation bilaterally, no wheezes/rhonchi/rales  ABDOMEN: soft, non tender, non distended  SKIN: no rash or skin lesions  EXTREMITIES: no edema, pulses 2+bilaterally  PSYCH: appropriate mood and affect  NEUROLOGIC COMPREHENSIVE EXAM: Patient is oriented to person, place and time, NAD; appropriate affect  CN II, III, IV, V, VI, VII,VIII,IX,X,XI-XII intact with EOMI, PERRLA, OKN intact, VF grossly intact, fundi poorly visualized secondary to pupillary constriction; symmetric face noted  Motor: 5/5 UE/LE bilateral symmetric;4-/5 hip flexion b/l, with 4+/5 dorsiflexion; Sensory: intact to light touch and pinprick bilaterally; abnormal vibration sensation feet bilaterally; Coordination within normal limits on FTN and CECY testing; DTR: 3/4 through, no Babinski, nonsustained  clonus  Tandem gait is abnormal  Romberg: negative  ROS:  12 points of review of system was reviewed with the patient and was unremarkable with exception: see HPI  Review of Systems   Constitutional: Negative  Negative for appetite change and fever  HENT: Negative  Negative for hearing loss, tinnitus, trouble swallowing and voice change  Eyes: Negative  Negative for photophobia and pain  Respiratory: Negative  Negative for shortness of breath  Cardiovascular: Negative  Negative for palpitations  Gastrointestinal: Positive for abdominal distention, abdominal pain and constipation  Negative for nausea and vomiting  Endocrine: Negative  Negative for cold intolerance  Genitourinary: Positive for difficulty urinating, enuresis and flank pain  Negative for dysuria, frequency and urgency  Musculoskeletal: Positive for gait problem and myalgias  Negative for back pain and neck pain  Skin: Negative  Negative for rash  Neurological: Positive for speech difficulty, weakness, numbness and headaches  Negative for dizziness, tremors, seizures, syncope, facial asymmetry and light-headedness  Hematological: Negative  Does not bruise/bleed easily  Psychiatric/Behavioral: Positive for confusion  Negative for hallucinations and sleep disturbance

## 2021-05-20 NOTE — TELEPHONE ENCOUNTER
MSKODI spoke with the patient and asked what needed to be updated on her letter  She asked for MSW to please update to letter to state her last office visit with today's date and send her a copy of today's OV note to go along with it  She also asked for MSW to include her two scheduled upcoming appointments with [facility] and Dr Victoria Loredo  MSW will update the letter and send to Dr Victoria Loredo for her review and signature

## 2021-05-21 ENCOUNTER — APPOINTMENT (OUTPATIENT)
Dept: LAB | Facility: HOSPITAL | Age: 41
End: 2021-05-21
Attending: PSYCHIATRY & NEUROLOGY
Payer: COMMERCIAL

## 2021-05-21 ENCOUNTER — TELEPHONE (OUTPATIENT)
Dept: NEUROLOGY | Facility: CLINIC | Age: 41
End: 2021-05-21

## 2021-05-21 DIAGNOSIS — G35 MS (MULTIPLE SCLEROSIS) (HCC): ICD-10-CM

## 2021-05-21 LAB
CREAT SERPL-MCNC: 0.8 MG/DL (ref 0.6–1.3)
GFR SERPL CREATININE-BSD FRML MDRD: 93 ML/MIN/1.73SQ M

## 2021-05-21 PROCEDURE — 36415 COLL VENOUS BLD VENIPUNCTURE: CPT

## 2021-05-21 PROCEDURE — 82565 ASSAY OF CREATININE: CPT

## 2021-05-21 NOTE — TELEPHONE ENCOUNTER
Pt is scheduled for Tysabri on 6/2/21  Last infusion 5/5/21, infusions every 4 weeks  Confirmed scheduling is correct  Last CBC/CMP 4/26/21  Last JCV 1/28/21 0 22 (final result negative)  Last MRI brain and c-spine 3/1/21    Okay to proceed with infusion?     Tysabri is approved with [employer] from 2/23/21 to 8/29/21, [employer] # [id number]      Touch auth valid until 9/1/21 at [facility]

## 2021-05-21 NOTE — TELEPHONE ENCOUNTER
Letter drafted and ready for Dr Colette Fields review and signature  MSW will have it sent to her MA on Monday

## 2021-05-24 NOTE — TELEPHONE ENCOUNTER
MSW received the signed letter  A copy has been scanned to her chart  MSW will mail a copy to her home

## 2021-05-26 ENCOUNTER — OFFICE VISIT (OUTPATIENT)
Dept: PHYSICAL THERAPY | Age: 41
End: 2021-05-26
Payer: COMMERCIAL

## 2021-05-26 DIAGNOSIS — G35 MS (MULTIPLE SCLEROSIS) (HCC): Primary | ICD-10-CM

## 2021-05-26 DIAGNOSIS — R26.2 AMBULATORY DYSFUNCTION: ICD-10-CM

## 2021-05-26 PROCEDURE — 97113 AQUATIC THERAPY/EXERCISES: CPT

## 2021-05-26 NOTE — PROGRESS NOTES
Daily Note     Today's date: 2021  Patient name: Lian Pandya  : 1980  MRN: [id number]  Referring provider: Kaylyn Dc PA-C  Dx:   Encounter Diagnosis     ICD-10-CM    1  [facility] )  G35    2  Ambulatory dysfunction  R26 2        Start Time: 1100  Stop Time: 1150  Total time in clinic (min): 50 minutes    Subjective: pt notes it's a good day today  She did have some R knee pain w/ the leg ex's today  Objective: See treatment diary below      Assessment: Tolerated treatment fair  Slow and steady ex's, gentle movements in the water  R knee pain w/ marching and deep water bike  Modified program to not fatigue pt  Patient would benefit from continued PT      Plan: Continue per plan of care        Precautions: MS/falls    Daily Treatment Diary     Exercise Diary  5/10 5/25           Water walking 3' 5           Postural training             Gait training  5           Home exercise pgm/patient education 5'            Wall: t/h raises  x10           Hip x4  x10 ea           Marching  1           squats             Knee flex/ext             Step-ups (fwd/bkwd/ss)             SLS (eyes open/closed)             SLS w UE mvmt  AROM/ball toss             Weight shifting             UE Noodle work x 4  2' sit 3' sit           UE AROM             Resistive UE work (paddles, bells, TB)             Core work on noodle (sitting/stdg)             Sit on noodle with movement             Seated on pool bench w proper posture             Ankle df/pf 2' 2           marching 2' 2           Hip Ab/add 1' 1           Knee flex/ext 2' 2           Deep water mvmt 3' bike 2           Deep water tx/stretching 5' 7           Specific self - stretches wall/steps  4               Modalities              whirlpool

## 2021-06-01 ENCOUNTER — OFFICE VISIT (OUTPATIENT)
Dept: PHYSICAL THERAPY | Age: 41
End: 2021-06-01
Payer: COMMERCIAL

## 2021-06-01 DIAGNOSIS — G35 MS (MULTIPLE SCLEROSIS) (HCC): Primary | ICD-10-CM

## 2021-06-01 DIAGNOSIS — R26.2 AMBULATORY DYSFUNCTION: ICD-10-CM

## 2021-06-01 PROCEDURE — 97113 AQUATIC THERAPY/EXERCISES: CPT

## 2021-06-01 NOTE — PROGRESS NOTES
Daily Note     Today's date: 2021  Patient name: Artis Dumont  : 1980  MRN: [id number]  Referring provider: Cornelio Sue PA-C  Dx:   Encounter Diagnosis     ICD-10-CM    1  MS (multiple sclerosis) ([facility] [address] )  G35    2  Ambulatory dysfunction  R26 2        Start Time: 1105  Stop Time: 1200  Total time in clinic (min): 55 minutes    Subjective: pt notes she's tired today from walking yesterday  Objective: See treatment diary below      Assessment: Tolerated treatment fair  Slow and steady w/ ex's, rest periods as pt needs  Working on improving gait focusing on core activation and improving posture  Patient would benefit from continued PT      Plan: Continue per plan of care        Precautions: MS/falls    Daily Treatment Diary     Exercise Diary  5/10 5/25 6/1          Water walking 3' 5 5          Postural training             Gait training  5 5          Home exercise pgm/patient education 5'            Wall: t/h raises  x10 1'          Hip x4  x10 ea x10 ea          Marching  1 1          squats             Knee flex/ext   1          Step-ups (fwd/bkwd/ss)             SLS (eyes open/closed)             SLS w UE mvmt  AROM/ball toss             Weight shifting             UE Noodle work x 4  2' sit 3' sit 3' sit          UE AROM             Resistive UE work (paddles, bells, TB)             Core work on noodle (sitting/stdg)             Sit on noodle with movement             Seated on pool bench w proper posture             Ankle df/pf 2' 2 2          marching 2' 2 2          Hip Ab/add 1' 1 1          Knee flex/ext 2' 2 2          Deep water mvmt 3' bike 2 3          Deep water tx/stretching 5' 7 7          Specific self - stretches wall/steps  4 4              Modalities              whirlpool

## 2021-06-02 ENCOUNTER — HOSPITAL ENCOUNTER (OUTPATIENT)
Dept: INFUSION CENTER | Facility: CLINIC | Age: 41
Discharge: HOME | End: 2021-06-02
Payer: COMMERCIAL

## 2021-06-02 VITALS
TEMPERATURE: 97.7 F | SYSTOLIC BLOOD PRESSURE: 99 MMHG | HEART RATE: 79 BPM | DIASTOLIC BLOOD PRESSURE: 64 MMHG | RESPIRATION RATE: 18 BRPM

## 2021-06-02 DIAGNOSIS — G35 MS (MULTIPLE SCLEROSIS) (HCC): Primary | ICD-10-CM

## 2021-06-02 LAB
ALBUMIN SERPL BCP-MCNC: 3.7 G/DL (ref 3.5–5.7)
ALP SERPL-CCNC: 93 U/L (ref 46–116)
ALT SERPL W P-5'-P-CCNC: 18 U/L (ref 12–78)
ANION GAP SERPL CALCULATED.3IONS-SCNC: 8 MMOL/L (ref 4–13)
AST SERPL W P-5'-P-CCNC: 40 U/L (ref 5–45)
BASOPHILS # BLD MANUAL: 0 THOUSAND/UL (ref 0–0.1)
BASOPHILS NFR MAR MANUAL: 0 % (ref 0–1)
BILIRUB SERPL-MCNC: 0.9 MG/DL (ref 0.2–1)
BUN SERPL-MCNC: 10 MG/DL (ref 5–25)
CALCIUM SERPL-MCNC: 9.2 MG/DL (ref 8.3–10.1)
CHLORIDE SERPL-SCNC: 107 MMOL/L (ref 98–108)
CO2 SERPL-SCNC: 26 MMOL/L (ref 21–32)
CREAT SERPL-MCNC: 0.9 MG/DL (ref 0.6–1.3)
EOSINOPHIL # BLD MANUAL: 0.15 THOUSAND/UL (ref 0–0.4)
EOSINOPHIL NFR BLD MANUAL: 3 % (ref 0–6)
ERYTHROCYTE [DISTWIDTH] IN BLOOD BY AUTOMATED COUNT: 13.6 % (ref 11.6–15.1)
GFR SERPL CREATININE-BSD FRML MDRD: 80 ML/MIN/1.73SQ M
GLUCOSE SERPL-MCNC: 89 MG/DL (ref 65–140)
HCT VFR BLD AUTO: 33.9 % (ref 34.8–46.1)
HGB BLD-MCNC: 11.9 G/DL (ref 11.5–15.4)
LYMPHOCYTES # BLD AUTO: 3.06 THOUSAND/UL (ref 0.6–4.47)
LYMPHOCYTES # BLD AUTO: 60 % (ref 14–44)
MCH RBC QN AUTO: 33.6 PG (ref 26.8–34.3)
MCHC RBC AUTO-ENTMCNC: 35.1 G/DL (ref 31.4–37.4)
MCV RBC AUTO: 96 FL (ref 82–98)
MONOCYTES # BLD AUTO: 0.26 THOUSAND/UL (ref 0–1.22)
MONOCYTES NFR BLD: 5 % (ref 4–12)
NEUTROPHILS # BLD MANUAL: 1.63 THOUSAND/UL (ref 1.85–7.62)
NEUTS SEG NFR BLD AUTO: 32 % (ref 43–75)
NRBC BLD AUTO-RTO: 1 /100 WBC (ref 0–2)
PLATELET # BLD AUTO: 92 THOUSANDS/UL (ref 149–390)
PLATELET BLD QL SMEAR: ABNORMAL
PMV BLD AUTO: 10.6 FL (ref 8.9–12.7)
POTASSIUM SERPL-SCNC: 3.7 MMOL/L (ref 3.5–5.3)
PROT SERPL-MCNC: 6.7 G/DL (ref 6.4–8.2)
RBC # BLD AUTO: 3.54 MILLION/UL (ref 3.81–5.12)
RBC MORPH BLD: NORMAL
SODIUM SERPL-SCNC: 141 MMOL/L (ref 136–145)
TOTAL CELLS COUNTED SPEC: 100
WBC # BLD AUTO: 5.1 THOUSAND/UL (ref 4.31–10.16)

## 2021-06-02 PROCEDURE — 85007 BL SMEAR W/DIFF WBC COUNT: CPT | Performed by: PSYCHIATRY & NEUROLOGY

## 2021-06-02 PROCEDURE — 96413 CHEMO IV INFUSION 1 HR: CPT

## 2021-06-02 PROCEDURE — 96367 TX/PROPH/DG ADDL SEQ IV INF: CPT

## 2021-06-02 PROCEDURE — 80053 COMPREHEN METABOLIC PANEL: CPT | Performed by: PSYCHIATRY & NEUROLOGY

## 2021-06-02 PROCEDURE — 85027 COMPLETE CBC AUTOMATED: CPT | Performed by: PSYCHIATRY & NEUROLOGY

## 2021-06-02 RX ORDER — ACETAMINOPHEN 325 MG/1
650 TABLET ORAL ONCE
Status: CANCELLED | OUTPATIENT
Start: 2021-07-02

## 2021-06-02 RX ORDER — SODIUM CHLORIDE 9 MG/ML
20 INJECTION, SOLUTION INTRAVENOUS ONCE
Status: CANCELLED | OUTPATIENT
Start: 2021-07-02

## 2021-06-02 RX ORDER — ACETAMINOPHEN 325 MG/1
650 TABLET ORAL ONCE
Status: COMPLETED | OUTPATIENT
Start: 2021-06-02 | End: 2021-06-02

## 2021-06-02 RX ORDER — SODIUM CHLORIDE 9 MG/ML
20 INJECTION, SOLUTION INTRAVENOUS ONCE
Status: COMPLETED | OUTPATIENT
Start: 2021-06-02 | End: 2021-06-02

## 2021-06-02 RX ADMIN — ACETAMINOPHEN 650 MG: 325 TABLET, FILM COATED ORAL at 10:42

## 2021-06-02 RX ADMIN — SODIUM CHLORIDE 20 ML/HR: 0.9 INJECTION, SOLUTION INTRAVENOUS at 10:39

## 2021-06-02 RX ADMIN — DIPHENHYDRAMINE HYDROCHLORIDE 50 MG: 50 INJECTION, SOLUTION INTRAMUSCULAR; INTRAVENOUS at 10:39

## 2021-06-02 RX ADMIN — NATALIZUMAB 300 MG: 300 INJECTION INTRAVENOUS at 11:32

## 2021-06-02 NOTE — PLAN OF CARE
Problem: Potential for Falls  Goal: Patient will remain free of falls  Description: INTERVENTIONS:  - Assess patient frequently for physical needs  -  Identify cognitive and physical deficits and behaviors that affect risk of falls  -  Lebo fall precautions as indicated by assessment   - Educate patient/family on patient safety including physical limitations  - Instruct patient to call for assistance with activity based on assessment  - Modify environment to reduce risk of injury  - Consider OT/PT consult to assist with strengthening/mobility  Outcome: Progressing     Problem: Knowledge Deficit  Goal: Patient/family/caregiver demonstrates understanding of disease process, treatment plan, medications, and discharge instructions  Description: Complete learning assessment and assess knowledge base    Interventions:  - Provide teaching at level of understanding  - Provide teaching via preferred learning methods  Outcome: Progressing

## 2021-06-02 NOTE — PROGRESS NOTES
Pt  Denied new symptoms or concerns today  Pt  Ambulates using a cane  Labs obtained as ordered  Pre Tysabri check list completed on the touch program   Pt  Authorized from 2/23/21 through 8/29/21  Tysabri tolerated well  Monitored x 60 minutes as ordered  BP 99/64 post Tysabri  Pt  Denied dizziness  Discharged to home with Mother in stable conditions  Future appointment scheduled for 28 days  AVS provided

## 2021-06-08 ENCOUNTER — OFFICE VISIT (OUTPATIENT)
Dept: OBGYN CLINIC | Facility: CLINIC | Age: 41
End: 2021-06-08
Payer: COMMERCIAL

## 2021-06-08 VITALS — WEIGHT: 115 LBS | BODY MASS INDEX: 18.48 KG/M2 | HEIGHT: 66 IN

## 2021-06-08 DIAGNOSIS — B96.89 BACTERIAL VAGINOSIS: ICD-10-CM

## 2021-06-08 DIAGNOSIS — Z12.31 ENCOUNTER FOR SCREENING MAMMOGRAM FOR MALIGNANT NEOPLASM OF BREAST: ICD-10-CM

## 2021-06-08 DIAGNOSIS — N76.0 BACTERIAL VAGINOSIS: ICD-10-CM

## 2021-06-08 DIAGNOSIS — Z01.419 ENCOUNTER FOR WELL WOMAN EXAM WITH ROUTINE GYNECOLOGICAL EXAM: Primary | ICD-10-CM

## 2021-06-08 DIAGNOSIS — N31.9 NEUROGENIC BLADDER: ICD-10-CM

## 2021-06-08 DIAGNOSIS — G35 MULTIPLE SCLEROSIS (HCC): ICD-10-CM

## 2021-06-08 PROCEDURE — 99386 PREV VISIT NEW AGE 40-64: CPT | Performed by: OBSTETRICS & GYNECOLOGY

## 2021-06-08 PROCEDURE — 87624 HPV HI-RISK TYP POOLED RSLT: CPT | Performed by: OBSTETRICS & GYNECOLOGY

## 2021-06-08 PROCEDURE — G0145 SCR C/V CYTO,THINLAYER,RESCR: HCPCS | Performed by: OBSTETRICS & GYNECOLOGY

## 2021-06-08 RX ORDER — CALCIUM CARBONATE 260MG(650)
100 TABLET,CHEWABLE ORAL 3 TIMES DAILY
Status: ON HOLD | COMMUNITY
End: 2021-11-15 | Stop reason: ALTCHOICE

## 2021-06-08 RX ORDER — ASCORBIC ACID 1000 MG
120 TABLET ORAL DAILY
COMMUNITY

## 2021-06-08 RX ORDER — GLUCOSAMINE/D3/BOSWELLIA SERRA 1500MG-400
1 TABLET ORAL DAILY
COMMUNITY

## 2021-06-08 NOTE — PROGRESS NOTES
Assessment   37 y o   with regular menses who is sexually active and currently not using hormonal contraception (s/p BTL) presenting for annual exam     Plan   Diagnoses and all orders for this visit:    Encounter for well woman exam with routine gynecological exam  -     Liquid-based pap, screening  - Mammo screening protocols discussed  Slip provided  -  Return in 1yr for yearly    Encounter for screening mammogram for malignant neoplasm of breast  -     Mammo screening bilateral w 3d & cad; Future  - Indicates she may consider waiting until later given low risk status    Neurogenic bladder  Multiple sclerosis ([facility] [address] )  -     Ambulatory referral to Urogynecology; Future      __________________________________________________________________      Subjective     [place] Oswald is a 37 y o   with regular menses who is sexually active and currently not using hormonal contraception (s/p BTL) presenting for annual exam  She is without complaint  Has issues with voiding/stooling secondary to MS  Has to strain to use restroom  Sees urology and tried pelvic PT, but doesn't feel like shes improving  Advised on pelvic floor dysfunction and differences with structural vs neurological causes  Hx of irregularity without etiology  Normalized after having babies  GYN  Complaints: denies  Denies dysmenorrhea, dyspareunia, genital discharge, genital ulcers, pelvic pain and vulvar/vaginal symptoms  Periods are regular every 28-30 days  Dysmenorrhea:none     Cyclic symptoms include none  Sexually active: Yes - single partner - male  Hx STI: 15yo, unsure what but cured by meds   Hx Abnormal pap: denies  Last pap: unsure    OB   ( x3)  No complications  s/p BTL      Complaints: as above, retention  Denies urinary frequency, hematuria, urinary incontinence and dysuria    BREAST  Complaints: denies  Denies: breast lump, breast tenderness, changed mole, dryness, nipple discharge, pruritus, rash, skin color change and skin lesion(s)  Last mammogram: n/a  Personal hx: denies  Family hx: denies fhx of breast, uterine, ovarian, or colon cancers  Patient does not do regular self-exams    GENERAL  PMH reviewed/updated and is as below  Patient does follow with a PCP  Disability  Used to be   Denies domestic violence  Exercise: walk with crutches  Diet: vegetarian, gets protein from shakes and occ fish    SCREENING  Cervical Ca: pap collected  Breast Ca: clinicians breast exam and teaching done  Reviewed breast ca screening recommendations from ACOG vs USPSTF  Colon Ca: not indicated by age        Past Medical History:   Diagnosis Date    Back pain     Chronic constipation 6/27/2016    Collar bone fracture     right side    Dyssynergic defecation     Type 1    ETOH abuse     Head injury 2014    Infectious viral hepatitis     Lyme disease     Meningitis spinal 2007?     Multiple sclerosis ([facility] [address] )     Opioid abuse ([facility] [address] )     Rib fractures     Thrombocytopenia ([facility] [address] )        Past Surgical History:   Procedure Laterality Date    APPENDECTOMY      CLAVICLE SURGERY      FL LUMBAR PUNCTURE DIAGNOSTIC  11/25/2020    KNEE ARTHROSCOPY      MULTIPLE TOOTH EXTRACTIONS      IN COLONOSCOPY FLX DX W/COLLJ SPEC WHEN PFRMD N/A 6/27/2016    Procedure: COLONOSCOPY;  Surgeon: Kevin See MD;  Location: [facility];  Service: Colorectal    TONSILLECTOMY AND ADENOIDECTOMY      TUBAL LIGATION           Current Outpatient Medications:     b complex vitamins tablet, Take 1 tablet by mouth daily, Disp: , Rfl:     baclofen 10 mg tablet, Take 1 tablet (10 mg total) by mouth 3 (three) times a day (Patient taking differently: Take 10 mg by mouth as needed ), Disp: 90 tablet, Rfl: 0    Biotin 89906 MCG TABS, Take by mouth, Disp: , Rfl:     buprenorphine-naloxone (SUBOXONE) 2-0 5 mg per SL tablet, Place 0 5 tablets under the tongue 2 (two) times a day , Disp: , Rfl:     Calcium Carbonate (CALCIUM 600 PO), Take by mouth, Disp: , Rfl:   cloNIDine (CATAPRES) 0 2 mg tablet, Take 0 2 mg by mouth daily at bedtime , Disp: , Rfl:     cyclobenzaprine (FLEXERIL) 5 mg tablet, Take 5 mg by mouth 3 (three) times a day as needed for muscle spasms , Disp: , Rfl:     docusate sodium (COLACE) 100 mg capsule, Take 100 mg by mouth 3 (three) times a day, Disp: , Rfl:     Ginkgo Biloba 40 MG TABS, Take 120 mg by mouth, Disp: , Rfl:     ibuprofen (MOTRIN) 200 mg tablet, Take 200 mg by mouth as needed for mild pain, Disp: , Rfl:     Liver Extract (LIVER PO), Take 1 tablet by mouth daily, Disp: , Rfl:     lubiprostone (AMITIZA) 24 mcg capsule, Take 24 mcg by mouth 2 (two) times a day with meals, Disp: , Rfl:     Magnesium Citrate 100 MG TABS, Take 100 mg by mouth 3 (three) times a day, Disp: , Rfl:     Misc Natural Products (GINSENG COMPLEX PO), Take by mouth, Disp: , Rfl:     NON FORMULARY, HEMPANOL BRAIN DETOX 200MG, Disp: , Rfl:     Nutritional Supplements (METABOLIC LIVER FORMULA PO), Take 1 tablet by mouth daily, Disp: , Rfl:     Omega-3 Fatty Acids (FISH OIL OMEGA-3 PO), Take 1,200 mg by mouth daily, Disp: , Rfl:     Potassium 99 MG TABS, Take 99 mg by mouth, Disp: , Rfl:     pregabalin (LYRICA) 50 mg capsule, Take 1 capsule (50 mg total) by mouth 3 (three) times a day, Disp: 90 capsule, Rfl: 1    Probiotic Product (PROBIOTIC DAILY PO), Take 1 tablet by mouth daily, Disp: , Rfl:     promethazine (PHENERGAN) 50 MG tablet, TAKE 1 TO 2 TABS AT BEDTIME AS NEEDED FOR INSOMNIA, Disp: , Rfl: 1    topiramate (TOPAMAX) 50 MG tablet, TAKE 1 TABLET BY MOUTH EVERY DAY FOR HEADACHE/SLEEP, Disp: , Rfl: 5    Nerve Stimulator (STANDARD TENS) VICKY, by Does not apply route 2 (two) times a day, Disp: 1 Device, Rfl: 0    Nerve Stimulator (TENS THERAPY REPLACE BACK PADS) MISC, 30 pad by Does not apply route 2 (two) times a day, Disp: 30 each, Rfl: 0    Nerve Stimulator VICKY, by Does not apply route 2 (two) times a day, Disp: 1 each, Rfl: 0    Nerve Stimulator Supplies MISC, 30 pad by Does not apply route 2 (two) times a day, Disp: 30 pad, Rfl: 0    Allergies   Allergen Reactions    Penicillins GI Intolerance     Other reaction(s): Unknown Reaction       Social History     Tobacco Use    Smoking status: Current Every Day Smoker     Packs/day: 0 50     Types: Cigarettes    Smokeless tobacco: Never Used   Substance Use Topics    Alcohol use: Yes     Alcohol/week: 14 0 standard drinks     Types: 14 Shots of liquor per week     Comment: 2 mixed drinks each night    Drug use: No           Objective  Ht 5' 6" (1 676 m)   Wt 52 2 kg (115 lb)   LMP 05/18/2021 (Within Days)   BMI 18 56 kg/m²      Physical Exam:  Physical Exam  Exam conducted with a chaperone present  Constitutional:       General: She is not in acute distress  Appearance: Normal appearance  She is well-developed  She is not ill-appearing, toxic-appearing or diaphoretic  HENT:      Head: Normocephalic and atraumatic  Eyes:      General: No scleral icterus  Cardiovascular:      Rate and Rhythm: Normal rate  Pulmonary:      Effort: Pulmonary effort is normal  No accessory muscle usage or respiratory distress  Chest:      Breasts:         Right: No inverted nipple, mass, nipple discharge, skin change or tenderness  Left: No inverted nipple, mass, nipple discharge, skin change or tenderness  Abdominal:      General: There is no distension  Palpations: Abdomen is soft  There is no mass  Tenderness: There is no abdominal tenderness  There is no guarding or rebound  Genitourinary:     General: Normal vulva  Exam position: Lithotomy position  Labia:         Right: No rash, tenderness or lesion  Left: No rash, tenderness or lesion  Vagina: No signs of injury  No vaginal discharge, erythema or tenderness  Cervix: No cervical motion tenderness or discharge  Uterus: Not enlarged, not fixed and not tender         Adnexa:         Right: No mass, tenderness or fullness  Left: No mass, tenderness or fullness  Rectum: No external hemorrhoid  Normal anal tone  Comments: Urethral meatus: normal  Skin:     General: Skin is warm and dry  Coloration: Skin is not jaundiced  Findings: No bruising, erythema or rash  Neurological:      Mental Status: She is alert  Psychiatric:         Mood and Affect: Mood normal          Behavior: Behavior normal          Thought Content:  Thought content normal          Judgment: Judgment normal

## 2021-06-09 LAB
HPV HR 12 DNA CVX QL NAA+PROBE: NEGATIVE
HPV16 DNA CVX QL NAA+PROBE: NEGATIVE
HPV18 DNA CVX QL NAA+PROBE: NEGATIVE

## 2021-06-10 ENCOUNTER — OFFICE VISIT (OUTPATIENT)
Dept: PHYSICAL THERAPY | Age: 41
End: 2021-06-10
Payer: COMMERCIAL

## 2021-06-10 DIAGNOSIS — R26.2 AMBULATORY DYSFUNCTION: ICD-10-CM

## 2021-06-10 DIAGNOSIS — G35 MS (MULTIPLE SCLEROSIS) (HCC): Primary | ICD-10-CM

## 2021-06-10 PROCEDURE — 97113 AQUATIC THERAPY/EXERCISES: CPT

## 2021-06-10 NOTE — PROGRESS NOTES
Daily Note     Today's date: 6/10/2021  Patient name: Esthela Stallings  : 1980  MRN: [id number]  Referring provider: Aaron Nolasco PA-C  Dx:   Encounter Diagnosis     ICD-10-CM    1  MS (multiple sclerosis) ([facility] [address] )  G35    2  Ambulatory dysfunction  R26 2        Start Time: 1100  Stop Time: 1200  Total time in clinic (min): 60 minutes    Subjective: pt notes her legs are bad today  She had a rough night last night  Objective: See treatment diary below      Assessment: Tolerated treatment fair  Slow and steady w/ ex's, rest periods as pt needs  Today her legs were really painful so most exs' were done seated and focused on UE's today  Patient would benefit from continued PT      Plan: Continue per plan of care        Precautions: MS/falls    Daily Treatment Diary     Exercise Diary  5/10 5/25 6/1 6/10         Water walking 3' 5 5 10         Postural training             Gait training  5 5          Home exercise pgm/patient education 5'            Wall: t/h raises  x10 1'          Hip x4  x10 ea x10 ea            1 1          squats             Knee flex/ext   1          Step-ups (fwd/bkwd/ss)             SLS (eyes open/closed)             SLS w UE mvmt  AROM/ball toss             Weight shifting             UE Noodle work x 4  2' sit 3' sit 3' sit 3' sit         UE AROM             Resistive UE work (paddles, bells, TB)             UE ex's w/ 2# MB    8' sit         UE ex's w/ paddles    5' sit         Seated on pool bench w proper posture             Ankle df/pf 2' 2 2          marching 2' 2 2          Hip Ab/add 1' 1 1          Knee flex/ext 2' 2 2          Deep water mvmt 3' bike 2 3 3'         Deep water tx/stretching 5' 7 7 2x10'         Specific self - stretches wall/steps  4 4 4             Modalities              whirlpool

## 2021-06-11 LAB
LAB AP GYN PRIMARY INTERPRETATION: NORMAL
Lab: NORMAL
PATH INTERP SPEC-IMP: NORMAL

## 2021-06-11 RX ORDER — METRONIDAZOLE 7.5 MG/G
1 GEL VAGINAL
Qty: 70 G | Refills: 0 | Status: SHIPPED | OUTPATIENT
Start: 2021-06-11 | End: 2021-06-16

## 2021-06-14 ENCOUNTER — TELEPHONE (OUTPATIENT)
Dept: NEUROLOGY | Facility: CLINIC | Age: 41
End: 2021-06-14

## 2021-06-14 NOTE — TELEPHONE ENCOUNTER
Pt calling to report she has been prescribed antibiotics (Metrogel) for bacterial vaginosis  She will be starting the medication today and should complete this on 6/18/21  She is asking if Tysabri infusion needs to be adjusted  Per chart review, pt's next Tysabri infusion is scheduled for 7/2/21  This is 2 weeks from last dose of antibiotics  Pt made aware that rescheduling should not be needed  She is aware if she would have continued signs of infection after 6/18/21 when antibiotics are complete she will need to notify our office as rescheduling may be needed at that point  She is agreeable  Dr HILL, just kath Mackey  Pt only needs a call back if your recommendations would differ from what has already been discussed  Thanks! [phone]  Okay to leave detailed message

## 2021-06-17 ENCOUNTER — OFFICE VISIT (OUTPATIENT)
Dept: PHYSICAL THERAPY | Age: 41
End: 2021-06-17
Payer: COMMERCIAL

## 2021-06-17 DIAGNOSIS — R26.2 AMBULATORY DYSFUNCTION: ICD-10-CM

## 2021-06-17 DIAGNOSIS — G35 MS (MULTIPLE SCLEROSIS) (HCC): Primary | ICD-10-CM

## 2021-06-17 PROCEDURE — 97113 AQUATIC THERAPY/EXERCISES: CPT

## 2021-06-17 NOTE — PROGRESS NOTES
Daily Note     Today's date: 2021  Patient name: Ivana Salcedo  : 1980  MRN: [id number]  Referring provider: Chelsey Motley PA-C  Dx:   Encounter Diagnosis     ICD-10-CM    1  MS (multiple sclerosis) ([facility] [address] )  G35    2  Ambulatory dysfunction  R26 2        Start Time: 1100  Stop Time: 1200  Total time in clinic (min): 60 minutes    Subjective: pt notes she is feeling better today than last visit  She feels PT is helping she notes her R knee isn't hurting as much  Objective: See treatment diary below    Pt seen 1:1 for 30 minutes    Assessment: Tolerated treatment fairly well today  Pt was able to tolerate standing ex's today  No c/o increased pain or burning in LE's  Slow gains overall w/ progress, pt only comes once a week to PT because she has a lot of other appts and it's a lot for her to get here  Plan: Continue per plan of care        Precautions: MS/falls    Daily Treatment Diary     Exercise Diary  5/10 5/25 6/1 6/10 6/17        Water walking 3' 5 5 10 10        Postural training             Gait training  5 5          Home exercise pgm/patient education 5'            Wall: t/h raises  x10 1'  1        Hip x4  x10 ea x10 ea  1          1 1  1        squats     1        Knee flex/ext   1  1        Step-ups (fwd/bkwd/ss)             SLS (eyes open/closed)             SLS w UE mvmt  AROM/ball toss             Weight shifting             UE Noodle work x 4  2' sit 3' sit 3' sit 3' sit 4' standing        UE AROM             Resistive UE work (paddles, cassidy, TB)             UE ex's w/ 2# MB    8' sit         UE ex's w/ paddles    5' sit         Seated on pool bench w proper posture             Ankle df/pf 2' 2 2  1        marching 2' 2 2  1        Hip Ab/add 1' 1 1  1        Knee flex/ext 2' 2 2  2        Deep water mvmt 3' bike 2 3 3' 5        Deep water tx/stretching 5' 7 7 2x10' 10'        Specific self - stretches wall/steps  4 4 4 4            Modalities              whirlpool

## 2021-06-18 ENCOUNTER — TELEPHONE (OUTPATIENT)
Dept: NEUROLOGY | Facility: CLINIC | Age: 41
End: 2021-06-18

## 2021-06-18 DIAGNOSIS — G35 MS (MULTIPLE SCLEROSIS) (HCC): Primary | ICD-10-CM

## 2021-06-18 NOTE — TELEPHONE ENCOUNTER
Patient returned call  She requested a script for SL as she plans to go to a SL Lab to have blood work done  Lab script entered and signed

## 2021-06-18 NOTE — TELEPHONE ENCOUNTER
Pt is scheduled for Tysabri on 7/2/21  Last infusion 6/2/21, infusions every # weeks  Confirmed scheduling is correct (2 days after infusion was expected)  Last CBC/CMP 6/2/21  Last JCV 1/28/21 0 22 (final result negative)  Last MRI brain and c-spine 3/1/21    Pt is due for updated JCV level next month  Called and Left a message on pt's answering machine for a call back  Need to confirm if she would like [employer] or  script  Okay to proceed with infusion?     Tysabri is approved with [employer] from 2/23/21 to 8/29/21, [facility] # [id number]      Touch auth valid until 9/1/21 at [facility]

## 2021-06-21 ENCOUNTER — APPOINTMENT (OUTPATIENT)
Dept: LAB | Facility: HOSPITAL | Age: 41
End: 2021-06-21
Attending: PSYCHIATRY & NEUROLOGY
Payer: COMMERCIAL

## 2021-06-21 DIAGNOSIS — G35 MS (MULTIPLE SCLEROSIS) (HCC): ICD-10-CM

## 2021-06-21 LAB
ALBUMIN SERPL BCP-MCNC: 4 G/DL (ref 3.5–5)
ALP SERPL-CCNC: 92 U/L (ref 46–116)
ALT SERPL W P-5'-P-CCNC: 36 U/L (ref 12–78)
ANION GAP SERPL CALCULATED.3IONS-SCNC: 7 MMOL/L (ref 4–13)
AST SERPL W P-5'-P-CCNC: 41 U/L (ref 5–45)
BASOPHILS # BLD AUTO: 0.05 THOUSANDS/ΜL (ref 0–0.1)
BASOPHILS NFR BLD AUTO: 1 % (ref 0–1)
BILIRUB SERPL-MCNC: 0.6 MG/DL (ref 0.2–1)
BUN SERPL-MCNC: 7 MG/DL (ref 5–25)
CALCIUM SERPL-MCNC: 9.5 MG/DL (ref 8.3–10.1)
CHLORIDE SERPL-SCNC: 105 MMOL/L (ref 100–108)
CO2 SERPL-SCNC: 30 MMOL/L (ref 21–32)
CREAT SERPL-MCNC: 0.74 MG/DL (ref 0.6–1.3)
EOSINOPHIL # BLD AUTO: 0.24 THOUSAND/ΜL (ref 0–0.61)
EOSINOPHIL NFR BLD AUTO: 5 % (ref 0–6)
ERYTHROCYTE [DISTWIDTH] IN BLOOD BY AUTOMATED COUNT: 13.5 % (ref 11.6–15.1)
GFR SERPL CREATININE-BSD FRML MDRD: 102 ML/MIN/1.73SQ M
GLUCOSE SERPL-MCNC: 111 MG/DL (ref 65–140)
HCT VFR BLD AUTO: 36.6 % (ref 34.8–46.1)
HGB BLD-MCNC: 12.3 G/DL (ref 11.5–15.4)
IMM GRANULOCYTES # BLD AUTO: 0.01 THOUSAND/UL (ref 0–0.2)
IMM GRANULOCYTES NFR BLD AUTO: 0 % (ref 0–2)
LYMPHOCYTES # BLD AUTO: 2.82 THOUSANDS/ΜL (ref 0.6–4.47)
LYMPHOCYTES NFR BLD AUTO: 54 % (ref 14–44)
MCH RBC QN AUTO: 33.8 PG (ref 26.8–34.3)
MCHC RBC AUTO-ENTMCNC: 33.6 G/DL (ref 31.4–37.4)
MCV RBC AUTO: 101 FL (ref 82–98)
MONOCYTES # BLD AUTO: 0.36 THOUSAND/ΜL (ref 0.17–1.22)
MONOCYTES NFR BLD AUTO: 7 % (ref 4–12)
NEUTROPHILS # BLD AUTO: 1.71 THOUSANDS/ΜL (ref 1.85–7.62)
NEUTS SEG NFR BLD AUTO: 33 % (ref 43–75)
NRBC BLD AUTO-RTO: 1 /100 WBCS
PLATELET # BLD AUTO: 65 THOUSANDS/UL (ref 149–390)
PMV BLD AUTO: 10.6 FL (ref 8.9–12.7)
POTASSIUM SERPL-SCNC: 3.9 MMOL/L (ref 3.5–5.3)
PROT SERPL-MCNC: 7.4 G/DL (ref 6.4–8.2)
RBC # BLD AUTO: 3.64 MILLION/UL (ref 3.81–5.12)
SODIUM SERPL-SCNC: 142 MMOL/L (ref 136–145)
WBC # BLD AUTO: 5.19 THOUSAND/UL (ref 4.31–10.16)

## 2021-06-21 PROCEDURE — 36415 COLL VENOUS BLD VENIPUNCTURE: CPT

## 2021-06-21 PROCEDURE — 85025 COMPLETE CBC W/AUTO DIFF WBC: CPT

## 2021-06-21 PROCEDURE — 80053 COMPREHEN METABOLIC PANEL: CPT

## 2021-06-21 PROCEDURE — 86711 JOHN CUNNINGHAM ANTIBODY: CPT

## 2021-06-22 ENCOUNTER — TELEPHONE (OUTPATIENT)
Dept: UROLOGY | Facility: AMBULATORY SURGERY CENTER | Age: 41
End: 2021-06-22

## 2021-06-22 NOTE — TELEPHONE ENCOUNTER
Call placed to patient and spoke with her  Answered her questions to the best of my knowledge about Urodynamics and this testing  Pt was thankful for the call and will follow up with Karyna Em tomorrow with any questions or concerns she has about this procedure

## 2021-06-22 NOTE — TELEPHONE ENCOUNTER
Patient managed by Dr Olamide Davies , patient is scheduled 6/23 @ 1pm with Aaron Hinson for Urodyamics appt and asked what all the appointment involves   Patient can be reached at [phone]

## 2021-06-23 ENCOUNTER — PROCEDURE VISIT (OUTPATIENT)
Dept: UROLOGY | Facility: MEDICAL CENTER | Age: 41
End: 2021-06-23
Payer: COMMERCIAL

## 2021-06-23 DIAGNOSIS — R39.11 URINARY HESITANCY: Primary | ICD-10-CM

## 2021-06-23 DIAGNOSIS — N36.44 DETRUSOR SPHINCTER DYSSYNERGIA: ICD-10-CM

## 2021-06-23 LAB
SL AMB  POCT GLUCOSE, UA: NEGATIVE
SL AMB LEUKOCYTE ESTERASE,UA: NEGATIVE
SL AMB POCT BILIRUBIN,UA: NEGATIVE
SL AMB POCT BLOOD,UA: NEGATIVE
SL AMB POCT CLARITY,UA: CLEAR
SL AMB POCT COLOR,UA: YELLOW
SL AMB POCT KETONES,UA: NEGATIVE
SL AMB POCT NITRITE,UA: NEGATIVE
SL AMB POCT PH,UA: 7.5
SL AMB POCT SPECIFIC GRAVITY,UA: 1.01
SL AMB POCT URINE PROTEIN: NEGATIVE
SL AMB POCT UROBILINOGEN: NEGATIVE

## 2021-06-23 PROCEDURE — 51784 ANAL/URINARY MUSCLE STUDY: CPT

## 2021-06-23 PROCEDURE — 51741 ELECTRO-UROFLOWMETRY FIRST: CPT

## 2021-06-23 PROCEDURE — 81002 URINALYSIS NONAUTO W/O SCOPE: CPT

## 2021-06-23 PROCEDURE — 51726 COMPLEX CYSTOMETROGRAM: CPT

## 2021-06-23 NOTE — PROGRESS NOTES
CC: " I have to push a lot to pee and don't have a normal flow"     Uroflow:       Voided volume:        137 6ml       Max flow rate:        10 7 ml/sec       Average flow rate:   2 9 ml/sec       PVR:    75 ml       Patient felt volume voided was normal    CMG:       Position:  sitting          Fill sensation:    58 ml,  pdet 0 cm of H2O       First urge:          170 ml,     pdet 7            Normal urge:     Never reached           Must urge:         Never reached     Permission to void:   500  ml,     pdet 0            Max pdet during void     45m H2O       Voided volume:  481   ml       Bladder stability:   stable         Compliance:  normal      Sphincter function:   No Stress leakage provoked at 200 ml, and 300 ml    EMG activity:       Normal during filling,        abnormal at start of void    Void attempts:       Volume in        pdet           Voided volume           300                 0                   0           400        20                   2  with strain           500        42               482  with strain      Comments:   Decreased sensation   Strains to void   + EMG activity at start of void    Urodynamics    Date/Time: 6/23/2021 3:53 PM  Performed by: Jaxson Colby RN  Authorized by: Kavon Spann MD   Universal Protocol:  Consent: Verbal consent obtained  Written consent obtained    Risks and benefits: risks, benefits and alternatives were discussed  Consent given by: patient  Patient understanding: patient states understanding of the procedure being performed  Patient consent: the patient's understanding of the procedure matches consent given  Procedure consent: procedure consent matches procedure scheduled  Patient identity confirmed: verbally with patient    Procedure - Urodynamics:  Procedure details: CMG and EMG    Voiding Pressure Study: No      Post-procedure:     Patient tolerance: Patient tolerated procedure well with no immediate complications

## 2021-06-24 ENCOUNTER — OFFICE VISIT (OUTPATIENT)
Dept: PHYSICAL THERAPY | Age: 41
End: 2021-06-24
Payer: COMMERCIAL

## 2021-06-24 DIAGNOSIS — R26.2 AMBULATORY DYSFUNCTION: ICD-10-CM

## 2021-06-24 DIAGNOSIS — G35 MS (MULTIPLE SCLEROSIS) (HCC): Primary | ICD-10-CM

## 2021-06-24 PROCEDURE — 97113 AQUATIC THERAPY/EXERCISES: CPT

## 2021-06-28 ENCOUNTER — EVALUATION (OUTPATIENT)
Dept: PHYSICAL THERAPY | Age: 41
End: 2021-06-28
Payer: COMMERCIAL

## 2021-06-28 ENCOUNTER — TELEPHONE (OUTPATIENT)
Dept: UROLOGY | Facility: MEDICAL CENTER | Age: 41
End: 2021-06-28

## 2021-06-28 DIAGNOSIS — R26.2 AMBULATORY DYSFUNCTION: ICD-10-CM

## 2021-06-28 DIAGNOSIS — G35 MS (MULTIPLE SCLEROSIS) (HCC): Primary | ICD-10-CM

## 2021-06-28 LAB
GALACTOMANNAN AG SERPL IA-ACNC: 0.21
JCPYV AB SERPL QL IA: NORMAL
JCPYV AB SERPL QL IA: NORMAL
SL AMB JCV AB BY INHIBITION: NEGATIVE
SPECIMEN STATUS: NORMAL

## 2021-06-28 PROCEDURE — 97164 PT RE-EVAL EST PLAN CARE: CPT | Performed by: PHYSICAL THERAPIST

## 2021-06-28 NOTE — PROGRESS NOTES
PT Re-Evaluation     Today's date: 2021  Patient name: Hal Baca  : 1980  MRN: [id number]  Referring provider: Denia Nguyen PA-C  Dx:   Encounter Diagnosis     ICD-10-CM    1  MS (multiple sclerosis) ([facility] [address] )  G35    2  Ambulatory dysfunction  R26 2        Start Time: 1400  Stop Time: 1430  Total time in clinic (min): 30 minutes    Assessment  Assessment details: Hal Baca has had 7 sessions of physical therapy to date for gait dysfunction and MS weakness  She has had 5 aquatic sessions to date  She has some gains in strength and improved balance scores  Her TUG with and without her walking stick also improved  sonya has not been able to attend PT consistently but has made some progress and may benefit from a continuation of services as outlined  Impairments: abnormal muscle tone, activity intolerance, impaired balance, impaired physical strength, lacks appropriate home exercise program, pain with function and poor posture   Functional limitations: difficulty walking, climbing stairs, getting in/out car, transfers, household choresUnderstanding of Dx/Px/POC: fair   Prognosis: fair    Goals  ST-3 WEEKS  1  Pt will be able to tolerate > 30 minutes of aquatic PT program  achieved  2  Increase core activation with minimal verbal cuing with pt able to maintain in upright posture for >3'  Working towards  3  Increase gross motor LE by 1/2 MMT grade in all deficient planes  Achieved in some areas    LT WEEKS  1  Improved DGI by 5 points  By discharge  2  Pt will be able to tolerate 10 minutes of [employer]  By discharge  3  Independent with HEP to perform in family pool    [facility]    Plan  Plan details: Pt nly able to attend PT 1x week due to transportation issues    Patient would benefit from: skilled physical therapy  Planned modality interventions: cryotherapy, electrical stimulation/Russian stimulation, thermotherapy: hydrocollator packs and unattended electrical stimulation  Planned therapy interventions: aquatic therapy, flexibility, functional ROM exercises, home exercise program, neuromuscular re-education, patient education, postural training, strengthening, stretching, therapeutic activities and therapeutic exercise  Frequency: 1x week  Duration in weeks: 6  Plan of Care beginning date: 6/28/2021  Plan of Care expiration date: 8/28/2021  Treatment plan discussed with: patient        Subjective Evaluation    History of Present Illness  Mechanism of injury: Pt noted she began to lose her balance and had frequent falls  She had and MRI and lumbar puncture that confirmed MS  She has been on meds for approximately 1 year  She is using a walking stick  She has difficulty walking  She does have a history of pain after physical activity in her back and joints  6/28  Pt feels her legs have been a little better  She does report weakness and heavy feeling  She reports her balance is unchanged  Her symptoms vary day to day and she has "good and bad days"  She maldonado shave occasional falls    Pain  Progression: improved    Treatments  Previous treatment: physical therapy  Patient Goals  Patient goals for therapy: increased strength and improved balance          Objective     Static Posture     Comments  Forward flexed at hips    Strength/Myotome Testing     Left Shoulder     Planes of Motion   Flexion: 4-   Abduction: 4-     Right Shoulder     Planes of Motion   Flexion: 4-   Abduction: 4-     Left Elbow   Flexion: 4  Extension: 4-    Right Elbow   Flexion: 4  Extension: 4-    Left Wrist/Hand   Wrist extension: 4  Wrist flexion: 4    Right Wrist/Hand   Wrist flexion: 4    Left Hip   Planes of Motion   Flexion: 3  Abduction: 4-  Adduction: 4    Right Hip   Planes of Motion   Flexion: 3  Abduction: 4-  Adduction: 4    Left Knee   Flexion: 3+  Extension: 3-    Right Knee   Flexion: 3+  Extension: 3    Left Ankle/Foot   Dorsiflexion: 3-  Plantar flexion: 3-    Right Ankle/Foot   Dorsiflexion: 3-  Plantar flexion: 3-    Ambulation   Weight-Bearing Status     Additional Weight-Bearing Status Details  Pt walsk wit walking stick    Ambulation: Level Surfaces   Ambulation with assistive device: independent  Ambulation without assistive device: independent (short distances with evidence of imbalance)    Ambulation: Stairs   Ascend stairs: independent  Pattern: non-reciprocal  Railings: one rail  Descend stairs: independent  Pattern: non-reciprocal  Railings: one rail    Additional Stairs Ambulation Details  Relies on arms to pull up    Observational Gait   Decreased walking speed, stride length, left step length and right step length  Left foot contact pattern: foot flat  Right foot contact pattern: foot flat  Left arm swing: decreased    Quality of Movement During Gait   Trunk  Forward lean       Additional Quality of Movement During Gait Details  limited foot clearance, shuffling gait pattern  ataxic    Comments   DGI 10/24      Neuro Exam:     Sensation   Light touch LE: left impaired and right impaired  Proprioception LE: left impaired and right impaired    Coordination   Heel to shin: left dysmetric and right dysmetric  Finger to nose: right dysmetria  Finger to nose: left WNL  Rapid alternating movements: UE WNL and LE impaired     Transfers Sit to stand: independent (uses arms) Sit to supine: independent Supine to sit: independent Into the car: independent (uses arms to move legs) Out of the car: independent (uses arms to pull legs in)     Functional outcomes   TUG: 10 76 without walking stick, 11 59 with walking stick (seconds)      Balance assessments   MCTSIB   Eyes open level surface: 30  Eyes open foam surface: 30  Eyes closed level surface: 30  Eyes closed foam surface: 5             Precautions: fall risk       6/28            Revaluation perfromed                                                   Neuro Re-Ed Ther Ex                                                                                                                     Ther Activity                                       Gait Training                                       Modalities

## 2021-06-28 NOTE — TELEPHONE ENCOUNTER
Patient wanted to know if she needs to make appointment in [place] or if someone will call her with the results  Patient was seen by urodynamics nurse

## 2021-06-29 ENCOUNTER — TELEPHONE (OUTPATIENT)
Dept: UROLOGY | Facility: MEDICAL CENTER | Age: 41
End: 2021-06-29

## 2021-06-29 DIAGNOSIS — N31.9 NEUROGENIC BLADDER: ICD-10-CM

## 2021-06-29 DIAGNOSIS — N36.44 DETRUSOR SPHINCTER DYSSYNERGIA: Primary | ICD-10-CM

## 2021-06-29 NOTE — TELEPHONE ENCOUNTER
I called the patient to go over her renal ultrasound and CMG results  The renal ultrasound was completely normal   This is good  She has multiple sclerosis with a neurogenic bladder  No hydronephrosis  Her urodynamics showed normal compliance, decent capacity of 500 cc maximum capacity, but she does show some pelvic floor dysfunction/detrusor sphincter dyssynergia  Her pressures are not concerning  Her EMG pattern shows heightened activity when she is trying to void  She has been through pelvic floor therapy with physical therapy and she does not feel that it has helped her  Unfortunately this is part of her overall condition with multiple sclerosis  She is seeing her OBGYN who has recommended her for InterStim  She is going to be seeing a doctor in [facility] for this  I told her that I have done InterStim for quite a long time and I do not really consider her to be a candidate for this, but the other doctor will obviously be doing a test stimulation and maybe they will have success  Her main problem with her difficulties in voiding is that her pelvic floor muscles or squeezing inappropriately when she is trying to empty her bladder  I told her I would send her a copy of this note and I would like to see her in a year with a renal ultrasound  We need to monitor her for deterioration in terms of her bladder function and hydronephrosis

## 2021-06-29 NOTE — PROGRESS NOTES
Patient ID: Shaylee Carmona is a 37 y o  female  Who initially presented to the [facility] Clinic with abnormal gait and balance, was found to have white matter lesions in brain and cervical spine as well as positive MS panel in her CSF  Diagnosis of MS was made  Patient was initiated on Tysabri as her 1st disease modifying therapy  Assessment/Plan:  --continue with Tysabri, next infusion scheduled for 7/2  --last LETI V 06/21/21 negative,   --last MRI brain and cervical March, stable, overall plaque burden is mild, no evidence of enhancement, patient with stable cord lesions  due for updated imaging in a  October  --patient to continue physical therapy,  Patient to continue with pool therapy  --seen in follow-up by Urology  -- will restart her Lyrica 50 mg in the morning, can titrate up to 1 tablet twice a day  Patient to call with update  -- patient has access to baclofen if needed  Can increase to twice a day  Patient not using Flexeril secondary to complaints of lethargy    No problem-specific Assessment & Plan notes found for this encounter  Diagnoses and all orders for this visit:    MS (multiple sclerosis) ([facility] [address] )    Musculoskeletal pain, chronic    Neurogenic bladder           Subjective:    VIRGILIO   Alaina Duarte is a 72-year-old female with history of Lyme disease, IBS, chronic low back pain, who presents today for neurologic follow-up for multiple sclerosis  Patient initially presented to our movement Disorder Clinic in June of 2020 for evaluation of gait imbalance difficulty  She had reported altered gait since childhood without falls, supposed history of Lyme disease treated in her teenage years, along with multi-trauma in 2014  She also has self admitted history of prescription opioid addiction in the past for chronic pain, heavy alcohol intake  Patient had been pulled over by police over a year before her consult him here and failed a DUI test at that time    She went to balance therapy afterwards and the therapist noticed incoordination  She also reported chronic back pain starting in 2017, along with leg weakness  She also reported some difficulty controlling her bowels  Eventual testing revealed MRI brain compatible with subtle white matter hyperintensities in the immediate periventricular region of both cerebral hemispheres and subtle changes within the left cerebellum  MRI cervical spine demonstrated moderate, abnormal cord signal within the lateral aspects of the cervical cord bilaterally at C2-3, C4-6 suspicious for chronic demyelinating disease  MRI thoracic spine with normal cord signal   MRI lumbar spine with only mild degenerative spondylosis  She was then seen in the [address]  She had a lumbar puncture in November of 2020  CSF Lyme was negative  MS panel with 8 oligoclonal bands, elevated IgG synthesis rate and elevated myelin basic protein  NMO negative  JCV negative 1/28/21 with index 0 22  Decision was made for patient to start Tysabri, her 1st infusion was on March 3, 2021  She did undergo updated imaging in March, minimal white matter changes noted, overall plaque burden mild, no evidence of enhancement  Cervical MRI stable cord abnormalities, minimal spondylosis, no new or enhancing lesions  Patient was last seen in May, at that time, she remained clinically stable from an MS standpoint  Imaging from March stable  Patient continued with ataxic gait  She presented with complaints of increased difficulty emptying her bladder  She had been recently treated for a UTI  She also presented with complaints of heaviness and weakness in her lower extremities, as she did subsequently undergo physical therapy  Today, given stability in her imaging, she continues on Tysabri, no infusion reactions  The interim, she did call, she had a bacterial vaginosis, and was prescribed antibiotics  Her infusion was held off  She is currently scheduled to undergo her next infusion on July 2nd  Last JCV remains negative  She presents today with ongoing complaints of paresthesias, occurring primarily in the midthoracic area which will radiate down her low back, and across her thoracic region  In the past, she had been on Lyrica, unclear why she discontinue however this was beneficial  She notes having some interim falls, feels her legs go slower than she wants them to  She uses a cane in order to get about  No focal weakness  The interim, she was seen by Ophthalmology, she did require new glasses  She is followed by Urology, question at this point of potential InterStim placement  Patient does have access to baclofen, typically uses 1 at night as needed  The interim, patient followed up with Urology, underwent urodynamic testing  Patient noted to have low blood pressure today, denies any lightheadedness or dizziness  Encouraged her to increase her fluid intake  Should she have ongoing issues, she should contact her PCP     in the interim, patient started taking a new herbal drink called "Hempanol Brain Detox" along with Prudy Fines which she thinks has been very helpful for her brain, and cognitive functioning        Labs 06/21/2021; JCV index = 0 21 antibody negative, BMP normal, LFTs normal, GFR = 102, RBC = 3 64, platelets = 65, absolute neutrophils = 1 71, absent lymphocytes = 3 06    The following portions of the patient's history were reviewed and updated as appropriate: allergies, current medications, past family history, past medical history, past social history, past surgical history and problem list          Objective:  Current Outpatient Medications   Medication Sig Dispense Refill    b complex vitamins tablet Take 1 tablet by mouth daily      baclofen 10 mg tablet Take 1 tablet (10 mg total) by mouth 3 (three) times a day (Patient taking differently: Take 10 mg by mouth as needed ) 90 tablet 0    buprenorphine-naloxone (SUBOXONE) 2-0 5 mg per SL tablet Place 0 5 tablets under the tongue 2 (two) times a day       cloNIDine (CATAPRES) 0 2 mg tablet Take 0 2 mg by mouth daily at bedtime       Ginkgo Biloba 40 MG TABS Take 120 mg by mouth      ibuprofen (MOTRIN) 200 mg tablet Take 200 mg by mouth as needed for mild pain      lubiprostone (AMITIZA) 24 mcg capsule Take 24 mcg by mouth 2 (two) times a day with meals      Magnesium Citrate 100 MG TABS Take 100 mg by mouth 3 (three) times a day      Misc Natural Products (GINSENG COMPLEX PO) Take by mouth      Nerve Stimulator (STANDARD TENS) VICKY by Does not apply route 2 (two) times a day 1 Device 0    Nerve Stimulator (TENS THERAPY REPLACE BACK PADS) MISC 30 pad by Does not apply route 2 (two) times a day 30 each 0    Nerve Stimulator VICKY by Does not apply route 2 (two) times a day 1 each 0    Nerve Stimulator Supplies MISC 30 pad by Does not apply route 2 (two) times a day 30 pad 0    Nutritional Supplements (METABOLIC LIVER FORMULA PO) Take 1 tablet by mouth daily      Potassium 99 MG TABS Take 99 mg by mouth      Probiotic Product (PROBIOTIC DAILY PO) Take 1 tablet by mouth daily      promethazine (PHENERGAN) 50 MG tablet TAKE 1 TO 2 TABS AT BEDTIME AS NEEDED FOR INSOMNIA  1    topiramate (TOPAMAX) 50 MG tablet TAKE 1 TABLET BY MOUTH EVERY DAY FOR HEADACHE/SLEEP  5    Biotin 37761 MCG TABS Take by mouth (Patient not taking: Reported on 7/1/2021)      Calcium Carbonate (CALCIUM 600 PO) Take by mouth (Patient not taking: Reported on 7/1/2021)      cyclobenzaprine (FLEXERIL) 5 mg tablet Take 5 mg by mouth 3 (three) times a day as needed for muscle spasms  (Patient not taking: Reported on 7/1/2021)      docusate sodium (COLACE) 100 mg capsule Take 100 mg by mouth 3 (three) times a day (Patient not taking: Reported on 7/1/2021)      Liver Extract (LIVER PO) Take 1 tablet by mouth daily (Patient not taking: Reported on 7/1/2021)      NON FORMULARY HEMPANOL BRAIN DETOX 200MG      Omega-3 Fatty Acids (FISH OIL OMEGA-3 PO) Take 1,200 mg by mouth daily (Patient not taking: Reported on 7/1/2021)      pregabalin (LYRICA) 50 mg capsule Take 1 capsule (50 mg total) by mouth 3 (three) times a day (Patient not taking: Reported on 7/1/2021) 90 capsule 1     No current facility-administered medications for this visit  Blood pressure (!) 82/64, pulse 82, resp  rate 18, weight 50 1 kg (110 lb 6 oz)  Physical Exam  Constitutional:       Appearance: Normal appearance  HENT:      Head: Normocephalic  Eyes:      Extraocular Movements: Extraocular movements intact  Pupils: Pupils are equal, round, and reactive to light  Cardiovascular:      Rate and Rhythm: Normal rate  Pulses: Normal pulses  Pulmonary:      Effort: Pulmonary effort is normal    Musculoskeletal:      Cervical back: Normal range of motion  Skin:     General: Skin is warm  Neurological:      Deep Tendon Reflexes: Reflexes are normal and symmetric  Psychiatric:         Mood and Affect: Mood normal          Speech: Speech normal          Behavior: Behavior normal          Thought Content: Thought content normal          Neurological Exam  Mental Status  Awake, alert and oriented to person, place and time  Speech is normal  Language is fluent with no aphasia  Cranial Nerves  CN II: Visual fields full to confrontation  Right funduscopic exam: disc intact  Left funduscopic exam: disc intact  CN III, IV, VI: Extraocular movements intact bilaterally  Pupils equal round and reactive to light bilaterally  CN V: Facial sensation is normal   CN VII: Full and symmetric facial movement  CN XI: Shoulder shrug strength is normal   CN XII: Tongue midline without atrophy or fasciculations  Motor  Normal muscle bulk throughout  Normal muscle tone  No abnormal involuntary movements  Strength is 5/5 in all four extremities except as noted    Upper extremity strength full 5/5 bilaterally,  Give-way weakness bilateral lower extremities noted     Sensory  Temperature abnormality:  Decreased temperature left lower extremity versus right  Vibration abnormality:  Patient with hyperesthesias to vibration  Lower extremities  Reflexes  Deep tendon reflexes are 2+ and symmetric in all four extremities with downgoing toes bilaterally  Deep tendon reflexes:  Hyper throughout  Coordination  Right: Finger-to-nose normal   Left: Finger-to-nose normal     Gait  Casual gait: Wide stance  Reduced stride length  Ambulates with a cane, imbalance noted  ROS: personally reviewed with patient    Review of Systems   Constitutional: Positive for fatigue  HENT: Negative  Eyes:         Dry eyes   Respiratory: Negative  Cardiovascular: Negative  Gastrointestinal: Positive for abdominal pain  Musculoskeletal: Positive for arthralgias, back pain, gait problem, joint swelling and myalgias  Skin: Negative  Neurological: Positive for weakness and headaches  Memory problems,  Tingling, cramping, imbalance, falls   Hematological: Negative  Psychiatric/Behavioral: Negative

## 2021-06-30 ENCOUNTER — TELEPHONE (OUTPATIENT)
Dept: UROLOGY | Facility: MEDICAL CENTER | Age: 41
End: 2021-06-30

## 2021-06-30 NOTE — TELEPHONE ENCOUNTER
Called patient and informed will receive post card for one year FU  Mailed note to patient with order for ultrasound which she will schedule closer to the time of her appointment  Note of 6/29 mailed to PCP

## 2021-06-30 NOTE — TELEPHONE ENCOUNTER
----- Message from Dnaa Morton RN sent at 6/30/2021  8:35 AM EDT -----    ----- Message -----  From: Wanda Gillespie MD  Sent: 6/29/2021   4:11 PM EDT  To: [facility]      Please mail patient a copy of this office note I generated 6/29/2021 at 4 5:00 p m , and please send a copy of it to her primary care provider  Also please set her up for her to see me in a year with a renal ultrasound

## 2021-07-01 ENCOUNTER — OFFICE VISIT (OUTPATIENT)
Dept: NEUROLOGY | Facility: CLINIC | Age: 41
End: 2021-07-01
Payer: COMMERCIAL

## 2021-07-01 VITALS
SYSTOLIC BLOOD PRESSURE: 82 MMHG | HEART RATE: 82 BPM | DIASTOLIC BLOOD PRESSURE: 64 MMHG | BODY MASS INDEX: 17.81 KG/M2 | RESPIRATION RATE: 18 BRPM | WEIGHT: 110.38 LBS

## 2021-07-01 DIAGNOSIS — G35 MS (MULTIPLE SCLEROSIS) (HCC): Primary | ICD-10-CM

## 2021-07-01 DIAGNOSIS — G89.29 MUSCULOSKELETAL PAIN, CHRONIC: ICD-10-CM

## 2021-07-01 DIAGNOSIS — M79.18 MUSCULOSKELETAL PAIN, CHRONIC: ICD-10-CM

## 2021-07-01 DIAGNOSIS — N31.9 NEUROGENIC BLADDER: ICD-10-CM

## 2021-07-01 PROCEDURE — 99214 OFFICE O/P EST MOD 30 MIN: CPT | Performed by: NURSE PRACTITIONER

## 2021-07-01 NOTE — TELEPHONE ENCOUNTER
Hmmm- I just called her 2 days ago and went over the results in detail and we had a very long conversation  I documented a phone note  doesshe want the actual copies the urodynamic testing, or does she not remember the conversation?

## 2021-07-01 NOTE — PATIENT INSTRUCTIONS
Patient to continue Tysabri  Scheduled for updated imaging in October  Following with Urology  Continue with pool therapy  Will restart her Lyrica 50 mg in the morning, if no response or changes, will increase to 1 tablet twice a day    Patient to call with an update  Can take baclofen 10 mg 1 tablet twice a day if needed for back spasms  Patient following with primary care

## 2021-07-01 NOTE — TELEPHONE ENCOUNTER
I called and spoke to patient, she didn't understand about detrusor sphincter dyssynergia  I explained this to her and reviewed your note with her, [facility]  She was satisfied after I spoke with her

## 2021-07-02 ENCOUNTER — HOSPITAL ENCOUNTER (OUTPATIENT)
Dept: INFUSION CENTER | Facility: CLINIC | Age: 41
Discharge: HOME | End: 2021-07-02
Payer: COMMERCIAL

## 2021-07-02 VITALS
TEMPERATURE: 97.5 F | RESPIRATION RATE: 18 BRPM | HEART RATE: 85 BPM | DIASTOLIC BLOOD PRESSURE: 67 MMHG | SYSTOLIC BLOOD PRESSURE: 108 MMHG

## 2021-07-02 DIAGNOSIS — G35 MS (MULTIPLE SCLEROSIS) (HCC): Primary | ICD-10-CM

## 2021-07-02 PROCEDURE — 96413 CHEMO IV INFUSION 1 HR: CPT

## 2021-07-02 PROCEDURE — 96367 TX/PROPH/DG ADDL SEQ IV INF: CPT

## 2021-07-02 RX ORDER — ACETAMINOPHEN 325 MG/1
650 TABLET ORAL ONCE
Status: CANCELLED | OUTPATIENT
Start: 2021-07-30 | End: 2021-07-19

## 2021-07-02 RX ORDER — SODIUM CHLORIDE 9 MG/ML
20 INJECTION, SOLUTION INTRAVENOUS ONCE
Status: CANCELLED | OUTPATIENT
Start: 2021-07-30

## 2021-07-02 RX ORDER — SODIUM CHLORIDE 9 MG/ML
20 INJECTION, SOLUTION INTRAVENOUS ONCE
Status: COMPLETED | OUTPATIENT
Start: 2021-07-02 | End: 2021-07-02

## 2021-07-02 RX ORDER — ACETAMINOPHEN 325 MG/1
650 TABLET ORAL ONCE
Status: COMPLETED | OUTPATIENT
Start: 2021-07-02 | End: 2021-07-02

## 2021-07-02 RX ADMIN — DIPHENHYDRAMINE HYDROCHLORIDE 50 MG: 50 INJECTION, SOLUTION INTRAMUSCULAR; INTRAVENOUS at 11:56

## 2021-07-02 RX ADMIN — SODIUM CHLORIDE 20 ML/HR: 0.9 INJECTION, SOLUTION INTRAVENOUS at 11:53

## 2021-07-02 RX ADMIN — NATALIZUMAB 300 MG: 300 INJECTION INTRAVENOUS at 12:18

## 2021-07-02 RX ADMIN — ACETAMINOPHEN 650 MG: 325 TABLET, FILM COATED ORAL at 11:55

## 2021-07-02 NOTE — PROGRESS NOTES
Pt tolerated tysabri and 1 hr observation period without incident  Pt was provided with AVS and future appts reviewed

## 2021-07-08 ENCOUNTER — APPOINTMENT (OUTPATIENT)
Dept: PHYSICAL THERAPY | Age: 41
End: 2021-07-08
Payer: COMMERCIAL

## 2021-07-12 ENCOUNTER — TELEPHONE (OUTPATIENT)
Dept: NEUROLOGY | Facility: CLINIC | Age: 41
End: 2021-07-12

## 2021-07-12 NOTE — TELEPHONE ENCOUNTER
Patient called to give an update on efficacy of Baclofen 10mg- 2 tabs daily and Lyrica 50mg - 1 capsule daily  Patient states she is not getting any relief at all  Per LOV 7/1 notes:    -- will restart her Lyrica 50 mg in the morning, can titrate up to 1 tablet twice a day  Patient to call with update  [phone] to leave detailed msg  Nadira - ok to increase Lyrica to BID as per office note? Any other recommendations?

## 2021-07-12 NOTE — TELEPHONE ENCOUNTER
Patient left voicemail  Wants to give update as requested  Need more details         Called and Left a message on pt's answering machine for a call back

## 2021-07-15 ENCOUNTER — OFFICE VISIT (OUTPATIENT)
Dept: PHYSICAL THERAPY | Age: 41
End: 2021-07-15
Payer: COMMERCIAL

## 2021-07-15 DIAGNOSIS — G35 MS (MULTIPLE SCLEROSIS) (HCC): Primary | ICD-10-CM

## 2021-07-15 DIAGNOSIS — R26.2 AMBULATORY DYSFUNCTION: ICD-10-CM

## 2021-07-15 PROCEDURE — 97113 AQUATIC THERAPY/EXERCISES: CPT

## 2021-07-15 NOTE — PROGRESS NOTES
Daily Note     Today's date: 7/15/2021  Patient name: Mayank Schwartz  : 1980  MRN: [id number]  Referring provider: Alex Spaulding PA-C  Dx:   Encounter Diagnosis     ICD-10-CM    1  MS (multiple sclerosis) ([facility] [address] )  G35    2  Ambulatory dysfunction  R26 2        Start Time: 1000  Stop Time: 1100  Total time in clinic (min): 60 minutes    Subjective: pt is c/o of R hand being numb since she woke up this morning  She also c/o R knee pain w/ difficulty w/ deep water bike  Objective: See treatment diary below  Pt seen 1:1 for 20 minutes    Assessment: Tolerated treatment fairly  well  Patient is making slow progress, will be transitioning pt to HEP in 2 weeks  Plan: Continue per plan of care        Precautions: fall risk        Exercise Diary  5/10 5/25 6/1 6/10 6/17 6/24 7/15      Water walking 3' 5 5 10 10 10 10      Postural training             Gait training  5 5          Home exercise pgm/patient education 5'            Wall: t/h raises  x10 1'  1 1 1      Hip x4  x10 ea x10 ea  1 2 2        1 1  1 1 1      squats     1 1 1      Knee flex/ext   1  1 1 1      Step-ups (fwd/bkwd/ss)             SLS (eyes open/closed)             SLS w UE mvmt  AROM/ball toss             Weight shifting             UE Noodle work x 4  2' sit 3' sit 3' sit 3' sit 4' standing 4 4      UE AROM             Resistive UE work (paddles, bells, TB)             UE ex's w/ 2# MB    8' sit  3' 2# 3 2#      UE ex's w/ paddles    5' sit  5' 2# 5 2#      Seated on pool bench w proper posture             Ankle df/pf 2' 2 2  1 1 1      marching 2' 2 2  1 1 1      Hip Ab/add 1' 1 1  1 1 1      Knee flex/ext 2' 2 2  2 2 2      Deep water mvmt 3' bike 2 3 3' 5 5 2      Deep water tx/stretching 5' 7 7 2x10' 10' 10 5      Specific self - stretches wall/steps  4 4 4 4 4 2          Modalities              whirlpool

## 2021-07-16 ENCOUNTER — TELEPHONE (OUTPATIENT)
Dept: NEUROLOGY | Facility: CLINIC | Age: 41
End: 2021-07-16

## 2021-07-16 NOTE — TELEPHONE ENCOUNTER
Pt is scheduled for Tysabri on 7/30/21  Last infusion 7/2/21, infusions every 4 weeks  Confirmed scheduling is correct  Last CBC/CMP 6/21/21  Last JCV 6/21/21 0 21 (negative)  Last MRI brain and c-spine 3/1/21, updated imaging scheduled for October 2021    Okay to proceed with infusion?     Tysabri is approved with [employer] from 2/23/21 to 8/29/21, [place] # [id number]      Touch auth valid until 9/1/21 at [facility]

## 2021-07-22 ENCOUNTER — TELEPHONE (OUTPATIENT)
Dept: NEUROLOGY | Facility: CLINIC | Age: 41
End: 2021-07-22

## 2021-07-22 ENCOUNTER — OFFICE VISIT (OUTPATIENT)
Dept: PHYSICAL THERAPY | Age: 41
End: 2021-07-22
Payer: COMMERCIAL

## 2021-07-22 DIAGNOSIS — R26.2 AMBULATORY DYSFUNCTION: ICD-10-CM

## 2021-07-22 DIAGNOSIS — G35 MS (MULTIPLE SCLEROSIS) (HCC): Primary | ICD-10-CM

## 2021-07-22 PROCEDURE — 97113 AQUATIC THERAPY/EXERCISES: CPT

## 2021-07-22 NOTE — PROGRESS NOTES
Daily Note     Today's date: 2021  Patient name: Helen Haile  : 1980  MRN: [id number]  Referring provider: Mike Johnson PA-C  Dx:   Encounter Diagnosis     ICD-10-CM    1  MS (multiple sclerosis) ([facility] )  G35    2  Ambulatory dysfunction  R26 2        Start Time: 1100  Stop Time: 1200  Total time in clinic (min): 60 minutes    Subjective: Patient reports having increased pain today in her legs, she thinks that she over did yesterday  Objective: See treatment diary below  Patient seen 1:1 30 minutes with therapist    Assessment: Tolerated treatment fair, slow progress today due to increased LE pain  Patient deferred all LE exercises today due to pain  Patient demonstrated fatigue post treatment and would benefit from continued PT      Plan: Continue per plan of care        Precautions: fall risk        Exercise Diary  5/10 5/25 6/1 6/10 6/17 6/24 7/15 7/22     Water walking 3' 5 5 10 10 10 10 10     Postural training             Gait training  5 5          Home exercise pgm/patient education 5'            Wall: t/h raises  x10 1'  1 1 1 nt     Hip x4  x10 ea x10 ea  1 2 2 nt     Marching  1 1  1 1 1 nt     squats     1 1 1 nt     Knee flex/ext   1  1 1 1 nt     Step-ups (fwd/bkwd/ss)             SLS (eyes open/closed)             SLS w UE mvmt  AROM/ball toss             Weight shifting             UE Noodle work x 4  2' sit 3' sit 3' sit 3' sit 4' standing 4 4 Sit 4'     UE AROM             Resistive UE work (paddles, bells, TB)             UE ex's w/ 2# MB    8' sit  3' 2# 3 2# 3 2#     UE ex's w/ paddles    5' sit  5' 2# 5 2# 5 2#     Seated on pool bench w proper posture             Ankle df/pf 2' 2 2  1 1 1 nt     marching 2' 2 2  1 1 1 nt     Hip Ab/add 1' 1 1  1 1 1 nt     Knee flex/ext 2' 2 2  2 2 2 nt     Deep water mvmt 3' bike 2 3 3' 5 5 2 nt     Deep water tx/stretching 5' 7 7 2x10' 10' 10 5 10x2     Specific self - stretches wall/steps  4 4 4 4 4 2 2         Modalities whshanelleool

## 2021-07-22 NOTE — TELEPHONE ENCOUNTER
See if she is having any side effects from either the Lyrica and or the baclofen  We can increase the baclofen if it is more muscle, spasm related  If it is nerve pain will increase her Lyrica  I see she is going to physical therapy, needs to continue

## 2021-07-22 NOTE — TELEPHONE ENCOUNTER
Called patient, reviewed Nadira's message  She denies any side effects of either medication - notes she is slightly more tired  Patient having difficulty describing her pain  She is not sure if this is more muscle related or nerve related  Notes it is primarily located in her lower back and "wraps around to her side"  Does not radiate to legs  Feels as though the pain is in and surrounding her spine  She continues to see PT (water PT as she is in too much pain to stand)  States she had a bad day at PT today and actually felt pain in her legs for the first time as well       Preferred pharm [facility] in [facility] call back [phone], okay with detailed messages

## 2021-07-22 NOTE — TELEPHONE ENCOUNTER
Would like to get MRI if her thoracic spine, concern for potential MS hug, would suggest she try increasing her baclofen to 1 tablet 3 times a day  There is an order already in for the thoracic, not due until a few months however would like to get that sooner    Will hold off for now on the brain and cervical

## 2021-07-22 NOTE — TELEPHONE ENCOUNTER
Pt calling to report the increase in lyrica is not helping with her back pain  States her whole spine hurts  Notes she cannot do anything physical without massive amounts of pain  Asking for an alternative medication to try  Pt is currently taking pregabalin 50mg BID and baclofen 10mg BID  Previously on flexeril and reports lethargy  Also on ibuprofen and meloxicam in the past without relief  Please advise  *Call was disconnected (lost connection) before formally ending call  Left voicemail for pt making her aware message will be sent with her concerns  She is to call if she has anything further to add  Okay to leave detailed message per communication consent

## 2021-07-23 NOTE — TELEPHONE ENCOUNTER
Called and advised pt of all of the below  She verbalized clear understanding of all instructions  Pt is asking what is MS brandt? Advised pt that it is painful feeling of tightness or pressure in torso area  Pt verbalized understanding  Pt will call to schedule MRI thoracic sooner  States that she already increased her baclofen to 2 tabs bid and it is not helping  As soon as she does anything, bend down or cook  Any physical activity she is in pain  Pt states that she is aware that you don't want her to take steroids but she is asking if you would consider prescribing steroids?

## 2021-07-26 NOTE — TELEPHONE ENCOUNTER
Spoke w/patient  She is agreeable to script for Robaxin  She does not wish to increase dose of Topamax at this time  Nadira - please enter script for Robaxin  Send to [facility] in [facility]  Thanks!

## 2021-07-26 NOTE — TELEPHONE ENCOUNTER
MD Umu Ibarra CRNP 3 hours ago (12:37 PM)   NT  Patient has chronic UTIs and frequent Antibiotic use, would advise against steroids  Please consider Robaxin with increasing dose of topamax to 100mg/day-  Patient has chronic pain syndrome on Suboxone and clonidine already       Message text

## 2021-07-26 NOTE — TELEPHONE ENCOUNTER
Please see message below from Dr Elizabeth Carrillo,  does not want to initiate steroids  See if she would like to try Robaxin, if so would call in a script

## 2021-07-27 NOTE — TELEPHONE ENCOUNTER
I sent a script in for her Robaxin, would suggest she start with 1 at bedtime, can increase to 2 a day  May make her tired  She can take to consistently for about 3 days see if that works    Then can use as needed

## 2021-07-29 ENCOUNTER — OFFICE VISIT (OUTPATIENT)
Dept: PHYSICAL THERAPY | Age: 41
End: 2021-07-29
Payer: COMMERCIAL

## 2021-07-29 DIAGNOSIS — G35 MS (MULTIPLE SCLEROSIS) (HCC): Primary | ICD-10-CM

## 2021-07-29 DIAGNOSIS — R26.2 AMBULATORY DYSFUNCTION: ICD-10-CM

## 2021-07-29 PROCEDURE — 97113 AQUATIC THERAPY/EXERCISES: CPT

## 2021-07-29 NOTE — PROGRESS NOTES
Daily Note     Today's date: 2021  Patient name: Helen Haile  : 1980  MRN: [id number]  Referring provider: Mike Johnson PA-C  Dx:   Encounter Diagnosis     ICD-10-CM    1  MS (multiple sclerosis) ([facility] [address] )  G35    2  Ambulatory dysfunction  R26 2        Start Time: 1100  Stop Time: 1200  Total time in clinic (min): 60 minutes    Subjective: pt notes she is feeling better today than last week  She states she's very tired today  Objective: See treatment diary below      Assessment: Tolerated treatment fair  Slow progress w/ pt  She continues to come once a week because of her schedule  added resistive tubing for rows and sh ext focusing on core  Patient would benefit from continued PT      Plan: Continue per plan of care        Precautions: fall risk        Exercise Diary  5/10 5/25 6/1 6/10 6/17 6/24 7/15 7/22 7/29    Water walking 3' 5 5 10 10 10 10 10 10    Postural training             Gait training  5 5          Home exercise pgm/patient education 5'            Wall: t/h raises  x10 1'  1 1 1 nt 1    Hip x4  x10 ea x10 ea  1 2 2 nt 2    Marching  1 1  1 1 1 nt 1    squats     1 1 1 nt 1    Knee flex/ext   1  1 1 1 nt 1    Step-ups (fwd/bkwd/ss)             SLS (eyes open/closed)             SLS w UE mvmt  AROM/ball toss             Weight shifting             UE Noodle work x 4  2' sit 3' sit 3' sit 3' sit 4' standing 4 4 Sit 4' 4'    UE AROM             Resistive UE work (paddles, bells, TB)         3' Blue    UE ex's w/ 2# MB    8' sit  3' 2# 3 2# 3 2#     UE ex's w/ paddles    5' sit  5' 2# 5 2# 5 2#     Seated on pool bench w proper posture             Ankle df/pf 2' 2 2  1 1 1 nt 2    marching 2' 2 2  1 1 1 nt 2    Hip Ab/add 1' 1 1  1 1 1 nt 2    Knee flex/ext 2' 2 2  2 2 2 nt 2    Deep water mvmt 3' bike 2 3 3' 5 5 2 nt 4'    Deep water tx/stretching 5' 7 7 2x10' 10' 10 5 10x2 x15'    Specific self - stretches wall/steps  4 4 4 4 4 2 2 4        Modalities              whirlpool

## 2021-07-30 ENCOUNTER — HOSPITAL ENCOUNTER (OUTPATIENT)
Dept: INFUSION CENTER | Facility: CLINIC | Age: 41
Discharge: HOME | End: 2021-07-30
Payer: COMMERCIAL

## 2021-07-30 VITALS
SYSTOLIC BLOOD PRESSURE: 100 MMHG | TEMPERATURE: 97.4 F | HEART RATE: 69 BPM | RESPIRATION RATE: 18 BRPM | DIASTOLIC BLOOD PRESSURE: 62 MMHG

## 2021-07-30 DIAGNOSIS — G35 MS (MULTIPLE SCLEROSIS) (HCC): Primary | ICD-10-CM

## 2021-07-30 LAB
ALBUMIN SERPL BCP-MCNC: 3.9 G/DL (ref 3.5–5)
ALP SERPL-CCNC: 95 U/L (ref 46–116)
ALT SERPL W P-5'-P-CCNC: 44 U/L (ref 12–78)
ANION GAP SERPL CALCULATED.3IONS-SCNC: 11 MMOL/L (ref 4–13)
AST SERPL W P-5'-P-CCNC: 58 U/L (ref 5–45)
BILIRUB SERPL-MCNC: 0.33 MG/DL (ref 0.2–1)
BUN SERPL-MCNC: 12 MG/DL (ref 5–25)
CALCIUM SERPL-MCNC: 8.8 MG/DL (ref 8.3–10.1)
CHLORIDE SERPL-SCNC: 105 MMOL/L (ref 100–108)
CO2 SERPL-SCNC: 26 MMOL/L (ref 21–32)
CREAT SERPL-MCNC: 0.65 MG/DL (ref 0.6–1.3)
ERYTHROCYTE [DISTWIDTH] IN BLOOD BY AUTOMATED COUNT: 13 % (ref 11.6–15.1)
GFR SERPL CREATININE-BSD FRML MDRD: 111 ML/MIN/1.73SQ M
GLUCOSE SERPL-MCNC: 81 MG/DL (ref 65–140)
HCT VFR BLD AUTO: 37.6 % (ref 34.8–46.1)
HGB BLD-MCNC: 12.6 G/DL (ref 11.5–15.4)
MCH RBC QN AUTO: 33.4 PG (ref 26.8–34.3)
MCHC RBC AUTO-ENTMCNC: 33.5 G/DL (ref 31.4–37.4)
MCV RBC AUTO: 100 FL (ref 82–98)
PLATELET # BLD AUTO: 67 THOUSANDS/UL (ref 149–390)
PMV BLD AUTO: 13.3 FL (ref 8.9–12.7)
POTASSIUM SERPL-SCNC: 3.9 MMOL/L (ref 3.5–5.3)
PROT SERPL-MCNC: 7.1 G/DL (ref 6.4–8.2)
RBC # BLD AUTO: 3.77 MILLION/UL (ref 3.81–5.12)
SODIUM SERPL-SCNC: 142 MMOL/L (ref 136–145)
WBC # BLD AUTO: 5.1 THOUSAND/UL (ref 4.31–10.16)

## 2021-07-30 PROCEDURE — 96365 THER/PROPH/DIAG IV INF INIT: CPT

## 2021-07-30 PROCEDURE — 85027 COMPLETE CBC AUTOMATED: CPT | Performed by: PSYCHIATRY & NEUROLOGY

## 2021-07-30 PROCEDURE — 80053 COMPREHEN METABOLIC PANEL: CPT | Performed by: PSYCHIATRY & NEUROLOGY

## 2021-07-30 PROCEDURE — 96367 TX/PROPH/DG ADDL SEQ IV INF: CPT

## 2021-07-30 RX ORDER — SODIUM CHLORIDE 9 MG/ML
20 INJECTION, SOLUTION INTRAVENOUS ONCE
Status: CANCELLED | OUTPATIENT
Start: 2021-08-27

## 2021-07-30 RX ORDER — SODIUM CHLORIDE 9 MG/ML
20 INJECTION, SOLUTION INTRAVENOUS ONCE
Status: COMPLETED | OUTPATIENT
Start: 2021-07-30 | End: 2021-07-30

## 2021-07-30 RX ORDER — ACETAMINOPHEN 325 MG/1
650 TABLET ORAL ONCE
Status: CANCELLED | OUTPATIENT
Start: 2021-08-27 | End: 2021-08-16

## 2021-07-30 RX ORDER — ACETAMINOPHEN 325 MG/1
650 TABLET ORAL ONCE
Status: COMPLETED | OUTPATIENT
Start: 2021-07-30 | End: 2021-07-30

## 2021-07-30 RX ADMIN — ACETAMINOPHEN 650 MG: 325 TABLET, FILM COATED ORAL at 12:35

## 2021-07-30 RX ADMIN — DIPHENHYDRAMINE HYDROCHLORIDE 50 MG: 50 INJECTION, SOLUTION INTRAMUSCULAR; INTRAVENOUS at 12:30

## 2021-07-30 RX ADMIN — NATALIZUMAB 300 MG: 300 INJECTION INTRAVENOUS at 12:56

## 2021-07-30 RX ADMIN — SODIUM CHLORIDE 20 ML/HR: 0.9 INJECTION, SOLUTION INTRAVENOUS at 12:33

## 2021-07-30 NOTE — PROGRESS NOTES
Patient arrived to the unit and denied complications  Pre infusion checklist completed via the touch program  Patient tolerated her tysabri infusion and remained for one hour of observation

## 2021-08-02 ENCOUNTER — TELEPHONE (OUTPATIENT)
Dept: NEUROLOGY | Facility: CLINIC | Age: 41
End: 2021-08-02

## 2021-08-02 NOTE — TELEPHONE ENCOUNTER
Patient left voicemail giving update on Robaxin 500mg  She states the medication seems to be working  Pain is decreased and she feels better  Patient states "so far, so good   No need for a return call "

## 2021-08-05 ENCOUNTER — OFFICE VISIT (OUTPATIENT)
Dept: PHYSICAL THERAPY | Age: 41
End: 2021-08-05
Payer: COMMERCIAL

## 2021-08-05 ENCOUNTER — TELEPHONE (OUTPATIENT)
Dept: UROLOGY | Facility: CLINIC | Age: 41
End: 2021-08-05

## 2021-08-05 DIAGNOSIS — G35 MS (MULTIPLE SCLEROSIS) (HCC): Primary | ICD-10-CM

## 2021-08-05 DIAGNOSIS — R26.2 AMBULATORY DYSFUNCTION: ICD-10-CM

## 2021-08-05 PROCEDURE — 97113 AQUATIC THERAPY/EXERCISES: CPT

## 2021-08-05 NOTE — PROGRESS NOTES
Daily Note     Today's date: 2021  Patient name: Naeem Rose  : 1980  MRN: [id number]  Referring provider: Andre Whaley PA-C  Dx:   Encounter Diagnosis     ICD-10-CM    1  MS (multiple sclerosis) ([facility] [address] )  G35    2  Ambulatory dysfunction  R26 2        Start Time: 1105  Stop Time: 1200  Total time in clinic (min): 55 minutes    Subjective: Patient notes continued weakness  She states, "I fell the other day  My body just moves faster than I think sometimes  I didn't hit my head  I'm good"  Objective: See treatment diary below    Assessment: Tolerated treatment fair  Minor R knee irritation noted during standing TE secondary to past surgeries and "no cartilage left"  Patient still fatiguing with the current PT POC  Patient would benefit from continued PT    Plan: Continue per plan of care        Precautions: fall risk    Exercise Diary  5/10 5/25 6/1 6/10 6/17 6/24 7/15 7/22 7/29 8   Water walking 3' 5 5 10 10 10 10 10 10 10   Postural training             Gait training  5 5          Home exercise pgm/patient education 5'            Wall: t/h raises  x10 1'  1 1 1 nt 1 1   Hip x4  x10 ea x10 ea  1 2 2 nt 2 2   Marching  1 1  1 1 1 nt 1 1   squats     1 1 1 nt 1 1   Knee flex/ext   1  1 1 1 nt 1 1   Step-ups (fwd/bkwd/ss)             SLS (eyes open/closed)             SLS w UE mvmt  AROM/ball toss             Weight shifting             UE Noodle work x 4  2' sit 3' sit 3' sit 3' sit 4' standing 4 4 Sit 4' 4' 4   UE AROM             Resistive UE work (paddles, bells, TB)         3' Blue 3'  Blue   UE ex's w/ 2# MB    8' sit  3' 2# 3 2# 3 2#     UE ex's w/ paddles    5' sit  5' 2# 5 2# 5 2#     Seated on pool bench w proper posture             Ankle df/pf 2' 2 2  1 1 1 nt 2 2   marching 2' 2 2  1 1 1 nt 2 2   Hip Ab/add 1' 1 1  1 1 1 nt 2 2   Knee flex/ext 2' 2 2  2 2 2 nt 2 2   Deep water mvmt 3' bike 2 3 3' 5 5 2 nt 4' 4'   Deep water tx/stretching 5' 7 7 2x10' 10' 10 5 10x2 x15' 10'   Specific self - stretches wall/steps  4 4 4 4 4 2 2 4 4     Modalities              whirlpool

## 2021-08-12 ENCOUNTER — APPOINTMENT (OUTPATIENT)
Dept: PHYSICAL THERAPY | Age: 41
End: 2021-08-12
Payer: COMMERCIAL

## 2021-08-13 ENCOUNTER — TELEPHONE (OUTPATIENT)
Dept: NEUROLOGY | Facility: CLINIC | Age: 41
End: 2021-08-13

## 2021-08-13 DIAGNOSIS — G35 MS (MULTIPLE SCLEROSIS) (HCC): Primary | ICD-10-CM

## 2021-08-13 NOTE — TELEPHONE ENCOUNTER
Please proceed with infusion
Pt is scheduled for Tysabri on 8/27/21  Last infusion 7/30/21, infusions every 4 weeks  Confirmed scheduling is correct  Last CBC/CMP 7/30/21  Last JCV 6/21/21 0 21 (negative)  Last MRI brain and c-spine 3/1/21, scheduled for updated imaging 8/16/21, 10/6/21, and 10/7/21    Okay to proceed with infusion? Mary Ann Morales is approved with [employer] from 2/23/21 to 8/29/21, Harinder Farias # [id number]  Tysabri touch reauthorization completed, valid until 3/3/22 at [facility] infusion center  Pt will need new PA after this infusion 
9

## 2021-08-16 ENCOUNTER — HOSPITAL ENCOUNTER (OUTPATIENT)
Dept: MRI IMAGING | Facility: HOSPITAL | Age: 41
Discharge: HOME | End: 2021-08-16
Attending: PSYCHIATRY & NEUROLOGY
Payer: COMMERCIAL

## 2021-08-16 DIAGNOSIS — G35 MS (MULTIPLE SCLEROSIS) (HCC): ICD-10-CM

## 2021-08-16 PROCEDURE — G1004 CDSM NDSC: HCPCS

## 2021-08-16 PROCEDURE — 72157 MRI CHEST SPINE W/O & W/DYE: CPT

## 2021-08-16 PROCEDURE — A9585 GADOBUTROL INJECTION: HCPCS | Performed by: PSYCHIATRY & NEUROLOGY

## 2021-08-16 RX ADMIN — GADOBUTROL 4 ML: 604.72 INJECTION INTRAVENOUS at 16:00

## 2021-08-18 ENCOUNTER — TELEPHONE (OUTPATIENT)
Dept: NEUROLOGY | Facility: CLINIC | Age: 41
End: 2021-08-18

## 2021-08-18 NOTE — TELEPHONE ENCOUNTER
I sent a high priority staff message to Dr Pawan Castanon:      Dr Ricardo Hunter from [facility] radiology just called me  The patient had a T-spine MRI, w/significant findings  I wanted to notify you  Please let me know, if you need an appointment to be scheduled ASAP   Thank you kindly!        -Evaline Salines

## 2021-08-18 NOTE — TELEPHONE ENCOUNTER
Dr Avonne Canavan response via staff message:      MD Melania Sánchez  New lesion from [address], but not active demyelination - no urgent evaluation required   TY           Previous Messages

## 2021-08-19 ENCOUNTER — OFFICE VISIT (OUTPATIENT)
Dept: PHYSICAL THERAPY | Age: 41
End: 2021-08-19
Payer: COMMERCIAL

## 2021-08-19 DIAGNOSIS — G35 MS (MULTIPLE SCLEROSIS) (HCC): Primary | ICD-10-CM

## 2021-08-19 DIAGNOSIS — R26.2 AMBULATORY DYSFUNCTION: ICD-10-CM

## 2021-08-19 PROCEDURE — 97113 AQUATIC THERAPY/EXERCISES: CPT

## 2021-08-19 NOTE — PROGRESS NOTES
Daily Note     Today's date: 2021  Patient name: Karlos Henry  : 1980  MRN: [id number]  Referring provider: Shamika Kearney PA-C  Dx:   Encounter Diagnosis     ICD-10-CM    1  MS (multiple sclerosis) ([facility] )  G35    2  Ambulatory dysfunction  R26 2        Start Time: 1100  Stop Time: 1200  Total time in clinic (min): 60 minutes    Subjective: pt notes no sig change  Objective: See treatment diary below      Assessment: Tolerated treatment well today  No rest periods needed  Gentle ex's for LE and UE strengthening  Pt continues w/ poor gait and sig muscle weakness in LE's  Patient continues w/ once a week for aquatic PT  Will be progressing pt to HEP at the end of the month  Plan: Continue per plan of care        Precautions: fall risk    Exercise Diary   6/10 6/17 6/24 7/15 7/22 7/29 8/5   Water walking 10 5 5 10 10 10 10 10 10 10   Postural training 3            Gait training  5 5          Home exercise pgm/patient education             Wall: t/h raises 1 x10 1'  1 1 1 nt 1 1   Hip x4 2 x10 ea x10 ea  1 2 2 nt 2 2   Marching 1 1 1  1 1 1 nt 1 1   squats 1    1 1 1 nt 1 1   Knee flex/ext 1  1  1 1 1 nt 1 1   Step-ups (fwd/bkwd/ss)             SLS (eyes open/closed)             SLS w UE mvmt  AROM/ball toss             Weight shifting             UE Noodle work x 4  4 3' sit 3' sit 3' sit 4' standing 4 4 Sit 4' 4' 4   UE AROM             Resistive UE work (paddles, bells, TB) 2' Blue        3' Blue 3'  Blue   UE ex's w/ 2# MB    8' sit  3' 2# 3 2# 3 2#     UE ex's w/ paddles    5' sit  5' 2# 5 2# 5 2#     Seated on pool bench w proper posture 1            Ankle df/pf 2' 2 2  1 1 1 nt 2 2   marching 2' 2 2  1 1 1 nt 2 2   Hip Ab/add 2 1 1  1 1 1 nt 2 2   Knee flex/ext 2' 2 2  2 2 2 nt 2 2   Deep water mvmt 5' bike 2 3 3' 5 5 2 nt 4' 4'   Deep water tx/stretching 10 7 7 2x10' 10' 10 5 10x2 x15' 10'   Specific self - stretches wall/steps 4 4 4 4 4 4 2 2 4 4     Modalities whshanelleool

## 2021-08-25 NOTE — TELEPHONE ENCOUNTER
MS relapse already took place, symptoms patient has been experiencing already are due to her lesions, including a new one

## 2021-08-25 NOTE — TELEPHONE ENCOUNTER
Pt calling to report she received letter in the mail regarding abnormal results  Reviewed below message with her  She verbalizes understanding  I did also review MS disease process with pt  She is asking if the mentioned lesion could ever cause her issues in the future? Please advise

## 2021-08-26 ENCOUNTER — OFFICE VISIT (OUTPATIENT)
Dept: PHYSICAL THERAPY | Age: 41
End: 2021-08-26
Payer: COMMERCIAL

## 2021-08-26 DIAGNOSIS — R26.2 AMBULATORY DYSFUNCTION: ICD-10-CM

## 2021-08-26 DIAGNOSIS — G35 MS (MULTIPLE SCLEROSIS) (HCC): Primary | ICD-10-CM

## 2021-08-26 PROCEDURE — 97113 AQUATIC THERAPY/EXERCISES: CPT

## 2021-08-26 NOTE — PROGRESS NOTES
Daily Note     Today's date: 2021  Patient name: Terrie Silver  : 1980  MRN: [id number]  Referring provider: Jayla Quezada PA-C  Dx:   Encounter Diagnosis     ICD-10-CM    1  MS (multiple sclerosis) ([facility] [address] )  G35    2  Ambulatory dysfunction  R26 2        Start Time: 1100  Stop Time: 1200  Total time in clinic (min): 60 minutes    Subjective: pt notes she is starting a new job next month and can't attend PT so pt  Will join our fitness program to continue her ex's  " the water has really helped me a lot "      Objective: See treatment diary below      Assessment: Tolerated treatment well today  No rest periods needed today   Instructed pt on our fitness program and pt is in agreement to continue w/ our fitness program        Plan: discharge pt to fitness program     Precautions: fall risk    Exercise Diary   6 6/10 6/17 6/24 7/15 7/22 7/29 8   Water walking 10 10 5 10 10 10 10 10 10 10   Postural training 3            Gait training   5          Home exercise pgm/patient education             Wall: t/h raises 1 1 1'  1 1 1 nt 1 1   Hip x4 2 2 x10 ea  1 2 2 nt 2 2   Marching 1 1 1  1 1 1 nt 1 1   squats 1 1   1 1 1 nt 1 1   Knee flex/ext 1 1 1  1 1 1 nt 1 1   Step-ups (fwd/bkwd/ss)             SLS (eyes open/closed)             SLS w UE mvmt  AROM/ball toss             Weight shifting             UE Noodle work x 4  4 4 3' sit 3' sit 4' standing 4 4 Sit 4' 4' 4   UE AROM             Resistive UE work (paddles, bells, TB) 2' Blue 2       3' Blue 3'  Blue   UE ex's w/ 2# MB    8' sit  3' 2# 3 2# 3 2#     UE ex's w/ paddles    5' sit  5' 2# 5 2# 5 2#     Seated on pool bench w proper posture 1            Ankle df/pf 2' 2 2  1 1 1 nt 2 2   marching 2' 2 2  1 1 1 nt 2 2   Hip Ab/add 2 1 1  1 1 1 nt 2 2   Knee flex/ext 2' 2 2  2 2 2 nt 2 2   Deep water mvmt 5' bike 5 3 3' 5 5 2 nt 4' 4'   Deep water tx/stretching 10 10 7 2x10' 10' 10 5 10x2 x15' 10'   Specific self - stretches wall/steps 4 4 4 4 4 4 2 2 4 4     Modalities              whirlpool

## 2021-08-27 ENCOUNTER — HOSPITAL ENCOUNTER (OUTPATIENT)
Dept: INFUSION CENTER | Facility: CLINIC | Age: 41
Discharge: HOME | End: 2021-08-27
Payer: COMMERCIAL

## 2021-08-27 VITALS
HEART RATE: 71 BPM | DIASTOLIC BLOOD PRESSURE: 78 MMHG | TEMPERATURE: 97.9 F | RESPIRATION RATE: 18 BRPM | SYSTOLIC BLOOD PRESSURE: 114 MMHG

## 2021-08-27 DIAGNOSIS — G35 MS (MULTIPLE SCLEROSIS) (HCC): Primary | ICD-10-CM

## 2021-08-27 LAB
ALBUMIN SERPL BCP-MCNC: 3.6 G/DL (ref 3.5–5)
ALP SERPL-CCNC: 102 U/L (ref 46–116)
ALT SERPL W P-5'-P-CCNC: 62 U/L (ref 12–78)
ANION GAP SERPL CALCULATED.3IONS-SCNC: 8 MMOL/L (ref 4–13)
AST SERPL W P-5'-P-CCNC: 89 U/L (ref 5–45)
BASOPHILS # BLD AUTO: 0.03 THOUSANDS/ΜL (ref 0–0.1)
BASOPHILS NFR BLD AUTO: 1 % (ref 0–1)
BILIRUB SERPL-MCNC: 0.6 MG/DL (ref 0.2–1)
BUN SERPL-MCNC: 5 MG/DL (ref 5–25)
CALCIUM SERPL-MCNC: 8.4 MG/DL (ref 8.3–10.1)
CHLORIDE SERPL-SCNC: 106 MMOL/L (ref 100–108)
CO2 SERPL-SCNC: 29 MMOL/L (ref 21–32)
CREAT SERPL-MCNC: 0.69 MG/DL (ref 0.6–1.3)
EOSINOPHIL # BLD AUTO: 0.14 THOUSAND/ΜL (ref 0–0.61)
EOSINOPHIL NFR BLD AUTO: 3 % (ref 0–6)
ERYTHROCYTE [DISTWIDTH] IN BLOOD BY AUTOMATED COUNT: 13.7 % (ref 11.6–15.1)
GFR SERPL CREATININE-BSD FRML MDRD: 109 ML/MIN/1.73SQ M
GLUCOSE SERPL-MCNC: 82 MG/DL (ref 65–140)
HCT VFR BLD AUTO: 39.2 % (ref 34.8–46.1)
HGB BLD-MCNC: 12.7 G/DL (ref 11.5–15.4)
IMM GRANULOCYTES # BLD AUTO: 0.01 THOUSAND/UL (ref 0–0.2)
IMM GRANULOCYTES NFR BLD AUTO: 0 % (ref 0–2)
LYMPHOCYTES # BLD AUTO: 2.52 THOUSANDS/ΜL (ref 0.6–4.47)
LYMPHOCYTES NFR BLD AUTO: 59 % (ref 14–44)
MCH RBC QN AUTO: 32.5 PG (ref 26.8–34.3)
MCHC RBC AUTO-ENTMCNC: 32.4 G/DL (ref 31.4–37.4)
MCV RBC AUTO: 100 FL (ref 82–98)
MONOCYTES # BLD AUTO: 0.43 THOUSAND/ΜL (ref 0.17–1.22)
MONOCYTES NFR BLD AUTO: 10 % (ref 4–12)
NEUTROPHILS # BLD AUTO: 1.16 THOUSANDS/ΜL (ref 1.85–7.62)
NEUTS SEG NFR BLD AUTO: 27 % (ref 43–75)
NRBC BLD AUTO-RTO: 1 /100 WBCS
PLATELET # BLD AUTO: 66 THOUSANDS/UL (ref 149–390)
PMV BLD AUTO: 12.9 FL (ref 8.9–12.7)
POTASSIUM SERPL-SCNC: 3.4 MMOL/L (ref 3.5–5.3)
PROT SERPL-MCNC: 6.8 G/DL (ref 6.4–8.2)
RBC # BLD AUTO: 3.91 MILLION/UL (ref 3.81–5.12)
SODIUM SERPL-SCNC: 143 MMOL/L (ref 136–145)
WBC # BLD AUTO: 4.29 THOUSAND/UL (ref 4.31–10.16)

## 2021-08-27 PROCEDURE — 85025 COMPLETE CBC W/AUTO DIFF WBC: CPT | Performed by: PSYCHIATRY & NEUROLOGY

## 2021-08-27 PROCEDURE — 96365 THER/PROPH/DIAG IV INF INIT: CPT

## 2021-08-27 PROCEDURE — 80053 COMPREHEN METABOLIC PANEL: CPT | Performed by: PSYCHIATRY & NEUROLOGY

## 2021-08-27 PROCEDURE — 96367 TX/PROPH/DG ADDL SEQ IV INF: CPT

## 2021-08-27 RX ORDER — SODIUM CHLORIDE 9 MG/ML
20 INJECTION, SOLUTION INTRAVENOUS ONCE
Status: COMPLETED | OUTPATIENT
Start: 2021-08-27 | End: 2021-08-27

## 2021-08-27 RX ORDER — SODIUM CHLORIDE 9 MG/ML
20 INJECTION, SOLUTION INTRAVENOUS ONCE
Status: CANCELLED | OUTPATIENT
Start: 2021-09-24

## 2021-08-27 RX ORDER — ACETAMINOPHEN 325 MG/1
650 TABLET ORAL ONCE
Status: COMPLETED | OUTPATIENT
Start: 2021-08-27 | End: 2021-08-27

## 2021-08-27 RX ORDER — ACETAMINOPHEN 325 MG/1
650 TABLET ORAL ONCE
Status: CANCELLED | OUTPATIENT
Start: 2021-09-24 | End: 2021-09-24

## 2021-08-27 RX ADMIN — NATALIZUMAB 300 MG: 300 INJECTION INTRAVENOUS at 13:02

## 2021-08-27 RX ADMIN — DIPHENHYDRAMINE HYDROCHLORIDE 50 MG: 50 INJECTION, SOLUTION INTRAMUSCULAR; INTRAVENOUS at 12:22

## 2021-08-27 RX ADMIN — SODIUM CHLORIDE 20 ML/HR: 0.9 INJECTION, SOLUTION INTRAVENOUS at 12:23

## 2021-08-27 RX ADMIN — ACETAMINOPHEN 650 MG: 325 TABLET, FILM COATED ORAL at 12:24

## 2021-08-27 NOTE — PROGRESS NOTES
Patient arrived on unit forTysabri  Tysabri preinfusion checklist reviewed with patient  Patient cleared for Tysabri today  Patient tolerated infusion and remained additional hour for observation  No discomforts/issues  Patient made aware of next appointment   AVS given to patient

## 2021-08-31 ENCOUNTER — TELEPHONE (OUTPATIENT)
Dept: NEUROLOGY | Facility: CLINIC | Age: 41
End: 2021-08-31

## 2021-08-31 NOTE — TELEPHONE ENCOUNTER
New PA is needed for Tysabri with [employer]  PA form completed and faxed with cover sheet, last office note, recent lab results, JCV results, and MRI results  Awaiting determination

## 2021-09-01 NOTE — TELEPHONE ENCOUNTER
Tysabri is approved with [employer] from 8/31/21 to 8/31/22 at [facility] center  Auth# [id number]  Referral updated

## 2021-09-02 ENCOUNTER — TELEPHONE (OUTPATIENT)
Dept: NEUROLOGY | Facility: CLINIC | Age: 41
End: 2021-09-02

## 2021-09-02 NOTE — TELEPHONE ENCOUNTER
Pt is requesting records from telephone encounters  Notes she is applying for SSI  Advised that she will need to complete SARAH form specifically requesting this  She would like [employer] form mailed to her  Address on file was confirmed  Mailed

## 2021-09-06 NOTE — PROGRESS NOTES
Pt was seen inconsistently 1x/week for aquatic PT  She did make some small gains with improved balance scores noted o her re-evaluation  She is starting a new job and will not be able to attend PT regularly and has been offered a "step down" fitness program   Discharge PT at this time

## 2021-09-10 ENCOUNTER — TELEPHONE (OUTPATIENT)
Dept: NEUROLOGY | Facility: CLINIC | Age: 41
End: 2021-09-10

## 2021-09-10 NOTE — TELEPHONE ENCOUNTER
Pt is scheduled for Tysabri on 9/24/21  Last infusion 8/27/21, infusions every 4 weeks  Confirmed scheduling is correct  Last CBC/CMP 8/27/21  Last JCV 6/21/21 0 21 (negative)  Last MRI t-spine 8/16/21, brain 3/1/21  Updated MRI brain and c-spine scheduled 10/6/21 and 10/7/21  Okay to proceed with infusion? Tysabri is approved with [employer] from 8/31/21 to 8/31/22 at [facility] infusion center   Auth# [id number]      Tysabri touch authorization valid until 3/3/22 at [facility] infusion [place]

## 2021-09-10 NOTE — TELEPHONE ENCOUNTER
Received AE report from [employer] stating pt reported her legs were getting worse, "pelvic floor was affected," memory problems, and lesion behind neck  All were reported around 3/3/21  Per chart review, these complaints were discussed at 3/3/21 OV while pt was receiving her first infusion  Noted this on form and faxed back

## 2021-09-24 ENCOUNTER — HOSPITAL ENCOUNTER (OUTPATIENT)
Dept: INFUSION CENTER | Facility: CLINIC | Age: 41
Discharge: HOME | End: 2021-09-24
Payer: COMMERCIAL

## 2021-09-24 VITALS
TEMPERATURE: 97.6 F | DIASTOLIC BLOOD PRESSURE: 60 MMHG | SYSTOLIC BLOOD PRESSURE: 101 MMHG | HEART RATE: 69 BPM | RESPIRATION RATE: 18 BRPM

## 2021-09-24 DIAGNOSIS — G35 MS (MULTIPLE SCLEROSIS) (HCC): Primary | ICD-10-CM

## 2021-09-24 LAB
ALBUMIN SERPL BCP-MCNC: 4.1 G/DL (ref 3.5–5)
ALP SERPL-CCNC: 107 U/L (ref 46–116)
ALT SERPL W P-5'-P-CCNC: 54 U/L (ref 12–78)
ANION GAP SERPL CALCULATED.3IONS-SCNC: 9 MMOL/L (ref 4–13)
AST SERPL W P-5'-P-CCNC: 64 U/L (ref 5–45)
BASOPHILS # BLD AUTO: 0.03 THOUSANDS/ΜL (ref 0–0.1)
BASOPHILS NFR BLD AUTO: 1 % (ref 0–1)
BILIRUB SERPL-MCNC: 0.45 MG/DL (ref 0.2–1)
BUN SERPL-MCNC: 10 MG/DL (ref 5–25)
CALCIUM SERPL-MCNC: 8.8 MG/DL (ref 8.3–10.1)
CHLORIDE SERPL-SCNC: 105 MMOL/L (ref 100–108)
CO2 SERPL-SCNC: 27 MMOL/L (ref 21–32)
CREAT SERPL-MCNC: 0.65 MG/DL (ref 0.6–1.3)
EOSINOPHIL # BLD AUTO: 0.14 THOUSAND/ΜL (ref 0–0.61)
EOSINOPHIL NFR BLD AUTO: 3 % (ref 0–6)
ERYTHROCYTE [DISTWIDTH] IN BLOOD BY AUTOMATED COUNT: 14.2 % (ref 11.6–15.1)
GFR SERPL CREATININE-BSD FRML MDRD: 111 ML/MIN/1.73SQ M
GLUCOSE SERPL-MCNC: 88 MG/DL (ref 65–140)
HCT VFR BLD AUTO: 37.5 % (ref 34.8–46.1)
HGB BLD-MCNC: 12.5 G/DL (ref 11.5–15.4)
IMM GRANULOCYTES # BLD AUTO: 0.01 THOUSAND/UL (ref 0–0.2)
IMM GRANULOCYTES NFR BLD AUTO: 0 % (ref 0–2)
LYMPHOCYTES # BLD AUTO: 2.46 THOUSANDS/ΜL (ref 0.6–4.47)
LYMPHOCYTES NFR BLD AUTO: 55 % (ref 14–44)
MCH RBC QN AUTO: 33.1 PG (ref 26.8–34.3)
MCHC RBC AUTO-ENTMCNC: 33.3 G/DL (ref 31.4–37.4)
MCV RBC AUTO: 99 FL (ref 82–98)
MONOCYTES # BLD AUTO: 0.36 THOUSAND/ΜL (ref 0.17–1.22)
MONOCYTES NFR BLD AUTO: 8 % (ref 4–12)
NEUTROPHILS # BLD AUTO: 1.46 THOUSANDS/ΜL (ref 1.85–7.62)
NEUTS SEG NFR BLD AUTO: 33 % (ref 43–75)
NRBC BLD AUTO-RTO: 1 /100 WBCS
PLATELET # BLD AUTO: 83 THOUSANDS/UL (ref 149–390)
PMV BLD AUTO: 12.4 FL (ref 8.9–12.7)
POTASSIUM SERPL-SCNC: 3.7 MMOL/L (ref 3.5–5.3)
PROT SERPL-MCNC: 7.4 G/DL (ref 6.4–8.2)
RBC # BLD AUTO: 3.78 MILLION/UL (ref 3.81–5.12)
SODIUM SERPL-SCNC: 141 MMOL/L (ref 136–145)
WBC # BLD AUTO: 4.46 THOUSAND/UL (ref 4.31–10.16)

## 2021-09-24 PROCEDURE — 96367 TX/PROPH/DG ADDL SEQ IV INF: CPT

## 2021-09-24 PROCEDURE — 80053 COMPREHEN METABOLIC PANEL: CPT | Performed by: PSYCHIATRY & NEUROLOGY

## 2021-09-24 PROCEDURE — 85025 COMPLETE CBC W/AUTO DIFF WBC: CPT | Performed by: PSYCHIATRY & NEUROLOGY

## 2021-09-24 PROCEDURE — 96365 THER/PROPH/DIAG IV INF INIT: CPT

## 2021-09-24 RX ORDER — ACETAMINOPHEN 325 MG/1
650 TABLET ORAL ONCE
Status: COMPLETED | OUTPATIENT
Start: 2021-09-24 | End: 2021-09-24

## 2021-09-24 RX ORDER — SODIUM CHLORIDE 9 MG/ML
20 INJECTION, SOLUTION INTRAVENOUS ONCE
Status: COMPLETED | OUTPATIENT
Start: 2021-09-24 | End: 2021-09-24

## 2021-09-24 RX ORDER — SODIUM CHLORIDE 9 MG/ML
20 INJECTION, SOLUTION INTRAVENOUS ONCE
Status: CANCELLED | OUTPATIENT
Start: 2021-10-22

## 2021-09-24 RX ORDER — ACETAMINOPHEN 325 MG/1
650 TABLET ORAL ONCE
Status: CANCELLED | OUTPATIENT
Start: 2021-10-22 | End: 2021-10-22

## 2021-09-24 RX ADMIN — SODIUM CHLORIDE 20 ML/HR: 0.9 INJECTION, SOLUTION INTRAVENOUS at 12:15

## 2021-09-24 RX ADMIN — DIPHENHYDRAMINE HYDROCHLORIDE 50 MG: 50 INJECTION, SOLUTION INTRAMUSCULAR; INTRAVENOUS at 12:15

## 2021-09-24 RX ADMIN — ACETAMINOPHEN 650 MG: 325 TABLET, FILM COATED ORAL at 12:06

## 2021-09-24 RX ADMIN — NATALIZUMAB 300 MG: 300 INJECTION INTRAVENOUS at 12:38

## 2021-09-24 NOTE — PROGRESS NOTES
Patient tolerated Tysabri infusion well  Observed for 1 hour post infusion with no complications  Pt is aware of all future appointments  AVS provided

## 2021-09-27 NOTE — TELEPHONE ENCOUNTER
Per communication from management, patient will need to call [phone] herself and state clearly  that she is seeking Telephone Encounters  Called patient  Received voicemail  Left detailed msg (per consent in chart) regarding above

## 2021-10-05 DIAGNOSIS — M79.18 MUSCULOSKELETAL PAIN, CHRONIC: ICD-10-CM

## 2021-10-05 DIAGNOSIS — G89.29 MUSCULOSKELETAL PAIN, CHRONIC: ICD-10-CM

## 2021-10-05 RX ORDER — METHOCARBAMOL 500 MG/1
500 TABLET, FILM COATED ORAL EVERY 6 HOURS PRN
Qty: 30 TABLET | Refills: 0 | Status: SHIPPED | OUTPATIENT
Start: 2021-10-05 | End: 2022-01-20 | Stop reason: SDUPTHER

## 2021-10-06 ENCOUNTER — HOSPITAL ENCOUNTER (OUTPATIENT)
Dept: MRI IMAGING | Facility: HOSPITAL | Age: 41
Discharge: HOME | End: 2021-10-06
Attending: PSYCHIATRY & NEUROLOGY
Payer: COMMERCIAL

## 2021-10-06 DIAGNOSIS — R26.2 AMBULATORY DYSFUNCTION: ICD-10-CM

## 2021-10-06 DIAGNOSIS — G89.29 MUSCULOSKELETAL PAIN, CHRONIC: ICD-10-CM

## 2021-10-06 DIAGNOSIS — M79.18 MUSCULOSKELETAL PAIN, CHRONIC: ICD-10-CM

## 2021-10-06 DIAGNOSIS — G35 MS (MULTIPLE SCLEROSIS) (HCC): ICD-10-CM

## 2021-10-06 PROCEDURE — G1004 CDSM NDSC: HCPCS

## 2021-10-06 PROCEDURE — 72156 MRI NECK SPINE W/O & W/DYE: CPT

## 2021-10-06 PROCEDURE — 70553 MRI BRAIN STEM W/O & W/DYE: CPT

## 2021-10-06 PROCEDURE — A9585 GADOBUTROL INJECTION: HCPCS | Performed by: PSYCHIATRY & NEUROLOGY

## 2021-10-06 RX ADMIN — GADOBUTROL 5 ML: 604.72 INJECTION INTRAVENOUS at 12:30

## 2021-10-08 ENCOUNTER — TELEPHONE (OUTPATIENT)
Dept: NEUROLOGY | Facility: CLINIC | Age: 41
End: 2021-10-08

## 2021-10-09 ENCOUNTER — HOSPITAL ENCOUNTER (EMERGENCY)
Facility: HOSPITAL | Age: 41
Discharge: HOME | End: 2021-10-09
Attending: EMERGENCY MEDICINE
Payer: COMMERCIAL

## 2021-10-09 VITALS
RESPIRATION RATE: 18 BRPM | OXYGEN SATURATION: 98 % | SYSTOLIC BLOOD PRESSURE: 125 MMHG | TEMPERATURE: 98 F | DIASTOLIC BLOOD PRESSURE: 78 MMHG | BODY MASS INDEX: 17.75 KG/M2 | HEIGHT: 66 IN | HEART RATE: 75 BPM | WEIGHT: 110.45 LBS

## 2021-10-09 DIAGNOSIS — R20.2 PARESTHESIAS: Primary | ICD-10-CM

## 2021-10-09 LAB
ALBUMIN SERPL BCP-MCNC: 4 G/DL (ref 3.5–5)
ALP SERPL-CCNC: 104 U/L (ref 46–116)
ALT SERPL W P-5'-P-CCNC: 50 U/L (ref 12–78)
ANION GAP SERPL CALCULATED.3IONS-SCNC: 9 MMOL/L (ref 4–13)
AST SERPL W P-5'-P-CCNC: 74 U/L (ref 5–45)
BASOPHILS # BLD AUTO: 0.02 THOUSANDS/ΜL (ref 0–0.1)
BASOPHILS NFR BLD AUTO: 0 % (ref 0–1)
BILIRUB SERPL-MCNC: 0.68 MG/DL (ref 0.2–1)
BUN SERPL-MCNC: 10 MG/DL (ref 5–25)
CALCIUM SERPL-MCNC: 9 MG/DL (ref 8.3–10.1)
CHLORIDE SERPL-SCNC: 104 MMOL/L (ref 100–108)
CO2 SERPL-SCNC: 27 MMOL/L (ref 21–32)
CREAT SERPL-MCNC: 0.64 MG/DL (ref 0.6–1.3)
EOSINOPHIL # BLD AUTO: 0.2 THOUSAND/ΜL (ref 0–0.61)
EOSINOPHIL NFR BLD AUTO: 4 % (ref 0–6)
ERYTHROCYTE [DISTWIDTH] IN BLOOD BY AUTOMATED COUNT: 14.5 % (ref 11.6–15.1)
GFR SERPL CREATININE-BSD FRML MDRD: 112 ML/MIN/1.73SQ M
GLUCOSE SERPL-MCNC: 85 MG/DL (ref 65–140)
HCT VFR BLD AUTO: 37.6 % (ref 34.8–46.1)
HGB BLD-MCNC: 12.3 G/DL (ref 11.5–15.4)
IMM GRANULOCYTES # BLD AUTO: 0.01 THOUSAND/UL (ref 0–0.2)
IMM GRANULOCYTES NFR BLD AUTO: 0 % (ref 0–2)
LYMPHOCYTES # BLD AUTO: 2.91 THOUSANDS/ΜL (ref 0.6–4.47)
LYMPHOCYTES NFR BLD AUTO: 59 % (ref 14–44)
MCH RBC QN AUTO: 32.6 PG (ref 26.8–34.3)
MCHC RBC AUTO-ENTMCNC: 32.7 G/DL (ref 31.4–37.4)
MCV RBC AUTO: 100 FL (ref 82–98)
MONOCYTES # BLD AUTO: 0.45 THOUSAND/ΜL (ref 0.17–1.22)
MONOCYTES NFR BLD AUTO: 9 % (ref 4–12)
NEUTROPHILS # BLD AUTO: 1.41 THOUSANDS/ΜL (ref 1.85–7.62)
NEUTS SEG NFR BLD AUTO: 28 % (ref 43–75)
NRBC BLD AUTO-RTO: 1 /100 WBCS
PLATELET # BLD AUTO: 77 THOUSANDS/UL (ref 149–390)
PMV BLD AUTO: 11.6 FL (ref 8.9–12.7)
POTASSIUM SERPL-SCNC: 3.8 MMOL/L (ref 3.5–5.3)
PROT SERPL-MCNC: 7.4 G/DL (ref 6.4–8.2)
RBC # BLD AUTO: 3.77 MILLION/UL (ref 3.81–5.12)
SODIUM SERPL-SCNC: 140 MMOL/L (ref 136–145)
TSH SERPL DL<=0.05 MIU/L-ACNC: 0.83 UIU/ML (ref 0.36–3.74)
WBC # BLD AUTO: 5 THOUSAND/UL (ref 4.31–10.16)

## 2021-10-09 PROCEDURE — 99285 EMERGENCY DEPT VISIT HI MDM: CPT | Performed by: EMERGENCY MEDICINE

## 2021-10-09 PROCEDURE — 84443 ASSAY THYROID STIM HORMONE: CPT | Performed by: EMERGENCY MEDICINE

## 2021-10-09 PROCEDURE — 99284 EMERGENCY DEPT VISIT MOD MDM: CPT

## 2021-10-09 PROCEDURE — 80053 COMPREHEN METABOLIC PANEL: CPT | Performed by: EMERGENCY MEDICINE

## 2021-10-09 PROCEDURE — 36415 COLL VENOUS BLD VENIPUNCTURE: CPT | Performed by: EMERGENCY MEDICINE

## 2021-10-09 PROCEDURE — 85025 COMPLETE CBC W/AUTO DIFF WBC: CPT | Performed by: EMERGENCY MEDICINE

## 2021-10-09 RX ORDER — PREDNISONE 20 MG/1
40 TABLET ORAL ONCE
Status: COMPLETED | OUTPATIENT
Start: 2021-10-09 | End: 2021-10-09

## 2021-10-09 RX ORDER — METHYLPREDNISOLONE 4 MG/1
TABLET ORAL
Qty: 21 TABLET | Refills: 0 | Status: SHIPPED | OUTPATIENT
Start: 2021-10-09 | End: 2022-03-15

## 2021-10-09 RX ADMIN — PREDNISONE 40 MG: 20 TABLET ORAL at 15:33

## 2021-10-22 ENCOUNTER — HOSPITAL ENCOUNTER (OUTPATIENT)
Facility: HOSPITAL | Age: 41
Setting detail: OUTPATIENT SURGERY
End: 2021-10-22
Attending: OBSTETRICS & GYNECOLOGY | Admitting: OBSTETRICS & GYNECOLOGY
Payer: COMMERCIAL

## 2021-10-22 ENCOUNTER — HOSPITAL ENCOUNTER (OUTPATIENT)
Dept: INFUSION CENTER | Facility: CLINIC | Age: 41
End: 2021-10-22

## 2021-10-22 ENCOUNTER — TELEPHONE (OUTPATIENT)
Dept: NEUROLOGY | Facility: CLINIC | Age: 41
End: 2021-10-22

## 2021-10-25 ENCOUNTER — TELEPHONE (OUTPATIENT)
Dept: NEUROLOGY | Facility: CLINIC | Age: 41
End: 2021-10-25

## 2021-10-25 ENCOUNTER — OFFICE VISIT (OUTPATIENT)
Dept: NEUROLOGY | Facility: CLINIC | Age: 41
End: 2021-10-25
Payer: COMMERCIAL

## 2021-10-25 ENCOUNTER — APPOINTMENT (OUTPATIENT)
Dept: LAB | Facility: HOSPITAL | Age: 41
End: 2021-10-25
Attending: PSYCHIATRY & NEUROLOGY
Payer: COMMERCIAL

## 2021-10-25 VITALS
HEART RATE: 99 BPM | HEIGHT: 66 IN | BODY MASS INDEX: 17.68 KG/M2 | WEIGHT: 110 LBS | SYSTOLIC BLOOD PRESSURE: 120 MMHG | DIASTOLIC BLOOD PRESSURE: 72 MMHG

## 2021-10-25 DIAGNOSIS — D69.6 THROMBOCYTOPENIA (HCC): ICD-10-CM

## 2021-10-25 DIAGNOSIS — G89.29 CHRONIC MIDLINE LOW BACK PAIN WITHOUT SCIATICA: ICD-10-CM

## 2021-10-25 DIAGNOSIS — G35 MS (MULTIPLE SCLEROSIS) (HCC): Primary | ICD-10-CM

## 2021-10-25 DIAGNOSIS — N31.9 NEUROGENIC BLADDER: ICD-10-CM

## 2021-10-25 DIAGNOSIS — M54.50 CHRONIC MIDLINE LOW BACK PAIN WITHOUT SCIATICA: ICD-10-CM

## 2021-10-25 DIAGNOSIS — G89.29 MUSCULOSKELETAL PAIN, CHRONIC: ICD-10-CM

## 2021-10-25 DIAGNOSIS — F11.11 NONDEPENDENT OPIOID ABUSE IN REMISSION (HCC): ICD-10-CM

## 2021-10-25 DIAGNOSIS — G35 MS (MULTIPLE SCLEROSIS) (HCC): ICD-10-CM

## 2021-10-25 DIAGNOSIS — G47.00 INSOMNIA: ICD-10-CM

## 2021-10-25 DIAGNOSIS — R26.89 BALANCE PROBLEMS: ICD-10-CM

## 2021-10-25 DIAGNOSIS — R26.2 AMBULATORY DYSFUNCTION: ICD-10-CM

## 2021-10-25 DIAGNOSIS — M79.18 MUSCULOSKELETAL PAIN, CHRONIC: ICD-10-CM

## 2021-10-25 DIAGNOSIS — F10.20 ALCOHOLISM (HCC): ICD-10-CM

## 2021-10-25 LAB
ALBUMIN SERPL BCP-MCNC: 4.4 G/DL (ref 3.5–5)
ALP SERPL-CCNC: 116 U/L (ref 46–116)
ALT SERPL W P-5'-P-CCNC: 50 U/L (ref 12–78)
ANION GAP SERPL CALCULATED.3IONS-SCNC: 8 MMOL/L (ref 4–13)
AST SERPL W P-5'-P-CCNC: 53 U/L (ref 5–45)
BASOPHILS # BLD AUTO: 0.05 THOUSANDS/ΜL (ref 0–0.1)
BASOPHILS NFR BLD AUTO: 1 % (ref 0–1)
BILIRUB SERPL-MCNC: 0.55 MG/DL (ref 0.2–1)
BUN SERPL-MCNC: 6 MG/DL (ref 5–25)
CALCIUM SERPL-MCNC: 9.2 MG/DL (ref 8.3–10.1)
CHLORIDE SERPL-SCNC: 104 MMOL/L (ref 100–108)
CO2 SERPL-SCNC: 30 MMOL/L (ref 21–32)
CREAT SERPL-MCNC: 0.69 MG/DL (ref 0.6–1.3)
EOSINOPHIL # BLD AUTO: 0.13 THOUSAND/ΜL (ref 0–0.61)
EOSINOPHIL NFR BLD AUTO: 3 % (ref 0–6)
ERYTHROCYTE [DISTWIDTH] IN BLOOD BY AUTOMATED COUNT: 14.5 % (ref 11.6–15.1)
GFR SERPL CREATININE-BSD FRML MDRD: 109 ML/MIN/1.73SQ M
GLUCOSE SERPL-MCNC: 96 MG/DL (ref 65–140)
HAV IGM SER QL: NORMAL
HBV CORE AB SER QL: NORMAL
HBV SURFACE AB SER-ACNC: 3.28 MIU/ML
HBV SURFACE AG SER QL: NORMAL
HCT VFR BLD AUTO: 39.9 % (ref 34.8–46.1)
HGB BLD-MCNC: 13 G/DL (ref 11.5–15.4)
IGA SERPL-MCNC: 182 MG/DL (ref 70–400)
IGG SERPL-MCNC: 941 MG/DL (ref 700–1600)
IGM SERPL-MCNC: 162 MG/DL (ref 40–230)
IMM GRANULOCYTES # BLD AUTO: 0.01 THOUSAND/UL (ref 0–0.2)
IMM GRANULOCYTES NFR BLD AUTO: 0 % (ref 0–2)
LYMPHOCYTES # BLD AUTO: 2.25 THOUSANDS/ΜL (ref 0.6–4.47)
LYMPHOCYTES NFR BLD AUTO: 45 % (ref 14–44)
MCH RBC QN AUTO: 32.1 PG (ref 26.8–34.3)
MCHC RBC AUTO-ENTMCNC: 32.6 G/DL (ref 31.4–37.4)
MCV RBC AUTO: 99 FL (ref 82–98)
MONOCYTES # BLD AUTO: 0.3 THOUSAND/ΜL (ref 0.17–1.22)
MONOCYTES NFR BLD AUTO: 6 % (ref 4–12)
NEUTROPHILS # BLD AUTO: 2.22 THOUSANDS/ΜL (ref 1.85–7.62)
NEUTS SEG NFR BLD AUTO: 45 % (ref 43–75)
NRBC BLD AUTO-RTO: 0 /100 WBCS
PLATELET # BLD AUTO: 69 THOUSANDS/UL (ref 149–390)
PMV BLD AUTO: 11.8 FL (ref 8.9–12.7)
POTASSIUM SERPL-SCNC: 3.8 MMOL/L (ref 3.5–5.3)
PROT SERPL-MCNC: 7.8 G/DL (ref 6.4–8.2)
RBC # BLD AUTO: 4.05 MILLION/UL (ref 3.81–5.12)
SODIUM SERPL-SCNC: 142 MMOL/L (ref 136–145)
WBC # BLD AUTO: 4.96 THOUSAND/UL (ref 4.31–10.16)

## 2021-10-25 PROCEDURE — 82784 ASSAY IGA/IGD/IGG/IGM EACH: CPT

## 2021-10-25 PROCEDURE — 86803 HEPATITIS C AB TEST: CPT

## 2021-10-25 PROCEDURE — 86359 T CELLS TOTAL COUNT: CPT

## 2021-10-25 PROCEDURE — 36415 COLL VENOUS BLD VENIPUNCTURE: CPT

## 2021-10-25 PROCEDURE — 80053 COMPREHEN METABOLIC PANEL: CPT

## 2021-10-25 PROCEDURE — 86360 T CELL ABSOLUTE COUNT/RATIO: CPT

## 2021-10-25 PROCEDURE — 86706 HEP B SURFACE ANTIBODY: CPT

## 2021-10-25 PROCEDURE — 86709 HEPATITIS A IGM ANTIBODY: CPT

## 2021-10-25 PROCEDURE — 87340 HEPATITIS B SURFACE AG IA: CPT

## 2021-10-25 PROCEDURE — 86355 B CELLS TOTAL COUNT: CPT

## 2021-10-25 PROCEDURE — 86704 HEP B CORE ANTIBODY TOTAL: CPT

## 2021-10-25 PROCEDURE — 99215 OFFICE O/P EST HI 40 MIN: CPT | Performed by: PSYCHIATRY & NEUROLOGY

## 2021-10-25 PROCEDURE — 85025 COMPLETE CBC W/AUTO DIFF WBC: CPT

## 2021-10-25 RX ORDER — DALFAMPRIDINE 10 MG/1
10 TABLET, FILM COATED, EXTENDED RELEASE ORAL 2 TIMES DAILY
Qty: 60 TABLET | Refills: 5 | Status: SHIPPED | OUTPATIENT
Start: 2021-10-25 | End: 2022-06-01 | Stop reason: SDUPTHER

## 2021-10-26 DIAGNOSIS — G35 MS (MULTIPLE SCLEROSIS) (HCC): Primary | ICD-10-CM

## 2021-10-26 LAB
BASOPHILS # BLD AUTO: 0 X10E3/UL (ref 0–0.2)
BASOPHILS NFR BLD AUTO: 1 %
CD19 CELLS # BLD: 427 /UL (ref 12–645)
CD19 CELLS NFR BLD: 19.4 % (ref 3.3–25.4)
CD3 CELLS # BLD: 1516 /UL (ref 622–2402)
CD3 CELLS NFR BLD: 68.9 % (ref 57.5–86.2)
CD3+CD4+ CELLS # BLD: 1199 /UL (ref 359–1519)
CD3+CD4+ CELLS NFR BLD: 54.5 % (ref 30.8–58.5)
CD3+CD4+ CELLS/CD3+CD8+ CLL BLD: 3.68 % (ref 0.92–3.72)
CD3+CD8+ CELLS # BLD: 326 /UL (ref 109–897)
CD3+CD8+ CELLS NFR BLD: 14.8 % (ref 12–35.5)
EOSINOPHIL # BLD AUTO: 0.1 X10E3/UL (ref 0–0.4)
EOSINOPHIL NFR BLD AUTO: 3 %
ERYTHROCYTE [DISTWIDTH] IN BLOOD BY AUTOMATED COUNT: 14.7 % (ref 11.7–15.4)
HCT VFR BLD AUTO: 37.9 % (ref 34–46.6)
HGB BLD-MCNC: 13.2 G/DL (ref 11.1–15.9)
IMM GRANULOCYTES # BLD: 0 X10E3/UL (ref 0–0.1)
IMM GRANULOCYTES NFR BLD: 0 %
LYMPHOCYTES # BLD AUTO: 2.2 X10E3/UL (ref 0.7–3.1)
LYMPHOCYTES NFR BLD AUTO: 44 %
MCH RBC QN AUTO: 33.4 PG (ref 26.6–33)
MCHC RBC AUTO-ENTMCNC: 34.8 G/DL (ref 31.5–35.7)
MCV RBC AUTO: 96 FL (ref 79–97)
MONOCYTES # BLD AUTO: 0.4 X10E3/UL (ref 0.1–0.9)
MONOCYTES NFR BLD AUTO: 7 %
MORPHOLOGY BLD-IMP: ABNORMAL
NEUTROPHILS # BLD AUTO: 2.2 X10E3/UL (ref 1.4–7)
NEUTROPHILS NFR BLD AUTO: 45 %
NRBC BLD AUTO-RTO: 1 % (ref 0–0)
PLATELET # BLD AUTO: 65 X10E3/UL (ref 150–450)
RBC # BLD AUTO: 3.95 X10E6/UL (ref 3.77–5.28)
WBC # BLD AUTO: 5 X10E3/UL (ref 3.4–10.8)

## 2021-10-26 RX ORDER — SODIUM CHLORIDE 9 MG/ML
20 INJECTION, SOLUTION INTRAVENOUS ONCE
Status: CANCELLED | OUTPATIENT
Start: 2021-10-28

## 2021-10-28 ENCOUNTER — HOSPITAL ENCOUNTER (OUTPATIENT)
Dept: INFUSION CENTER | Facility: HOSPITAL | Age: 41
Discharge: HOME | End: 2021-10-28
Attending: PSYCHIATRY & NEUROLOGY
Payer: COMMERCIAL

## 2021-10-28 VITALS
HEART RATE: 62 BPM | RESPIRATION RATE: 18 BRPM | OXYGEN SATURATION: 94 % | SYSTOLIC BLOOD PRESSURE: 98 MMHG | TEMPERATURE: 96.9 F | DIASTOLIC BLOOD PRESSURE: 62 MMHG

## 2021-10-28 DIAGNOSIS — G35 MS (MULTIPLE SCLEROSIS) (HCC): Primary | ICD-10-CM

## 2021-10-28 PROCEDURE — 96365 THER/PROPH/DIAG IV INF INIT: CPT

## 2021-10-28 RX ORDER — SODIUM CHLORIDE 9 MG/ML
20 INJECTION, SOLUTION INTRAVENOUS ONCE
Status: CANCELLED | OUTPATIENT
Start: 2021-11-24

## 2021-10-28 RX ORDER — SODIUM CHLORIDE 9 MG/ML
20 INJECTION, SOLUTION INTRAVENOUS ONCE
Status: COMPLETED | OUTPATIENT
Start: 2021-10-28 | End: 2021-10-28

## 2021-10-28 RX ADMIN — SODIUM CHLORIDE 1000 MG: 0.9 INJECTION, SOLUTION INTRAVENOUS at 09:30

## 2021-10-28 RX ADMIN — SODIUM CHLORIDE 20 ML/HR: 0.9 INJECTION, SOLUTION INTRAVENOUS at 09:10

## 2021-10-29 LAB — MISCELLANEOUS LAB TEST RESULT: NORMAL

## 2021-11-04 ENCOUNTER — TELEPHONE (OUTPATIENT)
Dept: NEUROLOGY | Facility: CLINIC | Age: 41
End: 2021-11-04

## 2021-11-05 ENCOUNTER — TELEPHONE (OUTPATIENT)
Dept: NEUROLOGY | Facility: CLINIC | Age: 41
End: 2021-11-05

## 2021-11-08 ENCOUNTER — APPOINTMENT (OUTPATIENT)
Dept: LAB | Facility: HOSPITAL | Age: 41
End: 2021-11-08
Payer: COMMERCIAL

## 2021-11-08 ENCOUNTER — TELEPHONE (OUTPATIENT)
Dept: NEUROLOGY | Facility: CLINIC | Age: 41
End: 2021-11-08

## 2021-11-08 DIAGNOSIS — G35 MULTIPLE SCLEROSIS (HCC): ICD-10-CM

## 2021-11-08 LAB
BASOPHILS # BLD AUTO: 0.04 THOUSANDS/ΜL (ref 0–0.1)
BASOPHILS NFR BLD AUTO: 1 % (ref 0–1)
EOSINOPHIL # BLD AUTO: 0.22 THOUSAND/ΜL (ref 0–0.61)
EOSINOPHIL NFR BLD AUTO: 5 % (ref 0–6)
ERYTHROCYTE [DISTWIDTH] IN BLOOD BY AUTOMATED COUNT: 14.3 % (ref 11.6–15.1)
HCT VFR BLD AUTO: 34.7 % (ref 34.8–46.1)
HGB BLD-MCNC: 11.5 G/DL (ref 11.5–15.4)
IMM GRANULOCYTES # BLD AUTO: 0.01 THOUSAND/UL (ref 0–0.2)
IMM GRANULOCYTES NFR BLD AUTO: 0 % (ref 0–2)
LYMPHOCYTES # BLD AUTO: 2.15 THOUSANDS/ΜL (ref 0.6–4.47)
LYMPHOCYTES NFR BLD AUTO: 46 % (ref 14–44)
MCH RBC QN AUTO: 32.7 PG (ref 26.8–34.3)
MCHC RBC AUTO-ENTMCNC: 33.1 G/DL (ref 31.4–37.4)
MCV RBC AUTO: 99 FL (ref 82–98)
MONOCYTES # BLD AUTO: 0.51 THOUSAND/ΜL (ref 0.17–1.22)
MONOCYTES NFR BLD AUTO: 11 % (ref 4–12)
NEUTROPHILS # BLD AUTO: 1.73 THOUSANDS/ΜL (ref 1.85–7.62)
NEUTS SEG NFR BLD AUTO: 37 % (ref 43–75)
NRBC BLD AUTO-RTO: 1 /100 WBCS
PLATELET # BLD AUTO: 68 THOUSANDS/UL (ref 149–390)
PMV BLD AUTO: 11.7 FL (ref 8.9–12.7)
RBC # BLD AUTO: 3.52 MILLION/UL (ref 3.81–5.12)
WBC # BLD AUTO: 4.66 THOUSAND/UL (ref 4.31–10.16)

## 2021-11-08 PROCEDURE — 85025 COMPLETE CBC W/AUTO DIFF WBC: CPT

## 2021-11-08 PROCEDURE — 36415 COLL VENOUS BLD VENIPUNCTURE: CPT

## 2021-11-09 VITALS — WEIGHT: 110 LBS | HEIGHT: 66 IN | BODY MASS INDEX: 17.68 KG/M2

## 2021-11-10 ENCOUNTER — TELEPHONE (OUTPATIENT)
Dept: NEUROLOGY | Facility: CLINIC | Age: 41
End: 2021-11-10

## 2021-11-10 DIAGNOSIS — G35 MS (MULTIPLE SCLEROSIS) (HCC): Primary | ICD-10-CM

## 2021-11-11 ENCOUNTER — TELEPHONE (OUTPATIENT)
Dept: NEUROLOGY | Facility: CLINIC | Age: 41
End: 2021-11-11

## 2021-11-12 RX ORDER — SODIUM CHLORIDE 9 MG/ML
125 INJECTION, SOLUTION INTRAVENOUS CONTINUOUS
Status: CANCELLED | OUTPATIENT
Start: 2021-11-15

## 2021-11-12 RX ORDER — ONDANSETRON 2 MG/ML
4 INJECTION INTRAMUSCULAR; INTRAVENOUS ONCE AS NEEDED
Status: CANCELLED | OUTPATIENT
Start: 2021-11-12

## 2021-11-12 RX ORDER — MEPERIDINE HYDROCHLORIDE 25 MG/ML
12.5 INJECTION INTRAMUSCULAR; INTRAVENOUS; SUBCUTANEOUS ONCE AS NEEDED
Status: CANCELLED | OUTPATIENT
Start: 2021-11-12

## 2021-11-12 RX ORDER — HYDROMORPHONE HCL/PF 1 MG/ML
0.5 SYRINGE (ML) INJECTION
Status: CANCELLED | OUTPATIENT
Start: 2021-11-12

## 2021-11-12 RX ORDER — CEFAZOLIN SODIUM 2 G/50ML
2000 SOLUTION INTRAVENOUS ONCE
Status: CANCELLED | OUTPATIENT
Start: 2021-11-15

## 2021-11-12 RX ORDER — PROMETHAZINE HYDROCHLORIDE 25 MG/ML
6.25 INJECTION, SOLUTION INTRAMUSCULAR; INTRAVENOUS ONCE AS NEEDED
Status: CANCELLED | OUTPATIENT
Start: 2021-11-12

## 2021-11-12 RX ORDER — FENTANYL CITRATE/PF 50 MCG/ML
50 SYRINGE (ML) INJECTION
Status: CANCELLED | OUTPATIENT
Start: 2021-11-12

## 2021-11-14 PROBLEM — F17.200 SMOKING: Status: ACTIVE | Noted: 2021-11-14

## 2021-11-14 PROBLEM — IMO0001 SMOKING: Status: ACTIVE | Noted: 2021-11-14

## 2021-11-15 ENCOUNTER — HOSPITAL ENCOUNTER (OUTPATIENT)
Dept: RADIOLOGY | Facility: HOSPITAL | Age: 41
Setting detail: OUTPATIENT SURGERY
Discharge: HOME | End: 2021-11-15
Payer: COMMERCIAL

## 2021-11-15 ENCOUNTER — ANESTHESIA EVENT (OUTPATIENT)
Dept: PERIOP | Facility: HOSPITAL | Age: 41
End: 2021-11-15
Payer: COMMERCIAL

## 2021-11-15 ENCOUNTER — HOSPITAL ENCOUNTER (OUTPATIENT)
Facility: HOSPITAL | Age: 41
Setting detail: OUTPATIENT SURGERY
Discharge: HOME | End: 2021-11-15
Attending: OBSTETRICS & GYNECOLOGY | Admitting: OBSTETRICS & GYNECOLOGY
Payer: COMMERCIAL

## 2021-11-15 ENCOUNTER — ANESTHESIA (OUTPATIENT)
Dept: PERIOP | Facility: HOSPITAL | Age: 41
End: 2021-11-15
Payer: COMMERCIAL

## 2021-11-15 VITALS
DIASTOLIC BLOOD PRESSURE: 59 MMHG | TEMPERATURE: 97.3 F | HEART RATE: 60 BPM | HEIGHT: 66 IN | SYSTOLIC BLOOD PRESSURE: 100 MMHG | BODY MASS INDEX: 17.68 KG/M2 | WEIGHT: 110.01 LBS | OXYGEN SATURATION: 98 % | RESPIRATION RATE: 20 BRPM

## 2021-11-15 DIAGNOSIS — R33.8 OTHER RETENTION OF URINE: ICD-10-CM

## 2021-11-15 LAB
EXT PREGNANCY TEST URINE: NEGATIVE
EXT. CONTROL: NORMAL

## 2021-11-15 PROCEDURE — 72220 X-RAY EXAM SACRUM TAILBONE: CPT

## 2021-11-15 PROCEDURE — 81025 URINE PREGNANCY TEST: CPT | Performed by: ANESTHESIOLOGY

## 2021-11-15 PROCEDURE — C1778 LEAD, NEUROSTIMULATOR: HCPCS | Performed by: OBSTETRICS & GYNECOLOGY

## 2021-11-15 PROCEDURE — C1883 ADAPT/EXT, PACING/NEURO LEAD: HCPCS | Performed by: OBSTETRICS & GYNECOLOGY

## 2021-11-15 DEVICE — PERCUTANEOUS EXTENSION
Type: IMPLANTABLE DEVICE | Site: SACRUM | Status: FUNCTIONAL
Brand: AXONICS

## 2021-11-15 DEVICE — TINED LEAD KIT
Type: IMPLANTABLE DEVICE | Site: SACRUM | Status: FUNCTIONAL
Brand: AXONICS

## 2021-11-15 RX ORDER — HYDROMORPHONE HCL/PF 1 MG/ML
0.5 SYRINGE (ML) INJECTION
Status: DISCONTINUED | OUTPATIENT
Start: 2021-11-15 | End: 2021-11-15 | Stop reason: HOSPADM

## 2021-11-15 RX ORDER — IBUPROFEN 600 MG/1
600 TABLET ORAL EVERY 6 HOURS PRN
Status: DISCONTINUED | OUTPATIENT
Start: 2021-11-15 | End: 2021-11-15 | Stop reason: HOSPADM

## 2021-11-15 RX ORDER — BUPIVACAINE HYDROCHLORIDE 5 MG/ML
INJECTION, SOLUTION EPIDURAL; INTRACAUDAL AS NEEDED
Status: DISCONTINUED | OUTPATIENT
Start: 2021-11-15 | End: 2021-11-15 | Stop reason: HOSPADM

## 2021-11-15 RX ORDER — MIDAZOLAM HYDROCHLORIDE 2 MG/2ML
INJECTION, SOLUTION INTRAMUSCULAR; INTRAVENOUS AS NEEDED
Status: DISCONTINUED | OUTPATIENT
Start: 2021-11-15 | End: 2021-11-15

## 2021-11-15 RX ORDER — PROPOFOL 10 MG/ML
INJECTION, EMULSION INTRAVENOUS AS NEEDED
Status: DISCONTINUED | OUTPATIENT
Start: 2021-11-15 | End: 2021-11-15

## 2021-11-15 RX ORDER — MEPERIDINE HYDROCHLORIDE 25 MG/ML
12.5 INJECTION INTRAMUSCULAR; INTRAVENOUS; SUBCUTANEOUS ONCE AS NEEDED
Status: DISCONTINUED | OUTPATIENT
Start: 2021-11-15 | End: 2021-11-15 | Stop reason: HOSPADM

## 2021-11-15 RX ORDER — LIDOCAINE HYDROCHLORIDE 20 MG/ML
INJECTION, SOLUTION EPIDURAL; INFILTRATION; INTRACAUDAL; PERINEURAL AS NEEDED
Status: DISCONTINUED | OUTPATIENT
Start: 2021-11-15 | End: 2021-11-15

## 2021-11-15 RX ORDER — SODIUM CHLORIDE 9 MG/ML
125 INJECTION, SOLUTION INTRAVENOUS CONTINUOUS
Status: DISCONTINUED | OUTPATIENT
Start: 2021-11-15 | End: 2021-11-15 | Stop reason: HOSPADM

## 2021-11-15 RX ORDER — DEXMEDETOMIDINE HYDROCHLORIDE 100 UG/ML
INJECTION, SOLUTION INTRAVENOUS AS NEEDED
Status: DISCONTINUED | OUTPATIENT
Start: 2021-11-15 | End: 2021-11-15

## 2021-11-15 RX ORDER — PROPOFOL 10 MG/ML
INJECTION, EMULSION INTRAVENOUS CONTINUOUS PRN
Status: DISCONTINUED | OUTPATIENT
Start: 2021-11-15 | End: 2021-11-15

## 2021-11-15 RX ORDER — GLYCOPYRROLATE 0.2 MG/ML
INJECTION INTRAMUSCULAR; INTRAVENOUS AS NEEDED
Status: DISCONTINUED | OUTPATIENT
Start: 2021-11-15 | End: 2021-11-15

## 2021-11-15 RX ORDER — PROMETHAZINE HYDROCHLORIDE 25 MG/ML
6.25 INJECTION, SOLUTION INTRAMUSCULAR; INTRAVENOUS ONCE AS NEEDED
Status: DISCONTINUED | OUTPATIENT
Start: 2021-11-15 | End: 2021-11-15 | Stop reason: HOSPADM

## 2021-11-15 RX ORDER — FENTANYL CITRATE/PF 50 MCG/ML
50 SYRINGE (ML) INJECTION
Status: DISCONTINUED | OUTPATIENT
Start: 2021-11-15 | End: 2021-11-15 | Stop reason: HOSPADM

## 2021-11-15 RX ORDER — CEFAZOLIN SODIUM 2 G/50ML
2000 SOLUTION INTRAVENOUS ONCE
Status: COMPLETED | OUTPATIENT
Start: 2021-11-15 | End: 2021-11-15

## 2021-11-15 RX ORDER — ONDANSETRON 2 MG/ML
4 INJECTION INTRAMUSCULAR; INTRAVENOUS EVERY 6 HOURS PRN
Status: DISCONTINUED | OUTPATIENT
Start: 2021-11-15 | End: 2021-11-15 | Stop reason: HOSPADM

## 2021-11-15 RX ORDER — ONDANSETRON 2 MG/ML
4 INJECTION INTRAMUSCULAR; INTRAVENOUS ONCE AS NEEDED
Status: DISCONTINUED | OUTPATIENT
Start: 2021-11-15 | End: 2021-11-15 | Stop reason: HOSPADM

## 2021-11-15 RX ORDER — ONDANSETRON 2 MG/ML
INJECTION INTRAMUSCULAR; INTRAVENOUS AS NEEDED
Status: DISCONTINUED | OUTPATIENT
Start: 2021-11-15 | End: 2021-11-15

## 2021-11-15 RX ORDER — ACETAMINOPHEN 325 MG/1
650 TABLET ORAL EVERY 6 HOURS PRN
Status: DISCONTINUED | OUTPATIENT
Start: 2021-11-15 | End: 2021-11-15 | Stop reason: HOSPADM

## 2021-11-15 RX ORDER — FENTANYL CITRATE 50 UG/ML
INJECTION, SOLUTION INTRAMUSCULAR; INTRAVENOUS AS NEEDED
Status: DISCONTINUED | OUTPATIENT
Start: 2021-11-15 | End: 2021-11-15

## 2021-11-15 RX ADMIN — FENTANYL CITRATE 100 MCG: 50 INJECTION INTRAMUSCULAR; INTRAVENOUS at 12:59

## 2021-11-15 RX ADMIN — ONDANSETRON 4 MG: 2 INJECTION INTRAMUSCULAR; INTRAVENOUS at 13:15

## 2021-11-15 RX ADMIN — PROPOFOL 100 MCG/KG/MIN: 10 INJECTION, EMULSION INTRAVENOUS at 13:12

## 2021-11-15 RX ADMIN — LIDOCAINE HYDROCHLORIDE 60 MG: 20 INJECTION, SOLUTION EPIDURAL; INFILTRATION; INTRACAUDAL; PERINEURAL at 13:11

## 2021-11-15 RX ADMIN — SODIUM CHLORIDE 125 ML/HR: 0.9 INJECTION, SOLUTION INTRAVENOUS at 12:26

## 2021-11-15 RX ADMIN — MIDAZOLAM 2 MG: 1 INJECTION INTRAMUSCULAR; INTRAVENOUS at 12:59

## 2021-11-15 RX ADMIN — CEFAZOLIN SODIUM 2000 MG: 2 SOLUTION INTRAVENOUS at 12:57

## 2021-11-15 RX ADMIN — GLYCOPYRROLATE 0.1 MG: 0.2 INJECTION, SOLUTION INTRAMUSCULAR; INTRAVENOUS at 12:59

## 2021-11-15 RX ADMIN — DEXMEDETOMIDINE HCL 20 MCG: 100 INJECTION INTRAVENOUS at 12:59

## 2021-11-15 RX ADMIN — DEXMEDETOMIDINE HCL 20 MCG: 100 INJECTION INTRAVENOUS at 13:12

## 2021-11-15 RX ADMIN — PROPOFOL 30 MG: 10 INJECTION, EMULSION INTRAVENOUS at 13:11

## 2021-11-15 RX ADMIN — IBUPROFEN 600 MG: 600 TABLET, FILM COATED ORAL at 16:01

## 2021-11-16 ENCOUNTER — DOCUMENTATION (OUTPATIENT)
Dept: NEUROLOGY | Facility: CLINIC | Age: 41
End: 2021-11-16

## 2021-11-18 ENCOUNTER — OFFICE VISIT (OUTPATIENT)
Dept: UROLOGY | Facility: CLINIC | Age: 41
End: 2021-11-18
Payer: COMMERCIAL

## 2021-11-18 VITALS
WEIGHT: 113 LBS | BODY MASS INDEX: 18.24 KG/M2 | DIASTOLIC BLOOD PRESSURE: 70 MMHG | HEART RATE: 62 BPM | SYSTOLIC BLOOD PRESSURE: 100 MMHG

## 2021-11-18 DIAGNOSIS — N31.9 NEUROGENIC BLADDER: Primary | ICD-10-CM

## 2021-11-18 LAB
POST-VOID RESIDUAL VOLUME, ML POC: 19 ML
SL AMB  POCT GLUCOSE, UA: NORMAL
SL AMB LEUKOCYTE ESTERASE,UA: NORMAL
SL AMB POCT BILIRUBIN,UA: NORMAL
SL AMB POCT BLOOD,UA: NORMAL
SL AMB POCT CLARITY,UA: CLEAR
SL AMB POCT COLOR,UA: YELLOW
SL AMB POCT KETONES,UA: NORMAL
SL AMB POCT NITRITE,UA: NORMAL
SL AMB POCT PH,UA: 8
SL AMB POCT SPECIFIC GRAVITY,UA: 1.02
SL AMB POCT URINE PROTEIN: NORMAL
SL AMB POCT UROBILINOGEN: 0.2

## 2021-11-18 PROCEDURE — 51798 US URINE CAPACITY MEASURE: CPT | Performed by: NURSE PRACTITIONER

## 2021-11-18 PROCEDURE — 99212 OFFICE O/P EST SF 10 MIN: CPT | Performed by: NURSE PRACTITIONER

## 2021-11-18 PROCEDURE — 81002 URINALYSIS NONAUTO W/O SCOPE: CPT | Performed by: NURSE PRACTITIONER

## 2021-11-24 ENCOUNTER — HOSPITAL ENCOUNTER (OUTPATIENT)
Dept: INFUSION CENTER | Facility: HOSPITAL | Age: 41
Discharge: HOME | End: 2021-11-24
Attending: PSYCHIATRY & NEUROLOGY
Payer: COMMERCIAL

## 2021-11-24 ENCOUNTER — TELEPHONE (OUTPATIENT)
Dept: OTHER | Facility: OTHER | Age: 41
End: 2021-11-24

## 2021-11-24 VITALS
DIASTOLIC BLOOD PRESSURE: 60 MMHG | TEMPERATURE: 97.1 F | RESPIRATION RATE: 17 BRPM | HEART RATE: 66 BPM | OXYGEN SATURATION: 96 % | SYSTOLIC BLOOD PRESSURE: 90 MMHG

## 2021-11-24 DIAGNOSIS — G35 MS (MULTIPLE SCLEROSIS) (HCC): Primary | ICD-10-CM

## 2021-11-24 PROCEDURE — 96365 THER/PROPH/DIAG IV INF INIT: CPT

## 2021-11-24 RX ORDER — SODIUM CHLORIDE 9 MG/ML
20 INJECTION, SOLUTION INTRAVENOUS ONCE
Status: COMPLETED | OUTPATIENT
Start: 2021-11-24 | End: 2021-11-24

## 2021-11-24 RX ORDER — SODIUM CHLORIDE 9 MG/ML
20 INJECTION, SOLUTION INTRAVENOUS ONCE
Status: CANCELLED | OUTPATIENT
Start: 2021-11-25

## 2021-11-24 RX ADMIN — SODIUM CHLORIDE 20 ML/HR: 0.9 INJECTION, SOLUTION INTRAVENOUS at 10:58

## 2021-11-24 RX ADMIN — SODIUM CHLORIDE 1000 MG: 0.9 INJECTION, SOLUTION INTRAVENOUS at 10:58

## 2021-12-06 ENCOUNTER — TELEPHONE (OUTPATIENT)
Dept: NEUROLOGY | Facility: CLINIC | Age: 41
End: 2021-12-06

## 2021-12-07 RX ORDER — OFATUMUMAB 20 MG/.4ML
20 INJECTION, SOLUTION SUBCUTANEOUS
Qty: 0.4 ML | Refills: 11 | Status: SHIPPED | OUTPATIENT
Start: 2021-12-07

## 2021-12-07 RX ORDER — OFATUMUMAB 20 MG/.4ML
INJECTION, SOLUTION SUBCUTANEOUS
Qty: 1.2 ML | Refills: 0 | Status: SHIPPED | OUTPATIENT
Start: 2021-12-07

## 2021-12-23 ENCOUNTER — HOSPITAL ENCOUNTER (OUTPATIENT)
Dept: INFUSION CENTER | Facility: HOSPITAL | Age: 41
Discharge: HOME | End: 2021-12-23
Attending: PSYCHIATRY & NEUROLOGY

## 2022-01-20 ENCOUNTER — TELEPHONE (OUTPATIENT)
Dept: NEUROLOGY | Facility: CLINIC | Age: 42
End: 2022-01-20

## 2022-01-20 DIAGNOSIS — G89.29 MUSCULOSKELETAL PAIN, CHRONIC: ICD-10-CM

## 2022-01-20 DIAGNOSIS — M79.18 MUSCULOSKELETAL PAIN, CHRONIC: ICD-10-CM

## 2022-01-20 RX ORDER — METHOCARBAMOL 500 MG/1
500 TABLET, FILM COATED ORAL EVERY 6 HOURS PRN
Qty: 30 TABLET | Refills: 0 | Status: SHIPPED | OUTPATIENT
Start: 2022-01-20

## 2022-01-20 NOTE — TELEPHONE ENCOUNTER
Pt left voicemail stating she prefers in office visit but will do virtual if needed  Also asking for methocarbamol refill  Called pt to discuss  She reports she is upset about starting [place] Dural without being told she could not continue Solumedrol infusions  Would like to discuss this in person  Asking to keep appt in office  I did keep appt in office per appt desk  Please review and sign refill request if agreeable, thanks!

## 2022-01-20 NOTE — TELEPHONE ENCOUNTER
Left message on voicemail      Reminder appt call     Patient is scheduled  for 1/24/2021 @ 11 am with Dr Jacquie Stein to advised pt to switch into virtual visit per recommendation of our office due to high increase cases of Covid 19 and potential exposure

## 2022-01-21 NOTE — TELEPHONE ENCOUNTER
Patient left  stating she is now agreeable to doing a virtual appt on 1/24/22  Spoke w/patient  She states she spoke w/someone earlier who changed appt to virtual in computer, but it has not been changed  Patient confirmed they are going to contact her via link to her phone  [place] - are you aware of this change? Do you know who spoke w/patient?

## 2022-01-21 NOTE — TELEPHONE ENCOUNTER
I was not aware of this change no encounter in pts chart either  I will switch over to a virtual visit      Thanks

## 2022-01-24 ENCOUNTER — TELEMEDICINE (OUTPATIENT)
Dept: NEUROLOGY | Facility: CLINIC | Age: 42
End: 2022-01-24
Payer: COMMERCIAL

## 2022-01-24 VITALS — BODY MASS INDEX: 18.48 KG/M2 | WEIGHT: 115 LBS | HEIGHT: 66 IN

## 2022-01-24 DIAGNOSIS — F11.11 NONDEPENDENT OPIOID ABUSE IN REMISSION (HCC): ICD-10-CM

## 2022-01-24 DIAGNOSIS — G89.29 MUSCULOSKELETAL PAIN, CHRONIC: ICD-10-CM

## 2022-01-24 DIAGNOSIS — F10.20 ALCOHOLISM (HCC): ICD-10-CM

## 2022-01-24 DIAGNOSIS — N31.9 NEUROGENIC BLADDER: ICD-10-CM

## 2022-01-24 DIAGNOSIS — G95.9 CERVICAL MYELOPATHY (HCC): ICD-10-CM

## 2022-01-24 DIAGNOSIS — G35 MS (MULTIPLE SCLEROSIS) (HCC): Primary | ICD-10-CM

## 2022-01-24 DIAGNOSIS — M79.18 MUSCULOSKELETAL PAIN, CHRONIC: ICD-10-CM

## 2022-01-24 DIAGNOSIS — Z86.19 HISTORY OF LYME DISEASE: ICD-10-CM

## 2022-01-24 DIAGNOSIS — D69.6 THROMBOCYTOPENIA (HCC): ICD-10-CM

## 2022-01-24 DIAGNOSIS — R26.2 AMBULATORY DYSFUNCTION: ICD-10-CM

## 2022-01-24 PROCEDURE — 99214 OFFICE O/P EST MOD 30 MIN: CPT | Performed by: PSYCHIATRY & NEUROLOGY

## 2022-01-24 NOTE — PROGRESS NOTES
[facility]  PATIENT:  Christin Weir  MRN:  [id number]  :  1980  DATE OF SERVICE:  2022  Virtual Regular Visit    Verification of patient location:    Patient is located in the following state in which I hold an active license [place]      Assessment/Plan:    Problem List Items Addressed This Visit        Nervous and Auditory    Cervical myelopathy ([facility] )    MS (multiple sclerosis) ([facility] ) - Primary       Other    Alcoholism ([facility] )    Musculoskeletal pain, chronic    Nondependent opioid abuse in remission ([facility] )    History of Lyme disease    Neurogenic bladder    Ambulatory dysfunction    Thrombocytopenia ([facility] )               Reason for visit is routine   Chief Complaint   Patient presents with    Virtual Regular Visit        Encounter provider Alfonso Gonzalez MD    Provider located at [address]  [phone]      Recent Visits  No visits were found meeting these conditions  Showing recent visits within past 7 days and meeting all other requirements  Today's Visits  Date Type Provider Dept   22 Telemedicine Alfonso Gonzalez MD [facility] [place]   Showing today's visits and meeting all other requirements  Future Appointments  No visits were found meeting these conditions  Showing future appointments within next 150 days and meeting all other requirements       The patient was identified by name and date of birth  Christin Weir was informed that this is a telemedicine visit and that the visit is being conducted through [facility] and patient was informed this is a secure, HIPAA-complaint platform  She agrees to proceed     My office door was closed  No one else was in the room  She acknowledged consent and understanding of privacy and security of the video platform  The patient has agreed to participate and understands they can discontinue the visit at any time      Patient is aware this is a [facility] service  Subjective  Katya Akers is a 40 y o  female with MS and related issues   HPI   Mrs Danae Rm is a 39 yo female who has presented to  [facility] for follow-up on multiple sclerosis and related issues  Patient described no new focal neurological deficit, she successfully started ofatumumab injections as she completed total of 4 doses with no side effects has been reported  Patient described no injection reaction, no joint pain  Patient has no significant changes in her motor dysfunction since last seen, had same time patient main concern today was if she can use steroid regimens while taking B-cell depleting therapy      We discontinued Tysabri as medication was not providing  significant benefits, with no improvement in ambulation reported  Patient ambulates by using crutches and cane  MRI brain suggest stable radiographic findings with mild chronic demyelinating plaque has been noted, patient has stable 2 demyelinating plaques in her cervical spine at C2-C3 and C4-C6 and T11-T12,  Which all contribute to the patient clinical dysfunction  Patient will be advised monthly Solu-Medrol 1000 mg IV infusions starting this week - we extensively discussed side effects of chronic steroid treatment including avascular necrosis, osteoporosis, cardiac dysfunction, infection, secondary diabetes, irritability, hypertension  We also agreed patient may consider starting dalfampridine 10 mg tablets, we also agreed there is a 40-50% chance of improvement in ambulatory dysfunction  Patient had sprain of muscle in the groin area when she was getting out of stationary bike 2 years ago with prednisone taper been provided for muscle spasm and pain in that region  Patient has been working with urology team for neurogenic bladder, last office visit was November 18, 2021  o[facility]ubacar Cook    Ms Danae Rm has presented to [facility] for follow-up on multiple sclerosis and related issues  Patient successfully started Dorotha Duong  with no injection reaction or additional side effects has been described  Patient has normal baseline of T/B lymphocytes as well as immunoglobulins prior to initiating stated to sick regimen  Patient has moderate agreed thrombocytopenia likely due to liver dysfunction and related alcoholism  Patient had significant involvement of her spinal cord which contributes to patient disability and ambulatory dysfunction  Patient should continue Dorotha Duong on monthly basis, no concern for intermittent use of Solu-Medrol of oral prednisone if clinically indicated  Patient is also interested in resuming aquatic therapy when it is going to be warm outside  Patient is to continue with urology team on scheduled basis  Patient has been following with primary care team as well as gynecology team with no other medical condition has been described, no recent infection or hospitalization has been reported  Follow-up with [facility] within 3 months    Past Medical History:   Diagnosis Date    Back pain     Chronic constipation 6/27/2016    Chronic pain disorder     Collar bone fracture     right side    Crutches as ambulation aid     Depression     Dyssynergic defecation     Type 1    ETOH abuse     Head injury 2014    Infectious viral hepatitis     Lyme disease     Meningitis spinal 2007?     Multiple sclerosis ([facility] )     Opioid abuse ([facility] )     Rib fractures     Thrombocytopenia (HCC)     Use of cane as ambulatory aid        Past Surgical History:   Procedure Laterality Date    APPENDECTOMY      CLAVICLE SURGERY      COLONOSCOPY      FL LUMBAR PUNCTURE DIAGNOSTIC  11/25/2020    KNEE ARTHROSCOPY      MULTIPLE TOOTH EXTRACTIONS      OK COLONOSCOPY FLX DX W/COLLJ SPEC WHEN PFRMD N/A 6/27/2016    Procedure: COLONOSCOPY;  Surgeon: Jerica Felix MD;  Location: [facility];  Service: Colorectal    OK INCISION,IMPLANT,NEUROELEC,SACRAL NERVE N/A 11/15/2021    Procedure: INSERTION OF NEUROSTIMULATOR ELECTRODE ARRAY SACRAL NERVE; FLUOROSCOPY; ELECTRONICN ANALYSIS;  Surgeon: Jaime Wagner MD;  Location: [facility];  Service: UroGynecology           TONSILLECTOMY AND ADENOIDECTOMY      TUBAL LIGATION         Current Outpatient Medications   Medication Sig Dispense Refill    b complex vitamins tablet Take 1 tablet by mouth daily      Biotin 13036 MCG TABS Take 1 tablet by mouth daily        buprenorphine-naloxone (SUBOXONE) 2-0 5 mg per SL tablet Place 0 5 tablets under the tongue 2 (two) times a day       Cholecalciferol (Vitamin D3) 125 MCG (5000 UT) TABS Take 5,000 Units by mouth daily      cloNIDine (CATAPRES) 0 2 mg tablet Take 0 2 mg by mouth daily at bedtime       docusate sodium (COLACE) 100 mg capsule Take 100 mg by mouth 3 (three) times a day        ibuprofen (MOTRIN) 200 mg tablet Take 200 mg by mouth as needed for mild pain      Kesimpta 20 MG/0 4ML SOAJ Inject 20mg under the skin at week 0, 1, and 2   1 2 mL 0    lubiprostone (AMITIZA) 24 mcg capsule Take 24 mcg by mouth 2 (two) times a day with meals      methocarbamol (ROBAXIN) 500 mg tablet Take 1 tablet (500 mg total) by mouth every 6 (six) hours as needed for muscle spasms 30 tablet 0    Misc Natural Products (GINSENG COMPLEX PO) Take 2 tablets by mouth daily        Multiple Vitamins-Minerals (ZINC PO) Take by mouth      Nerve Stimulator (TENS THERAPY REPLACE BACK PADS) MISC 30 pad by Does not apply route 2 (two) times a day 30 each 0    Nerve Stimulator VICKY by Does not apply route 2 (two) times a day 1 each 0    Nerve Stimulator Supplies MISC 30 pad by Does not apply route 2 (two) times a day 30 pad 0    Omega-3 Fatty Acids (FISH OIL OMEGA-3 PO) Take 1,200 mg by mouth daily       Potassium 99 MG TABS Take 99 mg by mouth daily        Probiotic Product (PROBIOTIC DAILY PO) Take 2 tablets by mouth daily        promethazine (PHENERGAN) 50 MG tablet TAKE 1 TO 2 TABS AT BEDTIME AS NEEDED FOR INSOMNIA  1    topiramate (TOPAMAX) 50 MG tablet Take 50 mg by mouth daily    5    TURMERIC PO Take by mouth      ZINC-VITAMIN C PO Take by mouth      baclofen 10 mg tablet Take 1 tablet (10 mg total) by mouth 3 (three) times a day (Patient not taking: Reported on 1/24/2022 ) 90 tablet 0    Calcium Carbonate (CALCIUM 600 PO) Take 600 mg by mouth daily   (Patient not taking: Reported on 1/24/2022 )      Dalfampridine ER 10 MG TB12 Take 1 tablet (10 mg total) by mouth 2 (two) times a day 60 tablet 5    Ginkgo Biloba 40 MG TABS Take 120 mg by mouth daily        Kesimpta 20 MG/0 4ML SOAJ Inject 20 mg under the skin every 28 days Starting at week 4 (Patient not taking: Reported on 1/24/2022 ) 0 4 mL 11    methylprednisolone (MEDROL) 4 mg tablet Take as directed with food 21 tablet 0    Nerve Stimulator (STANDARD TENS) VICKY by Does not apply route 2 (two) times a day (Patient not taking: Reported on 1/24/2022 ) 1 Device 0    NON FORMULARY HEMPANOL BRAIN DETOX 200MG (Patient not taking: Reported on 1/24/2022 )      pregabalin (LYRICA) 50 mg capsule Take 1 capsule (50 mg total) by mouth 3 (three) times a day 90 capsule 1     No current facility-administered medications for this visit  Allergies   Allergen Reactions    Penicillins GI Intolerance     Other reaction(s): Unknown Reaction       Review of Systems   Constitutional: Negative  Negative for appetite change and fever  HENT: Negative  Negative for hearing loss, tinnitus, trouble swallowing and voice change  Eyes: Negative  Negative for photophobia and pain  Respiratory: Negative  Negative for shortness of breath  Cardiovascular: Negative  Negative for palpitations  Gastrointestinal: Negative  Negative for nausea and vomiting  Endocrine: Negative  Negative for cold intolerance  Genitourinary: Negative  Negative for dysuria, frequency and urgency  Musculoskeletal: Negative  Negative for myalgias and neck pain  Skin: Negative  Negative for rash  Neurological: Positive for weakness  Negative for dizziness, tremors, seizures, syncope, facial asymmetry, speech difficulty, light-headedness, numbness and headaches  Hematological: Negative  Does not bruise/bleed easily  Psychiatric/Behavioral: Negative  Negative for confusion, hallucinations and sleep disturbance  Video Exam    Vitals:    01/24/22 1042   Weight: 52 2 kg (115 lb)   Height: 5' 6" (1 676 m)       Physical Exam     I spent 21 minutes with patient today in which greater than 50% of the time was spent in counseling/coordination of care regarding pathophysiology of MS and related isuses     VIRTUAL VISIT DISCLAIMER      Artis Dumont verbally agrees to participate in [employer]  Pt is aware that [employer] could be limited without vital signs or the ability to perform a full hands-on physical exam  Christie Raphael understands she or the provider may request at any time to terminate the video visit and request the patient to seek care or treatment in person

## 2022-02-10 ENCOUNTER — TELEPHONE (OUTPATIENT)
Dept: NEUROLOGY | Facility: CLINIC | Age: 42
End: 2022-02-10

## 2022-02-10 NOTE — TELEPHONE ENCOUNTER
Padmini Meng with [facility] specialty pharmacy calling to request dalfampridine [place] status  Advised there is a [place] approval letter on file  She asked for this to be faxed to [phone]  Faxed

## 2022-03-07 ENCOUNTER — TELEPHONE (OUTPATIENT)
Dept: NEUROLOGY | Facility: CLINIC | Age: 42
End: 2022-03-07

## 2022-03-15 ENCOUNTER — TELEPHONE (OUTPATIENT)
Dept: NEUROLOGY | Facility: CLINIC | Age: 42
End: 2022-03-15

## 2022-03-15 ENCOUNTER — TELEMEDICINE (OUTPATIENT)
Dept: NEUROLOGY | Facility: CLINIC | Age: 42
End: 2022-03-15
Payer: COMMERCIAL

## 2022-03-15 ENCOUNTER — APPOINTMENT (OUTPATIENT)
Dept: LAB | Facility: HOSPITAL | Age: 42
End: 2022-03-15
Payer: COMMERCIAL

## 2022-03-15 VITALS — WEIGHT: 107 LBS | BODY MASS INDEX: 17.19 KG/M2 | HEIGHT: 66 IN

## 2022-03-15 DIAGNOSIS — R20.2 PARESTHESIAS: ICD-10-CM

## 2022-03-15 DIAGNOSIS — D69.6 THROMBOCYTOPENIA (HCC): ICD-10-CM

## 2022-03-15 DIAGNOSIS — G35 MS (MULTIPLE SCLEROSIS) (HCC): Primary | ICD-10-CM

## 2022-03-15 DIAGNOSIS — G35 MS (MULTIPLE SCLEROSIS) (HCC): ICD-10-CM

## 2022-03-15 DIAGNOSIS — R26.2 AMBULATORY DYSFUNCTION: ICD-10-CM

## 2022-03-15 LAB
ALBUMIN SERPL BCP-MCNC: 3.8 G/DL (ref 3.5–5)
ALP SERPL-CCNC: 145 U/L (ref 46–116)
ALT SERPL W P-5'-P-CCNC: 50 U/L (ref 12–78)
ANION GAP SERPL CALCULATED.3IONS-SCNC: 8 MMOL/L (ref 4–13)
AST SERPL W P-5'-P-CCNC: 104 U/L (ref 5–45)
BASOPHILS # BLD AUTO: 0.03 THOUSANDS/ΜL (ref 0–0.1)
BASOPHILS NFR BLD AUTO: 1 % (ref 0–1)
BILIRUB SERPL-MCNC: 0.36 MG/DL (ref 0.2–1)
BUN SERPL-MCNC: 8 MG/DL (ref 5–25)
CALCIUM SERPL-MCNC: 8.9 MG/DL (ref 8.3–10.1)
CHLORIDE SERPL-SCNC: 102 MMOL/L (ref 100–108)
CO2 SERPL-SCNC: 29 MMOL/L (ref 21–32)
CREAT SERPL-MCNC: 0.8 MG/DL (ref 0.6–1.3)
EOSINOPHIL # BLD AUTO: 0.17 THOUSAND/ΜL (ref 0–0.61)
EOSINOPHIL NFR BLD AUTO: 5 % (ref 0–6)
ERYTHROCYTE [DISTWIDTH] IN BLOOD BY AUTOMATED COUNT: 12.7 % (ref 11.6–15.1)
GFR SERPL CREATININE-BSD FRML MDRD: 91 ML/MIN/1.73SQ M
GLUCOSE SERPL-MCNC: 111 MG/DL (ref 65–140)
HCT VFR BLD AUTO: 35.9 % (ref 34.8–46.1)
HGB BLD-MCNC: 12.6 G/DL (ref 11.5–15.4)
IGA SERPL-MCNC: 192 MG/DL (ref 70–400)
IGG SERPL-MCNC: 927 MG/DL (ref 700–1600)
IGM SERPL-MCNC: 144 MG/DL (ref 40–230)
IMM GRANULOCYTES # BLD AUTO: 0 THOUSAND/UL (ref 0–0.2)
IMM GRANULOCYTES NFR BLD AUTO: 0 % (ref 0–2)
LYMPHOCYTES # BLD AUTO: 1.28 THOUSANDS/ΜL (ref 0.6–4.47)
LYMPHOCYTES NFR BLD AUTO: 40 % (ref 14–44)
MCH RBC QN AUTO: 33.1 PG (ref 26.8–34.3)
MCHC RBC AUTO-ENTMCNC: 35.1 G/DL (ref 31.4–37.4)
MCV RBC AUTO: 94 FL (ref 82–98)
MONOCYTES # BLD AUTO: 0.26 THOUSAND/ΜL (ref 0.17–1.22)
MONOCYTES NFR BLD AUTO: 8 % (ref 4–12)
NEUTROPHILS # BLD AUTO: 1.5 THOUSANDS/ΜL (ref 1.85–7.62)
NEUTS SEG NFR BLD AUTO: 46 % (ref 43–75)
NRBC BLD AUTO-RTO: 0 /100 WBCS
PLATELET # BLD AUTO: 64 THOUSANDS/UL (ref 149–390)
PMV BLD AUTO: 10.6 FL (ref 8.9–12.7)
POTASSIUM SERPL-SCNC: 3.7 MMOL/L (ref 3.5–5.3)
PROT SERPL-MCNC: 7.3 G/DL (ref 6.4–8.2)
RBC # BLD AUTO: 3.81 MILLION/UL (ref 3.81–5.12)
SODIUM SERPL-SCNC: 139 MMOL/L (ref 136–145)
WBC # BLD AUTO: 3.24 THOUSAND/UL (ref 4.31–10.16)

## 2022-03-15 PROCEDURE — 85025 COMPLETE CBC W/AUTO DIFF WBC: CPT

## 2022-03-15 PROCEDURE — 82784 ASSAY IGA/IGD/IGG/IGM EACH: CPT

## 2022-03-15 PROCEDURE — 99214 OFFICE O/P EST MOD 30 MIN: CPT | Performed by: PHYSICIAN ASSISTANT

## 2022-03-15 PROCEDURE — 36415 COLL VENOUS BLD VENIPUNCTURE: CPT

## 2022-03-15 PROCEDURE — 80053 COMPREHEN METABOLIC PANEL: CPT

## 2022-03-15 RX ORDER — CELECOXIB 200 MG/1
200 CAPSULE ORAL DAILY
COMMUNITY
Start: 2022-03-02

## 2022-03-15 NOTE — ASSESSMENT & PLAN NOTE
Patient with MS more recently diagnosed in 2020  She had started Tysabri in March 2021, but felt she continued to have progression of symptoms on the medication, therefore it was discontinued after her Sept 2021  She received monthly IV steroids until starting [place] Sales in December 2021  She is tolerating Martinez Sales well, although continues to report ongoing gait dysfunction  This is discouraging to her  We discussed that DMTs will unfortunately not improve current level of functioning significantly or take back damage already done from prior lesions  The goal of disease modifying therapy is to prevent new lesions/new symptoms and disability  She is interested in returning to aqua therapy--referral placed  She also previously tried Ampyra for walking but felt she had side effects to the medication  Did not take this med for long enough to see if it improved her gait  She would consider a re-trial in the future  I would like her to get labs done at this time  Imaging was completed in the Fall of 2021  Consider repeat summer/fall 2022

## 2022-03-15 NOTE — PROGRESS NOTES
Virtual Regular Visit    Verification of patient location:    Patient is located in the following state in which I hold an active license [place]      Assessment/Plan:    Problem List Items Addressed This Visit        Nervous and Auditory    MS (multiple sclerosis) ([facility] [address] ) - Primary     Patient with MS more recently diagnosed in 2020  She had started Tysabri in March 2021, but felt she continued to have progression of symptoms on the medication, therefore it was discontinued after her Sept 2021  She received monthly IV steroids until starting Haja Kays in December 2021  She is tolerating Haja Kays well, although continues to report ongoing gait dysfunction  This is discouraging to her  We discussed that DMTs will unfortunately not improve current level of functioning significantly or take back damage already done from prior lesions  The goal of disease modifying therapy is to prevent new lesions/new symptoms and disability  She is interested in returning to aqua therapy--referral placed  She also previously tried Ampyra for walking but felt she had side effects to the medication  Did not take this med for long enough to see if it improved her gait  She would consider a re-trial in the future  I would like her to get labs done at this time  Imaging was completed in the Fall of 2021  Consider repeat summer/fall 2022  Relevant Medications    celecoxib (CeleBREX) 200 mg capsule    Other Relevant Orders    CBC and differential (Completed)    Comprehensive metabolic panel (Completed)    IgG, IgA, IgM    Ambulatory Referral to Physical Therapy       Other    Ambulatory dysfunction    Thrombocytopenia (HCC)     Previously seen by hematology, last visit over 1 year ago  Per heme note, concern for alcohol being the culprit  If no improvement once patient stopped drinking alcohol, further workup will be warranted  Will recheck labs, as above, and if still low, consider referral back to heme  Reason for visit is   Chief Complaint   Patient presents with    Virtual Regular Visit        Encounter provider Jermaine Lizama PA-C    Provider located at [address] [address]      Recent Visits  No visits were found meeting these conditions  Showing recent visits within past 7 days and meeting all other requirements  Today's Visits  Date Type Provider Dept   03/15/22 Telephone Kingsley Brown MA [facility]   03/15/22 [address] [place] [address],  [address], [place] [facility]   Showing today's visits and meeting all other requirements  Future Appointments  No visits were found meeting these conditions  Showing future appointments within next 150 days and meeting all other requirements       The patient was identified by name and date of birth  Chris Boyer was informed that this is a telemedicine visit and that the visit is being conducted through [address] [place] [address] Now and patient was informed that this is a secure, HIPAA-compliant platform  She agrees to proceed     My office door was closed  No one else was in the room  She acknowledged consent and understanding of privacy and security of the video platform  The patient has agreed to participate and understands they can discontinue the visit at any time  Patient is aware this is a billable service  Subjective    HPI   Chris Boyer is a 40 y o  female who presents today for MS follow up  She was last seen via telemedicine on 1/24/21  To review, patient initially presented to our office in June 2020 and was seen by Dr Raymond Kirk for evaluation of gait and balance difficulty  She had reported altered gait since childhood without falls, supposed history of Lyme disease treated in her teenage years, along with multi-trauma in 2014  She also has self admitted history of prescription opioid addiction in the past for chronic pain, heavy alcohol intake   Patient had been pulled over by police over a year before her consult him here and failed a DUI test at that time  She went to balance therapy afterwards and the therapist noticed incoordination  She also reported chronic back pain starting in 2017, along with leg weakness  She also reported some difficulty controlling her bowels  Eventual testing revealed MRI brain compatible with subtle white matter hyperintensities in the immediate periventricular region of both cerebral hemispheres and subtle changes within the left cerebellum  MRI cervical spine demonstrated moderate, abnormal cord signal within the lateral aspects of the cervical cord bilaterally at C2-3, C4-6 suspicious for chronic demyelinating disease  MRI thoracic spine with normal cord signal  MRI lumbar spine with only mild degenerative spondylosis  She was then seen in the [address]  She had a lumbar puncture in November of 2020  CSF Lyme was negative  MS panel with 8 oligoclonal bands, elevated IgG synthesis rate and elevated myelin basic protein  NMO negative  JCV negative 1/28/21 with index 0 22  She has seen hematology for thrombocytopenia and mild leukopenia  Decision was made for patient to start Tysabri  First infusion was given 3/3/21  At timing of October 2021 visit, patient reported worsening ambulatory dysfunction and did not feel Tysabri was helping her at all, wanted to change DMT  B-cell depleting therapy was discussed  Patient started Elma Hernandez in December 2021  Most recent imaging:  --MRI lamar 10/6/2021: Stable mild chronic demyelinating plaque in the supratentorial brain  No evidence of progressive or active demyelination    --MRI c-spine 10/6/2021:  Stable cord signal abnormality involving bilateral lateral cord at levels C2-3 and C4-6  --MRI t-spine 8/16/2021: Focus of cord signal abnormality centrally in the distal thoracic spinal cord from T11 inferior endplate through N87 mid vertebral level is compatible with demyelinating lesions in the setting of known multiple sclerosis  This is new from the prior study  No abnormal enhancement    Patient sees urology for neurogenic bladder and had Interstim placed 11/15/2021  Today, patient reports she is about the same  She still continues to complain of difficulty with balance and gait dysfunction  She is frustrated that she feels she continues to decline  She tried Ampyra and felt it made her feel loopy so she stopped it  She tells me she would consider trying it again and actually has medication on hand if she ever wants to restart  Past Medical History:   Diagnosis Date    Back pain     Chronic constipation 6/27/2016    Chronic pain disorder     Collar bone fracture     right side    Crutches as ambulation aid     Depression     Dyssynergic defecation     Type 1    ETOH abuse     Head injury 2014    Infectious viral hepatitis     Lyme disease     Meningitis spinal 2007?     Multiple sclerosis ([facility] [address] )     Opioid abuse ([facility] [address] )     Rib fractures     Thrombocytopenia (HCC)     Use of cane as ambulatory aid        Past Surgical History:   Procedure Laterality Date    APPENDECTOMY      CLAVICLE SURGERY      COLONOSCOPY      FL LUMBAR PUNCTURE DIAGNOSTIC  11/25/2020    KNEE ARTHROSCOPY      MULTIPLE TOOTH EXTRACTIONS      LA COLONOSCOPY FLX DX W/COLLJ SPEC WHEN PFRMD N/A 6/27/2016    Procedure: COLONOSCOPY;  Surgeon: Black Gutierrez MD;  Location: [facility];  Service: Colorectal    LA INCISION,IMPLANT,NEUROELEC,SACRAL NERVE N/A 11/15/2021    Procedure: INSERTION OF NEUROSTIMULATOR ELECTRODE ARRAY SACRAL NERVE; FLUOROSCOPY; ELECTRONICN ANALYSIS;  Surgeon: Alyson Camargo MD;  Location: [facility];  Service: UroGynecology           TONSILLECTOMY AND ADENOIDECTOMY      TUBAL LIGATION         Current Outpatient Medications   Medication Sig Dispense Refill    b complex vitamins tablet Take 1 tablet by mouth daily      Biotin 92368 MCG TABS Take 1 tablet by mouth daily  buprenorphine-naloxone (SUBOXONE) 2-0 5 mg per SL tablet Place 0 5 tablets under the tongue 2 (two) times a day       celecoxib (CeleBREX) 200 mg capsule Take 200 mg by mouth daily      Cholecalciferol (Vitamin D3) 125 MCG (5000 UT) TABS Take 5,000 Units by mouth daily      cloNIDine (CATAPRES) 0 2 mg tablet Take 0 2 mg by mouth daily at bedtime       docusate sodium (COLACE) 100 mg capsule Take 100 mg by mouth 3 (three) times a day        Ginkgo Biloba 40 MG TABS Take 120 mg by mouth daily        ibuprofen (MOTRIN) 200 mg tablet Take 200 mg by mouth as needed for mild pain      Kesimpta 20 MG/0 4ML SOAJ Inject 20mg under the skin at week 0, 1, and 2   1 2 mL 0    lubiprostone (AMITIZA) 24 mcg capsule Take 24 mcg by mouth 2 (two) times a day with meals      methocarbamol (ROBAXIN) 500 mg tablet Take 1 tablet (500 mg total) by mouth every 6 (six) hours as needed for muscle spasms 30 tablet 0    Misc Natural Products (GINSENG COMPLEX PO) Take 2 tablets by mouth daily        Multiple Vitamins-Minerals (ZINC PO) Take by mouth      Nerve Stimulator (TENS THERAPY REPLACE BACK PADS) MISC 30 pad by Does not apply route 2 (two) times a day 30 each 0    Nerve Stimulator VICKY by Does not apply route 2 (two) times a day 1 each 0    Nerve Stimulator Supplies MISC 30 pad by Does not apply route 2 (two) times a day 30 pad 0    Omega-3 Fatty Acids (FISH OIL OMEGA-3 PO) Take 1,200 mg by mouth daily       Potassium 99 MG TABS Take 99 mg by mouth daily        Probiotic Product (PROBIOTIC DAILY PO) Take 2 tablets by mouth daily        promethazine (PHENERGAN) 50 MG tablet TAKE 1 TO 2 TABS AT BEDTIME AS NEEDED FOR INSOMNIA  1    topiramate (TOPAMAX) 50 MG tablet Take 50 mg by mouth daily    5    TURMERIC PO Take by mouth      ZINC-VITAMIN C PO Take by mouth      Calcium Carbonate (CALCIUM 600 PO) Take 600 mg by mouth daily   (Patient not taking: Reported on 1/24/2022 )      Dalfampridine ER 10 MG TB12 Take 1 tablet (10 mg total) by mouth 2 (two) times a day (Patient not taking: Reported on 3/15/2022 ) 60 tablet 5    Kesimpta 20 MG/0 4ML SOAJ Inject 20 mg under the skin every 28 days Starting at week 4 (Patient not taking: Reported on 1/24/2022 ) 0 4 mL 11    Nerve Stimulator (STANDARD TENS) VICKY by Does not apply route 2 (two) times a day (Patient not taking: Reported on 1/24/2022 ) 1 Device 0    NON FORMULARY HEMPANOL BRAIN DETOX 200MG (Patient not taking: Reported on 1/24/2022 )       No current facility-administered medications for this visit  Allergies   Allergen Reactions    Penicillins GI Intolerance     Other reaction(s): Unknown Reaction       Review of Systems   Constitutional: Negative  Negative for appetite change and fever  HENT: Negative  Negative for hearing loss, tinnitus, trouble swallowing and voice change  Eyes: Negative  Negative for photophobia and pain  Respiratory: Negative  Negative for shortness of breath  Cardiovascular: Negative  Negative for palpitations  Gastrointestinal: Positive for constipation  Negative for nausea and vomiting  Endocrine: Negative  Negative for cold intolerance  Genitourinary: Negative  Negative for dysuria, frequency and urgency  Musculoskeletal: Positive for back pain (whole back), gait problem (gait dysturbance- balance issues), joint swelling (joint pain whole body), neck pain and neck stiffness  Negative for myalgias  Skin: Negative  Negative for rash  Allergic/Immunologic: Negative  Neurological: Negative for dizziness, tremors, seizures, syncope, facial asymmetry, speech difficulty, weakness, light-headedness, numbness and headaches  Hematological: Negative  Does not bruise/bleed easily  Psychiatric/Behavioral: Positive for sleep disturbance  Negative for confusion and hallucinations         Video Exam    Vitals:    03/15/22 1306   Weight: 48 5 kg (107 lb)   Height: 5' 6" (1 676 m)       Physical Exam  Constitutional:       Appearance: Normal appearance  HENT:      Head: Normocephalic and atraumatic  Neurological:      Mental Status: She is alert  Comments: Mental status: AO x 3, able to follow commands, no dysarthria or aphasia    CN 1: not tested  CN 2: not tested  CN 3, 4, 6: no noticeable palsy, EOMs intact  CN 5: not tested  CN 7: face symmetrical  CN 8: hearing intact to voice  CN 9: not tested  CN 10: not tested  CN 11: shoulder shrug symmetric   CN 12: tongue midline     Motor:  No focal weakness or abnormal movements appreciated on this limited exam   Did not eval lower extremities due to positioning on the video   Psychiatric:         Mood and Affect: Mood normal          Behavior: Behavior normal           I spent 16 minutes directly with the patient during this visit    VIRTUAL VISIT DISCLAIMER      Zhane Pineda verbally agrees to participate in [employer]  Pt is aware that [employer] could be limited without vital signs or the ability to perform a full hands-on physical exam  Christie Woodson understands she or the provider may request at any time to terminate the video visit and request the patient to seek care or treatment in person

## 2022-03-15 NOTE — TELEPHONE ENCOUNTER
Left message for pt to call back and reschedule an appt she has with Dr HILL in May until July  Pt needs 4m f/u appt

## 2022-03-15 NOTE — ASSESSMENT & PLAN NOTE
Previously seen by hematology, last visit over 1 year ago  Per heme note, concern for alcohol being the culprit  If no improvement once patient stopped drinking alcohol, further workup will be warranted  Will recheck labs, as above, and if still low, consider referral back to heme

## 2022-03-15 NOTE — PATIENT INSTRUCTIONS
Continue Kesimpta monthly injection  Please get labs done at your earliest convenience  I entered a referral for aqua therapy  Follow up in 4 months

## 2022-03-16 DIAGNOSIS — D69.6 THROMBOCYTOPENIA (HCC): Primary | ICD-10-CM

## 2022-03-16 DIAGNOSIS — D70.9 NEUTROPENIA, UNSPECIFIED TYPE (HCC): ICD-10-CM

## 2022-03-17 ENCOUNTER — TELEPHONE (OUTPATIENT)
Dept: NEUROLOGY | Facility: CLINIC | Age: 42
End: 2022-03-17

## 2022-03-17 ENCOUNTER — TELEPHONE (OUTPATIENT)
Dept: HEMATOLOGY ONCOLOGY | Facility: CLINIC | Age: 42
End: 2022-03-17

## 2022-03-17 NOTE — TELEPHONE ENCOUNTER
Called patient  Received vm  Left detailed msg (per consent in chart) regarding results and recommendations below

## 2022-03-17 NOTE — TELEPHONE ENCOUNTER
----- Message from Jordana Turner PA-C sent at 3/16/2022  7:57 AM EDT -----  Labs reviewed  Please let patient know her platelets continue to be low  She saw heme over a year ago and they felt additional workup would be warranted if there was no improvement after cessation of alcohol  I am referring her back to hematology for further workup of persistent thrombocytopenia  Additionally, her liver enzymes continue to be elevated  Please make sure she is following with her PCP/GI for this

## 2022-03-18 NOTE — TELEPHONE ENCOUNTER
MSW attempted to reach patient at [phone] to offer to connect her to substance abuse/mental health services  No answer at [phone]  MSW left a message requesting callback  Awaiting same

## 2022-03-18 NOTE — TELEPHONE ENCOUNTER
She needs to be honest with her PCP regarding her alcohol intake  Alcohol cessation is highly advised  Social work--please see if patient interested in getting help finding counseling for substance abuse or counseling in general   If referral is needed, I can place

## 2022-03-18 NOTE — TELEPHONE ENCOUNTER
Spoke w/patient  Advised her of results and recommendations  Patient verbalized understanding, but stated she is not going to f/u w/PCP and Heme  States "my life is technically over  I can't work  I can't walk  So I really don't care " Patient denies any plans to harm herself  States her kids are her reason for living  She would never do anything to hurt herself because of them  Patient is still consuming alcohol  2-3  mixed drinks (3 maximum) @ [facility]  States this is her only enjoyment  She does not want to tell her PCP that she is drinking as she is on Suboxone and she does not want him to be disappointed in her  Patient does go to a support group called "[facility]" which is for addiction  She is possibly open to seeing a therapist, however it would need to be someone nearby  Advised patient we can have our social work team assist with finding a provider  Patient agreeable  Kelsy cosby     Can you place referral for therapist?

## 2022-03-21 NOTE — TELEPHONE ENCOUNTER
MSW attempted to reach patient at [phone] to offer to connect her to substance abuse/mental health services  No answer at [phone]  MSW left a message requesting callback  Awaiting same

## 2022-03-22 NOTE — TELEPHONE ENCOUNTER
Lucy Quesada from [facility] later called back  Lucy Quesada stated that she was able to locate a search field in her system for medically compromised patients and was able to locate the following 2 other in-network counseling providers:    [address]  [phone]  *Lucy Quesada stated that she spoke with this agency and confirmed that they are accepting new patients, have openings if before 2PM on weekdays, can offer telehelath, and do have 3 clinicians that have experience in treating patients with chronic illnesses  [employer]  [phone]    Lucy Quesada stated that she can also authorize out of network providers if none of the ones provided are appropriate for patient  Lucy Quesada provided her direct callback number as [phone]

## 2022-03-22 NOTE — TELEPHONE ENCOUNTER
MSW was able to locate the following local MS support group from the www nationalmssociety  com's Find a Support Group function:      2:00 PM - 4:00 PM  Recurring Event  Every 2nd Sunday  In [facility], [address] [place] [address]  SEE MAP    Self-help group for individuals with MS  Contact information  [email or web address]  [phone]    MSW attempted to reach Kimberly Kendal to confirm that the group is still meeting at [phone]  No answer  MSW left a message requesting callback  Awaiting same  MSW also phoned [facility] at [phone] and spoke with Leatha Vega to request a list of therapists/counselors who can treat patient with a chronic illness   Stephanie transferred this writer's call to Robert Wilson, Case Manage at [facility], who provided the following list of in-network counselors but was not able to sort for chronic illness experience:    [facility]  [phone]    [facility]  [phone]    [address]  [phone]    [facility]ulting/Human [address]  [phone]    [facility]  [phone]

## 2022-03-22 NOTE — TELEPHONE ENCOUNTER
LATE ENTRY FROM 3/21/22:    MSW received callback from patient ([phone])  MSW advised that this writer was calling to offer to connect her with community resources such as substance abuse/mental health treatment  Patient declined resources for substance abuse and mental health  Patient stated what she thinks would be beneficial is "meetings for patients with MS"  Patient voiced a preference for a local group as opposed to virtual  MSW advised that due to patient's location, it is possible that there may not be local groups available  MSW will research options and will send same to patient for her review  Patient appeared to be very frustrated regarding her functional abilities due to [address]  Patient stated hat she "knows a lot of people with MS who can walk and work" but she "can't walk or work"  Patient stated "it freaking sucks"  Since there is a concern that this writer will not be able to locate local support groups, MSW offered to send patient a listing of therapists in her area for mental health counseling to address her frustrations  Patient initially declined, stated that she talks to her family when she is feeling frustrated and journals when needed  Patient later stated that she would be agreeable to receive a list of therapists in her area should she wish to seek counseling in the future  Patient requested that above resources be mailed to her  Address confirmed

## 2022-03-23 NOTE — TELEPHONE ENCOUNTER
MSW generated a list of MS support groups and in-network psychotherapists for patient in the [address] [place] [address] Rd letter tab  Letter placed in mail to patient's home address this date  MSW did include this writer's contact name/number in case she requires further assistance  MSW will be available to patient as needed

## 2022-03-23 NOTE — TELEPHONE ENCOUNTER
MSW received callback from Yogesh Bloom from the [address] [place] [address] in [place], [place]  Hanna Vigil stated that the group will be resuming on the second Jemal of each month - the next will be April 10th from 2-4PM at the [facility] in [place]  Hanna Vigil stated that she is currently in a rehab, but will be joining the group remotely  Hanna Vigil stated that they have about 12 attendees and that they do welcome patients with MS and PD since they are both neurological diseases  Hanna Vigil stated that they do have 6 patients with MS and that they range in age from 42's to 63's  Hanna Vigil stated that the group is "spunky" and feels patient would be a good fit  Hanna Vigil stated that they traditionally have a round aniket where everyone gets to speak  MSKODI will send this information to patient along with the mental health therapists information

## 2022-04-25 ENCOUNTER — TELEPHONE (OUTPATIENT)
Dept: SURGICAL ONCOLOGY | Facility: CLINIC | Age: 42
End: 2022-04-25

## 2022-04-25 ENCOUNTER — TELEPHONE (OUTPATIENT)
Dept: HEMATOLOGY ONCOLOGY | Facility: CLINIC | Age: 42
End: 2022-04-25

## 2022-04-25 NOTE — TELEPHONE ENCOUNTER
Patient called in stating she received a phone call to make a new patient appointment   Patient does not want to make an appointment

## 2022-05-17 ENCOUNTER — DOCUMENTATION (OUTPATIENT)
Dept: NEUROLOGY | Facility: CLINIC | Age: 42
End: 2022-05-17

## 2022-05-24 ENCOUNTER — TELEPHONE (OUTPATIENT)
Dept: NEUROLOGY | Facility: CLINIC | Age: 42
End: 2022-05-24

## 2022-05-24 NOTE — TELEPHONE ENCOUNTER
Patient called to request letter excusing her from jury duty  Juror #: [id number]  [address] [place] [address] [phone] (u) [phone] Attn: Abhi Wise - are you agreeable to jury duty letter?

## 2022-05-25 NOTE — TELEPHONE ENCOUNTER
[address] [facility] [address] letter generated and faxed to:    (f) [phone] Attn: Lisandro Mota    Copy of letter mailed to patient's home address on file per patient request

## 2022-05-27 ENCOUNTER — TELEPHONE (OUTPATIENT)
Dept: UROLOGY | Facility: CLINIC | Age: 42
End: 2022-05-27

## 2022-05-27 NOTE — TELEPHONE ENCOUNTER
Call placed to patient  Made her aware her appointment with Clarisse Bass on 7/7 in [place] was re-scheduled to the same day with Juve Maki at 10:30 am  She was reminded of US kidney and bladder scheduled for 6/15 and aware we will review the results of her US at appointment on 7/7  Patient verbalized understanding

## 2022-05-31 NOTE — TELEPHONE ENCOUNTER
Called patient and left a message for patient stating her US is scheduled on June 15th and follow up with Urology is on the July 7th  Asked patient to please call the office  if she still has questions

## 2022-05-31 NOTE — TELEPHONE ENCOUNTER
Patient called stating she has on her calendar that she has U/S scheduled for the 15th and she has notes written down from last year that she is to complete labs a week prior and also have an appointment with urology, she is asking for clarification if we can give her a call back  Please advise

## 2022-06-01 ENCOUNTER — TELEPHONE (OUTPATIENT)
Dept: NEUROLOGY | Facility: CLINIC | Age: 42
End: 2022-06-01

## 2022-06-01 DIAGNOSIS — R26.2 AMBULATORY DYSFUNCTION: ICD-10-CM

## 2022-06-01 DIAGNOSIS — G35 MS (MULTIPLE SCLEROSIS) (HCC): ICD-10-CM

## 2022-06-01 DIAGNOSIS — R26.89 BALANCE PROBLEMS: ICD-10-CM

## 2022-06-01 RX ORDER — DALFAMPRIDINE 10 MG/1
10 TABLET, FILM COATED, EXTENDED RELEASE ORAL 2 TIMES DAILY
Qty: 60 TABLET | Refills: 5 | Status: SHIPPED | OUTPATIENT
Start: 2022-06-01

## 2022-06-01 NOTE — TELEPHONE ENCOUNTER
Patient is to resume dalfampridine, no concern or potassium changes  Patient is to have close follow up with primary team or other provider who provide potassium regimen     Refill provided   Concern is for worsening liver dysfunction completed on 3/15/22

## 2022-06-01 NOTE — TELEPHONE ENCOUNTER
Called patient and left a voicemail stating she does not need blood work for her OV with [facility] on 7/7/2022

## 2022-06-01 NOTE — TELEPHONE ENCOUNTER
Patient called in - regarding dalfampridine  She had originally stopped taking dalfampridine due to fatigue (please see encounter 12/6/21), but has decided to restart as of today, she is "tired of not being able to walk"  She took 10 mg this morning as she had this left over  She was also concerned about it affecting potassium channels and she is not sure if she should continue her potassium tabs then - taking 99 mg 3 tabs  Or if she should be scared the that the dalfampridine will cause more issues for her? Patient also questioned why Dr Avery Canales told her there was 1 med for improving walking - dalfampridine  As she has researched and found these other alternatives, she would like info about being on Ampyra (I did inform her that this is the brand for dalfampridine), and Firdapse, Ruzurgi, amifampridine  Currently taking Kesimpta and Robaxin 500 mg every 6 hours as needed       Best [phone], okay with detailed messages

## 2022-06-01 NOTE — TELEPHONE ENCOUNTER
Patient states she received a message that she needs lab work done  Patient was informed of her upcoming ultrasound appointment and her follow up with the urology office  Patient was informed there is no active lab orders  Please review if patient needs labs  Please contact the patient and advise

## 2022-06-09 NOTE — TELEPHONE ENCOUNTER
Patient called to report not being able to walk again; symptoms worsened past 3 days  She said she was with her son on a tour and did a lot of walking with crutches so may have been a trigger  Describes off/on past 3 days "body locks up", could not left either leg or arms; felt like they were "paralyzed"  Mild improvement after rest but resumed intermittent; unable to ambulate to bathroom due to extremity weakness and was incontinent as couldn't get to the bathroom, family had to assist in "pulling me off of the floor"  She said she stopped dalfampridine when these symptoms started a few days ago as this was only new change;  Discussed this should help with walking as recommended by Dr Sridhar Stahl   But patient not receptive to continuing at this time  She is asking for steroid infusion; said last one was approx 6 months ago  Please provide recommendation, thank you      Best   [phone]

## 2022-06-09 NOTE — TELEPHONE ENCOUNTER
Dr Amber De La Rosa are you agreeable to steroid infusion? She is on [place]  Likely over-exerted due to walking tour causing worsening of known ambulatory dysfunction

## 2022-06-10 ENCOUNTER — HOSPITAL ENCOUNTER (OUTPATIENT)
Dept: INFUSION CENTER | Facility: HOSPITAL | Age: 42
Discharge: HOME | End: 2022-06-10
Attending: PSYCHIATRY & NEUROLOGY
Payer: COMMERCIAL

## 2022-06-10 VITALS
SYSTOLIC BLOOD PRESSURE: 101 MMHG | HEART RATE: 84 BPM | RESPIRATION RATE: 18 BRPM | DIASTOLIC BLOOD PRESSURE: 62 MMHG | OXYGEN SATURATION: 92 % | TEMPERATURE: 98.3 F

## 2022-06-10 DIAGNOSIS — G35 MS (MULTIPLE SCLEROSIS) (HCC): Primary | ICD-10-CM

## 2022-06-10 PROCEDURE — 96365 THER/PROPH/DIAG IV INF INIT: CPT

## 2022-06-10 RX ORDER — SODIUM CHLORIDE 9 MG/ML
20 INJECTION, SOLUTION INTRAVENOUS ONCE
Status: CANCELLED | OUTPATIENT
Start: 2022-06-10

## 2022-06-10 RX ORDER — SODIUM CHLORIDE 9 MG/ML
20 INJECTION, SOLUTION INTRAVENOUS ONCE
Status: COMPLETED | OUTPATIENT
Start: 2022-06-10 | End: 2022-06-10

## 2022-06-10 RX ORDER — SODIUM CHLORIDE 9 MG/ML
20 INJECTION, SOLUTION INTRAVENOUS ONCE
Status: CANCELLED | OUTPATIENT
Start: 2022-06-11

## 2022-06-10 RX ADMIN — SODIUM CHLORIDE 1000 MG: 9 INJECTION, SOLUTION INTRAVENOUS at 15:09

## 2022-06-10 RX ADMIN — SODIUM CHLORIDE 20 ML/HR: 0.9 INJECTION, SOLUTION INTRAVENOUS at 14:30

## 2022-06-10 NOTE — TELEPHONE ENCOUNTER
PA is not needed with [facility] for [address], [address], and [id number] per website  Pt made aware of below, she is agreeable and prefers the [facility] infusion  Would like first available appts scheduled  If needed, she is aware that she can contact the infusion center to adjust appts to work with her schedule  Therapy plan entered and sent for signature  Referral updated  TT sent  Awaiting signature for scheduling

## 2022-06-10 NOTE — PLAN OF CARE
Problem: Potential for Falls  Goal: Patient will remain free of falls  Description: INTERVENTIONS:  - Educate patient/family on patient safety including physical limitations  - Instruct patient to call for assistance with activity   - Keep Call bell within reach  - Keep chair locked and adjusted as appropriate  - Keep care items and personal belongings within reach  - Initiate and maintain comfort rounds  - Consider moving patient to room near nurses station  Outcome: Progressing     Problem: INFECTION - ADULT  Goal: Absence of fever/infection during neutropenic period  Description: INTERVENTIONS:  - Monitor WBC    Outcome: Progressing

## 2022-06-10 NOTE — TELEPHONE ENCOUNTER
Plan is signed  Called [facility] infusion center and spoke with Elisha De La Cruz  Pt can begin infusions today, she will need to arrive prior to 2pm  They will schedule remaining 2 infusions with pt  Pt made aware, she is agreeable  I emphasized that she will need to be at the infusion center prior to 2pm  Pt verbalizes understanding  She will call with any problems

## 2022-06-13 ENCOUNTER — HOSPITAL ENCOUNTER (OUTPATIENT)
Dept: INFUSION CENTER | Facility: HOSPITAL | Age: 42
Discharge: HOME | End: 2022-06-13
Attending: PSYCHIATRY & NEUROLOGY
Payer: COMMERCIAL

## 2022-06-13 VITALS
RESPIRATION RATE: 18 BRPM | OXYGEN SATURATION: 96 % | TEMPERATURE: 98.2 F | HEART RATE: 83 BPM | DIASTOLIC BLOOD PRESSURE: 75 MMHG | SYSTOLIC BLOOD PRESSURE: 126 MMHG

## 2022-06-13 DIAGNOSIS — G35 MS (MULTIPLE SCLEROSIS) (HCC): Primary | ICD-10-CM

## 2022-06-13 PROCEDURE — 96365 THER/PROPH/DIAG IV INF INIT: CPT

## 2022-06-13 RX ORDER — SODIUM CHLORIDE 9 MG/ML
20 INJECTION, SOLUTION INTRAVENOUS ONCE
Status: COMPLETED | OUTPATIENT
Start: 2022-06-13 | End: 2022-06-13

## 2022-06-13 RX ORDER — SODIUM CHLORIDE 9 MG/ML
20 INJECTION, SOLUTION INTRAVENOUS ONCE
Status: CANCELLED | OUTPATIENT
Start: 2022-06-14

## 2022-06-13 RX ADMIN — SODIUM CHLORIDE 1000 MG: 9 INJECTION, SOLUTION INTRAVENOUS at 14:39

## 2022-06-13 RX ADMIN — SODIUM CHLORIDE 20 ML/HR: 0.9 INJECTION, SOLUTION INTRAVENOUS at 14:10

## 2022-06-13 NOTE — PROGRESS NOTES
Patient tolerated infusion well, no complications  Discharged with PIV for tomorrow's infusion  Patient instructed not to shower until PIV d/c'd tomorrow  She stated understanding  No distress noted

## 2022-06-14 ENCOUNTER — HOSPITAL ENCOUNTER (OUTPATIENT)
Dept: INFUSION CENTER | Facility: HOSPITAL | Age: 42
Discharge: HOME | End: 2022-06-14
Attending: PSYCHIATRY & NEUROLOGY
Payer: COMMERCIAL

## 2022-06-14 VITALS
DIASTOLIC BLOOD PRESSURE: 59 MMHG | TEMPERATURE: 98.2 F | OXYGEN SATURATION: 94 % | SYSTOLIC BLOOD PRESSURE: 102 MMHG | HEART RATE: 78 BPM | RESPIRATION RATE: 16 BRPM

## 2022-06-14 DIAGNOSIS — G35 MS (MULTIPLE SCLEROSIS) (HCC): Primary | ICD-10-CM

## 2022-06-14 PROCEDURE — 96365 THER/PROPH/DIAG IV INF INIT: CPT

## 2022-06-14 RX ORDER — SODIUM CHLORIDE 9 MG/ML
20 INJECTION, SOLUTION INTRAVENOUS ONCE
Status: CANCELLED | OUTPATIENT
Start: 2022-06-14

## 2022-06-14 RX ORDER — SODIUM CHLORIDE 9 MG/ML
20 INJECTION, SOLUTION INTRAVENOUS ONCE
Status: COMPLETED | OUTPATIENT
Start: 2022-06-14 | End: 2022-06-14

## 2022-06-14 RX ADMIN — SODIUM CHLORIDE 20 ML/HR: 0.9 INJECTION, SOLUTION INTRAVENOUS at 10:53

## 2022-06-14 RX ADMIN — SODIUM CHLORIDE 1000 MG: 0.9 INJECTION, SOLUTION INTRAVENOUS at 10:53

## 2022-06-15 ENCOUNTER — HOSPITAL ENCOUNTER (OUTPATIENT)
Dept: ULTRASOUND IMAGING | Facility: HOSPITAL | Age: 42
Discharge: HOME | End: 2022-06-15
Attending: UROLOGY
Payer: COMMERCIAL

## 2022-06-15 DIAGNOSIS — N31.9 NEUROGENIC BLADDER: ICD-10-CM

## 2022-06-15 DIAGNOSIS — N36.44 DETRUSOR SPHINCTER DYSSYNERGIA: ICD-10-CM

## 2022-06-15 PROCEDURE — 76770 US EXAM ABDO BACK WALL COMP: CPT

## 2022-06-21 ENCOUNTER — TELEPHONE (OUTPATIENT)
Dept: NEUROLOGY | Facility: CLINIC | Age: 42
End: 2022-06-21

## 2022-06-21 DIAGNOSIS — G35 MS (MULTIPLE SCLEROSIS) (HCC): Primary | ICD-10-CM

## 2022-06-21 RX ORDER — SODIUM CHLORIDE 9 MG/ML
20 INJECTION, SOLUTION INTRAVENOUS ONCE
Status: CANCELLED | OUTPATIENT
Start: 2022-06-24

## 2022-06-21 NOTE — TELEPHONE ENCOUNTER
pt called and states that she left vm yesterday  states that she had an episode recently  see encounter from 6/1   states that she received steroid and was ok for a little bit but now can't walk up the stairs, can't lift up her feet, legs very weak  This began 2-3 days ago  Did not do anything to exert herself this time  No new N/T, No bowel or bladder changes, no vison changes    No recent illness  Not taking dalfampridine as she felt that symptoms worsened after restarting this    kesimpta monthly   asking if she can get steroids again or other recommendation   Please advise  [phone] to leave detailed message

## 2022-06-21 NOTE — TELEPHONE ENCOUNTER
PA is not needed with [facility] for [address], [address], and [id number] per website  Pt prefers the [facility]  Therapy plan entered and sent for signature  Referral updated  TT sent  Left message for pt providing an update

## 2022-06-22 NOTE — TELEPHONE ENCOUNTER
Called [facility] and spoke with Al  Scheduled pt for Friday 6/24/22 @ 12pm     Called pt  She wanted to add that her left hand and fingers aren't functioning as well as they were before  Right hand is better than the left  She is hopeful IV steroids will help  Pt is agreeable to the above infusion appt  She will call our office with any questions or concerns

## 2022-06-23 ENCOUNTER — TELEPHONE (OUTPATIENT)
Dept: UROLOGY | Facility: CLINIC | Age: 42
End: 2022-06-23

## 2022-06-23 NOTE — TELEPHONE ENCOUNTER
Called and left voicemail message per communication consent with results of US kidney and bladder  Advised that results would be reviewed in further detail at her upcoming appointment with Joanne Tanner on 7/7

## 2022-06-24 ENCOUNTER — HOSPITAL ENCOUNTER (OUTPATIENT)
Dept: INFUSION CENTER | Facility: HOSPITAL | Age: 42
Discharge: HOME | End: 2022-06-24
Attending: PSYCHIATRY & NEUROLOGY
Payer: COMMERCIAL

## 2022-06-24 ENCOUNTER — TELEPHONE (OUTPATIENT)
Dept: NEUROLOGY | Facility: CLINIC | Age: 42
End: 2022-06-24

## 2022-06-24 VITALS
HEART RATE: 87 BPM | OXYGEN SATURATION: 98 % | RESPIRATION RATE: 16 BRPM | TEMPERATURE: 97.8 F | DIASTOLIC BLOOD PRESSURE: 53 MMHG | SYSTOLIC BLOOD PRESSURE: 98 MMHG

## 2022-06-24 DIAGNOSIS — G35 MS (MULTIPLE SCLEROSIS) (HCC): Primary | ICD-10-CM

## 2022-06-24 PROCEDURE — 96365 THER/PROPH/DIAG IV INF INIT: CPT

## 2022-06-24 RX ORDER — SODIUM CHLORIDE 9 MG/ML
20 INJECTION, SOLUTION INTRAVENOUS ONCE
Status: COMPLETED | OUTPATIENT
Start: 2022-06-24 | End: 2022-06-24

## 2022-06-24 RX ORDER — SODIUM CHLORIDE 9 MG/ML
20 INJECTION, SOLUTION INTRAVENOUS ONCE
Status: CANCELLED | OUTPATIENT
Start: 2022-06-24

## 2022-06-24 RX ADMIN — SODIUM CHLORIDE 20 ML/HR: 0.9 INJECTION, SOLUTION INTRAVENOUS at 12:10

## 2022-06-24 RX ADMIN — SODIUM CHLORIDE 1000 MG: 0.9 INJECTION, SOLUTION INTRAVENOUS at 12:24

## 2022-06-24 NOTE — TELEPHONE ENCOUNTER
Received voicemail from Maryjane with [facility]  Spoke with Reyna  See her notes:    1230 Patient states her coordination in her hands is getting worse  Increased difficulty last night into today with typing on computer  Buttons troublesome at times  Patient questioning if she will be able to have infusion more than one day  1248 I placed call to Dasha Washington RN and awaiting return call  1355 Infusion completed without incident  I did not receive a call back from neurology  Patient is aware of same and was instructed she would get a call from myself or neurology later today  She was discharged in stable condition    1437 I did not receive a call back from neurology  I sent a message to Dr Durand Halawa regarding patient's concerns via [employer]   Waiting for instructions    [phone] Return message from Dr Durand Halawa and informed no other doses will be advised at this point and her office will contact the patient regarding same

## 2022-06-24 NOTE — PROGRESS NOTES
[phone] I did not receive a call back from neurology  I sent a message to Dr Aura Sierra regarding patient's concerns via [email or web address]   Waiting for instructions

## 2022-06-24 NOTE — PROGRESS NOTES
1355 Infusion completed without incident  I did not receive a call back from neurology  Patient is aware of same and was instructed she would get a call from myself or neurology later today   She was discharged in stable condition

## 2022-06-24 NOTE — PROGRESS NOTES
1230 Patient states her coordination in her hands is getting worse  Increased difficulty last night into today with typing on computer  Buttons troublesome at times  Patient questioning if she will be able to have infusion more than one day  1248 I placed call to Simón Claudio RN and awaiting return call

## 2022-06-24 NOTE — PROGRESS NOTES
1988 Return message from Dr Naa Arnold and informed no other doses will be advised at this point and her office will contact the patient regarding same

## 2022-06-24 NOTE — PLAN OF CARE
Problem: Potential for Falls  Goal: Patient will remain free of falls  Description: INTERVENTIONS:  - Educate patient/family on patient safety including physical limitations  - Instruct patient to call for assistance with activity   - Consult OT/PT to assist with strengthening/mobility   - Keep Call bell within reach  - Keep bed low and locked with side rails adjusted as appropriate  - Keep care items and personal belongings within reach  - Initiate and maintain comfort rounds  - Make Fall Risk Sign visible to staff    Outcome: Progressing     Problem: PAIN - ADULT  Goal: Verbalizes/displays adequate comfort level or baseline comfort level  Description: Interventions:  - Encourage patient to monitor pain and request assistance  - Assess pain using appropriate pain scale  - Administer analgesics based on type and severity of pain and evaluate response  - Implement non-pharmacological measures as appropriate and evaluate response  - Consider cultural and social influences on pain and pain management  - Notify physician/advanced practitioner if interventions unsuccessful or patient reports new pain  Outcome: Progressing     Problem: INFECTION - ADULT  Goal: Absence or prevention of progression during hospitalization  Description: INTERVENTIONS:  - Assess and monitor for signs and symptoms of infection  - Monitor lab/diagnostic results  - Monitor all insertion sites, i e  indwelling lines, tubes, and drains  - Monitor endotracheal if appropriate and nasal secretions for changes in amount and color  - [place] appropriate cooling/warming therapies per order  - Administer medications as ordered  - Instruct and encourage patient and family to use good hand hygiene technique  - Identify and instruct in appropriate isolation precautions for identified infection/condition  Outcome: Progressing     Problem: Knowledge Deficit  Goal: Patient/family/caregiver demonstrates understanding of disease process, treatment plan, medications, and discharge instructions  Description: Complete learning assessment and assess knowledge base    Interventions:  - Provide teaching at level of understanding  - Provide teaching via preferred learning methods  Outcome: Progressing

## 2022-06-24 NOTE — TELEPHONE ENCOUNTER
Pt made aware no further steroid doses advised at this time  She will call back with any further questions or concerns

## 2022-06-28 ENCOUNTER — EVALUATION (OUTPATIENT)
Dept: PHYSICAL THERAPY | Age: 42
End: 2022-06-28
Payer: COMMERCIAL

## 2022-06-28 DIAGNOSIS — G35 MS (MULTIPLE SCLEROSIS) (HCC): Primary | ICD-10-CM

## 2022-06-28 PROCEDURE — 97110 THERAPEUTIC EXERCISES: CPT | Performed by: PHYSICAL THERAPIST

## 2022-06-28 PROCEDURE — 97162 PT EVAL MOD COMPLEX 30 MIN: CPT | Performed by: PHYSICAL THERAPIST

## 2022-06-28 NOTE — PROGRESS NOTES
PT Evaluation     Today's date: 2022  Patient name: Ima Dancer  : 1980  MRN: [id number]  Referring provider: Carin Perez PA-C  Dx:   Encounter Diagnosis     ICD-10-CM    1  MS (multiple sclerosis) [facility])  G35                   Assessment  Assessment details: Ima Dancer is a pleasant 40 y o  female who presents with generalized weakness from [address]  She has generalized weakness leading to participation restrictions in her ADLs, transfers, ability to ambulate stairs, and has an increased risk of falling resulting in concern at no signs of improvement and fear of not being able to keep active  No further referral appears necessary at this time based upon examination results  Problem List:  1) Generalized weakness  2) Impaired gait   3) Increased fall risk     Impairments: abnormal gait, abnormal muscle firing, abnormal muscle tone, abnormal or restricted ROM, abnormal movement, activity intolerance, impaired balance, impaired physical strength, lacks appropriate home exercise program, pain with function, safety issue, poor posture  and poor body mechanics    Symptom irritability: moderateBarriers to therapy: Unable to come twice a week secondary to distance with increased gas prices and requiring rides   Understanding of Dx/Px/POC: fair   Prognosis: fair  Prognosis details: Positive prognostic indicators include good understanding of diagnosis and treatment plan options  Negative prognostic indicators include chronicity of symptoms, has expressed fear for participating in working with a PT, anxiety, depression and high symptom irritability  Goals  ST  Independent with HEP in 2 weeks  2  Pt will have verbal report of improvement in symptoms by >/=25% in 2 weeks   3  Pt will be able to tolerate > 15 minutes of aquatic PT program     To be achieved by D/C   LT  Pt will be able to perform TUG testing without the use of assistive device   2   Pt will be able to STS transfers without the use of UEs   3  Pt will be able to ambulate stairs at home   4  Pt will be able to decrease falls to 3 or less a week     Plan  Patient would benefit from: skilled physical therapy  Planned therapy interventions: activity modification, motor coordination training, neuromuscular re-education, patient education, self care, therapeutic activities, therapeutic exercise, graded activity, home exercise program, behavior modification, graded exercise, functional ROM exercises, strengthening, aquatic therapy and abdominal trunk stabilization  Frequency: 1x week  Duration in weeks: 12  Plan of Care beginning date: 6/28/2022  Plan of Care expiration date: 9/20/2022  Treatment plan discussed with: patient            Subjective Evaluation     History of Present Illness  Mechanism of injury: Pt reports that recently she had an episode where she lost all of her strength and had to rely on her family for completion of all transfers after pushing herself really hard to complete a tour of her son's new school  She felt that she over did it at the tour which led to her loosing all of her strength  She expresses her concern for working with a physical therapist and that she has a major fear of loosing all of her strength again from over doing it  She did express that she is not sure if PT is the right course of care for her at this time         Social Support  Steps to enter house: yes  2  Stairs in house: yes   12  Lives in: multiple-level home  Lives with: young children, spouse and adult children    Patient Goals  Patient goals for therapy: independence with ADLs/IADLs    Treatments  Previous treatment: physical therapy           Objective      Static Posture      Comments  Forward flexed at hips     Strength/Myotome Testing     Left Shoulder      Planes of Motion   Flexion: 4-   Abduction: 4-     Right Shoulder      Planes of Motion   Flexion: 4-   Abduction: 4-      Left Elbow   Flexion: 4-  Extension: 4-     Right Elbow   Flexion: 4-  Extension: 4-     Left Wrist/Hand   Wrist extension: 4  Wrist flexion: 4     Right Wrist/Hand   Wrist extension: 4  Wrist flexion: 4     Left Hip   Planes of Motion   Flexion: 3-  Abduction: 3-     Right Hip   Planes of Motion   Flexion: 3-  Abduction: 3-     Left Knee   Extension: 3-     Right Knee   Extension: 3-     Left Ankle/Foot   Dorsiflexion: 3-  Plantar flexion: 3-     Right Ankle/Foot   Dorsiflexion: 3-  Plantar flexion: 3-       Ambulation   Weight-Bearing Status     Additional Weight-Bearing Status Details  Pt walks with walking stick     Ambulation: Level Surfaces   Ambulation with assistive device: independent with walking stick      Observational Gait   Decreased walking speed, stride length, left step length and right step length  Left foot contact pattern: foot flat  Right foot contact pattern: foot flat  Left arm swing: decreased     Quality of Movement During Gait   Trunk  Forward lean       Additional Quality of Movement During Gait Details  limited foot clearance, does not obtain hip extension, crouched gait pattern         Neuro Exam:      Sensation   Light touch LE: intact more intense on the L for UE/LE     Coordination   Heel to shin: unable to perform on the L & R   Finger to nose: right dysmetria  Finger to nose: left WNL  Rapid alternating movements: UE WNL and LE impaired      Transfers Sit to stand: independent (uses arms)      Functional outcomes   TUG: 15 46 sec with walking stick   5TSTS : 18 75 with use of hands                Precautions: MS, fall risk, PROGRESS SLOWLY,       Manuals 6/28                                                                Neuro Re-Ed                                                                                                        Ther Ex             Pt education on HEP, POC x15 min                                                                                                        Ther Activity                                       Gait Training                                       Modalities

## 2022-07-05 ENCOUNTER — TELEPHONE (OUTPATIENT)
Dept: NEUROLOGY | Facility: CLINIC | Age: 42
End: 2022-07-05

## 2022-07-05 ENCOUNTER — OFFICE VISIT (OUTPATIENT)
Dept: NEUROLOGY | Facility: CLINIC | Age: 42
End: 2022-07-05
Payer: COMMERCIAL

## 2022-07-05 VITALS
SYSTOLIC BLOOD PRESSURE: 98 MMHG | RESPIRATION RATE: 18 BRPM | HEIGHT: 66 IN | WEIGHT: 101 LBS | HEART RATE: 86 BPM | DIASTOLIC BLOOD PRESSURE: 53 MMHG | TEMPERATURE: 98.2 F | BODY MASS INDEX: 16.23 KG/M2

## 2022-07-05 DIAGNOSIS — G89.29 MUSCULOSKELETAL PAIN, CHRONIC: ICD-10-CM

## 2022-07-05 DIAGNOSIS — M79.18 MUSCULOSKELETAL PAIN, CHRONIC: ICD-10-CM

## 2022-07-05 DIAGNOSIS — G35 MS (MULTIPLE SCLEROSIS) (HCC): Primary | ICD-10-CM

## 2022-07-05 DIAGNOSIS — R26.2 AMBULATORY DYSFUNCTION: ICD-10-CM

## 2022-07-05 DIAGNOSIS — F10.20 ALCOHOLISM (HCC): ICD-10-CM

## 2022-07-05 DIAGNOSIS — Z86.19 HISTORY OF LYME DISEASE: ICD-10-CM

## 2022-07-05 DIAGNOSIS — R26.89 BALANCE PROBLEMS: ICD-10-CM

## 2022-07-05 DIAGNOSIS — N31.9 NEUROGENIC BLADDER: ICD-10-CM

## 2022-07-05 DIAGNOSIS — F10.10 ETOH ABUSE: ICD-10-CM

## 2022-07-05 PROCEDURE — 99215 OFFICE O/P EST HI 40 MIN: CPT | Performed by: PSYCHIATRY & NEUROLOGY

## 2022-07-05 RX ORDER — NALOXEGOL OXALATE 25 MG/1
25 TABLET, FILM COATED ORAL DAILY
COMMUNITY
Start: 2022-06-20

## 2022-07-05 NOTE — TELEPHONE ENCOUNTER
Spoke with patient she stated that she has been rescheduled on several occasions and she has been having several episodes and really needs to be seen  Patient will be keeping her appointment today

## 2022-07-05 NOTE — PROGRESS NOTES
[facility] MULTIPLE SCLEROSIS CENTER  PATIENT:  Mariam Marie  MRN:  [id number]  :  1980  DATE OF SERVICE:  2022    Assessment/Plan:           Problem List Items Addressed This Visit        Nervous and Auditory    MS (multiple sclerosis) ([facility] [address] ) - Primary    Relevant Orders    MRI brain without contrast    MRI cervical spine wo contrast    MRI thoracic spine without contrast    CBC and differential    Comprehensive metabolic panel    TSH, 3rd generation with Free T4 reflex       Other    Alcoholism (HCC)    Musculoskeletal pain, chronic    Balance problems    History of Lyme disease    Neurogenic bladder    Ambulatory dysfunction    ETOH abuse         Mrs June Ambriz has presented to [facility] multiple [address] for follow-up on multiple sclerosis and ambulatory dysfunction  Patient described no new focal neurological dysfunction with stable clinical presentation reported since we started ofatumumab 20 mg in monthly injections  Patient described no recent infection, we discussed importance to consider Evusheld 600 mg as a pre exposure COVID-19 prophylaxis, patient is to reach primary care physician office or our office to schedule her injection if she considers this regimen  Patient had serological workup completed in 2022, we extensively discussed her findings  Patient has persistent liver dysfunction with thrombocytopenia due to chronic alcohol use  We also reviewed patient imaging completed in August/2021- patient had several demyelinating plaques appreciated in cervical spine with new demyelination in thoracic region comparing her imaging to 2020 studies  Patient will be advised to consider repeating imaging in 2022  We also discussed importance to consider social events in the light of body fatigue, patient has submitted for motorized scooter, not available yet    We extensively discussed limited reserve considering energy; we also discussed importance to have physical therapy on scheduled basis in the light of muscle wasting appreciated upper and lower extremities which contributes to patient generalized body weakness  Patient reports left hand spasticity in the cold environment or when she is over exhausted-patient is to consider taking Robaxin 750 mg  Patient would be recommended to avoid dietary supplements considering liver dysfunction  Patient had started off on pretty in 10 mg twice daily, patient is nonambulatory and it is difficult to evaluate for medication benefits  Patient presented with her daughter today  All questions were answered to the patient's satisfaction  Patient will be advised to use steroids sparingly going forward due to low muscle bulk and fragile bones  Patient is to follow with [facility] Neurology advanced practitioner team within 5-6 months  Subjective:    HPI  Mrs Say Meza is a 41 yo female who has presented to [facility] multiple [address] for follow-up on multiple sclerosis and related issues  Patient described no new focal neurological deficit, she successfully started ofatumumab injections with no side effects has been reported  Patient described no injection reaction, no joint pain  Patient has no significant changes in her motor dysfunction since last seen, we extensively discussed importance of significant bouts of fatigue were patient has worsening motor function in her upper and specifically lower extremities  Patient stated she was absolutely exhausted on June 8 when she had event at her child's school requiring walking long distances with crutches  Patient stated she requires her  to take her out to the car  Patient also developed stiffness and spasticity in upper extremities in her hands  Patient had completed Solu-Medrol infusion on Yvette 10, June 13, June 14, June 24, 2022  Patient also started dull found pretty in 10 mg twice daily, it is difficult at this 0 2 discuss benefits  Patient has been taking Robaxin  Patient has been taking Topamax 50 mg for headache prevention  We previous discontinued Tysabri as medication was not providing  significant benefits, with no improvement in ambulation reported  Patient ambulates by using crutches and cane        MRI brain suggest stable radiographic findings with mild chronic demyelinating plaque has been noted, patient has stable 2 demyelinating plaques in her cervical spine at C2-C3 and C4-C6 and T11-T12,  Which all contribute to the patient clinical dysfunction     Patient will be advised monthly Solu-Medrol 1000 mg IV infusions starting this week - we extensively discussed side effects of chronic steroid treatment including avascular necrosis, osteoporosis, cardiac dysfunction, infection, secondary diabetes, irritability, hypertension     We also agreed patient may consider starting dalfampridine 10 mg tablets, we also agreed there is a 40-50% chance of improvement in ambulatory dysfunction      Patient had sprain of muscle in the groin area when she was getting out of stationary bike 2 years ago with prednisone taper been provided for muscle spasm and pain in that region  Patient has been working with urology team for neurogenic bladder, last office visit was November 18, 2021            The following portions of the patient's history were reviewed and updated as appropriate:   She  has a past medical history of Back pain, Chronic constipation (6/27/2016), Chronic pain disorder, Collar bone fracture, Crutches as ambulation aid, Depression, Dyssynergic defecation, ETOH abuse, Head injury (2014), Infectious viral hepatitis, Lyme disease, Meningitis spinal (2007?), Multiple sclerosis ([facility] [address] ), Opioid abuse ([facility] [address] ), Rib fractures, Thrombocytopenia ([facility] [address] ), and Use of cane as ambulatory aid    She   Patient Active Problem List    Diagnosis Date Noted    ETOH abuse     Smoking 11/14/2021    Thrombocytopenia ([facility] [address] ) 01/12/2021    History of Lyme disease 11/10/2020    Cervical myelopathy ([facility] ) 11/10/2020    MS (multiple sclerosis) ([place] [id number] ) 11/10/2020    Neurogenic bladder 11/10/2020    Ambulatory dysfunction 11/10/2020    Unspecified abnormalities of gait and mobility 06/18/2020    Balance problems 06/18/2020    Chronic midline low back pain without sciatica 06/18/2020    Chronic constipation 06/27/2016    Encounter for screening colonoscopy 06/27/2016    IBS (irritable bowel syndrome) 07/15/2015    Chronic pain due to trauma 10/28/2014    Musculoskeletal pain, chronic 10/28/2014    Insomnia 07/08/2014    Leukopenia 11/12/2013    Alcoholism ([place] [address] ) 11/08/2013    Lyme disease 11/08/2013    Nondependent opioid abuse in remission ([place] [address] ) 11/08/2013     She  has a past surgical history that includes Clavicle surgery; Knee arthroscopy; Tonsillectomy and adenoidectomy; Appendectomy; Tubal ligation; pr colonoscopy flx dx w/collj spec when pfrmd (N/A, 6/27/2016); Multiple tooth extractions; [place] lumbar puncture diagnostic (11/25/2020); Colonoscopy; and pr incision,implant,neuroelec,sacral nerve (N/A, 11/15/2021)  Her family history includes No Known Problems in her brother; Skin cancer in her mother  She  reports that she has been smoking cigarettes  She has been smoking about 0 50 packs per day  She has never used smokeless tobacco  She reports current alcohol use of about 14 0 standard drinks of alcohol per week  She reports that she does not use drugs    Current Outpatient Medications   Medication Sig Dispense Refill    b complex vitamins tablet Take 1 tablet by mouth daily      Biotin 79742 MCG TABS Take 1 tablet by mouth daily        buprenorphine-naloxone (SUBOXONE) 2-0 5 mg per SL tablet Place 0 5 tablets under the tongue 2 (two) times a day       celecoxib (CeleBREX) 200 mg capsule Take 200 mg by mouth daily      Cholecalciferol (Vitamin D3) 125 MCG (5000 UT) TABS Take 5,000 Units by mouth daily      cloNIDine (CATAPRES) 0 2 mg tablet Take 0 2 mg by mouth daily at bedtime       Dalfampridine ER 10 MG TB12 Take 1 tablet (10 mg total) by mouth 2 (two) times a day 60 tablet 5    docusate sodium (COLACE) 100 mg capsule Take 100 mg by mouth 3 (three) times a day        Ginkgo Biloba 40 MG TABS Take 120 mg by mouth daily        ibuprofen (MOTRIN) 200 mg tablet Take 200 mg by mouth as needed for mild pain      Kesimpta 20 MG/0 4ML SOAJ Inject 20mg under the skin at week 0, 1, and 2   1 2 mL 0    lubiprostone (AMITIZA) 24 mcg capsule Take 24 mcg by mouth 2 (two) times a day with meals      methocarbamol (ROBAXIN) 500 mg tablet Take 1 tablet (500 mg total) by mouth every 6 (six) hours as needed for muscle spasms 30 tablet 0    Misc Natural Products (GINSENG COMPLEX PO) Take 2 tablets by mouth daily        Movantik 25 MG tablet Take 25 mg by mouth in the morning      Multiple Vitamins-Minerals (ZINC PO) Take by mouth      Nerve Stimulator (TENS THERAPY REPLACE BACK PADS) MISC 30 pad by Does not apply route 2 (two) times a day 30 each 0    Nerve Stimulator VICKY by Does not apply route 2 (two) times a day 1 each 0    Nerve Stimulator Supplies MISC 30 pad by Does not apply route 2 (two) times a day 30 pad 0    Omega-3 Fatty Acids (FISH OIL OMEGA-3 PO) Take 1,200 mg by mouth daily       Potassium 99 MG TABS Take 99 mg by mouth daily        Probiotic Product (PROBIOTIC DAILY PO) Take 2 tablets by mouth daily        promethazine (PHENERGAN) 50 MG tablet TAKE 1 TO 2 TABS AT BEDTIME AS NEEDED FOR INSOMNIA  1    topiramate (TOPAMAX) 50 MG tablet Take 50 mg by mouth daily    5    TURMERIC PO Take by mouth      ZINC-VITAMIN C PO Take by mouth      Calcium Carbonate (CALCIUM 600 PO) Take 600 mg by mouth daily   (Patient not taking: No sig reported)      Kesimpta 20 MG/0 4ML SOAJ Inject 20 mg under the skin every 28 days Starting at week 4 (Patient not taking: No sig reported) 0 4 mL 11    Nerve Stimulator (STANDARD TENS) VICKY by Does not apply route 2 (two) times a day (Patient not taking: No sig reported) 1 Device 0    NON FORMULARY HEMPANOL BRAIN DETOX 200MG (Patient not taking: No sig reported)       No current facility-administered medications for this visit       Current Outpatient Medications on File Prior to Visit   Medication Sig    b complex vitamins tablet Take 1 tablet by mouth daily    Biotin 00966 MCG TABS Take 1 tablet by mouth daily      buprenorphine-naloxone (SUBOXONE) 2-0 5 mg per SL tablet Place 0 5 tablets under the tongue 2 (two) times a day     celecoxib (CeleBREX) 200 mg capsule Take 200 mg by mouth daily    Cholecalciferol (Vitamin D3) 125 MCG (5000 UT) TABS Take 5,000 Units by mouth daily    cloNIDine (CATAPRES) 0 2 mg tablet Take 0 2 mg by mouth daily at bedtime     Dalfampridine ER 10 MG TB12 Take 1 tablet (10 mg total) by mouth 2 (two) times a day    docusate sodium (COLACE) 100 mg capsule Take 100 mg by mouth 3 (three) times a day      Ginkgo Biloba 40 MG TABS Take 120 mg by mouth daily      ibuprofen (MOTRIN) 200 mg tablet Take 200 mg by mouth as needed for mild pain    Kesimpta 20 MG/0 4ML SOAJ Inject 20mg under the skin at week 0, 1, and 2     lubiprostone (AMITIZA) 24 mcg capsule Take 24 mcg by mouth 2 (two) times a day with meals    methocarbamol (ROBAXIN) 500 mg tablet Take 1 tablet (500 mg total) by mouth every 6 (six) hours as needed for muscle spasms    Misc Natural Products (GINSENG COMPLEX PO) Take 2 tablets by mouth daily      Movantik 25 MG tablet Take 25 mg by mouth in the morning    Multiple Vitamins-Minerals (ZINC PO) Take by mouth    Nerve Stimulator (TENS THERAPY REPLACE BACK PADS) MISC 30 pad by Does not apply route 2 (two) times a day    Nerve Stimulator VICKY by Does not apply route 2 (two) times a day    Nerve Stimulator Supplies MISC 30 pad by Does not apply route 2 (two) times a day    Omega-3 Fatty Acids (FISH OIL OMEGA-3 PO) Take 1,200 mg by mouth daily     Potassium 99 MG TABS Take 99 mg by mouth daily      Probiotic Product (PROBIOTIC DAILY PO) Take 2 tablets by mouth daily      promethazine (PHENERGAN) 50 MG tablet TAKE 1 TO 2 TABS AT BEDTIME AS NEEDED FOR INSOMNIA    topiramate (TOPAMAX) 50 MG tablet Take 50 mg by mouth daily      TURMERIC PO Take by mouth    ZINC-VITAMIN C PO Take by mouth    Calcium Carbonate (CALCIUM 600 PO) Take 600 mg by mouth daily   (Patient not taking: No sig reported)    Kesimpta 20 MG/0 4ML SOAJ Inject 20 mg under the skin every 28 days Starting at week 4 (Patient not taking: No sig reported)    Nerve Stimulator (STANDARD TENS) VICKY by Does not apply route 2 (two) times a day (Patient not taking: No sig reported)    NON FORMULARY HEMPANOL BRAIN DETOX 200MG (Patient not taking: No sig reported)     No current facility-administered medications on file prior to visit  She is allergic to penicillins            Objective:    Blood pressure 98/53, pulse 86, temperature 98 2 °F (36 8 °C), temperature source Tympanic, resp  rate 18, height 5' 6" (1 676 m), weight 45 8 kg (101 lb), last menstrual period 06/17/2022  Physical Exam    Neurological Exam    CONSTITUTIONAL: NAD, pleasant  NECK: supple, no lymphadenopathy, no thyromegaly, no JVD  CARDIOVASCULAR: RRR, normal S1S2, no murmurs, no rubs  RESP: clear to auscultation bilaterally, no wheezes/rhonchi/rales  ABDOMEN: soft, non tender, non distended  SKIN: no rash or skin lesions  EXTREMITIES: no edema, pulses 2+bilaterally  PSYCH: appropriate mood and affect  NEUROLOGIC COMPREHENSIVE EXAM: Patient is oriented to person, place and time, NAD; appropriate affect  CN II, III, IV, V, VI, VII,VIII,IX,X,XI-XII intact with EOMI, PERRLA, OKN intact, VF grossly intact, fundi poorly visualized secondary to pupillary constriction; symmetric face noted   Motor: 5/5 UE/LE bilateral symmetric;4-/5 hip flexion b/l, with 4+/5 dorsiflexion; Sensory: intact to light touch and pinprick bilaterally; abnormal vibration sensation feet bilaterally; Coordination within normal limits on FTN and CECY testing; DTR: 3/4 through, no Babinski, nonsustained  clonus  Tandem gait is abnormal  Romberg: absent  ROS:  12 points of review of system was reviewed with the patient and was unremarkable with exception: see HPI  Review of Systems   Constitutional: Positive for fatigue  Negative for appetite change and fever  HENT: Negative  Negative for hearing loss, tinnitus, trouble swallowing and voice change  Eyes: Negative  Negative for photophobia and pain  Respiratory: Negative  Negative for shortness of breath  Cardiovascular: Negative  Negative for palpitations  Gastrointestinal: Positive for constipation  Negative for nausea and vomiting  Endocrine: Negative  Negative for cold intolerance  Genitourinary: Positive for frequency and urgency  Negative for dysuria  Musculoskeletal: Positive for back pain (whole back), gait problem (must use cane- balance issues- with assistance), neck pain and neck stiffness  Negative for myalgias  Skin: Negative  Negative for rash  Allergic/Immunologic: Negative  Neurological: Positive for dizziness, weakness (whole body), light-headedness, numbness (legs and arms at times) and headaches  Negative for tremors, seizures, syncope, facial asymmetry and speech difficulty  Hematological: Bruises/bleeds easily  Psychiatric/Behavioral: Negative for confusion, hallucinations and sleep disturbance          Depression, memory issues

## 2022-07-05 NOTE — TELEPHONE ENCOUNTER
I called and left a message for the patient to reschedule her appointment for today at 12:30 pm with Dr Dhillon Carry  I left my name and number   When the patient calls back offer her an appointment on 7/11/22 @ 3 pm

## 2022-07-06 ENCOUNTER — OFFICE VISIT (OUTPATIENT)
Dept: PHYSICAL THERAPY | Age: 42
End: 2022-07-06
Payer: COMMERCIAL

## 2022-07-06 DIAGNOSIS — G35 MS (MULTIPLE SCLEROSIS) (HCC): Primary | ICD-10-CM

## 2022-07-06 PROCEDURE — 97113 AQUATIC THERAPY/EXERCISES: CPT

## 2022-07-06 NOTE — PROGRESS NOTES
Daily Note     Today's date: 2022  Patient name: Enrrique Bianchi  : 1980  MRN: [id number]  Referring provider: Nancy Locke PA-C  Dx:   Encounter Diagnosis     ICD-10-CM    1  MS [facility] )  G35        Start Time: 1100  Stop Time: 1130  Total time in clinic (min): 30 minutes    Subjective: pt notes she is very fearful of getting cold in the water  Or overdoing it today because she doesn't want a relapse  Objective: See treatment diary below      Assessment: Tolerated treatment poorly for initial pool session  Pt is fearful of a relapse due to getting cold or overdoing it in the water  Started pt in deep water w/ LE movements w/ periods of rest  Once pt stated she was starting to get cold we came up and sat on the bench and continued w/ LE ex's f/b seated UE noodle ex's  After that pt stated she was done and had enough for today  Will monitor pt next visit, maybe land PT would be a better fit for pt at this time  Gentle ex's to pt's tolerance w/ limited time in the pool  Patient demonstrated fatigue post treatment and would benefit from continued PT      Plan: Continue per plan of care        Precautions: MS, fall risk, PROGRESS SLOWLY, watch fatigue, pt is fearful of getting cold    Precautions:     Daily Treatment Diary     Manual                                                                                   Exercise Diary              Water walking 5            Postural training             Gait training             Home exercise pgm/patient education             Wall: t/h raises             Hip abd/add             Marching             squats             Knee flex/ext             Step-ups (fwd/bkwd/ss)             SLS (eyes open/closed)             SLS w UE mvmt  AROM/ball toss             Weight shifting             UE Noodle work x 4  2            UE AROM             Resistive UE work (paddles, bells, TB)             Core work on noodle (sitting/stdg)             Sit on noodle with movement             Seated on pool bench w proper posture 3            Ankle df/pf 1            marching 1            Hip Ab/add 1            Knee flex/ext 1            Deep water mvmt 10            Deep water tx/stretching             Specific self - stretches wall/steps

## 2022-07-07 ENCOUNTER — OFFICE VISIT (OUTPATIENT)
Dept: UROLOGY | Facility: CLINIC | Age: 42
End: 2022-07-07
Payer: COMMERCIAL

## 2022-07-07 VITALS
HEART RATE: 86 BPM | WEIGHT: 109 LBS | DIASTOLIC BLOOD PRESSURE: 68 MMHG | SYSTOLIC BLOOD PRESSURE: 110 MMHG | BODY MASS INDEX: 17.59 KG/M2

## 2022-07-07 DIAGNOSIS — N31.9 NEUROGENIC BLADDER: Primary | ICD-10-CM

## 2022-07-07 PROCEDURE — 99213 OFFICE O/P EST LOW 20 MIN: CPT

## 2022-07-11 ENCOUNTER — TELEPHONE (OUTPATIENT)
Dept: NEUROLOGY | Facility: CLINIC | Age: 42
End: 2022-07-11

## 2022-07-11 NOTE — PROGRESS NOTES
7/7/2022    No chief complaint on file  Assessment and Plan    40 y o  female manage by Dr Kassie Cuevas    1  Neurogenic Bladder  · Normal Cystoscopy 2021  · Urodynamic testing with normal compliance, decent capacity, pelvic floor dysfunction/detrusor sphincter dyssynergia  · Failed InterStim  · Renal US 6/15/2022  · PVR 11mL      Discussion:    Discussed possible trial of a beta 3 agonist as she mostly complains of urinary urgency and frequency and her most recent Renal US showed a normal PVR  Due to concern for possible drug to drug interactions specifically related to Suboxone, I do not feel she is a good candidate at this time  She has tried anticholinergics in the past but due to constipation and dry mouth these were discontinued  History of Present Illness  Marvin Grajeda is a 40 y o  female with a history of neurogenic bladder due to multiple sclerosis with urinary hesitancy and weakness of stream  She also has neurogenic bowel with lower extremity muscle wasting and gait dysfunction  She presents today complain of worsening urinary urgency and frequency stating that she can urinate up to 20-30 times per day  She only urinates a small amount of urine each time and feels as though she has to strain to urinate  She had a renal ultrasound completed on 06/15/2022 which showed a prevoid volume of 235 mL and a postvoid bladder volume of 11 mL  She reports that when she went for the renal ultrasound because she had to hold her urine for the exam causing her to experience severe urgency and suprapubic pressure  This was relieved when she was able to urinate  She had urodynamic testing in the past which showed a decent capacity of 500 mL maximum capacity  What she describes experiencing during the renal ultrasound leads me to believe that her bladder capacity has likely worsened   Urodynamic testing in the past also showed normal compliance, She was noted to have some pelvic floor dysfunction/detrusor sphincter dyssynergia  She previously completed pelvic floor physical therapy does not feel that helped  She also reports having tried anticholinergics in the past but stopped due to severe dry mouth and issues with constipation  She had InterStim placed in November of 2021 but this was removed shortly after as she was reporting sensation of vibration and pain  She states today that she has accepted that this may be her normal urinary habits and that due to her MS she may have to deal with this for the rest of her life  Review of Systems   Constitutional: Negative for chills and fever  HENT: Negative for ear pain and sore throat  Eyes: Negative for pain and visual disturbance  Respiratory: Negative for cough and shortness of breath  Cardiovascular: Negative for chest pain and palpitations  Gastrointestinal: Positive for constipation  Negative for abdominal pain, diarrhea, nausea and vomiting  Genitourinary: Positive for frequency and urgency  Negative for difficulty urinating, dysuria, hematuria and pelvic pain  Musculoskeletal: Positive for gait problem (ambulates with cane)  Negative for arthralgias and back pain  Skin: Negative for color change and rash  Neurological: Negative for dizziness, seizures, syncope and weakness  All other systems reviewed and are negative  Vitals  Vitals:    07/07/22 1031   BP: 110/68   Pulse: 86   Weight: 49 4 kg (109 lb)       Physical Exam  Vitals reviewed  Constitutional:       General: She is not in acute distress  Appearance: Normal appearance  She is normal weight  She is not ill-appearing or toxic-appearing  HENT:      Head: Normocephalic and atraumatic  Nose: Nose normal    Eyes:      General: No scleral icterus  Conjunctiva/sclera: Conjunctivae normal    Cardiovascular:      Rate and Rhythm: Normal rate  Pulses: Normal pulses  Heart sounds: Normal heart sounds     Pulmonary:      Effort: Pulmonary effort is normal  No respiratory distress  Abdominal:      General: Abdomen is flat  Palpations: Abdomen is soft  Tenderness: There is no abdominal tenderness  There is no right CVA tenderness or left CVA tenderness  Hernia: No hernia is present  Musculoskeletal:         General: Normal range of motion  Cervical back: Normal range of motion  Skin:     General: Skin is warm and dry  Neurological:      General: No focal deficit present  Mental Status: She is alert and oriented to person, place, and time  Mental status is at baseline  Gait: Gait abnormal    Psychiatric:         Mood and Affect: Mood normal          Behavior: Behavior normal          Thought Content: Thought content normal          Judgment: Judgment normal          Past History  Past Medical History:   Diagnosis Date    Back pain     Chronic constipation 6/27/2016    Chronic pain disorder     Collar bone fracture     right side    Crutches as ambulation aid     Depression     Dyssynergic defecation     Type 1    ETOH abuse     Head injury 2014    Infectious viral hepatitis     Lyme disease     Meningitis spinal 2007?  Multiple sclerosis (HCC)     Opioid abuse ([address] )     Rib fractures     Thrombocytopenia (HCC)     Use of cane as ambulatory aid      Social History     Socioeconomic History    Marital status: /Civil Union     Spouse name: Satish Martinez Number of children: 2    Years of education: None    Highest education level: None   Occupational History    None   Tobacco Use    Smoking status: Current Every Day Smoker     Packs/day: 1 00     Types: Cigarettes    Smokeless tobacco: Never Used   Vaping Use    Vaping Use: Never used   Substance and Sexual Activity    Alcohol use:  Yes     Alcohol/week: 14 0 standard drinks     Types: 14 Shots of liquor per week     Comment: 2 mixed drinks each night    Drug use: No    Sexual activity: Not Currently   Other Topics Concern    None   Social History Narrative    None     Social Determinants of Health     Financial Resource Strain: Not on file   Food Insecurity: Not on file   Transportation Needs: Not on file   Physical Activity: Not on file   Stress: Not on file   Social Connections: Not on file   Intimate Partner Violence: Not on file   Housing Stability: Not on file     Social History     Tobacco Use   Smoking Status Current Every Day Smoker    Packs/day: 1 00    Types: Cigarettes   Smokeless Tobacco Never Used     Family History   Problem Relation Age of Onset    Skin cancer Mother     No Known Problems Brother        The following portions of the patient's history were reviewed and updated as appropriate allergies, current medications, past medical history, past social history, past surgical history and problem list    Imagin/15/2022  RENAL ULTRASOUND     INDICATION:   N36 44: Muscular disorders of urethra  N31 9: Neuromuscular dysfunction of bladder, unspecified      COMPARISON: 2021     TECHNIQUE:   Ultrasound of the retroperitoneum was performed with a curvilinear transducer utilizing volumetric sweeps and still imaging techniques       FINDINGS:     KIDNEYS:  Symmetric and normal size  Right kidney:  10 1 x 6 2 x 5 4 cm  Volume 177 2 mL  Left kidney:  11 1 x 7 9 x 5 3 cm  Volume 243 2 mL     Right kidney  Normal echogenicity and contour  No mass is identified  No hydronephrosis  No shadowing calculi  No perinephric fluid collections      Left kidney  Normal echogenicity and contour  No mass is identified  No hydronephrosis  No shadowing calculi  No perinephric fluid collections      URETERS:  Nonvisualized      BLADDER:   Normally distended  No focal thickening or mass lesions  Bilateral ureteral jets detected  Bladder debris is present  Prevoid bladder volume of 235 mL is noted  Post void bladder volume of 11 mL noted         IMPRESSION:     Bladder debris again identified         Results  No results found for this or any previous visit (from the past 1 hour(s))  ]  No results found for: PSA  Lab Results   Component Value Date    GLUCOSE 81 11/12/2015    CALCIUM 8 9 03/15/2022     11/12/2015    K 3 7 03/15/2022    CO2 29 03/15/2022     03/15/2022    BUN 8 03/15/2022    CREATININE 0 80 03/15/2022     Lab Results   Component Value Date    WBC 3 24 (L) 03/15/2022    HGB 12 6 03/15/2022    HCT 35 9 03/15/2022    MCV 94 03/15/2022    PLT 64 (L) 03/15/2022       Please Note:  Voice dictation software has been used to create this document  There may be inadvertent transcriptions errors       Connell Kayser

## 2022-07-11 NOTE — TELEPHONE ENCOUNTER
Patient left  requesting return call  She has some questions  Called patient  Received vm  Left msg requesting return call

## 2022-07-12 ENCOUNTER — TELEPHONE (OUTPATIENT)
Dept: NEUROLOGY | Facility: CLINIC | Age: 42
End: 2022-07-12

## 2022-07-12 NOTE — TELEPHONE ENCOUNTER
Patient initially was offered Tysabri and now Laney Dredge - 2 potent regimens for MS control  If patient believes she has progressive worsening despite this cytotoxic regimen use, consider patient progressing to SPMS  If patient believes she would like to stop DMT - she is to make a final decision then  Concern for other medical/psychiatric co-morbidities including alcoholism and cirrhosis influencing patient function;      Will consider  MRI B/C and EMG/NCS on follow up visit

## 2022-07-12 NOTE — TELEPHONE ENCOUNTER
Patient of Dr Riri Gandhi, last visit 7/5    She said symptoms are "not good" and progressively seem to worsen after taking kesimpta  She said her last dose was x 5 days ago and that evening,  symptoms worsened; "had no control", could not bend left leg, very limited ROM with left arm, "fingers don't want to work"  She feels Lobekae Balk may be making symptoms worse and is hesitant to continue; started kesimpta in December 2021    MRI scheduled 11/17    Please provide recommendation, thank you      cb [phone] (okay to leave detailed message )

## 2022-07-13 ENCOUNTER — APPOINTMENT (OUTPATIENT)
Dept: PHYSICAL THERAPY | Age: 42
End: 2022-07-13
Payer: COMMERCIAL

## 2022-07-14 NOTE — TELEPHONE ENCOUNTER
Spoke w/patient  Advised her of note below  Patient verbalized understanding  Patient states it has been 8 months since she started Virginia Lyman  She really believes it is the medication that is causing her issues and making her worse  States it was the same timeframe w/Tysabri  Educated patient on role of DMT in preventing further progression of MS  Also educated patient on possible progression to SPMS  Patient did verbalize understanding to this information  She is agreeable to moving forward with MRIs and EMG  Patient confirmed she drinks two (2) alcoholic beverages per night (Rum w/Lemonade)  Patient currently scheduled for MRI b/t/c in 2022  F/U appt 2022  Patient is due for next dose of Kesimpta in appx 3 weeks  She will think about it and decide if she wants to continue on DMT  1) Patient states monthly steroid infusions were discussed at [address]  Patient would like to pursue this option if possible  2) Patient was advised by our office to schedule imaging in 2022  Patient would like to have imaging done sooner if agreeable  3) Patient requesting order for EMG be placed  [phone] to leave detailed msg  Dr Manuel Rowan - please advise on 3 questions above  Thank you!

## 2022-07-19 DIAGNOSIS — R26.2 AMBULATORY DYSFUNCTION: ICD-10-CM

## 2022-07-19 DIAGNOSIS — F10.10 ETOH ABUSE: Primary | ICD-10-CM

## 2022-07-19 DIAGNOSIS — F10.20 ALCOHOLISM (HCC): ICD-10-CM

## 2022-07-19 DIAGNOSIS — R20.2 PARESTHESIAS: ICD-10-CM

## 2022-07-19 DIAGNOSIS — G35 MS (MULTIPLE SCLEROSIS) (HCC): ICD-10-CM

## 2022-07-19 NOTE — TELEPHONE ENCOUNTER
Patient calling in to follow up on previous telephone call and answers to her questions  Dr Trey Pearl see  7/14/22 encounter ( note from Pamela Quinonez to you)    Patient is asking for response  Please advise  Thank you      # [phone]

## 2022-07-19 NOTE — TELEPHONE ENCOUNTER
Please consider Solumedrol 1000 mg IV on monthly bases starting 7/24; additional 5 doses advised with total 6 treatments recommended    EMG order is in place     Please let the patient call to scheduling team to re-schedule MRIs at her convenience

## 2022-07-20 DIAGNOSIS — G35 MS (MULTIPLE SCLEROSIS) (HCC): Primary | ICD-10-CM

## 2022-07-20 RX ORDER — SODIUM CHLORIDE 9 MG/ML
20 INJECTION, SOLUTION INTRAVENOUS ONCE
Status: CANCELLED | OUTPATIENT
Start: 2022-07-25

## 2022-07-20 NOTE — TELEPHONE ENCOUNTER
Called [facility] [phone]  Spoke w/Darwin  Provided the following codes:    26931  26340    Per Linus Sandifer, these codes do not require a PA  WOD#[id number]    [facility] is in network for patient  Therapy plan entered  TT sent

## 2022-07-21 ENCOUNTER — APPOINTMENT (OUTPATIENT)
Dept: PHYSICAL THERAPY | Age: 42
End: 2022-07-21
Payer: COMMERCIAL

## 2022-07-21 NOTE — TELEPHONE ENCOUNTER
Called [facility]  Spoke w/Jaqueline  Patient scheduled for first of 6 monthly infusions on:    7/25/22 @ 8am at [facility]    They will schedule patient's subsequent infusions at time of first infusion  Called patient  Received vm  Left detailed msg (per verbal consent recd) advising of above appt time/date  Provided [facility] phone # if any issues  [phone]

## 2022-07-22 NOTE — TELEPHONE ENCOUNTER
Pt called and confirmed infusion appt for Monday  She again is asking if she should have mri's sooner than currently scheduled  Reviewed your response regarding this- Please let the patient call to scheduling team to re-schedule MRIs at her convenience     Made her aware of this,  She is asking for more clarification on this statement  Do you want her to have mri's sooner than nov?   Please advise  [phone]- to leave detailed message

## 2022-07-22 NOTE — TELEPHONE ENCOUNTER
Left another detailed message for pt making her aware of infusion appt on Monday  Asked pt to call back with any issues

## 2022-07-22 NOTE — TELEPHONE ENCOUNTER
Agustin Fitzpatrick MD  to Me          12:14 PM  Patient is to re-schedule her imaging if she wants sooner - patient has been taking high efficacy regimens one after another, I doubt patient has relapse and we are switching to solumedrol regardless of her imaging findings  If patient believes she has relapse - she is to have imaging within next couple weeks  no

## 2022-07-25 ENCOUNTER — HOSPITAL ENCOUNTER (OUTPATIENT)
Dept: INFUSION CENTER | Facility: HOSPITAL | Age: 42
Discharge: HOME | End: 2022-07-25
Attending: PSYCHIATRY & NEUROLOGY
Payer: COMMERCIAL

## 2022-07-25 VITALS
TEMPERATURE: 98.2 F | SYSTOLIC BLOOD PRESSURE: 107 MMHG | RESPIRATION RATE: 16 BRPM | HEART RATE: 78 BPM | OXYGEN SATURATION: 96 % | DIASTOLIC BLOOD PRESSURE: 58 MMHG

## 2022-07-25 DIAGNOSIS — G35 MS (MULTIPLE SCLEROSIS) (HCC): Primary | ICD-10-CM

## 2022-07-25 PROCEDURE — 96365 THER/PROPH/DIAG IV INF INIT: CPT

## 2022-07-25 RX ORDER — SODIUM CHLORIDE 9 MG/ML
20 INJECTION, SOLUTION INTRAVENOUS ONCE
Status: CANCELLED | OUTPATIENT
Start: 2022-08-23

## 2022-07-25 RX ORDER — SODIUM CHLORIDE 9 MG/ML
20 INJECTION, SOLUTION INTRAVENOUS ONCE
Status: COMPLETED | OUTPATIENT
Start: 2022-07-25 | End: 2022-07-25

## 2022-07-25 RX ADMIN — SODIUM CHLORIDE 20 ML/HR: 0.9 INJECTION, SOLUTION INTRAVENOUS at 11:35

## 2022-07-25 RX ADMIN — SODIUM CHLORIDE 1000 MG: 0.9 INJECTION, SOLUTION INTRAVENOUS at 11:42

## 2022-08-05 ENCOUNTER — HOSPITAL ENCOUNTER (OUTPATIENT)
Dept: MRI IMAGING | Facility: HOSPITAL | Age: 42
Discharge: HOME | End: 2022-08-05
Attending: PSYCHIATRY & NEUROLOGY
Payer: COMMERCIAL

## 2022-08-05 ENCOUNTER — HOSPITAL ENCOUNTER (OUTPATIENT)
Dept: RADIOLOGY | Facility: HOSPITAL | Age: 42
Discharge: HOME | End: 2022-08-05

## 2022-08-05 DIAGNOSIS — G35 MS (MULTIPLE SCLEROSIS) (HCC): ICD-10-CM

## 2022-08-05 DIAGNOSIS — M79.5 FOREIGN BODY (FB) IN SOFT TISSUE: ICD-10-CM

## 2022-08-05 PROCEDURE — 72141 MRI NECK SPINE W/O DYE: CPT

## 2022-08-05 PROCEDURE — G1004 CDSM NDSC: HCPCS

## 2022-08-05 PROCEDURE — 72146 MRI CHEST SPINE W/O DYE: CPT

## 2022-08-08 ENCOUNTER — TELEPHONE (OUTPATIENT)
Dept: OBGYN CLINIC | Facility: MEDICAL CENTER | Age: 42
End: 2022-08-08

## 2022-08-08 ENCOUNTER — TELEPHONE (OUTPATIENT)
Dept: OTHER | Facility: OTHER | Age: 42
End: 2022-08-08

## 2022-08-08 NOTE — TELEPHONE ENCOUNTER
Called and left detailed voicemail message per communication consent with Chloe Closs, CRNP's recommendations and advised patient to contact the office with any additional questions

## 2022-08-08 NOTE — TELEPHONE ENCOUNTER
Please let patient know I was able to review her chart and medications  I had discussed the possible addition of a beta 3 agonist with her during her last office visit  However, due to concerns for a possible drug to drug interaction with Suboxone,  I would recommend against this at this time  I would recommend continuing with her current treatment plan

## 2022-08-08 NOTE — TELEPHONE ENCOUNTER
Patient called about some urinary concerns did not want to specify  Please review when you get a chance   Thank you

## 2022-08-08 NOTE — TELEPHONE ENCOUNTER
Patient called in to follow up from her last appt on 7/7/22 with Daquan Chen  Stated that he was supposed to call her back after reviewing her chart  Please advise

## 2022-08-09 ENCOUNTER — HOSPITAL ENCOUNTER (OUTPATIENT)
Dept: MRI IMAGING | Facility: HOSPITAL | Age: 42
Discharge: HOME | End: 2022-08-09
Attending: PSYCHIATRY & NEUROLOGY
Payer: COMMERCIAL

## 2022-08-09 DIAGNOSIS — G35 MS (MULTIPLE SCLEROSIS) (HCC): ICD-10-CM

## 2022-08-09 PROCEDURE — G1004 CDSM NDSC: HCPCS

## 2022-08-09 PROCEDURE — 70551 MRI BRAIN STEM W/O DYE: CPT

## 2022-08-16 ENCOUNTER — TELEPHONE (OUTPATIENT)
Dept: NEUROLOGY | Facility: CLINIC | Age: 42
End: 2022-08-16

## 2022-08-16 NOTE — TELEPHONE ENCOUNTER
Received voicemail from [place] with [facility]  He reports pt called them to report she does not want to continue Kelly Dural as she feels it is not working  Pt also receiving monthly IV solumedrol infusions       JEREMY

## 2022-08-18 ENCOUNTER — TELEPHONE (OUTPATIENT)
Dept: NEUROLOGY | Facility: CLINIC | Age: 42
End: 2022-08-18

## 2022-08-18 NOTE — TELEPHONE ENCOUNTER
Pt left voicemail asking for MRI results  Called pt, reviewed results  Pt then reports when scheduling her EMG she was told by CS that her neuropathy was worse  States she was unaware that she has neuropathy  Concerned about potential amputation if severe based on someone she knows  I advised per chart she has no formal diagnosis of neuropathy  Discussed that sensory dysfunction can often be seen in pts with MS  Advised the EMG will give formal diagnosis of neuropathy if present  Discussed common treatments for neuropathy if indicated  Discussed typically amputation would not be needed in relation to neuropathy alone  No further questions

## 2022-08-23 ENCOUNTER — HOSPITAL ENCOUNTER (OUTPATIENT)
Dept: INFUSION CENTER | Facility: HOSPITAL | Age: 42
Discharge: HOME | End: 2022-08-23
Attending: PSYCHIATRY & NEUROLOGY
Payer: COMMERCIAL

## 2022-08-23 VITALS
RESPIRATION RATE: 18 BRPM | HEART RATE: 88 BPM | SYSTOLIC BLOOD PRESSURE: 111 MMHG | DIASTOLIC BLOOD PRESSURE: 55 MMHG | TEMPERATURE: 97.2 F | OXYGEN SATURATION: 96 %

## 2022-08-23 DIAGNOSIS — G35 MS (MULTIPLE SCLEROSIS) (HCC): Primary | ICD-10-CM

## 2022-08-23 PROCEDURE — 96365 THER/PROPH/DIAG IV INF INIT: CPT

## 2022-08-23 RX ORDER — SODIUM CHLORIDE 9 MG/ML
20 INJECTION, SOLUTION INTRAVENOUS ONCE
Status: COMPLETED | OUTPATIENT
Start: 2022-08-23 | End: 2022-08-23

## 2022-08-23 RX ORDER — SODIUM CHLORIDE 9 MG/ML
20 INJECTION, SOLUTION INTRAVENOUS ONCE
Status: CANCELLED | OUTPATIENT
Start: 2022-09-20

## 2022-08-23 RX ADMIN — SODIUM CHLORIDE 20 ML/HR: 0.9 INJECTION, SOLUTION INTRAVENOUS at 11:50

## 2022-08-23 RX ADMIN — SODIUM CHLORIDE 1000 MG: 0.9 INJECTION, SOLUTION INTRAVENOUS at 12:02

## 2022-08-23 NOTE — PLAN OF CARE
Problem: Potential for Falls  Goal: Patient will remain free of falls  Description: INTERVENTIONS:  - Educate patient/family on patient safety including physical limitations  - Instruct patient to call for assistance with activity   - Consult OT/PT to assist with strengthening/mobility   - Keep Call bell within reach  -Keep chair locked  Outcome: Progressing     Problem: INFECTION - ADULT  Goal: Absence or prevention of progression during hospitalization  Description: INTERVENTIONS:  - Assess and monitor for signs and symptoms of infection  - Monitor lab/diagnostic results  - Monitor all insertion sites, i e  indwelling lines, tubes, and drains  - Monitor endotracheal if appropriate and nasal secretions for changes in amount and color  - [place] appropriate cooling/warming therapies per order  - Administer medications as ordered  - Instruct and encourage patient and family to use good hand hygiene technique  - Identify and instruct in appropriate isolation precautions for identified infection/condition  Outcome: Progressing     Problem: Knowledge Deficit  Goal: Patient/family/caregiver demonstrates understanding of disease process, treatment plan, medications, and discharge instructions  Description: Complete learning assessment and assess knowledge base    Interventions:  - Provide teaching at level of understanding  - Provide teaching via preferred learning methods  Outcome: Progressing

## 2022-09-16 ENCOUNTER — TELEPHONE (OUTPATIENT)
Dept: OTHER | Facility: OTHER | Age: 42
End: 2022-09-16

## 2022-09-16 ENCOUNTER — TELEPHONE (OUTPATIENT)
Dept: NEUROLOGY | Facility: CLINIC | Age: 42
End: 2022-09-16

## 2022-09-16 DIAGNOSIS — R39.89 SUSPECTED UTI: Primary | ICD-10-CM

## 2022-09-16 NOTE — TELEPHONE ENCOUNTER
Spoke w/patient  Advised her of note below  Patient verbalized understanding  She will request UA from her urologist  Confirmed 9/20/22 IV Solumedrol infusion on 9/20/22

## 2022-09-16 NOTE — TELEPHONE ENCOUNTER
Constellation of the symptoms brings concerns for MS progression; Patient stopped Tysabri and Rashida Jason  Concern for UTI or other infections     No other recommendations - patient has steroids upcoming on 9/20

## 2022-09-16 NOTE — TELEPHONE ENCOUNTER
Patient left vm stating she was having symptoms  Need to triage      Called and Left a message on pt's answering machine for a call back

## 2022-09-16 NOTE — TELEPHONE ENCOUNTER
Spoke w/patient  She reports the upper left side of head is numb (eye to top of head) She states this has occurred in the past appx 2 5 - 3 years ago  She did not report this to our office at the time; however, patient feels it is now progressing so she is concerned  Also reports eft knee feels weird, as if it was in a cast or brace  These symptoms have been occurring the past four days  Admits to urinary urgency and frequency  Patient admits to painful urination x 1  appx 3 days ago  Denies any recent illnesses  No diabetes hx  Denies drug use  DMT - monthly steroid infusions (4 infusions left); next infusion on 9/20/22  MRI b/c/t - 8/2022  LOV - 7/2022    [phone] to leave detailed msg  Dr Alejandro Rust - please advise

## 2022-09-19 ENCOUNTER — LAB (OUTPATIENT)
Dept: LAB | Facility: HOSPITAL | Age: 42
End: 2022-09-19
Payer: COMMERCIAL

## 2022-09-19 DIAGNOSIS — R39.89 SUSPECTED UTI: ICD-10-CM

## 2022-09-19 LAB
BACTERIA UR QL AUTO: ABNORMAL /HPF
BILIRUB UR QL STRIP: NEGATIVE
CLARITY UR: ABNORMAL
COLOR UR: YELLOW
GLUCOSE UR STRIP-MCNC: NEGATIVE MG/DL
HGB UR QL STRIP.AUTO: NEGATIVE
KETONES UR STRIP-MCNC: NEGATIVE MG/DL
LEUKOCYTE ESTERASE UR QL STRIP: NEGATIVE
NITRITE UR QL STRIP: POSITIVE
NON-SQ EPI CELLS URNS QL MICRO: ABNORMAL /HPF
PH UR STRIP.AUTO: 7 [PH]
PROT UR STRIP-MCNC: NEGATIVE MG/DL
RBC #/AREA URNS AUTO: ABNORMAL /HPF
SP GR UR STRIP.AUTO: 1.01 (ref 1–1.03)
UROBILINOGEN UR QL STRIP.AUTO: 0.2 E.U./DL
WBC #/AREA URNS AUTO: ABNORMAL /HPF

## 2022-09-19 PROCEDURE — 81001 URINALYSIS AUTO W/SCOPE: CPT

## 2022-09-19 PROCEDURE — 87086 URINE CULTURE/COLONY COUNT: CPT

## 2022-09-19 PROCEDURE — 87186 SC STD MICRODIL/AGAR DIL: CPT

## 2022-09-19 PROCEDURE — 87077 CULTURE AEROBIC IDENTIFY: CPT

## 2022-09-19 NOTE — TELEPHONE ENCOUNTER
Called the patient and made her aware the orders for urine testing have been placed in her chart  Patient states her 'MS" doctor asked for the testing to rule out UTI  Patient has had ongoing urinary urgency and frequency and intermittent burning for months  Await results

## 2022-09-20 ENCOUNTER — HOSPITAL ENCOUNTER (OUTPATIENT)
Dept: INFUSION CENTER | Facility: HOSPITAL | Age: 42
Discharge: HOME | End: 2022-09-20
Attending: PSYCHIATRY & NEUROLOGY
Payer: COMMERCIAL

## 2022-09-20 VITALS
TEMPERATURE: 98.4 F | RESPIRATION RATE: 18 BRPM | SYSTOLIC BLOOD PRESSURE: 111 MMHG | OXYGEN SATURATION: 95 % | DIASTOLIC BLOOD PRESSURE: 57 MMHG | HEART RATE: 88 BPM

## 2022-09-20 DIAGNOSIS — G35 MS (MULTIPLE SCLEROSIS) (HCC): Primary | ICD-10-CM

## 2022-09-20 PROCEDURE — 96365 THER/PROPH/DIAG IV INF INIT: CPT

## 2022-09-20 RX ORDER — SODIUM CHLORIDE 9 MG/ML
20 INJECTION, SOLUTION INTRAVENOUS ONCE
Status: CANCELLED | OUTPATIENT
Start: 2022-10-18

## 2022-09-20 RX ORDER — SODIUM CHLORIDE 9 MG/ML
20 INJECTION, SOLUTION INTRAVENOUS ONCE
Status: COMPLETED | OUTPATIENT
Start: 2022-09-20 | End: 2022-09-20

## 2022-09-20 RX ADMIN — SODIUM CHLORIDE 20 ML/HR: 0.9 INJECTION, SOLUTION INTRAVENOUS at 12:32

## 2022-09-20 RX ADMIN — SODIUM CHLORIDE 1000 MG: 0.9 INJECTION, SOLUTION INTRAVENOUS at 12:43

## 2022-09-20 NOTE — PLAN OF CARE
Problem: Potential for Falls  Goal: Patient will remain free of falls  Description: INTERVENTIONS:  - Educate patient/family on patient safety including physical limitations  - Instruct patient to call for assistance with activity   - Consult OT/PT to assist with strengthening/mobility   - Keep Call bell within reach  - Keep chair locked  - Keep care items and personal belongings within reach    Outcome: Progressing     Problem: INFECTION - ADULT  Goal: Absence or prevention of progression during hospitalization  Description: INTERVENTIONS:  - Assess and monitor for signs and symptoms of infection  - Monitor lab/diagnostic results  - Monitor all insertion sites, i e  indwelling lines, tubes, and drains  - Monitor endotracheal if appropriate and nasal secretions for changes in amount and color  - [place] appropriate cooling/warming therapies per order  - Administer medications as ordered  - Instruct and encourage patient and family to use good hand hygiene technique  - Identify and instruct in appropriate isolation precautions for identified infection/condition  Outcome: Progressing     Problem: Knowledge Deficit  Goal: Patient/family/caregiver demonstrates understanding of disease process, treatment plan, medications, and discharge instructions  Description: Complete learning assessment and assess knowledge base    Interventions:  - Provide teaching at level of understanding  - Provide teaching via preferred learning methods  Outcome: Progressing

## 2022-09-21 ENCOUNTER — TELEPHONE (OUTPATIENT)
Dept: OTHER | Facility: OTHER | Age: 42
End: 2022-09-21

## 2022-09-21 LAB — BACTERIA UR CULT: ABNORMAL

## 2022-09-21 NOTE — TELEPHONE ENCOUNTER
Called and spoke to pt she wanted to know her results of micro and culture   I reviewed and advised that her culture was not full resulted and we will reach out once testing is finalized

## 2022-09-22 ENCOUNTER — TELEPHONE (OUTPATIENT)
Dept: UROLOGY | Facility: CLINIC | Age: 42
End: 2022-09-22

## 2022-09-22 DIAGNOSIS — N39.0 URINARY TRACT INFECTION WITHOUT HEMATURIA, SITE UNSPECIFIED: Primary | ICD-10-CM

## 2022-09-22 RX ORDER — SULFAMETHOXAZOLE AND TRIMETHOPRIM 800; 160 MG/1; MG/1
1 TABLET ORAL EVERY 12 HOURS SCHEDULED
Qty: 10 TABLET | Refills: 0 | Status: SHIPPED | OUTPATIENT
Start: 2022-09-22 | End: 2022-09-27

## 2022-09-22 NOTE — TELEPHONE ENCOUNTER
Called and left detailed VM for patient with final urine culture results and that Bactrim was faxed to her pharmacy  Confirmation was received  Asked her to call the office back if she has any questions

## 2022-09-22 NOTE — RESULT ENCOUNTER NOTE
Please let patient know her urine culture was positive for E-coli   I have faxed a prescription for Bactrim to her pharmacy

## 2022-10-05 ENCOUNTER — PROCEDURE VISIT (OUTPATIENT)
Dept: NEUROLOGY | Facility: CLINIC | Age: 42
End: 2022-10-05

## 2022-10-05 ENCOUNTER — TELEPHONE (OUTPATIENT)
Dept: NEUROLOGY | Facility: CLINIC | Age: 42
End: 2022-10-05

## 2022-10-05 DIAGNOSIS — R26.2 AMBULATORY DYSFUNCTION: ICD-10-CM

## 2022-10-05 DIAGNOSIS — R20.2 PARESTHESIAS: ICD-10-CM

## 2022-10-05 DIAGNOSIS — G35 MS (MULTIPLE SCLEROSIS) (HCC): ICD-10-CM

## 2022-10-05 DIAGNOSIS — F10.20 ALCOHOLISM (HCC): ICD-10-CM

## 2022-10-05 NOTE — TELEPHONE ENCOUNTER
Patient was here for EMG NCV LUISA ADKINS and stated she could not tolerate the study  We stopped the study

## 2022-10-05 NOTE — PROGRESS NOTES
EMG 1 Upper/1 Lower Neuropathy     Date/Time 10/5/2022 12:18 PM     Performed by  Jesusita Hinds MD     Authorized by Jemima Pollack MD                [facility] [address]  [phone]    Electromyography & Nerve Conduction Studies Report          Full Name: Jaime Dodson Gender: Female  MRN: [id number] YOB: 1980      Visit Date: 10/5/2022 11:50 AM  Age: 40 Years  Examining Physician: Jesusita Hinds MD   Referring Physician: DR Mau Olivia History: 31-year-old female with history of multiple sclerosis and alcoholism presents with complaints of stiffness and spasticity in both upper extremities  Also notes difficulty with gait  Patient is being evaluated for a lumbosacral radiculopathy versus peripheral neuropathy  TEMP 32 WITH HEAT VERY DIFFICULT TO OBTAIN TEMPERATURE       Motor Nerve Conduction Study       Nerve / Sites Muscle Latency Ref  Amplitude Ref  Segments Distance Lat Diff Ref      ms ms mV mV  cm ms m/s   L Peroneal - EDB      Ankle EDB 5 6 ?6 5 1 7 ?2 0 Ankle - EDB 9        B  Fib Head EDB 12 8  2 3  B  Fib Head - Ankle  7 19 ? 44         Summary        Motor and sensory conduction studies were performed on the left peroneal nerve  Peroneal motor terminal latency was normal with a low compound motor action potential amplitude  Patient refused further testing secondary to intolerability to pain  Impression:    Incomplete study  Patient refused testing secondary to intolerability to pain

## 2022-10-06 NOTE — TELEPHONE ENCOUNTER
Patient called and left message asking if she could complete US to r/o or confirm neuropathy diagnosis since EMG was unable to be completed  States body was unable to handle EMG pain      CB: [phone]

## 2022-10-07 NOTE — TELEPHONE ENCOUNTER
Received  transcription:    Yes, maria dolores  This is Aime Butterfield, I called yesterday  Nobody called me back  And this is about the neuropathy  I went in for that test and I couldn't complete it, it was extremely painful  And I am inquiring about an ultrasound  If I can get that test, I would appreciate that  So if somebody could call me or at least explain to me, something, I would really appreciate that  My phone number is [phone]  This is about something that happened a few days ago at [facility], thank you and have a good day bymatt

## 2022-10-10 NOTE — TELEPHONE ENCOUNTER
Left detailed message for pt (okay per communication consent) making her aware of Dr Beth's response  Pt is to call back with any questions

## 2022-10-10 NOTE — TELEPHONE ENCOUNTER
Patient called back regarding the same  Frustrated this is the 3rd message she has left and no one has called her back  Patient requesting a call back as soon as possible

## 2022-10-18 ENCOUNTER — HOSPITAL ENCOUNTER (OUTPATIENT)
Dept: INFUSION CENTER | Facility: HOSPITAL | Age: 42
Discharge: HOME | End: 2022-10-18
Attending: PSYCHIATRY & NEUROLOGY
Payer: COMMERCIAL

## 2022-10-18 VITALS
RESPIRATION RATE: 18 BRPM | SYSTOLIC BLOOD PRESSURE: 118 MMHG | OXYGEN SATURATION: 97 % | HEART RATE: 81 BPM | TEMPERATURE: 98.5 F | DIASTOLIC BLOOD PRESSURE: 71 MMHG

## 2022-10-18 DIAGNOSIS — G35 MS (MULTIPLE SCLEROSIS) (HCC): Primary | ICD-10-CM

## 2022-10-18 PROCEDURE — 96365 THER/PROPH/DIAG IV INF INIT: CPT

## 2022-10-18 RX ORDER — SODIUM CHLORIDE 9 MG/ML
20 INJECTION, SOLUTION INTRAVENOUS ONCE
OUTPATIENT
Start: 2022-11-15

## 2022-10-18 RX ORDER — SODIUM CHLORIDE 9 MG/ML
20 INJECTION, SOLUTION INTRAVENOUS ONCE
Status: COMPLETED | OUTPATIENT
Start: 2022-10-18 | End: 2022-10-18

## 2022-10-18 RX ADMIN — SODIUM CHLORIDE 20 ML/HR: 0.9 INJECTION, SOLUTION INTRAVENOUS at 11:32

## 2022-10-18 RX ADMIN — SODIUM CHLORIDE 1000 MG: 0.9 INJECTION, SOLUTION INTRAVENOUS at 11:40

## 2022-10-18 NOTE — PLAN OF CARE
Problem: Potential for Falls  Goal: Patient will remain free of falls  Description: INTERVENTIONS:  - Educate patient/family on patient safety including physical limitations  - Instruct patient to call for assistance with activity   - Consult OT/PT to assist with strengthening/mobility   - Keep Call bell within reach  - Keep chair locked  - Keep care items and personal belongings within reach    Outcome: Progressing     Problem: PAIN - ADULT  Goal: Verbalizes/displays adequate comfort level or baseline comfort level  Description: Interventions:  - Encourage patient to monitor pain and request assistance  - Assess pain using appropriate pain scale  - Administer analgesics based on type and severity of pain and evaluate response  - Implement non-pharmacological measures as appropriate and evaluate response  - Consider cultural and social influences on pain and pain management  - Notify physician/advanced practitioner if interventions unsuccessful or patient reports new pain  Outcome: Progressing     Problem: INFECTION - ADULT  Goal: Absence or prevention of progression during hospitalization  Description: INTERVENTIONS:  - Assess and monitor for signs and symptoms of infection  - Monitor lab/diagnostic results  - Monitor all insertion sites, i e  indwelling lines, tubes, and drains  - Monitor endotracheal if appropriate and nasal secretions for changes in amount and color  - [place] appropriate cooling/warming therapies per order  - Administer medications as ordered  - Instruct and encourage patient and family to use good hand hygiene technique  - Identify and instruct in appropriate isolation precautions for identified infection/condition  Outcome: Progressing     Problem: Knowledge Deficit  Goal: Patient/family/caregiver demonstrates understanding of disease process, treatment plan, medications, and discharge instructions  Description: Complete learning assessment and assess knowledge base    Interventions:  - Provide teaching at level of understanding  - Provide teaching via preferred learning methods  Outcome: Progressing

## 2022-10-27 ENCOUNTER — APPOINTMENT (OUTPATIENT)
Dept: LAB | Facility: HOSPITAL | Age: 42
End: 2022-10-27
Payer: COMMERCIAL

## 2022-10-27 ENCOUNTER — TELEPHONE (OUTPATIENT)
Dept: OTHER | Facility: OTHER | Age: 42
End: 2022-10-27

## 2022-10-27 DIAGNOSIS — R39.89 SUSPECTED UTI: ICD-10-CM

## 2022-10-27 DIAGNOSIS — R39.89 SUSPECTED UTI: Primary | ICD-10-CM

## 2022-10-27 LAB
AMORPH PHOS CRY URNS QL MICRO: ABNORMAL /HPF
BACTERIA UR QL AUTO: ABNORMAL /HPF
BILIRUB UR QL STRIP: NEGATIVE
CLARITY UR: ABNORMAL
COLOR UR: YELLOW
GLUCOSE UR STRIP-MCNC: NEGATIVE MG/DL
HGB UR QL STRIP.AUTO: NEGATIVE
KETONES UR STRIP-MCNC: NEGATIVE MG/DL
LEUKOCYTE ESTERASE UR QL STRIP: NEGATIVE
NITRITE UR QL STRIP: POSITIVE
NON-SQ EPI CELLS URNS QL MICRO: ABNORMAL /HPF
PH UR STRIP.AUTO: 7.5 [PH]
PROT UR STRIP-MCNC: NEGATIVE MG/DL
RBC #/AREA URNS AUTO: ABNORMAL /HPF
SP GR UR STRIP.AUTO: 1.01 (ref 1–1.03)
UROBILINOGEN UR QL STRIP.AUTO: 0.2 E.U./DL
WBC #/AREA URNS AUTO: ABNORMAL /HPF

## 2022-10-27 PROCEDURE — 87086 URINE CULTURE/COLONY COUNT: CPT

## 2022-10-27 PROCEDURE — 87077 CULTURE AEROBIC IDENTIFY: CPT

## 2022-10-27 PROCEDURE — 81001 URINALYSIS AUTO W/SCOPE: CPT

## 2022-10-27 PROCEDURE — 87186 SC STD MICRODIL/AGAR DIL: CPT

## 2022-10-27 RX ORDER — CEPHALEXIN 500 MG/1
500 CAPSULE ORAL EVERY 6 HOURS SCHEDULED
Qty: 20 CAPSULE | Refills: 0 | Status: SHIPPED | OUTPATIENT
Start: 2022-10-27 | End: 2022-11-01

## 2022-10-27 NOTE — TELEPHONE ENCOUNTER
Pt  Called because she believes she is getting a UTI  Burning, frequent urination, and odor  Can you pls put in an order so she can go to the lab for a test  Pt is requesting a call back    Ty

## 2022-10-27 NOTE — TELEPHONE ENCOUNTER
Called and spoke with the patient  Katerina Wagnerkasia states for the past 4-5 days she has noticed foul odor and urinary frequency  Denies any fever/chills, hematuria or flank pain  Plan to obtain urine testing  Orders placed  Await results

## 2022-10-28 NOTE — TELEPHONE ENCOUNTER
Prelim urinalysis looks like UTI  Will start empiric abx prior to weekend   Keflex 500mg qid x 5 days sent to pharmacy

## 2022-10-28 NOTE — TELEPHONE ENCOUNTER
Called and spoke with the patient  Informed Abner Philip her urinalysis is indicative of an UTI so Juan Miguel Ram sent over Keflex to her pharmacy  Will check results on Monday to confirm Keflex is the appropriate antibiotic

## 2022-10-29 LAB — BACTERIA UR CULT: ABNORMAL

## 2022-10-31 ENCOUNTER — TELEPHONE (OUTPATIENT)
Dept: NEUROLOGY | Facility: CLINIC | Age: 42
End: 2022-10-31

## 2022-10-31 DIAGNOSIS — R26.89 BALANCE PROBLEMS: ICD-10-CM

## 2022-10-31 DIAGNOSIS — G89.29 MUSCULOSKELETAL PAIN, CHRONIC: Primary | ICD-10-CM

## 2022-10-31 DIAGNOSIS — M79.18 MUSCULOSKELETAL PAIN, CHRONIC: Primary | ICD-10-CM

## 2022-10-31 NOTE — TELEPHONE ENCOUNTER
Yes hi  This is Ames Brash  I'm having a little a little worried about my last left hand  It woke me up early this morning  I can't feel it  I can feel thumb and pointer and middle finger but I my pinky and one next to it I can't feel it and my hand I can't feel them  This is not normal  I am assuming it has to do with MS  [place], you guys got my number again, this is Connie Modest  My number anyway [phone]  The sooner the better I appreciate it because this isn't the 1st time this happened but this is the worst it has been  All right, thank you   Bye

## 2022-11-01 NOTE — TELEPHONE ENCOUNTER
Called patient  Received vm  Left msg requesting return call       Nursing - need to triage    MRI - b/c/t - 8/2022  DMT - IV Solumedrol monthly -  Next infusion 11/15/22    F/U appt - 12/6/22

## 2022-11-02 ENCOUNTER — TELEPHONE (OUTPATIENT)
Dept: NEUROLOGY | Facility: CLINIC | Age: 42
End: 2022-11-02

## 2022-11-02 NOTE — TELEPHONE ENCOUNTER
Spoke with pt  She reports on 10/31/22 numbness in her hand woke her up  Numbness was in the left 4th and 5th digits, up half her hand to her wrist  Numbness lasted all day  The next morning she woke up numbness had resolved  States this has happened in the past but never lasted this long  Otherwise, no new/worsening symptoms that day  Pt did note pulsating sensation in the left side of her head above her eyebrow several days prior for a few hours  Pt is concerned about this occurring again and numbness becoming permanent  Would like Dr Beth's opinion on this and likelihood of permanent numbness in hand  Pt did also want to report overall she is doing a lot worse  Reports gradual worsening over time  States she cannot walk at all  States she physically cannot do PT  Has spot of numbness above left eyebrow  At night, left hand does not move the way she needs it to      [phone] to leave detailed message

## 2022-11-02 NOTE — TELEPHONE ENCOUNTER
Patient is not on DMT with aggressive type of MS diagnosis established  Considering patient declined tysabri and Darlina Sizer previously, progression and worsening disability expected, including numbness and weakness in her legs

## 2022-11-03 RX ORDER — CYCLOBENZAPRINE HCL 10 MG
10 TABLET ORAL 3 TIMES DAILY
Qty: 90 TABLET | Refills: 1 | Status: SHIPPED | OUTPATIENT
Start: 2022-11-03

## 2022-11-03 NOTE — TELEPHONE ENCOUNTER
Pt made aware  She reports muscle spasms as well as tightness in her legs  She is asking if there is a stronger or different muscle relaxer available that she can try  Previously tried Robaxin and feels this was not very helpful  Uses [facility] pharmacy on file

## 2022-11-15 ENCOUNTER — HOSPITAL ENCOUNTER (OUTPATIENT)
Dept: INFUSION CENTER | Facility: HOSPITAL | Age: 42
Discharge: HOME | End: 2022-11-15
Attending: PSYCHIATRY & NEUROLOGY

## 2022-11-15 ENCOUNTER — APPOINTMENT (EMERGENCY)
Dept: CT IMAGING | Facility: HOSPITAL | Age: 42
End: 2022-11-15

## 2022-11-15 ENCOUNTER — APPOINTMENT (EMERGENCY)
Dept: RADIOLOGY | Facility: HOSPITAL | Age: 42
End: 2022-11-15

## 2022-11-15 ENCOUNTER — HOSPITAL ENCOUNTER (EMERGENCY)
Facility: HOSPITAL | Age: 42
Discharge: HOME | End: 2022-11-15
Attending: EMERGENCY MEDICINE

## 2022-11-15 VITALS
BODY MASS INDEX: 17.36 KG/M2 | HEART RATE: 79 BPM | TEMPERATURE: 98.3 F | OXYGEN SATURATION: 97 % | SYSTOLIC BLOOD PRESSURE: 121 MMHG | RESPIRATION RATE: 20 BRPM | DIASTOLIC BLOOD PRESSURE: 64 MMHG | WEIGHT: 107.58 LBS

## 2022-11-15 VITALS
RESPIRATION RATE: 18 BRPM | TEMPERATURE: 97.3 F | HEART RATE: 90 BPM | DIASTOLIC BLOOD PRESSURE: 83 MMHG | SYSTOLIC BLOOD PRESSURE: 133 MMHG | OXYGEN SATURATION: 100 %

## 2022-11-15 DIAGNOSIS — R07.9 CHEST PAIN: ICD-10-CM

## 2022-11-15 DIAGNOSIS — G35 MS (MULTIPLE SCLEROSIS) (HCC): Primary | ICD-10-CM

## 2022-11-15 DIAGNOSIS — S09.90XA CLOSED HEAD INJURY, INITIAL ENCOUNTER: Primary | ICD-10-CM

## 2022-11-15 LAB
ALBUMIN SERPL BCP-MCNC: 3.9 G/DL (ref 3.5–5)
ALP SERPL-CCNC: 88 U/L (ref 46–116)
ALT SERPL W P-5'-P-CCNC: 32 U/L (ref 12–78)
ANION GAP SERPL CALCULATED.3IONS-SCNC: 7 MMOL/L (ref 4–13)
APTT PPP: 28 SECONDS (ref 23–37)
ATRIAL RATE: 68 BPM
ATRIAL RATE: 80 BPM
ATRIAL RATE: 86 BPM
BASOPHILS # BLD AUTO: 0.02 THOUSANDS/ÂΜL (ref 0–0.1)
BASOPHILS NFR BLD AUTO: 1 % (ref 0–1)
BILIRUB SERPL-MCNC: 0.34 MG/DL (ref 0.2–1)
BUN SERPL-MCNC: 7 MG/DL (ref 5–25)
CALCIUM SERPL-MCNC: 10.3 MG/DL (ref 8.3–10.1)
CARDIAC TROPONIN I PNL SERPL HS: 3 NG/L (ref 8–18)
CHLORIDE SERPL-SCNC: 100 MMOL/L (ref 96–108)
CO2 SERPL-SCNC: 32 MMOL/L (ref 21–32)
CREAT SERPL-MCNC: 0.84 MG/DL (ref 0.6–1.3)
EOSINOPHIL # BLD AUTO: 0.1 THOUSAND/ÂΜL (ref 0–0.61)
EOSINOPHIL NFR BLD AUTO: 3 % (ref 0–6)
ERYTHROCYTE [DISTWIDTH] IN BLOOD BY AUTOMATED COUNT: 14.3 % (ref 11.6–15.1)
GFR SERPL CREATININE-BSD FRML MDRD: 86 ML/MIN/1.73SQ M
GLUCOSE SERPL-MCNC: 93 MG/DL (ref 65–140)
HCT VFR BLD AUTO: 36.6 % (ref 34.8–46.1)
HGB BLD-MCNC: 12.1 G/DL (ref 11.5–15.4)
IMM GRANULOCYTES # BLD AUTO: 0 THOUSAND/UL (ref 0–0.2)
IMM GRANULOCYTES NFR BLD AUTO: 0 % (ref 0–2)
INR PPP: 0.95 (ref 0.84–1.19)
LYMPHOCYTES # BLD AUTO: 0.75 THOUSANDS/ÂΜL (ref 0.6–4.47)
LYMPHOCYTES NFR BLD AUTO: 25 % (ref 14–44)
MCH RBC QN AUTO: 30.8 PG (ref 26.8–34.3)
MCHC RBC AUTO-ENTMCNC: 33.1 G/DL (ref 31.4–37.4)
MCV RBC AUTO: 93 FL (ref 82–98)
MONOCYTES # BLD AUTO: 0.26 THOUSAND/ÂΜL (ref 0.17–1.22)
MONOCYTES NFR BLD AUTO: 9 % (ref 4–12)
NEUTROPHILS # BLD AUTO: 1.88 THOUSANDS/ÂΜL (ref 1.85–7.62)
NEUTS SEG NFR BLD AUTO: 62 % (ref 43–75)
NRBC BLD AUTO-RTO: 0 /100 WBCS
P AXIS: 76 DEGREES
P AXIS: 77 DEGREES
P AXIS: 79 DEGREES
PLATELET # BLD AUTO: 102 THOUSANDS/UL (ref 149–390)
PMV BLD AUTO: 11 FL (ref 8.9–12.7)
POTASSIUM SERPL-SCNC: 3.6 MMOL/L (ref 3.5–5.3)
PR INTERVAL: 140 MS
PR INTERVAL: 140 MS
PR INTERVAL: 146 MS
PROT SERPL-MCNC: 7.7 G/DL (ref 6.4–8.4)
PROTHROMBIN TIME: 12.9 SECONDS (ref 11.6–14.5)
QRS AXIS: 73 DEGREES
QRS AXIS: 77 DEGREES
QRS AXIS: 77 DEGREES
QRSD INTERVAL: 66 MS
QRSD INTERVAL: 66 MS
QRSD INTERVAL: 70 MS
QT INTERVAL: 360 MS
QT INTERVAL: 364 MS
QT INTERVAL: 414 MS
QTC INTERVAL: 419 MS
QTC INTERVAL: 430 MS
QTC INTERVAL: 440 MS
RBC # BLD AUTO: 3.93 MILLION/UL (ref 3.81–5.12)
SODIUM SERPL-SCNC: 139 MMOL/L (ref 135–147)
T WAVE AXIS: 103 DEGREES
T WAVE AXIS: 49 DEGREES
T WAVE AXIS: 95 DEGREES
VENTRICULAR RATE: 68 BPM
VENTRICULAR RATE: 80 BPM
VENTRICULAR RATE: 86 BPM
WBC # BLD AUTO: 3.01 THOUSAND/UL (ref 4.31–10.16)

## 2022-11-15 RX ORDER — SODIUM CHLORIDE 9 MG/ML
20 INJECTION, SOLUTION INTRAVENOUS ONCE
OUTPATIENT
Start: 2022-12-13

## 2022-11-15 RX ORDER — SODIUM CHLORIDE 9 MG/ML
20 INJECTION, SOLUTION INTRAVENOUS ONCE
Status: COMPLETED | OUTPATIENT
Start: 2022-11-15 | End: 2022-11-15

## 2022-11-15 RX ADMIN — SODIUM CHLORIDE 1000 MG: 0.9 INJECTION, SOLUTION INTRAVENOUS at 13:45

## 2022-11-15 RX ADMIN — SODIUM CHLORIDE 20 ML/HR: 0.9 INJECTION, SOLUTION INTRAVENOUS at 13:35

## 2022-11-15 NOTE — ED PROVIDER NOTES
History  Chief Complaint   Patient presents with   • Fall     Pt fell last night and struck head off toilet  Has had chest pain and right sided head pain since  No loc or blood thinners noted     HPI   20-year-old female past medical history significant for multiple sclerosis, ambulatory dysfunction, frequent falls presents to the ER for evaluation of recent fall with head strike and episode of chest discomfort/lightheadedness today  Patient presenting to the emergency department from the infusion center  She was therefore infusion of steroids for her MS  She reported a fall the night for which she attributes to her chronic gait dysfunction from her MS she reports hitting her head at that time under further questioning she reports becoming anxious and emotional developing lightheadedness and chest discomforts and overall it was deemed ochoa for her to go to the ER for evaluation at the recommendation of the infusion center  Anticipate that she could likely be discharged back to the infusion center if her workup was negative  Patient denies history of exertional or anginal like chest discomfort in the past   She denies a history of cardiac disease  She does report no change in her baseline neurologic status she has chronic paresthesias ambulatory dysfunction weakness as result of her MS but they are not worsened or changed today  Unfortunately she has not tolerated several therapies prescribed by Neurology for her MS and is being maintained on steroids alone at this time  This is an ongoing contribution to her stress and with she thinks this contributed to the symptoms today  Prior to Admission Medications   Prescriptions Last Dose Informant Patient Reported? Taking?    Biotin 09563 MCG TABS   Yes No   Sig: Take 1 tablet by mouth daily     Calcium Carbonate (CALCIUM 600 PO)   Yes No   Sig: Take 600 mg by mouth daily   Cholecalciferol (Vitamin D3) 125 MCG (5000 UT) TABS   Yes No   Sig: Take 5,000 Units by mouth daily   Dalfampridine ER 10 MG TB12   No No   Sig: Take 1 tablet (10 mg total) by mouth 2 (two) times a day   Ginkgo Biloba 40 MG TABS   Yes No   Sig: Take 120 mg by mouth daily     Misc Natural Products (GINSENG COMPLEX PO)   Yes No   Sig: Take 2 tablets by mouth daily     Movantik 25 MG tablet   Yes No   Sig: Take 25 mg by mouth in the morning   Multiple Vitamins-Minerals (ZINC PO)   Yes No   Sig: Take by mouth   NON FORMULARY   Yes No   Sig: HEMPANOL BRAIN DETOX 200MG   Nerve Stimulator (STANDARD TENS) VICKY   No No   Sig: by Does not apply route 2 (two) times a day   Nerve Stimulator (TENS THERAPY REPLACE BACK PADS) MISC  Self No No   Si pad by Does not apply route 2 (two) times a day   Nerve Stimulator VICKY  Self No No   Sig: by Does not apply route 2 (two) times a day   Nerve Stimulator Supplies MISC  Self No No   Si pad by Does not apply route 2 (two) times a day   Omega-3 Fatty Acids (FISH OIL OMEGA-3 PO)  Self Yes No   Sig: Take 1,200 mg by mouth daily    Potassium 99 MG TABS   Yes No   Sig: Take 99 mg by mouth daily     Probiotic Product (PROBIOTIC DAILY PO)  Self Yes No   Sig: Take 2 tablets by mouth daily     TURMERIC PO   Yes No   Sig: Take by mouth   ZINC-VITAMIN C PO   Yes No   Sig: Take by mouth   b complex vitamins tablet  Self Yes No   Sig: Take 1 tablet by mouth daily   buprenorphine-naloxone (SUBOXONE) 2-0 5 mg per SL tablet  Self Yes No   Sig: Place 0 5 tablets under the tongue 2 (two) times a day    celecoxib (CeleBREX) 200 mg capsule   Yes No   Sig: Take 200 mg by mouth daily   cloNIDine (CATAPRES) 0 2 mg tablet  Self Yes No   Sig: Take 0 2 mg by mouth daily at bedtime    cyclobenzaprine (FLEXERIL) 10 mg tablet   No No   Sig: Take 1 tablet (10 mg total) by mouth 3 (three) times a day   docusate sodium (COLACE) 100 mg capsule  Self Yes No   Sig: Take 100 mg by mouth 3 (three) times a day     ibuprofen (MOTRIN) 200 mg tablet   Yes No   Sig: Take 200 mg by mouth as needed for mild pain   lubiprostone (AMITIZA) 24 mcg capsule  Self Yes No   Sig: Take 24 mcg by mouth 2 (two) times a day with meals   methocarbamol (ROBAXIN) 500 mg tablet   No No   Sig: Take 1 tablet (500 mg total) by mouth every 6 (six) hours as needed for muscle spasms   promethazine (PHENERGAN) 50 MG tablet  Self Yes No   Sig: TAKE 1 TO 2 TABS AT BEDTIME AS NEEDED FOR INSOMNIA   topiramate (TOPAMAX) 50 MG tablet  Self Yes No   Sig: Take 50 mg by mouth daily        Facility-Administered Medications: None       Past Medical History:   Diagnosis Date   • Back pain    • Chronic constipation 6/27/2016   • Chronic pain disorder    • Collar bone fracture     right side   • Crutches as ambulation aid    • Depression    • Dyssynergic defecation     Type 1   • ETOH abuse    • Head injury 2014   • Infectious viral hepatitis    • Lyme disease    • Meningitis spinal 2007? • Multiple sclerosis (HCC)    • Opioid abuse ([facility] [address] )    • Rib fractures    • Thrombocytopenia (HCC)    • Use of cane as ambulatory aid        Past Surgical History:   Procedure Laterality Date   • APPENDECTOMY     • CLAVICLE SURGERY     • COLONOSCOPY     • FL LUMBAR PUNCTURE DIAGNOSTIC  11/25/2020   • KNEE ARTHROSCOPY     • MULTIPLE TOOTH EXTRACTIONS     • LA COLONOSCOPY FLX DX W/COLLJ SPEC WHEN PFRMD N/A 6/27/2016    Procedure: COLONOSCOPY;  Surgeon: Bayron Bo MD;  Location: [facility];  Service: Colorectal   • LA INCISION,IMPLANT,NEUROELEC,SACRAL NERVE N/A 11/15/2021    Procedure: INSERTION OF NEUROSTIMULATOR ELECTRODE ARRAY SACRAL NERVE; FLUOROSCOPY; Shantelle Arnett ANALYSIS;  Surgeon: Elsy Benitez MD;  Location: [facility];  Service: UroGynecology          • TONSILLECTOMY AND ADENOIDECTOMY     • TUBAL LIGATION         Family History   Problem Relation Age of Onset   • Skin cancer Mother    • No Known Problems Brother      I have reviewed and agree with the history as documented      E-Cigarette/Vaping   • E-Cigarette Use Never User      E-Cigarette/Vaping Substances   • Nicotine No    • THC No    • CBD No    • Flavoring No    • Other No    • Unknown No      Social History     Tobacco Use   • Smoking status: Current Every Day Smoker     Packs/day: 1 00     Types: Cigarettes   • Smokeless tobacco: Never Used   Vaping Use   • Vaping Use: Never used   Substance Use Topics   • Alcohol use: Yes     Alcohol/week: 14 0 standard drinks     Types: 14 Shots of liquor per week     Comment: 2 mixed drinks each night   • Drug use: No       Review of Systems   Constitutional: Negative for chills and fever  HENT: Negative for ear pain and sore throat  Eyes: Negative for pain and visual disturbance  Respiratory: Negative for cough and shortness of breath  Cardiovascular: Positive for chest pain  Negative for palpitations  Gastrointestinal: Negative for abdominal pain and vomiting  Genitourinary: Negative for dysuria and hematuria  Musculoskeletal: Positive for gait problem  Negative for arthralgias and back pain  Skin: Negative for color change and rash  Neurological: Positive for weakness, light-headedness, numbness and headaches  Negative for seizures, syncope, facial asymmetry and speech difficulty  All other systems reviewed and are negative  Physical Exam  Physical Exam  Vitals and nursing note reviewed  Constitutional:       General: She is not in acute distress  Appearance: Normal appearance  She is well-developed  HENT:      Head: Normocephalic and atraumatic  Comments: No Dykes sign contusion facial swelling periorbital ecchymosis     Right Ear: External ear normal       Left Ear: External ear normal       Nose: Nose normal       Comments: No nasal septal hematoma or epistaxis     Mouth/Throat:      Mouth: Mucous membranes are moist       Pharynx: Oropharynx is clear  Eyes:      Extraocular Movements: Extraocular movements intact        Conjunctiva/sclera: Conjunctivae normal       Pupils: Pupils are equal, round, and reactive to light  Cardiovascular:      Rate and Rhythm: Normal rate and regular rhythm  Heart sounds: No murmur heard  Pulmonary:      Effort: Pulmonary effort is normal  No respiratory distress  Breath sounds: Normal breath sounds  No wheezing, rhonchi or rales  Chest:      Chest wall: No tenderness  Abdominal:      Palpations: Abdomen is soft  Tenderness: There is no abdominal tenderness  There is no guarding or rebound  Musculoskeletal:         General: No tenderness or signs of injury  Cervical back: Normal range of motion and neck supple  No rigidity or tenderness  Right lower leg: No edema  Left lower leg: No edema  Skin:     General: Skin is warm and dry  Neurological:      Mental Status: She is alert and oriented to person, place, and time  Mental status is at baseline        Gait: Gait abnormal          Vital Signs  ED Triage Vitals [11/15/22 1128]   Temperature Pulse Respirations Blood Pressure SpO2   98 3 °F (36 8 °C) 95 18 114/75 97 %      Temp Source Heart Rate Source Patient Position - Orthostatic VS BP Location FiO2 (%)   Temporal Monitor Sitting Left arm --      Pain Score       4           Vitals:    11/15/22 1128 11/15/22 1230   BP: 114/75 121/64   Pulse: 95 79   Patient Position - Orthostatic VS: Sitting Lying         Visual Acuity      ED Medications  Medications - No data to display    Diagnostic Studies  Results Reviewed     Procedure Component Value Units Date/Time    High Sensitivity Troponin I Random [[id number]]  (Abnormal) Collected: 11/15/22 1153    Lab Status: Final result Specimen: Blood Updated: 11/15/22 1259     HS TnI random 3 ng/L     Comprehensive metabolic panel [[id number]]  (Abnormal) Collected: 11/15/22 1153    Lab Status: Final result Specimen: Blood from Arm, Left Updated: 11/15/22 1214     Sodium 139 mmol/L      Potassium 3 6 mmol/L      Chloride 100 mmol/L      CO2 32 mmol/L      ANION GAP 7 mmol/L      BUN 7 mg/dL      Creatinine 0 84 mg/dL Glucose 93 mg/dL      Calcium 10 3 mg/dL      AST --     ALT 32 U/L      Alkaline Phosphatase 88 U/L      Total Protein 7 7 g/dL      Albumin 3 9 g/dL      Total Bilirubin 0 34 mg/dL      eGFR 86 ml/min/1 73sq m     Narrative:      [place] guidelines for Chronic Kidney Disease (CKD):   •  Stage 1 with normal or high GFR (GFR > 90 mL/min/1 73 square meters)  •  Stage 2 Mild CKD (GFR = 60-89 mL/min/1 73 square meters)  •  Stage 3A Moderate CKD (GFR = 45-59 mL/min/1 73 square meters)  •  Stage 3B Moderate CKD (GFR = 30-44 mL/min/1 73 square meters)  •  Stage 4 Severe CKD (GFR = 15-29 mL/min/1 73 square meters)  •  Stage 5 End Stage CKD (GFR <15 mL/min/1 73 square meters)  Note: GFR calculation is accurate only with a steady state creatinine    Protime-INR [[id number]]  (Normal) Collected: 11/15/22 1153    Lab Status: Final result Specimen: Blood from Arm, Left Updated: 11/15/22 1210     Protime 12 9 seconds      INR 0 95    APTT [[id number]]  (Normal) Collected: 11/15/22 1153    Lab Status: Final result Specimen: Blood from Arm, Left Updated: 11/15/22 1210     PTT 28 seconds     CBC and differential [[id number]]  (Abnormal) Collected: 11/15/22 1153    Lab Status: Final result Specimen: Blood from Arm, Left Updated: 11/15/22 1209     WBC 3 01 Thousand/uL      RBC 3 93 Million/uL      Hemoglobin 12 1 g/dL      Hematocrit 36 6 %      MCV 93 fL      MCH 30 8 pg      MCHC 33 1 g/dL      RDW 14 3 %      MPV 11 0 fL      Platelets 634 Thousands/uL      nRBC 0 /100 WBCs      Neutrophils Relative 62 %      Immat GRANS % 0 %      Lymphocytes Relative 25 %      Monocytes Relative 9 %      Eosinophils Relative 3 %      Basophils Relative 1 %      Neutrophils Absolute 1 88 Thousands/µL      Immature Grans Absolute 0 00 Thousand/uL      Lymphocytes Absolute 0 75 Thousands/µL      Monocytes Absolute 0 26 Thousand/µL      Eosinophils Absolute 0 10 Thousand/µL      Basophils Absolute 0 02 Thousands/µL CT head without contrast   Final Result by Caitlyn Leavitt MD (11/15 1236)      No acute intracranial abnormality  Workstation performed: [id number]         XR chest 1 view portable   ED Interpretation by Latricia Sheffield DO (11/15 1231)   No acute cardiopulmonary process seen by myself pending official Radiology report  Procedures  Procedures         ED Course  ED Course as of 11/15/22 1316   e Nov 15, 2022   1148 EKG interpreted by myself  EKG dated 11/15/2022 at 11:35 a m  Demonstrates normal sinus rhythm 80 beats per minute, normal MI, QRS, QTC intervals, no STEMI  Nonspecific ST and T-wave abnormality   1248 CT brain no acute findings  1306 Repeat EKG interpreted by myself  EKG dated 11/15/2020 02/13/2002 demonstrates normal sinus rhythm at 60 beats per minute, normal MI, QRS, QTC intervals, no STEMI               HEART Risk Score    Flowsheet Row Most Recent Value   Heart Score Risk Calculator    History 0 Filed at: 11/15/2022 1308   ECG 1 Filed at: 11/15/2022 1308   Age 0 Filed at: 11/15/2022 1308   Risk Factors 0 Filed at: 11/15/2022 1308   Troponin 0 Filed at: 11/15/2022 1308   HEART Score 1 Filed at: 11/15/2022 1308                                      MDM  Number of Diagnoses or Management Options  Chest pain: new and requires workup  Closed head injury, initial encounter: new and requires workup     Amount and/or Complexity of Data Reviewed  Clinical lab tests: ordered and reviewed  Tests in the radiology section of CPT®: ordered and reviewed  Tests in the medicine section of CPT®: reviewed and ordered  Decide to obtain previous medical records or to obtain history from someone other than the patient: yes  Review and summarize past medical records: yes  Independent visualization of images, tracings, or specimens: yes    Risk of Complications, Morbidity, and/or Mortality  Presenting problems: moderate  Diagnostic procedures: moderate  Management options: moderate    Patient Progress  Patient progress: stable  Patient presenting to the emergency department from the infusion center  She was therefore infusion of steroids for her MS  She reported a fall the night for which she attributes to her chronic gait dysfunction from her MS she reports hitting her head at that time under further questioning she reports becoming anxious and emotional developing lightheadedness and chest discomforts and overall it was deemed ochoa for her to go to the ER for evaluation at the recommendation of the infusion center  Anticipate that she could likely be discharged back to the infusion center if her workup was negative  On my assessment the patient she is in no distress does seem consistent with anxiety reaction on top of aches and pains from a mild fall given her history of MS her concerns for head trauma and the referral for workup we will obtain a CT head which on my review is unremarkable  Initial EKG with nonspecific T-wave abnormality which was not present on repeat  Troponin was negative  VS remained stable  Patient had improvement in symptoms  Counseled her on anxiety reaction, strict return precautions  Disposition  Final diagnoses:   Closed head injury, initial encounter   Chest pain     Time reflects when diagnosis was documented in both MDM as applicable and the Disposition within this note     Time User Action Codes Description Comment    11/15/2022  1:01 PM Santos Escoto Add [S09 90XA] Closed head injury, initial encounter     11/15/2022  1:01 PM Santos Escoto Add [R07 9] Chest pain       ED Disposition     ED Disposition   Discharge    Condition   Stable    Date/Time   Tue Nov 15, 2022  1:04 PM    Comment   Minnie Phillips discharge to home/self care                 Follow-up Information     Follow up With Specialties Details Why [address], DO Emergency Medicine, Family Medicine Schedule an appointment as soon as possible for a visit   [address]  [address]  [phone]            Patient's Medications   Discharge Prescriptions    No medications on file       No discharge procedures on file      PDMP Review     None          ED Provider  Electronically Signed by           Timmy Carl,   11/15/22 [facility] [address] [place] [address],[address], DO  11/15/22 2023

## 2022-11-15 NOTE — PROGRESS NOTES
1100 Patient states she fell yesterday and it was a hard fall  She did hit right side of head and face and has some jaw pain  She said her heart does feel like she is having palpitations  Patient is teary-eyed  Support given  1115 Patient agreeable to be evaluated in ER and then back to infusion  [facility] to ER patient became weak  Obtained wheelchair and assistance from ER staff and patient was taken to registration  Patient stable  Report given to Jesus Maravilla RN and message sent to Dr Len Perdomo

## 2022-11-15 NOTE — PROGRESS NOTES
1500 Infusion completed without incident  She was discharged in stable condition   l assisted her to vehicle

## 2022-11-15 NOTE — DISCHARGE INSTRUCTIONS
Thank you for visiting the Emergency Department today  No signs of significant changes on CT scan or signs of traumatic injury  EKG and cardiac enzymes do not show signs of arrhyhtmia or heart damage  Return if symptoms persist or worsen  Otherwise  Follow up with your PCP

## 2022-11-15 NOTE — PLAN OF CARE
Problem: Potential for Falls  Goal: Patient will remain free of falls  Description: INTERVENTIONS:  - Educate patient/family on patient safety including physical limitations  - Instruct patient to call for assistance with activity   - Consult OT/PT to assist with strengthening/mobility   - Keep Call bell within reach  - Keep chair locked  - Keep care items and personal belongings within reach      Outcome: Progressing     Problem: PAIN - ADULT  Goal: Verbalizes/displays adequate comfort level or baseline comfort level  Description: Interventions:  - Encourage patient to monitor pain and request assistance  - Assess pain using appropriate pain scale  - Administer analgesics based on type and severity of pain and evaluate response  - Implement non-pharmacological measures as appropriate and evaluate response  - Consider cultural and social influences on pain and pain management  - Notify physician/advanced practitioner if interventions unsuccessful or patient reports new pain  Outcome: Progressing     Problem: INFECTION - ADULT  Goal: Absence or prevention of progression during hospitalization  Description: INTERVENTIONS:  - Assess and monitor for signs and symptoms of infection  - Monitor lab/diagnostic results  - Monitor all insertion sites, i e  indwelling lines, tubes, and drains  - Monitor endotracheal if appropriate and nasal secretions for changes in amount and color  - [place] appropriate cooling/warming therapies per order  - Administer medications as ordered  - Instruct and encourage patient and family to use good hand hygiene technique  - Identify and instruct in appropriate isolation precautions for identified infection/condition  Outcome: Progressing     Problem: Knowledge Deficit  Goal: Patient/family/caregiver demonstrates understanding of disease process, treatment plan, medications, and discharge instructions  Description: Complete learning assessment and assess knowledge base    Interventions:  - Provide teaching at level of understanding  - Provide teaching via preferred learning methods  Outcome: Progressing

## 2022-11-15 NOTE — ED NOTES
This RN walking patient to infusion center on third floor at this time  IV still in place at this time   Infusion aware of patient's arrival      Meghana Kellye RN  11/15/22 8698

## 2022-11-15 NOTE — Clinical Note
Minnie Phillips was seen and treated in our emergency department on 11/15/2022  No restrictions            Diagnosis:     Sin Bettencourt    She may return on this date: If you have any questions or concerns, please don't hesitate to call        Demetrius Thorne DO    ______________________________           _______________          _______________  Hospital Representative                              Date                                Time

## 2022-11-16 ENCOUNTER — TELEPHONE (OUTPATIENT)
Dept: NEUROLOGY | Facility: CLINIC | Age: 42
End: 2022-11-16

## 2022-11-16 NOTE — TELEPHONE ENCOUNTER
Patient requesting psychotherapy resources  She is ready to speak w/someone  Dr López Ear  [address], [address]   [phone]  Counsels MS patients/caregivers    SW - can you reach out to patient and assist with resources? Do you have the above information as well? Thanks!     Cb#: [phone]

## 2022-11-18 NOTE — TELEPHONE ENCOUNTER
Patient called back ([phone])  Patient requested list of in-network providers to be mailed to her home address  Address verified  List placed in the mail this date  MSW will be available to patient as needed

## 2022-11-18 NOTE — TELEPHONE ENCOUNTER
MSW placed call to Dr Rupert Evans at [phone] to inquire if she accepts patient's insurance, [facility] [place]  Unfortuanately, she does not  MSW previously provided patient a listing of in-network mental health agencies in March 2022  MSW phoned [facility] and spoke with Conjur at [phone] and she was able to confirm that the following providers are still in-network:     [facility]  [phone] [address] [facility]  [phone]     [facility]  [phone]     [facility] [address]  [phone]     [facility] [place]  [phone]    Agencies with experience treating patients that are medically compromised:   [address] [facility]  [phone]     [employer]  [phone]    MSW attempted to reach patient at [phone] to offer to resend above list of providers  No answer  MSW left detailed message that this writer will send list to her via [address]  [employer] message sent to patient this date      MSW will follow-up with patient next week to ensure that she received list

## 2022-12-12 ENCOUNTER — TELEPHONE (OUTPATIENT)
Dept: NEUROLOGY | Facility: CLINIC | Age: 42
End: 2022-12-12

## 2022-12-12 ENCOUNTER — TELEMEDICINE (OUTPATIENT)
Dept: NEUROLOGY | Facility: CLINIC | Age: 42
End: 2022-12-12

## 2022-12-12 VITALS — HEIGHT: 66 IN | WEIGHT: 110 LBS | BODY MASS INDEX: 17.68 KG/M2

## 2022-12-12 DIAGNOSIS — G35 MS (MULTIPLE SCLEROSIS) (HCC): Primary | ICD-10-CM

## 2022-12-12 DIAGNOSIS — D69.6 THROMBOCYTOPENIA (HCC): ICD-10-CM

## 2022-12-12 DIAGNOSIS — R26.2 AMBULATORY DYSFUNCTION: ICD-10-CM

## 2022-12-12 DIAGNOSIS — M79.18 MUSCULOSKELETAL PAIN, CHRONIC: ICD-10-CM

## 2022-12-12 DIAGNOSIS — G89.29 MUSCULOSKELETAL PAIN, CHRONIC: ICD-10-CM

## 2022-12-12 DIAGNOSIS — R26.89 BALANCE PROBLEMS: ICD-10-CM

## 2022-12-12 RX ORDER — ASCORBIC ACID 500 MG
500 TABLET ORAL DAILY
COMMUNITY

## 2022-12-12 RX ORDER — SODIUM CHLORIDE 9 MG/ML
20 INJECTION, SOLUTION INTRAVENOUS ONCE
Status: CANCELLED | OUTPATIENT
Start: 2022-12-13

## 2022-12-12 NOTE — TELEPHONE ENCOUNTER
Pt called to say she thought she had infusions today and mixed up her appt   Pt needs virtual  Cell phone added for link  Appt is at 11:00 with Dr Maureen Duarte today 12/12

## 2022-12-12 NOTE — PROGRESS NOTES
Virtual Regular Visit    Verification of patient location:    Patient is located in the following state in which I hold an active license [place]      Assessment/Plan:    Problem List Items Addressed This Visit        Nervous and Auditory    MS (multiple sclerosis) ([facility] [address] ) - Primary       Hematopoietic and Hemostatic    Thrombocytopenia ([facility] [address] )       Other    Musculoskeletal pain, chronic    Balance problems    Ambulatory dysfunction            Reason for visit is FU  Chief Complaint   Patient presents with   • Virtual Regular Visit        Encounter provider Cayla Johnson MD    Provider located at [address] [address]      Recent Visits  No visits were found meeting these conditions  Showing recent visits within past 7 days and meeting all other requirements  Today's Visits  Date Type Provider Dept   12/12/22 Telephone Cayla Johnson MD [facility]   12/12/22 Telephone Cayla Johnson MD [facility]   12/12/22 Telemedicine Cayla Johnson MD [facility]   Showing today's visits and meeting all other requirements  Future Appointments  No visits were found meeting these conditions  Showing future appointments within next 150 days and meeting all other requirements       The patient was identified by name and date of birth  Tatiana Lugo was informed that this is a telemedicine visit and that the visit is being conducted through the [employer]  She agrees to proceed     My office door was closed  No one else was in the room  She acknowledged consent and understanding of privacy and security of the video platform  The patient has agreed to participate and understands they can discontinue the visit at any time  Patient is aware this is a billable service  Subjective  Tatiana Lugo is a 40 y o  female with MS and related issues  HPI      Maria Eugenia Marley has presented to [facility] for follow-up on multiple sclerosis and ambulatory dysfunction  Patient is not on DMT for MS, we have monthly Solumedrol 1000 mg IV monthly infusion, patient believes her regimen works well especially 1st week after infusion and then she has more weakness and sensory dysfunction 1 week prior to her scheduled follow-up steroid treatment  Patient had no recent infection  Patient describes single fall with head injury in November, abnormal serological workup was discussed with primary care team     Our MSW team provided a list of in-network mental health agencies which was initially sent to the patient in March 2022  MSW did contacted your insurance, 2020 [facility]) and spoke with Je Rodriguez at [phone]  She was able to verify multiple mental health agencies are still in-network  EMG completed in October 2022- study was started but patient was not able to tolerate pain and she refused testing  Patient had serological workup completed in March 2022, we extensively discussed her findings  Patient has persistent liver dysfunction with thrombocytopenia due to chronic alcohol use      We also reviewed patient imaging completed in August/October 2021- patient had several demyelinating plaques appreciated in cervical spine with new demyelination in thoracic region comparing her imaging to 2020 studies  Patient will be advised to consider repeating imaging in November 2022      We also discussed importance to consider social events in the light of body fatigue, patient has submitted for motorized scooter, not available yet    We extensively discussed limited reserve considering energy; we also discussed importance to have physical therapy on scheduled basis in the light of muscle wasting appreciated upper and lower extremities which contributes to patient generalized body weakness       Patient reports left hand spasticity in the cold environment or when she is over exhausted-patient is to consider taking Robaxin 750 mg  Patient would be recommended to avoid dietary supplements considering liver dysfunction  Patient had started off on pretty in 10 mg twice daily, patient is nonambulatory and it is difficult to evaluate for medication benefits      Patient presented with her daughter today  All questions were answered to the patient's satisfaction  Patient will be advised to use steroids sparingly going forward due to low muscle bulk and fragile bones  MRI brain 8/2022: Mild, chronic white matter signal abnormality is stable from the prior brain MRI, correlating to the history of multiple sclerosis  No progressive demyelination  2   No acute infarction, intracranial hemorrhage or mass effect  MRI T-spine 8/2022: Stable chronic demyelinating disease within the thoracic cord  The largest lesion is identified within the anterior aspect of the lower thoracic cord at T11  MRI C-spine : There are multiple peripheral cord lesions identified within the cervical cord somewhat diffusely  These are best seen on series 3, series 7 and series 9  Size and number of lesions is grossly stable compared to the prior examination  No cord expansion identified  [facility]  Mrs Navi Woodson has presented to [place] [address] multiple sclerosis center for follow-up on multiple sclerosis and related issues  Patient believes she has excellent outcomes since we started monthly Solu-Medrol infusions with next follow-up dose will be scheduled for tomorrow, December 13, 2022  Patient does have excellent outcomes during the first week after her infusion with more MS symptoms reported 1 week prior to her scheduled infusion  Patient stated she had a fall prior to her previous infusion with head injury reported  No other bad falls in the past month    Patient continue working with holistic therapist where she received high-frequency regimen which inferencing her GI dysfunction multiple sclerosis and liver dysfunction  Patient is no longer taking herbal supplements  - we will continue Solu-Medrol 1 g monthly infusions, patient tolerates regimen well with no side effects reported  Patient was advised to consider following with OBGYN team or primary care team for baseline DEXA scan, concern for osteoporosis and avascular necrosis was reviewed with the patient again, patient is interested to continue her current treatment with no disease modifying regimen other than steroids advised  Patient had completed imaging of the brain, cervical and thoracic spine in August of this year with stable radiographic findings has been reported    Past Medical History:   Diagnosis Date   • Back pain    • Chronic constipation 6/27/2016   • Chronic pain disorder    • Collar bone fracture     right side   • Crutches as ambulation aid    • Depression    • Dyssynergic defecation     Type 1   • ETOH abuse    • Head injury 2014   • Infectious viral hepatitis    • Lyme disease    • Meningitis spinal 2007?    • Multiple sclerosis (HCC)    • Opioid abuse ([facility] [address] )    • Rib fractures    • Thrombocytopenia (HCC)    • Use of cane as ambulatory aid        Past Surgical History:   Procedure Laterality Date   • APPENDECTOMY     • CLAVICLE SURGERY     • COLONOSCOPY     • FL LUMBAR PUNCTURE DIAGNOSTIC  11/25/2020   • KNEE ARTHROSCOPY     • MULTIPLE TOOTH EXTRACTIONS     • NM COLONOSCOPY FLX DX W/COLLJ SPEC WHEN PFRMD N/A 6/27/2016    Procedure: COLONOSCOPY;  Surgeon: Bayron Bo MD;  Location: [facility];  Service: Colorectal   • NM OPEN IMPLANTATION CHARITY SACRAL NERVE N/A 11/15/2021    Procedure: INSERTION OF NEUROSTIMULATOR ELECTRODE ARRAY SACRAL NERVE; FLUOROSCOPY; Shantelle Arnett ANALYSIS;  Surgeon: Elsy Benitez MD;  Location: [facility];  Service: UroGynecology          • TONSILLECTOMY AND ADENOIDECTOMY     • TUBAL LIGATION         Current Outpatient Medications   Medication Sig Dispense Refill   • ascorbic acid (VITAMIN C) 500 mg tablet Take 500 mg by mouth daily     • b complex vitamins tablet Take 1 tablet by mouth daily     • Biotin 87231 MCG TABS Take 1 tablet by mouth daily       • buprenorphine-naloxone (SUBOXONE) 2-0 5 mg per SL tablet Place 0 5 tablets under the tongue 2 (two) times a day      • cloNIDine (CATAPRES) 0 2 mg tablet Take 0 2 mg by mouth daily at bedtime      • ibuprofen (MOTRIN) 200 mg tablet Take 200 mg by mouth as needed for mild pain     • lubiprostone (AMITIZA) 24 mcg capsule Take 24 mcg by mouth 2 (two) times a day with meals     • methocarbamol (ROBAXIN) 500 mg tablet Take 1 tablet (500 mg total) by mouth every 6 (six) hours as needed for muscle spasms 30 tablet 0   • Misc Natural Products (GINSENG COMPLEX PO) Take 2 tablets by mouth daily       • Movantik 25 MG tablet Take 25 mg by mouth in the morning     • Nerve Stimulator (STANDARD TENS) VICKY by Does not apply route 2 (two) times a day 1 Device 0   • Nerve Stimulator (TENS THERAPY REPLACE BACK PADS) MISC 30 pad by Does not apply route 2 (two) times a day 30 each 0   • Nerve Stimulator VICKY by Does not apply route 2 (two) times a day 1 each 0   • Nerve Stimulator Supplies MISC 30 pad by Does not apply route 2 (two) times a day 30 pad 0   • Omega-3 Fatty Acids (FISH OIL OMEGA-3 PO) Take 1,200 mg by mouth daily      • Potassium 99 MG TABS Take 99 mg by mouth daily       • Probiotic Product (PROBIOTIC DAILY PO) Take 2 tablets by mouth daily       • promethazine (PHENERGAN) 50 MG tablet TAKE 1 TO 2 TABS AT BEDTIME AS NEEDED FOR INSOMNIA  1   • topiramate (TOPAMAX) 50 MG tablet Take 50 mg by mouth daily    5   • TURMERIC PO Take by mouth     • ZINC-VITAMIN C PO Take by mouth     • Calcium Carbonate (CALCIUM 600 PO) Take 600 mg by mouth daily (Patient not taking: Reported on 12/12/2022)     • celecoxib (CeleBREX) 200 mg capsule Take 200 mg by mouth daily (Patient not taking: Reported on 12/12/2022)     • Cholecalciferol (Vitamin D3) 125 MCG (5000 UT) TABS Take 5,000 Units by mouth daily (Patient not taking: Reported on 12/12/2022)     • cyclobenzaprine (FLEXERIL) 10 mg tablet Take 1 tablet (10 mg total) by mouth 3 (three) times a day (Patient not taking: Reported on 12/12/2022) 90 tablet 1   • Dalfampridine ER 10 MG TB12 Take 1 tablet (10 mg total) by mouth 2 (two) times a day 60 tablet 5   • docusate sodium (COLACE) 100 mg capsule Take 100 mg by mouth 3 (three) times a day   (Patient not taking: Reported on 12/12/2022)     • Ginkgo Biloba 40 MG TABS Take 120 mg by mouth daily       • Multiple Vitamins-Minerals (ZINC PO) Take by mouth (Patient not taking: Reported on 12/12/2022)     • NON FORMULARY HEMPANOL BRAIN DETOX 200MG (Patient not taking: Reported on 12/12/2022)       No current facility-administered medications for this visit  Allergies   Allergen Reactions   • Penicillins GI Intolerance     Other reaction(s): Unknown Reaction       Review of Systems   Constitutional: Negative  Negative for appetite change and fever  HENT: Negative  Negative for hearing loss, tinnitus, trouble swallowing and voice change  Eyes: Negative  Negative for photophobia, pain and visual disturbance  Respiratory: Negative  Negative for shortness of breath  Cardiovascular: Negative  Negative for palpitations  Gastrointestinal: Negative  Negative for nausea and vomiting  Endocrine: Negative  Negative for cold intolerance  Genitourinary: Negative  Negative for dysuria, frequency and urgency  Musculoskeletal: Negative  Negative for gait problem, myalgias and neck pain  Skin: Negative  Negative for rash  Allergic/Immunologic: Negative  Neurological: Positive for weakness and numbness (both legs)  Negative for dizziness, tremors, seizures, syncope, facial asymmetry, speech difficulty, light-headedness and headaches  Hematological: Negative  Does not bruise/bleed easily  Psychiatric/Behavioral: Negative    Negative for confusion, hallucinations and sleep disturbance         Video Exam    Vitals:    12/12/22 1101   Weight: 49 9 kg (110 lb)   Height: 5' 6" (1 676 m)       Physical Exam     I spent 15 minutes with patient today in which greater than 50% of the time was spent in counseling/coordination of care regarding Pathophysiology of underlying neurological dysfunction and prognosis

## 2022-12-12 NOTE — TELEPHONE ENCOUNTER
Patient has Solumedrol 1000 mg IV monthly infusion, next infusion is scheduled on 12/13/2022  We discussed risks and benefits of long-standing steroid treatment, including infection, HTN, DM, AVN and osteoporosis, patient interested to continue this regimen for another 6 months - please place a new set of orders

## 2022-12-13 ENCOUNTER — HOSPITAL ENCOUNTER (OUTPATIENT)
Dept: INFUSION CENTER | Facility: HOSPITAL | Age: 42
Discharge: HOME | End: 2022-12-13
Attending: PSYCHIATRY & NEUROLOGY

## 2022-12-13 VITALS
SYSTOLIC BLOOD PRESSURE: 105 MMHG | DIASTOLIC BLOOD PRESSURE: 64 MMHG | HEART RATE: 65 BPM | TEMPERATURE: 97.7 F | RESPIRATION RATE: 18 BRPM | OXYGEN SATURATION: 98 %

## 2022-12-13 DIAGNOSIS — G35 MS (MULTIPLE SCLEROSIS) (HCC): Primary | ICD-10-CM

## 2022-12-13 RX ORDER — SODIUM CHLORIDE 9 MG/ML
20 INJECTION, SOLUTION INTRAVENOUS ONCE
OUTPATIENT
Start: 2023-01-10

## 2022-12-13 RX ORDER — SODIUM CHLORIDE 9 MG/ML
20 INJECTION, SOLUTION INTRAVENOUS ONCE
Status: COMPLETED | OUTPATIENT
Start: 2022-12-13 | End: 2022-12-13

## 2022-12-13 RX ADMIN — SODIUM CHLORIDE 1000 MG: 0.9 INJECTION, SOLUTION INTRAVENOUS at 11:39

## 2022-12-13 RX ADMIN — SODIUM CHLORIDE 20 ML/HR: 0.9 INJECTION, SOLUTION INTRAVENOUS at 11:25

## 2022-12-13 NOTE — PLAN OF CARE
Problem: Potential for Falls  Goal: Patient will remain free of falls  Description: INTERVENTIONS:  - Educate patient/family on patient safety including physical limitations  - Instruct patient to call for assistance with activity   - Consult OT/PT to assist with strengthening/mobility   - Keep Call bell within reach  - Keep chair locked  - Keep care items and personal belongings within reach    Outcome: Progressing     Problem: PAIN - ADULT  Goal: Verbalizes/displays adequate comfort level or baseline comfort level  Description: Interventions:  - Encourage patient to monitor pain and request assistance  - Assess pain using appropriate pain scale  - Administer analgesics based on type and severity of pain and evaluate response  - Implement non-pharmacological measures as appropriate and evaluate response  - Consider cultural and social influences on pain and pain management  - Notify physician/advanced practitioner if interventions unsuccessful or patient reports new pain  Outcome: Progressing     Problem: INFECTION - ADULT  Goal: Absence or prevention of progression during hospitalization  Description: INTERVENTIONS:  - Assess and monitor for signs and symptoms of infection  - Monitor lab/diagnostic results  - Monitor all insertion sites, i e  indwelling lines, tubes, and drains  - Monitor endotracheal if appropriate and nasal secretions for changes in amount and color  - [place] appropriate cooling/warming therapies per order  - Administer medications as ordered  - Instruct and encourage patient and family to use good hand hygiene technique  - Identify and instruct in appropriate isolation precautions for identified infection/condition  Outcome: Progressing     Problem: Knowledge Deficit  Goal: Patient/family/caregiver demonstrates understanding of disease process, treatment plan, medications, and discharge instructions  Description: Complete learning assessment and assess knowledge base    Interventions:  - Provide teaching at level of understanding  - Provide teaching via preferred learning methods  Outcome: Progressing

## 2022-12-13 NOTE — PROGRESS NOTES
1255 Infusion completed without incident and she was discharged in stable condition   I assisted her to vehicle

## 2022-12-19 ENCOUNTER — HOSPITAL ENCOUNTER (EMERGENCY)
Facility: HOSPITAL | Age: 42
Discharge: HOME | End: 2022-12-19
Attending: EMERGENCY MEDICINE

## 2022-12-19 VITALS
HEART RATE: 92 BPM | SYSTOLIC BLOOD PRESSURE: 115 MMHG | DIASTOLIC BLOOD PRESSURE: 81 MMHG | TEMPERATURE: 97.9 F | OXYGEN SATURATION: 98 % | RESPIRATION RATE: 16 BRPM

## 2022-12-19 DIAGNOSIS — B36.0 TINEA VERSICOLOR: Primary | ICD-10-CM

## 2022-12-19 RX ORDER — SELENIUM SULFIDE 2.5 MG/100ML
LOTION TOPICAL DAILY PRN
Qty: 118 ML | Refills: 0 | Status: SHIPPED | OUTPATIENT
Start: 2022-12-19

## 2022-12-19 RX ORDER — CLOTRIMAZOLE AND BETAMETHASONE DIPROPIONATE 10; .64 MG/G; MG/G
CREAM TOPICAL
Qty: 45 G | Refills: 1 | Status: SHIPPED | OUTPATIENT
Start: 2022-12-19

## 2022-12-19 NOTE — DISCHARGE INSTRUCTIONS
Thank you for visiting the Emergency Department today  Suspect a fungal skin infection  These are generally harmless but can produce bothersome symptoms  We are prescribing an antifungal cream to apply as directed  Also provided prescription for selenium sulfide shampoo but may obtain over-the-counter as well  Dry thoroughly after bathing and apply antifungal cream or moisturizer  Minimize wet dry cycles specially this time of year with the dry air as this will exacerbate your symptoms

## 2022-12-19 NOTE — ED PROVIDER NOTES
History  Chief Complaint   Patient presents with   • Rash     Patient states she started with a rash on her back a long time ago and feels like its getting worse  Patient states the rash is itchy but denies any pain       Rash  Associated symptoms: no abdominal pain, no fever, no joint pain, no shortness of breath, no sore throat and not vomiting      41yo F past medical history significant for multiple sclerosis receiving steroid infusions without history of significant skin conditions or prior symptoms presenting for evaluation of rash    Patient was gradual onset of an itchy dry rash to her back which been present for weeks to months  She reports recent worsening of her condition  She denies significant pain  Notes some red and dry skin to her low back and interscapular region  Describes pruritus denies any drainage or weeping or fevers no oral mucosal involvement or palms and soles involvement  No specific prescription medications  Patient denies any new immunosuppressive medications  Prior to Admission Medications   Prescriptions Last Dose Informant Patient Reported? Taking?    Biotin 73935 MCG TABS   Yes No   Sig: Take 1 tablet by mouth daily     Calcium Carbonate (CALCIUM 600 PO)   Yes No   Sig: Take 600 mg by mouth daily   Patient not taking: Reported on 12/12/2022   Cholecalciferol (Vitamin D3) 125 MCG (5000 UT) TABS   Yes No   Sig: Take 5,000 Units by mouth daily   Patient not taking: Reported on 12/12/2022   Dalfampridine ER 10 MG TB12   No No   Sig: Take 1 tablet (10 mg total) by mouth 2 (two) times a day   Ginkgo Biloba 40 MG TABS   Yes No   Sig: Take 120 mg by mouth daily     Misc Natural Products (GINSENG COMPLEX PO)   Yes No   Sig: Take 2 tablets by mouth daily     Movantik 25 MG tablet   Yes No   Sig: Take 25 mg by mouth in the morning   Multiple Vitamins-Minerals (ZINC PO)   Yes No   Sig: Take by mouth   Patient not taking: Reported on 12/12/2022   NON FORMULARY   Yes No   Sig: [place] BRAIN DETOX 200MG   Patient not taking: Reported on 2022   Nerve Stimulator (STANDARD TENS) VICKY   No No   Sig: by Does not apply route 2 (two) times a day   Nerve Stimulator (TENS THERAPY REPLACE BACK PADS) MISC   No No   Si pad by Does not apply route 2 (two) times a day   Nerve Stimulator VICKY   No No   Sig: by Does not apply route 2 (two) times a day   Nerve Stimulator Supplies MISC   No No   Si pad by Does not apply route 2 (two) times a day   Omega-3 Fatty Acids (FISH OIL OMEGA-3 PO)   Yes No   Sig: Take 1,200 mg by mouth daily    Potassium 99 MG TABS   Yes No   Sig: Take 99 mg by mouth daily     Probiotic Product (PROBIOTIC DAILY PO)   Yes No   Sig: Take 2 tablets by mouth daily     TURMERIC PO   Yes No   Sig: Take by mouth   ZINC-VITAMIN C PO   Yes No   Sig: Take by mouth   ascorbic acid (VITAMIN C) 500 mg tablet   Yes No   Sig: Take 500 mg by mouth daily   b complex vitamins tablet   Yes No   Sig: Take 1 tablet by mouth daily   buprenorphine-naloxone (SUBOXONE) 2-0 5 mg per SL tablet   Yes No   Sig: Place 0 5 tablets under the tongue 2 (two) times a day    celecoxib (CeleBREX) 200 mg capsule   Yes No   Sig: Take 200 mg by mouth daily   Patient not taking: Reported on 2022   cloNIDine (CATAPRES) 0 2 mg tablet   Yes No   Sig: Take 0 2 mg by mouth daily at bedtime    cyclobenzaprine (FLEXERIL) 10 mg tablet   No No   Sig: Take 1 tablet (10 mg total) by mouth 3 (three) times a day   Patient not taking: Reported on 2022   docusate sodium (COLACE) 100 mg capsule   Yes No   Sig: Take 100 mg by mouth 3 (three) times a day     Patient not taking: Reported on 2022   ibuprofen (MOTRIN) 200 mg tablet   Yes No   Sig: Take 200 mg by mouth as needed for mild pain   lubiprostone (AMITIZA) 24 mcg capsule   Yes No   Sig: Take 24 mcg by mouth 2 (two) times a day with meals   methocarbamol (ROBAXIN) 500 mg tablet   No No   Sig: Take 1 tablet (500 mg total) by mouth every 6 (six) hours as needed for muscle spasms   promethazine (PHENERGAN) 50 MG tablet   Yes No   Sig: TAKE 1 TO 2 TABS AT BEDTIME AS NEEDED FOR INSOMNIA   topiramate (TOPAMAX) 50 MG tablet   Yes No   Sig: Take 50 mg by mouth daily        Facility-Administered Medications: None       Past Medical History:   Diagnosis Date   • Back pain    • Chronic constipation 6/27/2016   • Chronic pain disorder    • Collar bone fracture     right side   • Crutches as ambulation aid    • Depression    • Dyssynergic defecation     Type 1   • ETOH abuse    • Head injury 2014   • Infectious viral hepatitis    • Lyme disease    • Meningitis spinal 2007? • Multiple sclerosis (HCC)    • Opioid abuse ([facility] [address] )    • Rib fractures    • Thrombocytopenia (HCC)    • Use of cane as ambulatory aid        Past Surgical History:   Procedure Laterality Date   • APPENDECTOMY     • CLAVICLE SURGERY     • COLONOSCOPY     • FL LUMBAR PUNCTURE DIAGNOSTIC  11/25/2020   • KNEE ARTHROSCOPY     • MULTIPLE TOOTH EXTRACTIONS     • MO COLONOSCOPY FLX DX W/COLLJ SPEC WHEN PFRMD N/A 6/27/2016    Procedure: COLONOSCOPY;  Surgeon: Nathan Cardoso MD;  Location: [facility];  Service: Colorectal   • MO OPEN IMPLANTATION CHARITY SACRAL NERVE N/A 11/15/2021    Procedure: INSERTION OF NEUROSTIMULATOR ELECTRODE ARRAY SACRAL NERVE; FLUOROSCOPY; Tasia Gist ANALYSIS;  Surgeon: Pavel Cervantes MD;  Location: [facility];  Service: UroGynecology          • TONSILLECTOMY AND ADENOIDECTOMY     • TUBAL LIGATION         Family History   Problem Relation Age of Onset   • Skin cancer Mother    • No Known Problems Brother      I have reviewed and agree with the history as documented      E-Cigarette/Vaping   • E-Cigarette Use Never User      E-Cigarette/Vaping Substances   • Nicotine No    • THC No    • CBD No    • Flavoring No    • Other No    • Unknown No      Social History     Tobacco Use   • Smoking status: Every Day     Packs/day: 1 00     Types: Cigarettes   • Smokeless tobacco: Never   Vaping Use   • Vaping Use: Never used   Substance Use Topics   • Alcohol use: Yes     Alcohol/week: 14 0 standard drinks     Types: 14 Shots of liquor per week     Comment: 2 mixed drinks each night   • Drug use: No       Review of Systems   Constitutional: Negative for chills and fever  HENT: Negative for ear pain and sore throat  Eyes: Negative for pain and visual disturbance  Respiratory: Negative for cough and shortness of breath  Cardiovascular: Negative for chest pain and palpitations  Gastrointestinal: Negative for abdominal pain and vomiting  Genitourinary: Negative for dysuria and hematuria  Musculoskeletal: Negative for arthralgias and back pain  Skin: Positive for rash  Negative for color change  Neurological: Negative for seizures and syncope  All other systems reviewed and are negative  Physical Exam  Physical Exam  Vitals and nursing note reviewed  Constitutional:       General: She is not in acute distress  Appearance: She is well-developed  HENT:      Head: Normocephalic and atraumatic  Eyes:      Conjunctiva/sclera: Conjunctivae normal    Cardiovascular:      Rate and Rhythm: Normal rate and regular rhythm  Heart sounds: No murmur heard  Pulmonary:      Effort: Pulmonary effort is normal  No respiratory distress  Breath sounds: Normal breath sounds  Abdominal:      Palpations: Abdomen is soft  Tenderness: There is no abdominal tenderness  Musculoskeletal:         General: No swelling  Cervical back: Neck supple  Skin:     General: Skin is warm and dry  Capillary Refill: Capillary refill takes less than 2 seconds  Comments: Scattered across the posterior thorax and low back there is a mildly erythematous slightly raised, scaly, pruritic rash without any evidence of induration or abscess or drainage or vesicles  Most consistent with tinea or fungal skin infection  No oral mucosal involvement  Negative Nikolsky sign    No associated soft tissue swelling  Neurological:      Mental Status: She is alert  Psychiatric:         Mood and Affect: Mood normal          Vital Signs  ED Triage Vitals   Temperature Pulse Respirations Blood Pressure SpO2   12/19/22 1700 12/19/22 1658 12/19/22 1700 12/19/22 1658 12/19/22 1658   97 9 °F (36 6 °C) 92 16 115/81 98 %      Temp Source Heart Rate Source Patient Position - Orthostatic VS BP Location FiO2 (%)   12/19/22 1700 12/19/22 1658 -- -- --   Temporal Monitor         Pain Score       12/19/22 1658       No Pain           Vitals:    12/19/22 1658   BP: 115/81   Pulse: 92         Visual Acuity      ED Medications  Medications - No data to display    Diagnostic Studies  Results Reviewed     None                 No orders to display              Procedures  Procedures         ED Course                                             MDM  Number of Diagnoses or Management Options  Tinea versicolor: new and does not require workup  Risk of Complications, Morbidity, and/or Mortality  Presenting problems: low  Diagnostic procedures: low  Management options: low    Patient Progress  Patient progress: stable      Disposition  Final diagnoses:   Tinea versicolor     Time reflects when diagnosis was documented in both MDM as applicable and the Disposition within this note     Time User Action Codes Description Comment    12/19/2022  5:14 PM Man Henning Add [B36 0] Tinea versicolor       ED Disposition     ED Disposition   Discharge    Condition   Stable    Date/Time   Mon Dec 19, 2022  5:15 PM    Comment   Boogie Lima discharge to home/self care  Follow-up Information     Follow up With Specialties Details Why Contact Info Additional Yohan Valenzuela DO Emergency Medicine, Family Medicine   [address]  [address] [address] Emergency Department Emergency Medicine Go to  If symptoms worsen [address] [facility] [place] [address]  [address] [facility] Emergency Department, [place] [address], [place], [place], [address]          Patient's Medications   Discharge Prescriptions    CLOTRIMAZOLE-BETAMETHASONE (Adrián Lan) 1-0 05 % CREAM    Apply to affected area 2 times daily prn       Start Date: 12/19/2022End Date: --       Order Dose: --       Quantity: 45 g    Refills: 1    SELENIUM SULFIDE (SELSUN) 2 5 % SHAMPOO    Apply topically daily as needed for dandruff       Start Date: 12/19/2022End Date: --       Order Dose: --       Quantity: 118 mL    Refills: 0       No discharge procedures on file      PDMP Review     None          ED Provider  Electronically Signed by           Lorena Ramos DO  12/19/22 8399

## 2023-01-09 ENCOUNTER — HOSPITAL ENCOUNTER (OUTPATIENT)
Dept: INFUSION CENTER | Facility: HOSPITAL | Age: 43
Discharge: HOME | End: 2023-01-09
Attending: PSYCHIATRY & NEUROLOGY

## 2023-01-09 VITALS
DIASTOLIC BLOOD PRESSURE: 61 MMHG | TEMPERATURE: 97 F | OXYGEN SATURATION: 99 % | RESPIRATION RATE: 18 BRPM | SYSTOLIC BLOOD PRESSURE: 101 MMHG | HEART RATE: 73 BPM

## 2023-01-09 DIAGNOSIS — G35 MS (MULTIPLE SCLEROSIS) (HCC): Primary | ICD-10-CM

## 2023-01-09 RX ORDER — SODIUM CHLORIDE 9 MG/ML
20 INJECTION, SOLUTION INTRAVENOUS ONCE
OUTPATIENT
Start: 2023-01-10

## 2023-01-09 RX ORDER — SODIUM CHLORIDE 9 MG/ML
20 INJECTION, SOLUTION INTRAVENOUS ONCE
Status: COMPLETED | OUTPATIENT
Start: 2023-01-09 | End: 2023-01-09

## 2023-01-09 RX ADMIN — SODIUM CHLORIDE 20 ML/HR: 0.9 INJECTION, SOLUTION INTRAVENOUS at 10:50

## 2023-01-09 RX ADMIN — SODIUM CHLORIDE 1000 MG: 0.9 INJECTION, SOLUTION INTRAVENOUS at 10:59

## 2023-02-07 ENCOUNTER — HOSPITAL ENCOUNTER (OUTPATIENT)
Dept: INFUSION CENTER | Facility: HOSPITAL | Age: 43
Discharge: HOME | End: 2023-02-07
Attending: PSYCHIATRY & NEUROLOGY

## 2023-02-07 VITALS
RESPIRATION RATE: 18 BRPM | TEMPERATURE: 97.9 F | SYSTOLIC BLOOD PRESSURE: 108 MMHG | OXYGEN SATURATION: 97 % | DIASTOLIC BLOOD PRESSURE: 56 MMHG | HEART RATE: 73 BPM

## 2023-02-07 DIAGNOSIS — G35 MS (MULTIPLE SCLEROSIS) (HCC): Primary | ICD-10-CM

## 2023-02-07 RX ORDER — SODIUM CHLORIDE 9 MG/ML
20 INJECTION, SOLUTION INTRAVENOUS ONCE
OUTPATIENT
Start: 2023-03-06

## 2023-02-07 RX ORDER — SODIUM CHLORIDE 9 MG/ML
20 INJECTION, SOLUTION INTRAVENOUS ONCE
Status: COMPLETED | OUTPATIENT
Start: 2023-02-07 | End: 2023-02-07

## 2023-02-07 RX ADMIN — SODIUM CHLORIDE 1000 MG: 0.9 INJECTION, SOLUTION INTRAVENOUS at 12:00

## 2023-02-07 RX ADMIN — SODIUM CHLORIDE 20 ML/HR: 0.9 INJECTION, SOLUTION INTRAVENOUS at 11:45

## 2023-02-07 NOTE — PLAN OF CARE
Problem: Potential for Falls  Goal: Patient will remain free of falls  Description: INTERVENTIONS:  - Educate patient/family on patient safety including physical limitations  - Instruct patient to call for assistance with activity   - Consult OT/PT to assist with strengthening/mobility   - Keep Call bell within reach  - Keep chair locked  - Keep care items and personal belongings within reach  - Initiate and maintain comfort rounds    Outcome: Progressing     Problem: PAIN - ADULT  Goal: Verbalizes/displays adequate comfort level or baseline comfort level  Description: Interventions:  - Encourage patient to monitor pain and request assistance  - Assess pain using appropriate pain scale  - Administer analgesics based on type and severity of pain and evaluate response  - Implement non-pharmacological measures as appropriate and evaluate response  - Consider cultural and social influences on pain and pain management  - Notify physician/advanced practitioner if interventions unsuccessful or patient reports new pain  Outcome: Progressing     Problem: INFECTION - ADULT  Goal: Absence or prevention of progression during hospitalization  Description: INTERVENTIONS:  - Assess and monitor for signs and symptoms of infection  - Monitor lab/diagnostic results  - Monitor all insertion sites, i e  indwelling lines, tubes, and drains  - Monitor endotracheal if appropriate and nasal secretions for changes in amount and color  - [place] appropriate cooling/warming therapies per order  - Administer medications as ordered  - Instruct and encourage patient and family to use good hand hygiene technique  - Identify and instruct in appropriate isolation precautions for identified infection/condition  Outcome: Progressing     Problem: Knowledge Deficit  Goal: Patient/family/caregiver demonstrates understanding of disease process, treatment plan, medications, and discharge instructions  Description: Complete learning assessment and assess knowledge base    Interventions:  - Provide teaching at level of understanding  - Provide teaching via preferred learning methods  Outcome: Progressing

## 2023-02-07 NOTE — PROGRESS NOTES
1319 Infusion completed without incident and she was discharged in stable condition   She was accompanied to vehicle by Minerva Paredes RN

## 2023-02-14 ENCOUNTER — HOSPITAL ENCOUNTER (OUTPATIENT)
Dept: BONE DENSITY | Facility: HOSPITAL | Age: 43
Discharge: HOME | End: 2023-02-14

## 2023-02-14 VITALS — HEIGHT: 64 IN | WEIGHT: 110 LBS | BODY MASS INDEX: 18.78 KG/M2

## 2023-02-14 DIAGNOSIS — M51.36 DEGENERATION OF LUMBAR INTERVERTEBRAL DISC: ICD-10-CM

## 2023-02-24 ENCOUNTER — TELEPHONE (OUTPATIENT)
Dept: NEUROLOGY | Facility: CLINIC | Age: 43
End: 2023-02-24

## 2023-02-24 NOTE — TELEPHONE ENCOUNTER
Pt continues to receive Solumedrol infusions  Pt has coverage with [facility]  Per website, no PA needed for , [id number], or [id number]

## 2023-03-06 ENCOUNTER — HOSPITAL ENCOUNTER (OUTPATIENT)
Dept: INFUSION CENTER | Facility: HOSPITAL | Age: 43
Discharge: HOME | End: 2023-03-06
Attending: PSYCHIATRY & NEUROLOGY

## 2023-03-06 VITALS
DIASTOLIC BLOOD PRESSURE: 52 MMHG | HEART RATE: 74 BPM | RESPIRATION RATE: 16 BRPM | TEMPERATURE: 98.4 F | SYSTOLIC BLOOD PRESSURE: 110 MMHG

## 2023-03-06 DIAGNOSIS — G35 MS (MULTIPLE SCLEROSIS) (HCC): Primary | ICD-10-CM

## 2023-03-06 RX ORDER — SODIUM CHLORIDE 9 MG/ML
20 INJECTION, SOLUTION INTRAVENOUS ONCE
OUTPATIENT
Start: 2023-03-07

## 2023-03-06 RX ORDER — SODIUM CHLORIDE 9 MG/ML
20 INJECTION, SOLUTION INTRAVENOUS ONCE
Status: COMPLETED | OUTPATIENT
Start: 2023-03-06 | End: 2023-03-06

## 2023-03-06 RX ADMIN — SODIUM CHLORIDE 1000 MG: 0.9 INJECTION, SOLUTION INTRAVENOUS at 11:13

## 2023-03-06 RX ADMIN — SODIUM CHLORIDE 20 ML/HR: 0.9 INJECTION, SOLUTION INTRAVENOUS at 11:12

## 2023-03-06 NOTE — PLAN OF CARE
Problem: INFECTION - ADULT  Goal: Absence or prevention of progression during hospitalization  Description: INTERVENTIONS:  - Assess and monitor for signs and symptoms of infection  - Monitor lab/diagnostic results  - Monitor all insertion sites, i e  indwelling lines, tubes, and drains  - Monitor endotracheal if appropriate and nasal secretions for changes in amount and color  - [place] appropriate cooling/warming therapies per order  - Administer medications as ordered  - Instruct and encourage patient and family to use good hand hygiene technique  - Identify and instruct in appropriate isolation precautions for identified infection/condition  Outcome: Progressing     Problem: Knowledge Deficit  Goal: Patient/family/caregiver demonstrates understanding of disease process, treatment plan, medications, and discharge instructions  Description: Complete learning assessment and assess knowledge base    Interventions:  - Provide teaching at level of understanding  - Provide teaching via preferred learning methods  Outcome: Progressing

## 2023-03-16 ENCOUNTER — NURSE TRIAGE (OUTPATIENT)
Dept: OTHER | Facility: OTHER | Age: 43
End: 2023-03-16

## 2023-03-16 DIAGNOSIS — R35.0 URINARY FREQUENCY: Primary | ICD-10-CM

## 2023-03-16 NOTE — TELEPHONE ENCOUNTER
Regarding: Possible UTI  ----- Message from Randy Witt sent at 3/16/2023  4:21 PM EDT -----  "I think I have an UTI again   Can my doctor place lab orders for me?"

## 2023-03-16 NOTE — TELEPHONE ENCOUNTER
Reason for Disposition  • Side (flank) or lower back pain present    Answer Assessment - Initial Assessment Questions  1  SYMPTOM: "What's the main symptom you're concerned about?" (e g , frequency, incontinence)      Urinary frequency, small amounts, foul odor    2  ONSET: "When did the this start?"      One week ago    3  PAIN: "Is there any pain?" If Yes, ask: "How bad is it?" (Scale: 1-10; mild, moderate, severe)      Across lower back which is normal for patient but she reports that it is a little worse right now  4  CAUSE: "What do you think is causing the symptoms?"      ? UTI     5  OTHER SYMPTOMS: "Do you have any other symptoms?" (e g , fever, flank pain, blood in urine, pain with urination)      Denies     6  PREGNANCY: "Is there any chance you are pregnant?" "When was your last menstrual period?"      Denies    Discussed protocol disposition with patient and placed UA/C&S orders  Patient will drop a sample off at the lab tomorrow  Home care advice given, callback/ER precautions reviewed  Patient verbalized understanding and denies having any further questions      Protocols used: [facility]

## 2023-03-17 ENCOUNTER — APPOINTMENT (OUTPATIENT)
Dept: LAB | Facility: CLINIC | Age: 43
End: 2023-03-17

## 2023-03-17 DIAGNOSIS — R35.0 URINARY FREQUENCY: ICD-10-CM

## 2023-03-17 LAB
AMORPH URATE CRY URNS QL MICRO: ABNORMAL
BACTERIA UR QL AUTO: ABNORMAL /HPF
BILIRUB UR QL STRIP: NEGATIVE
CLARITY UR: ABNORMAL
COLOR UR: YELLOW
GLUCOSE UR STRIP-MCNC: NEGATIVE MG/DL
HGB UR QL STRIP.AUTO: NEGATIVE
KETONES UR STRIP-MCNC: NEGATIVE MG/DL
LEUKOCYTE ESTERASE UR QL STRIP: ABNORMAL
MUCOUS THREADS UR QL AUTO: ABNORMAL
NITRITE UR QL STRIP: POSITIVE
NON-SQ EPI CELLS URNS QL MICRO: ABNORMAL /HPF
PH UR STRIP.AUTO: 7 [PH]
PROT UR STRIP-MCNC: ABNORMAL MG/DL
RBC #/AREA URNS AUTO: ABNORMAL /HPF
SP GR UR STRIP.AUTO: 1.01 (ref 1–1.03)
UROBILINOGEN UR STRIP-ACNC: <2 MG/DL
WBC #/AREA URNS AUTO: ABNORMAL /HPF
WBC CLUMPS # UR AUTO: PRESENT /UL

## 2023-03-19 LAB
BACTERIA UR CULT: ABNORMAL
BACTERIA UR CULT: ABNORMAL

## 2023-03-20 ENCOUNTER — TELEPHONE (OUTPATIENT)
Dept: UROLOGY | Facility: CLINIC | Age: 43
End: 2023-03-20

## 2023-03-20 DIAGNOSIS — N39.0 URINARY TRACT INFECTION WITHOUT HEMATURIA, SITE UNSPECIFIED: Primary | ICD-10-CM

## 2023-03-20 LAB
BACTERIA UR CULT: ABNORMAL
BACTERIA UR CULT: ABNORMAL

## 2023-03-20 RX ORDER — CEPHALEXIN 500 MG/1
500 CAPSULE ORAL EVERY 6 HOURS SCHEDULED
Qty: 20 CAPSULE | Refills: 0 | Status: SHIPPED | OUTPATIENT
Start: 2023-03-20 | End: 2023-03-25

## 2023-03-20 NOTE — TELEPHONE ENCOUNTER
----- Message from Derek U  80  sent at 3/20/2023  7:37 AM EDT -----  Please let patient know her urine culture was positive  I have sent a prescription for Keflex to her pharmacy    Called and left detailed message for patient explaining above per communication consent  Office number left in message for any questions or concerns

## 2023-03-20 NOTE — RESULT ENCOUNTER NOTE
Please let patient know her urine culture was positive    I have sent a prescription for Keflex to her pharmacy

## 2023-03-21 NOTE — TELEPHONE ENCOUNTER
Returned call to patient to discuss her questions, however there was no answer and voicemail box is full  Will try to contact again at a later time

## 2023-03-21 NOTE — TELEPHONE ENCOUNTER
Called and spoke with Keke Schulte  She reports last time she had a UTI she was treated prior to final culture due to her symptoms and was wondering if Keflex might need to be changed this time as well  Advised patient antibiotic was ordered based on final urine culture report from 3/17/23and patient can complete full course of Keflex as ordered  Patient verbalized understanding

## 2023-03-31 ENCOUNTER — TELEPHONE (OUTPATIENT)
Dept: OTHER | Facility: OTHER | Age: 43
End: 2023-03-31

## 2023-03-31 DIAGNOSIS — N39.0 ACUTE UTI: Primary | ICD-10-CM

## 2023-03-31 DIAGNOSIS — N31.9 NEUROGENIC BLADDER: ICD-10-CM

## 2023-03-31 DIAGNOSIS — R39.11 URINARY HESITANCY: ICD-10-CM

## 2023-03-31 RX ORDER — NITROFURANTOIN 25; 75 MG/1; MG/1
100 CAPSULE ORAL 2 TIMES DAILY
Qty: 10 CAPSULE | Refills: 0 | Status: SHIPPED | OUTPATIENT
Start: 2023-03-31 | End: 2023-04-05

## 2023-03-31 RX ORDER — PHENAZOPYRIDINE HYDROCHLORIDE 200 MG/1
200 TABLET, FILM COATED ORAL 3 TIMES DAILY PRN
Qty: 10 TABLET | Refills: 0 | Status: SHIPPED | OUTPATIENT
Start: 2023-03-31

## 2023-03-31 NOTE — TELEPHONE ENCOUNTER
Pt  Has a UTI and was on an antibiotic for about 5 days  She is still having symptoms and would like to to know if you could please send in some more   since she still has it  Doxycycline Pregnancy And Lactation Text: This medication is Pregnancy Category D and not consider safe during pregnancy. It is also excreted in breast milk but is considered safe for shorter treatment courses.

## 2023-04-03 ENCOUNTER — HOSPITAL ENCOUNTER (OUTPATIENT)
Dept: INFUSION CENTER | Facility: HOSPITAL | Age: 43
Discharge: HOME | End: 2023-04-03
Attending: PSYCHIATRY & NEUROLOGY

## 2023-04-03 VITALS
RESPIRATION RATE: 18 BRPM | DIASTOLIC BLOOD PRESSURE: 69 MMHG | SYSTOLIC BLOOD PRESSURE: 111 MMHG | HEART RATE: 83 BPM | TEMPERATURE: 98.8 F

## 2023-04-03 DIAGNOSIS — G35 MS (MULTIPLE SCLEROSIS) (HCC): Primary | ICD-10-CM

## 2023-04-03 RX ORDER — SODIUM CHLORIDE 9 MG/ML
20 INJECTION, SOLUTION INTRAVENOUS ONCE
Status: COMPLETED | OUTPATIENT
Start: 2023-04-03 | End: 2023-04-03

## 2023-04-03 RX ORDER — SODIUM CHLORIDE 9 MG/ML
20 INJECTION, SOLUTION INTRAVENOUS ONCE
OUTPATIENT
Start: 2023-05-01

## 2023-04-03 RX ADMIN — SODIUM CHLORIDE 20 ML/HR: 0.9 INJECTION, SOLUTION INTRAVENOUS at 10:43

## 2023-04-03 RX ADMIN — SODIUM CHLORIDE 1000 MG: 0.9 INJECTION, SOLUTION INTRAVENOUS at 10:44

## 2023-04-03 NOTE — PLAN OF CARE
Problem: INFECTION - ADULT  Goal: Absence or prevention of progression during hospitalization  Description: INTERVENTIONS:  - Assess and monitor for signs and symptoms of infection  - Monitor lab/diagnostic results  - Monitor all insertion sites, i e  indwelling lines, tubes, and drains  - Monitor endotracheal if appropriate and nasal secretions for changes in amount and color  - [place] appropriate cooling/warming therapies per order  - Administer medications as ordered  - Instruct and encourage patient and family to use good hand hygiene technique  - Identify and instruct in appropriate isolation precautions for identified infection/condition  Outcome: Progressing     Problem: Knowledge Deficit  Goal: Patient/family/caregiver demonstrates understanding of disease process, treatment plan, medications, and discharge instructions  Description: Complete learning assessment and assess knowledge base    Interventions:  - Provide teaching at level of understanding  - Provide teaching via preferred learning methods  Outcome: Progressing

## 2023-04-04 DIAGNOSIS — N31.9 NEUROGENIC BLADDER: Primary | ICD-10-CM

## 2023-04-04 DIAGNOSIS — R39.11 URINARY HESITANCY: ICD-10-CM

## 2023-04-04 RX ORDER — FLAVOXATE HYDROCHLORIDE 100 MG/1
100 TABLET ORAL 3 TIMES DAILY PRN
Qty: 30 TABLET | Refills: 3 | Status: SHIPPED | OUTPATIENT
Start: 2023-04-04 | End: 2023-04-07 | Stop reason: SDUPTHER

## 2023-04-04 NOTE — TELEPHONE ENCOUNTER
Per patient's phone call, she would like to get a Rx for [facility]rid[facility] [place]  Per patient, this is a miracle drug for her and she would like to have the medication ASAP

## 2023-04-04 NOTE — TELEPHONE ENCOUNTER
Called and spoke with Chema Settler  She denies any s/s of acute infection  States she has MS and always has to push to urinate  Reports pyridium is the only medication that has helped with this so far  Advised unfortunately pyridium is not recommended for long term use, however CRNP agreeable to Urispas 100 mg by mouth 3 times a day as needed  Patient requesting to trial this medication and would like script sent to [facility] in [place]

## 2023-04-04 NOTE — TELEPHONE ENCOUNTER
I do not recommend use of Pyridium for long-term  Alternatively we can try Urispas 100 mg p o  3 times per day as needed

## 2023-04-06 NOTE — TELEPHONE ENCOUNTER
"[employer] insurance/pharmacy dept phone number [phone]  Patient states she needs a prior auth for \"the new medication I was prescribed for my bladder and UTI's\"    "

## 2023-04-07 RX ORDER — FLAVOXATE HYDROCHLORIDE 100 MG/1
100 TABLET ORAL 3 TIMES DAILY PRN
Qty: 90 TABLET | Refills: 3 | Status: SHIPPED | OUTPATIENT
Start: 2023-04-07

## 2023-04-07 NOTE — TELEPHONE ENCOUNTER
Pharmacy benefits verified: BIN#: [id number] -  PCN#: [id number] - GRP#: None - ID#: [id number]  [facility]  Prior Authorization for Flavoxate (URISPAS) 100mg was requested and initiated via [employer] - Prior Auth (EOC) ID#:  [id number]  Response questions answered and submitted for consideration  Final determination is expected within 24-72 hours

## 2023-05-02 ENCOUNTER — HOSPITAL ENCOUNTER (OUTPATIENT)
Dept: INFUSION CENTER | Facility: HOSPITAL | Age: 43
Discharge: HOME | End: 2023-05-02
Attending: PSYCHIATRY & NEUROLOGY

## 2023-05-02 VITALS
SYSTOLIC BLOOD PRESSURE: 100 MMHG | HEART RATE: 85 BPM | OXYGEN SATURATION: 97 % | DIASTOLIC BLOOD PRESSURE: 63 MMHG | RESPIRATION RATE: 16 BRPM | TEMPERATURE: 96.9 F

## 2023-05-02 DIAGNOSIS — G35 MS (MULTIPLE SCLEROSIS) (HCC): Primary | ICD-10-CM

## 2023-05-02 RX ORDER — SODIUM CHLORIDE 9 MG/ML
20 INJECTION, SOLUTION INTRAVENOUS ONCE
Status: COMPLETED | OUTPATIENT
Start: 2023-05-02 | End: 2023-05-02

## 2023-05-02 RX ORDER — SODIUM CHLORIDE 9 MG/ML
20 INJECTION, SOLUTION INTRAVENOUS ONCE
OUTPATIENT
Start: 2023-05-29

## 2023-05-02 RX ADMIN — SODIUM CHLORIDE 1000 MG: 0.9 INJECTION, SOLUTION INTRAVENOUS at 11:14

## 2023-05-02 RX ADMIN — SODIUM CHLORIDE 20 ML/HR: 0.9 INJECTION, SOLUTION INTRAVENOUS at 11:13

## 2023-05-02 NOTE — PLAN OF CARE
Problem: INFECTION - ADULT  Goal: Absence or prevention of progression during hospitalization  Description: INTERVENTIONS:  - Assess and monitor for signs and symptoms of infection  - Monitor lab/diagnostic results  - Monitor all insertion sites, i e  indwelling lines, tubes, and drains  - Monitor endotracheal if appropriate and nasal secretions for changes in amount and color  - [place] appropriate cooling/warming therapies per order  - Administer medications as ordered  - Instruct and encourage patient and family to use good hand hygiene technique  - Identify and instruct in appropriate isolation precautions for identified infection/condition  Outcome: Progressing     Problem: Knowledge Deficit  Goal: Patient/family/caregiver demonstrates understanding of disease process, treatment plan, medications, and discharge instructions  Description: Complete learning assessment and assess knowledge base    Interventions:  - Provide teaching at level of understanding  - Provide teaching via preferred learning methods  Outcome: Progressing

## 2023-05-12 ENCOUNTER — OFFICE VISIT (OUTPATIENT)
Dept: NEUROLOGY | Facility: CLINIC | Age: 43
End: 2023-05-12

## 2023-05-12 ENCOUNTER — HOME HEALTH ADMISSION (OUTPATIENT)
Dept: HOME HEALTH SERVICES | Facility: HOME HEALTHCARE | Age: 43
End: 2023-05-12

## 2023-05-12 ENCOUNTER — HOME CARE VISIT (OUTPATIENT)
Dept: HOME HEALTH SERVICES | Facility: HOME HEALTHCARE | Age: 43
End: 2023-05-12

## 2023-05-12 VITALS — HEART RATE: 88 BPM | DIASTOLIC BLOOD PRESSURE: 76 MMHG | SYSTOLIC BLOOD PRESSURE: 110 MMHG | TEMPERATURE: 98.6 F

## 2023-05-12 DIAGNOSIS — G89.29 CHRONIC MIDLINE LOW BACK PAIN WITHOUT SCIATICA: ICD-10-CM

## 2023-05-12 DIAGNOSIS — R10.2 PELVIC PAIN IN FEMALE: ICD-10-CM

## 2023-05-12 DIAGNOSIS — R26.2 AMBULATORY DYSFUNCTION: ICD-10-CM

## 2023-05-12 DIAGNOSIS — M54.50 CHRONIC MIDLINE LOW BACK PAIN WITHOUT SCIATICA: ICD-10-CM

## 2023-05-12 DIAGNOSIS — M79.18 MUSCULOSKELETAL PAIN, CHRONIC: ICD-10-CM

## 2023-05-12 DIAGNOSIS — G89.29 MUSCULOSKELETAL PAIN, CHRONIC: ICD-10-CM

## 2023-05-12 DIAGNOSIS — G35 MS (MULTIPLE SCLEROSIS) (HCC): Primary | ICD-10-CM

## 2023-05-12 DIAGNOSIS — Z86.19 HISTORY OF LYME DISEASE: ICD-10-CM

## 2023-05-12 NOTE — CASE COMMUNICATION
This is for informational purposes only  Patient is requesting a home PT visit next Thursday  New SOC date will be 5/18/23   Thank you

## 2023-05-12 NOTE — PROGRESS NOTES
Patient ID: Luis Cortez is a 44 y o  female  Assessment/Plan:           Problem List Items Addressed This Visit        Nervous and Auditory    MS (multiple sclerosis) ([facility] [address] ) - Primary    Relevant Orders    XR hip/pelv 2-3 vws right if performed    XR hip/pelv 2-3 vws left if performed    Referral to [address] [place] [address]'s VNA    CBC and differential    Comprehensive metabolic panel       Other    Musculoskeletal pain, chronic    Chronic midline low back pain without sciatica    History of Lyme disease    Ambulatory dysfunction    Relevant Orders    Referral to [address] [place] [address]'s VNA    Pelvic pain in female    Relevant Orders    XR hip/pelv 2-3 vws right if performed    XR hip/pelv 2-3 vws left if performed      Mrs Maggie Macias has presented to [address] sclerosis [place] for follow-up  Patient presented with her mother  Patient describes no new focal neurological deficit but progressive worsening of ambulatory dysfunction with known multiple sclerosis and lower extremity weakness; patient is not interested in disease modifying regimen due to potential side effects; patient also present malnourished with profound muscle atrophy including her lower extremities with abnormal posture and poor endurance; This time patient described having pain in the groin hips and anterior thigh where she had increased dose of Suboxone by primary care team; patient has known history of alcoholism in the past; patient taking Topamax with potential side effects as a weight loss discussed  Patient completed DEXA scan with no signs of profound osteoporosis appreciated  Patient is interested to continue monthly Solu-Medrol 1 g infusion  Patient requested to increase frequency of Solu-Medrol to twice monthly, we may consider Solu-Medrol 500 mg twice monthly;    Patient is to consider starting physical therapy with good patient therapy in her house due to her disability    Patient is to follow-up with primary team for "additional serological work-up if clinically advised; no disease modifying regimen treatment choices offered during this visit  Patient is to consider x-ray of the hips and pelvis on her left and the right side  Patient had help from her kids who are 12 25and 20-year-old; Offered dietary approach and regular physical activity would be highly advised; patient had last imaging in this 2022, had stable mild burden of demyelination brain with moderate to severe burden of demyelination in cervical and thoracic spine;     Considering patient has stable radiographic findings with progressive nature of her multiple sclerosis, patient likely transitioning to secondary progressive multiple sclerosis and with malnutrition and poor health at baseline, progression is expected  Is to continue following with [facility] neurology within 6 months  Subjective: Steroid infusion  She states that it was the only thing that helped and wants to know if can get twice a year or if there are Steroid shots  The other medication that was prescribed made her feel \"drunk\" and \"on cloud 9\" and could not function well  she stopped the medication then tried restarting the medication but again had the same result      HPI  Mrs Hemphill has presented to  [facility] [address] for follow-up on multiple sclerosis and ambulatory dysfunction  Patient is severely malnourished with profound muscle atrophy in upper and specifically lower extremity and pelvis; relates with a walker  Patient stated that her legs are not pain when she is walking pain comes in the groin/hips and anterior thighs  Patient work with primary care team were Suboxone dose was increased  Patient is not on disease modifying therapy for multiple sclerosis as per the patient choice as she is suggest she has been suffering from side effects of the regimen    Patient has been taking Solu-Medrol 1 g on monthly basis with last dose provided on May 8, " 2023     Patient stated they came from [place] and she has difficulties ambulating  Patient is stretching and it takes a while when she started walking  Patient now describing pelvic pain on and off isolation by OB/GYN  Our MSW team previously provided a list of in-network mental health agencies which was initially sent to the patient in March 2022  MSW did contacted your insurance, [employer] [facility]) and spoke with Fay Davies at [phone]  She was able to verify multiple mental health agencies are still in-network        EMG completed in October 2022- study was started but patient was not able to tolerate pain and she refused testing      Patient had serological workup completed in March 2022, we extensively discussed her findings  Fabrice Hussein has persistent liver dysfunction with thrombocytopenia due to chronic alcohol use  MRI brain 8/2022: Mild, chronic white matter signal abnormality is stable from the prior brain MRI, correlating to the history of multiple sclerosis   No progressive demyelination  2   No acute infarction, intracranial hemorrhage or mass effect  MRI T-spine 8/2022: Stable chronic demyelinating disease within the thoracic cord   The largest lesion is identified within the anterior aspect of the lower thoracic cord at T11      MRI C-spine :  There are multiple peripheral cord lesions identified within the cervical cord somewhat diffusely  Tammy Brick are best seen on series 3, series 7 and series 9   Size and number of lesions is grossly stable compared to the prior examination   No cord expansion identified          The following portions of the patient's history were reviewed and updated as appropriate:   She  has a past medical history of Back pain, Chronic constipation (6/27/2016), Chronic pain disorder, Collar bone fracture, Crutches as ambulation aid, Depression, Dyssynergic defecation, ETOH abuse, Head injury (2014), Infectious viral hepatitis, Lyme disease, Meningitis spinal (2007?), Multiple sclerosis ([facility] [address] ), Opioid abuse ([facility] ), Rib fractures, Thrombocytopenia ([facility] ), and Use of cane as ambulatory aid  She   Patient Active Problem List    Diagnosis Date Noted   • Pelvic pain in female 05/12/2023   • ETOH abuse    • Smoking 11/14/2021   • Thrombocytopenia ([facility] [address] ) 01/12/2021   • History of Lyme disease 11/10/2020   • Cervical myelopathy ([facility] ) 11/10/2020   • MS (multiple sclerosis) ([facility] ) 11/10/2020   • Neurogenic bladder 11/10/2020   • Ambulatory dysfunction 11/10/2020   • Unspecified abnormalities of gait and mobility 06/18/2020   • Balance problems 06/18/2020   • Chronic midline low back pain without sciatica 06/18/2020   • Chronic constipation 06/27/2016   • Encounter for screening colonoscopy 06/27/2016   • IBS (irritable bowel syndrome) 07/15/2015   • Chronic pain due to trauma 10/28/2014   • Musculoskeletal pain, chronic 10/28/2014   • Insomnia 07/08/2014   • Leukopenia 11/12/2013   • Alcoholism ([facility] ) 11/08/2013   • Lyme disease 11/08/2013   • Nondependent opioid abuse in remission ([facility] ) 11/08/2013     She  has a past surgical history that includes Clavicle surgery; Knee arthroscopy; Tonsillectomy and adenoidectomy; Appendectomy; Tubal ligation; pr colonoscopy flx dx w/collj spec when pfrmd (N/A, 6/27/2016); Multiple tooth extractions; [place] lumbar puncture diagnostic (11/25/2020); Colonoscopy; and pr open implantation jennifer sacral nerve (N/A, 11/15/2021)  Her family history includes No Known Problems in her brother; Skin cancer in her mother  She  reports that she has been smoking cigarettes  She has been smoking an average of 1 pack per day  She has never used smokeless tobacco  She reports current alcohol use of about 14 0 standard drinks per week  She reports that she does not use drugs    Current Outpatient Medications   Medication Sig Dispense Refill   • ascorbic acid (VITAMIN C) 500 mg tablet Take 500 mg by mouth daily     • b complex vitamins tablet Take 1 tablet by mouth daily     • Biotin 97242 MCG TABS Take 1 tablet by mouth daily       • buprenorphine-naloxone (SUBOXONE) 2-0 5 mg per SL tablet Place 0 5 tablets under the tongue 3 (three) times a day 2mg  three times a day     • cloNIDine (CATAPRES) 0 2 mg tablet Take 0 2 mg by mouth daily at bedtime      • clotrimazole-betamethasone (LOTRISONE) 1-0 05 % cream Apply to affected area 2 times daily prn 45 g 1   • flavoxATE (URISPAS) 100 mg tablet Take 1 tablet (100 mg total) by mouth 3 (three) times a day as needed for bladder spasms 90 tablet 3   • Ginkgo Biloba 40 MG TABS Take 120 mg by mouth daily       • ibuprofen (MOTRIN) 200 mg tablet Take 200 mg by mouth as needed for mild pain     • lubiprostone (AMITIZA) 24 mcg capsule Take 24 mcg by mouth 2 (two) times a day with meals     • LUTEIN PO Take by mouth in the morning     • Misc Natural Products (GINSENG COMPLEX PO) Take 2 tablets by mouth daily       • Movantik 25 MG tablet Take 25 mg by mouth every evening     • Nerve Stimulator (STANDARD TENS) VICKY by Does not apply route 2 (two) times a day 1 Device 0   • Nerve Stimulator (TENS THERAPY REPLACE BACK PADS) MISC 30 pad by Does not apply route 2 (two) times a day 30 each 0   • Nerve Stimulator VICKY by Does not apply route 2 (two) times a day 1 each 0   • Nerve Stimulator Supplies MISC 30 pad by Does not apply route 2 (two) times a day 30 pad 0   • Omega-3 Fatty Acids (FISH OIL OMEGA-3 PO) Take 1,200 mg by mouth daily      • Potassium 99 MG TABS Take 99 mg by mouth daily       • Probiotic Product (PROBIOTIC DAILY PO) Take 2 tablets by mouth daily       • promethazine (PHENERGAN) 50 MG tablet daily at bedtime 1 5 tabs daily at bedtime  1   • selenium sulfide (SELSUN) 2 5 % shampoo Apply topically daily as needed for dandruff 118 mL 0   • topiramate (TOPAMAX) 50 MG tablet Take 50 mg by mouth daily    5   • TURMERIC PO Take by mouth     • ZINC-VITAMIN C PO Take by mouth     • Calcium Carbonate (CALCIUM 600 PO) Take 600 mg by mouth daily (Patient not taking: Reported on 12/12/2022)     • celecoxib (CeleBREX) 200 mg capsule Take 200 mg by mouth daily (Patient not taking: Reported on 12/12/2022)     • Cholecalciferol (Vitamin D3) 125 MCG (5000 UT) TABS Take 5,000 Units by mouth daily (Patient not taking: Reported on 12/12/2022)     • docusate sodium (COLACE) 100 mg capsule Take 100 mg by mouth 3 (three) times a day   (Patient not taking: Reported on 12/12/2022)     • Multiple Vitamins-Minerals (ZINC PO) Take by mouth (Patient not taking: Reported on 12/12/2022)     • NON FORMULARY HEMPANOL BRAIN DETOX 200MG (Patient not taking: Reported on 12/12/2022)     • phenazopyridine (PYRIDIUM) 200 mg tablet Take 1 tablet (200 mg total) by mouth 3 (three) times a day as needed for bladder spasms (Patient not taking: Reported on 5/12/2023) 10 tablet 0     No current facility-administered medications for this visit       Current Outpatient Medications on File Prior to Visit   Medication Sig   • ascorbic acid (VITAMIN C) 500 mg tablet Take 500 mg by mouth daily   • b complex vitamins tablet Take 1 tablet by mouth daily   • Biotin 40178 MCG TABS Take 1 tablet by mouth daily     • buprenorphine-naloxone (SUBOXONE) 2-0 5 mg per SL tablet Place 0 5 tablets under the tongue 3 (three) times a day 2mg  three times a day   • cloNIDine (CATAPRES) 0 2 mg tablet Take 0 2 mg by mouth daily at bedtime    • clotrimazole-betamethasone (LOTRISONE) 1-0 05 % cream Apply to affected area 2 times daily prn   • flavoxATE (URISPAS) 100 mg tablet Take 1 tablet (100 mg total) by mouth 3 (three) times a day as needed for bladder spasms   • Ginkgo Biloba 40 MG TABS Take 120 mg by mouth daily     • ibuprofen (MOTRIN) 200 mg tablet Take 200 mg by mouth as needed for mild pain   • lubiprostone (AMITIZA) 24 mcg capsule Take 24 mcg by mouth 2 (two) times a day with meals   • LUTEIN PO Take by mouth in the morning   • Misc Natural Products (GINSENG COMPLEX PO) Take 2 tablets by mouth daily     • Movantik 25 MG tablet Take 25 mg by mouth every evening   • Nerve Stimulator (STANDARD TENS) VICKY by Does not apply route 2 (two) times a day   • Nerve Stimulator (TENS THERAPY REPLACE BACK PADS) MISC 30 pad by Does not apply route 2 (two) times a day   • Nerve Stimulator VICKY by Does not apply route 2 (two) times a day   • Nerve Stimulator Supplies MISC 30 pad by Does not apply route 2 (two) times a day   • Omega-3 Fatty Acids (FISH OIL OMEGA-3 PO) Take 1,200 mg by mouth daily    • Potassium 99 MG TABS Take 99 mg by mouth daily     • Probiotic Product (PROBIOTIC DAILY PO) Take 2 tablets by mouth daily     • promethazine (PHENERGAN) 50 MG tablet daily at bedtime 1 5 tabs daily at bedtime   • selenium sulfide (SELSUN) 2 5 % shampoo Apply topically daily as needed for dandruff   • topiramate (TOPAMAX) 50 MG tablet Take 50 mg by mouth daily     • TURMERIC PO Take by mouth   • ZINC-VITAMIN C PO Take by mouth   • Calcium Carbonate (CALCIUM 600 PO) Take 600 mg by mouth daily (Patient not taking: Reported on 12/12/2022)   • celecoxib (CeleBREX) 200 mg capsule Take 200 mg by mouth daily (Patient not taking: Reported on 12/12/2022)   • Cholecalciferol (Vitamin D3) 125 MCG (5000 UT) TABS Take 5,000 Units by mouth daily (Patient not taking: Reported on 12/12/2022)   • docusate sodium (COLACE) 100 mg capsule Take 100 mg by mouth 3 (three) times a day   (Patient not taking: Reported on 12/12/2022)   • Multiple Vitamins-Minerals (ZINC PO) Take by mouth (Patient not taking: Reported on 12/12/2022)   • NON FORMULARY HEMPANOL BRAIN DETOX 200MG (Patient not taking: Reported on 12/12/2022)   • phenazopyridine (PYRIDIUM) 200 mg tablet Take 1 tablet (200 mg total) by mouth 3 (three) times a day as needed for bladder spasms (Patient not taking: Reported on 5/12/2023)     No current facility-administered medications on file prior to visit  She is allergic to penicillins            Objective:    Blood pressure 110/76, pulse 88, temperature 98 6 °F (37 °C)  Physical Exam    Neurological Exam  CONSTITUTIONAL: NAD, pleasant  NECK: supple, no lymphadenopathy, no thyromegaly, no JVD  CARDIOVASCULAR: RRR, normal S1S2, no murmurs, no rubs  RESP: clear to auscultation bilaterally, no wheezes/rhonchi/rales  ABDOMEN: soft, non tender, non distended  SKIN: no rash or skin lesions  EXTREMITIES: no edema, pulses 2+bilaterally  PSYCH: appropriate mood and affect  NEUROLOGIC COMPREHENSIVE EXAM: Patient is oriented to person, place and time, NAD; appropriate affect  CN II, III, IV, V, VI, VII,VIII,IX,X,XI-XII intact with EOMI, PERRLA, OKN intact, VF grossly intact, fundi poorly visualized secondary to pupillary constriction; symmetric face noted  Motor: 4-/5 UE/LE bilateral symmetric, 3-/5 hip flexion and dorsiflexion with a profound muscle atrophy; Sensory: intact to light touch and pinprick bilaterally; abnormal vibration sensation feet bilaterally; Coordination abnormal limits on FTN and CECY testing; DTR: 2/4 through, no Babinski, no clonus  Tandem gait is abnormal  Romberg: present  ROS:    Review of Systems   Constitutional: Positive for fatigue (increase)  Negative for appetite change and fever  HENT: Negative  Negative for hearing loss, tinnitus, trouble swallowing and voice change  Eyes: Positive for pain and visual disturbance (blurry)  Negative for photophobia  Itchy and burning  Dry eyes   Respiratory: Negative  Negative for shortness of breath  Cardiovascular: Negative  Negative for palpitations  Gastrointestinal: Negative  Negative for nausea and vomiting  Endocrine: Negative  Negative for cold intolerance  Genitourinary: Negative  Negative for dysuria, frequency and urgency  Musculoskeletal: Positive for gait problem, myalgias (Legs), neck pain and neck stiffness (not terrible)  Balance issues increased     Skin: Negative  Negative for rash  Allergic/Immunologic: Negative  Neurological: Positive for dizziness (A little not bad), weakness (Legs) and numbness (Legs)  Negative for tremors, seizures, syncope, facial asymmetry, speech difficulty, light-headedness and headaches  Hematological: Negative  Does not bruise/bleed easily  Psychiatric/Behavioral: Negative  Negative for confusion, hallucinations and sleep disturbance  All other systems reviewed and are negative

## 2023-05-17 ENCOUNTER — HOME CARE VISIT (OUTPATIENT)
Dept: HOME HEALTH SERVICES | Facility: HOME HEALTHCARE | Age: 43
End: 2023-05-17

## 2023-05-18 ENCOUNTER — HOME CARE VISIT (OUTPATIENT)
Dept: HOME HEALTH SERVICES | Facility: HOME HEALTHCARE | Age: 43
End: 2023-05-18

## 2023-05-18 NOTE — CASE COMMUNICATION
[facility] has admitted your patient to [address] [place] [address] service with the following disciplines:      PT and OT  Response needed, please respond via inbasket  regarding med discrepancies as applicable  Primary focus of home health care: neuromuscular  Patient stated goals of care: work on my arms  Anticipated visit pattern and next visit date: PT 1x only (1wk1) -eval and d/c; OT eval    See medication list - meds in home differ from AV S:  Phys ther compared meds in home to most up to date med list from 5/12 appt with Dr Caren Shannon  -The following meds on AVS but pt does not have in home/not taking: Calcium 600; Celecoxib 200mg; Lubiprostone 24mcg; Hempanon Brain detox 200mg; Pyridium 200mg; Zinc with Vitamin C (takes plain Zinc)  -The following meds pt reports ran out: Lotrisone cream 1-0 05%; Vitamin C 500mg; Selsun 2 5% shampoo  -Pt taking the following  differently: Ibuprofen 200mg 3-4tab every 6 hrs for pain; Potassium 99mg pt taking 3 tab daily instead of 1 tab daily; Vitamin D3 125mcg taking 2 tabs daily instead of 1; Ginkgo Biloba 2mg of 120mg instead of 1 tab; Ginseng 500mg 1 tab instead of 2tab  -Pt taking the following OTC which is not on list: Collagen with Vitamin C 4,000mg 2 tab daily ; Benadryl Itching Spray 2%- O 1% PRN for itching      --phys ther will contact PCP abou t above med discrepancies---    Significant clinical findings: impaired gait/balance/transfers, fall risk, hx falls  decreased BUE/BLE strength and B hip ROM  Potential barriers to goal achievement: MS, pain  Other pertinent information: mod nutritional risk  /60  Thank you for allowing us to participate in the care of your patient        Tammy Valdez PT

## 2023-05-19 ENCOUNTER — HOME CARE VISIT (OUTPATIENT)
Dept: HOME HEALTH SERVICES | Facility: HOME HEALTHCARE | Age: 43
End: 2023-05-19

## 2023-05-19 VITALS
TEMPERATURE: 98.5 F | OXYGEN SATURATION: 98 % | HEART RATE: 64 BPM | SYSTOLIC BLOOD PRESSURE: 100 MMHG | DIASTOLIC BLOOD PRESSURE: 60 MMHG

## 2023-05-21 VITALS — DIASTOLIC BLOOD PRESSURE: 56 MMHG | SYSTOLIC BLOOD PRESSURE: 92 MMHG | OXYGEN SATURATION: 91 % | HEART RATE: 64 BPM

## 2023-05-22 ENCOUNTER — HOME CARE VISIT (OUTPATIENT)
Dept: HOME HEALTH SERVICES | Facility: HOME HEALTHCARE | Age: 43
End: 2023-05-22

## 2023-05-22 NOTE — CASE COMMUNICATION
Occupational Therapy 1w3 starting 5 19 23  OT to include ADL training for Grooming and homemaking tasks with ub ther ex to increase safety and independence during self care tasks  Instructed patient on OT plan of care and frequency with good understanding demonstrated by patient

## 2023-05-25 ENCOUNTER — HOME CARE VISIT (OUTPATIENT)
Dept: HOME HEALTH SERVICES | Facility: HOME HEALTHCARE | Age: 43
End: 2023-05-25

## 2023-05-30 ENCOUNTER — HOSPITAL ENCOUNTER (OUTPATIENT)
Dept: INFUSION CENTER | Facility: HOSPITAL | Age: 43
Discharge: HOME | End: 2023-05-30
Attending: PSYCHIATRY & NEUROLOGY
Payer: COMMERCIAL

## 2023-05-30 ENCOUNTER — HOSPITAL ENCOUNTER (OUTPATIENT)
Dept: RADIOLOGY | Facility: HOSPITAL | Age: 43
Discharge: HOME | End: 2023-05-30

## 2023-05-30 VITALS
SYSTOLIC BLOOD PRESSURE: 97 MMHG | TEMPERATURE: 99 F | HEART RATE: 73 BPM | DIASTOLIC BLOOD PRESSURE: 54 MMHG | RESPIRATION RATE: 16 BRPM

## 2023-05-30 DIAGNOSIS — R10.2 PELVIC PAIN IN FEMALE: ICD-10-CM

## 2023-05-30 DIAGNOSIS — G35 MS (MULTIPLE SCLEROSIS) (HCC): Primary | ICD-10-CM

## 2023-05-30 DIAGNOSIS — G35 MS (MULTIPLE SCLEROSIS) (HCC): ICD-10-CM

## 2023-05-30 PROCEDURE — 96365 THER/PROPH/DIAG IV INF INIT: CPT

## 2023-05-30 RX ORDER — SODIUM CHLORIDE 9 MG/ML
20 INJECTION, SOLUTION INTRAVENOUS ONCE
Status: COMPLETED | OUTPATIENT
Start: 2023-05-30 | End: 2023-05-30

## 2023-05-30 RX ORDER — SODIUM CHLORIDE 9 MG/ML
20 INJECTION, SOLUTION INTRAVENOUS ONCE
Status: CANCELLED | OUTPATIENT
Start: 2023-05-30

## 2023-05-30 RX ADMIN — SODIUM CHLORIDE 1000 MG: 0.9 INJECTION, SOLUTION INTRAVENOUS at 11:10

## 2023-05-30 RX ADMIN — SODIUM CHLORIDE 20 ML/HR: 0.9 INJECTION, SOLUTION INTRAVENOUS at 11:10

## 2023-05-30 NOTE — PLAN OF CARE
Problem: INFECTION - ADULT  Goal: Absence or prevention of progression during hospitalization  Description: INTERVENTIONS:  - Assess and monitor for signs and symptoms of infection  - Monitor lab/diagnostic results  - Monitor all insertion sites, i e  indwelling lines, tubes, and drains  - Monitor endotracheal if appropriate and nasal secretions for changes in amount and color  - Thorofare appropriate cooling/warming therapies per order  - Administer medications as ordered  - Instruct and encourage patient and family to use good hand hygiene technique  - Identify and instruct in appropriate isolation precautions for identified infection/condition  Outcome: Progressing     Problem: Knowledge Deficit  Goal: Patient/family/caregiver demonstrates understanding of disease process, treatment plan, medications, and discharge instructions  Description: Complete learning assessment and assess knowledge base    Interventions:  - Provide teaching at level of understanding  - Provide teaching via preferred learning methods  Outcome: Progressing

## 2023-05-31 ENCOUNTER — TELEPHONE (OUTPATIENT)
Dept: NEUROLOGY | Facility: CLINIC | Age: 43
End: 2023-05-31

## 2023-05-31 ENCOUNTER — HOME CARE VISIT (OUTPATIENT)
Dept: HOME HEALTH SERVICES | Facility: HOME HEALTHCARE | Age: 43
End: 2023-05-31

## 2023-05-31 VITALS — SYSTOLIC BLOOD PRESSURE: 90 MMHG | DIASTOLIC BLOOD PRESSURE: 42 MMHG

## 2023-05-31 NOTE — TELEPHONE ENCOUNTER
Message    Yes, Gordogeraldo Cueto will be able to help with monthly Solumedrol 1000 mg IV x 6 doses    ----- Message -----   From: Lc Alatorre RN   Sent: 5/30/2023  11:32 AM EDT   To: Andria Bowser RN; Mulu Hearn RN; *         Can you please add more treatments for Mayda Ambriz

## 2023-06-01 NOTE — CASE COMMUNICATION
Discharge Occupational Therapy and Home Care 5 31 23  OT goals achieved  Instructed patient on OT and home care discharge with good understanding demonstrated by patient

## 2023-06-07 ENCOUNTER — TELEPHONE (OUTPATIENT)
Dept: NEUROLOGY | Facility: CLINIC | Age: 43
End: 2023-06-07

## 2023-06-07 DIAGNOSIS — G35 MS (MULTIPLE SCLEROSIS) (HCC): Primary | ICD-10-CM

## 2023-06-07 DIAGNOSIS — G89.29 MUSCULOSKELETAL PAIN, CHRONIC: Primary | ICD-10-CM

## 2023-06-07 DIAGNOSIS — G35 MS (MULTIPLE SCLEROSIS) (HCC): ICD-10-CM

## 2023-06-07 DIAGNOSIS — R10.2 PELVIC PAIN IN FEMALE: ICD-10-CM

## 2023-06-07 DIAGNOSIS — M16.6 OTHER SECONDARY OSTEOARTHRITIS OF BOTH HIPS: ICD-10-CM

## 2023-06-07 DIAGNOSIS — M79.18 MUSCULOSKELETAL PAIN, CHRONIC: Primary | ICD-10-CM

## 2023-06-07 RX ORDER — SODIUM CHLORIDE 9 MG/ML
20 INJECTION, SOLUTION INTRAVENOUS ONCE
OUTPATIENT
Start: 2023-06-27

## 2023-06-07 NOTE — TELEPHONE ENCOUNTER
Spoke w/pt  Advised her of continued monthly Solumedrol infusions (6 months)  Pt verbalized understanding  Pt is already scheduled for 6/27/23 at  [facility]  Last infusion 5/30/23  Therapy plan start date entered as 6/30/23  Therapy plan start date updated to 6/27/23

## 2023-06-07 NOTE — TELEPHONE ENCOUNTER
----- Message from Rosalinda Hendricks MD sent at 6/3/2023  8:42 PM EDT -----  Please call the patient regarding her abnormal result- mild but bilateral hip osteoarthritis noted;

## 2023-06-07 NOTE — TELEPHONE ENCOUNTER
PA is not needed with [facility] for [address], [address], and [id number] per website  Solumedrol therapy plan (1000mgs x 6 months) entered and sent for signature  TT sent

## 2023-06-09 NOTE — TELEPHONE ENCOUNTER
Pt left vm  She has a question regarding the XR hip results  She understands her hips have osteoarthritis; however, it is her legs that are painful and waking her up at night  Is the MS causing this pain? Or is there arthritis in her legs? Would like to know difference between arthritis and osteoarthritis    _________________________________  Cristiane Goldstein w/pt  Provided education on osteoarthritis  Patient states b/l legs hurt  Pain wakes her up night  Pain in entire leg  Upon waking, it is very difficult to bend her legs  During the day, pt states pain is more predominant in groin  Pt questioning if any imaging of legs can be done/is warranted or is this just a result of MS? Dr Aeysha Cuevas - please advise  Thanks!

## 2023-06-11 NOTE — TELEPHONE ENCOUNTER
Patient pain is multifactorial - profound malnourishment with advanced muscle atrophy LE bilaterally +  degenerative changes/OA spine and hips+ untreated MS with transitioning to SPMS and neuropathic pain + peripheral polyneuropathy with pain due to history of alcoholism + ambulatory dysfunction as a result of all of that, patient has chronic pain dysfunction syndrome  No signs of Avascular necrosis reported by radiology on her x-rays;      Best outcomes would be evaluation by Physiatry team and Pain management services

## 2023-06-12 ENCOUNTER — APPOINTMENT (OUTPATIENT)
Dept: LAB | Facility: CLINIC | Age: 43
End: 2023-06-12
Payer: COMMERCIAL

## 2023-06-12 DIAGNOSIS — G35 MS (MULTIPLE SCLEROSIS) (HCC): ICD-10-CM

## 2023-06-12 LAB
ALBUMIN SERPL BCP-MCNC: 3.7 G/DL (ref 3.5–5)
ALP SERPL-CCNC: 106 U/L (ref 46–116)
ALT SERPL W P-5'-P-CCNC: 20 U/L (ref 12–78)
ANION GAP SERPL CALCULATED.3IONS-SCNC: 1 MMOL/L (ref 4–13)
AST SERPL W P-5'-P-CCNC: 21 U/L (ref 5–45)
BASOPHILS # BLD AUTO: 0.04 THOUSANDS/ÂΜL (ref 0–0.1)
BASOPHILS NFR BLD AUTO: 1 % (ref 0–1)
BILIRUB SERPL-MCNC: 0.33 MG/DL (ref 0.2–1)
BUN SERPL-MCNC: 10 MG/DL (ref 5–25)
CALCIUM SERPL-MCNC: 9.5 MG/DL (ref 8.3–10.1)
CHLORIDE SERPL-SCNC: 108 MMOL/L (ref 96–108)
CO2 SERPL-SCNC: 31 MMOL/L (ref 21–32)
CREAT SERPL-MCNC: 0.79 MG/DL (ref 0.6–1.3)
EOSINOPHIL # BLD AUTO: 0.26 THOUSAND/ÂΜL (ref 0–0.61)
EOSINOPHIL NFR BLD AUTO: 6 % (ref 0–6)
ERYTHROCYTE [DISTWIDTH] IN BLOOD BY AUTOMATED COUNT: 11.9 % (ref 11.6–15.1)
GFR SERPL CREATININE-BSD FRML MDRD: 92 ML/MIN/1.73SQ M
GLUCOSE P FAST SERPL-MCNC: 69 MG/DL (ref 65–99)
HCT VFR BLD AUTO: 34.5 % (ref 34.8–46.1)
HGB BLD-MCNC: 12.1 G/DL (ref 11.5–15.4)
IMM GRANULOCYTES # BLD AUTO: 0.01 THOUSAND/UL (ref 0–0.2)
IMM GRANULOCYTES NFR BLD AUTO: 0 % (ref 0–2)
LYMPHOCYTES # BLD AUTO: 1.28 THOUSANDS/ÂΜL (ref 0.6–4.47)
LYMPHOCYTES NFR BLD AUTO: 31 % (ref 14–44)
MCH RBC QN AUTO: 33.4 PG (ref 26.8–34.3)
MCHC RBC AUTO-ENTMCNC: 35.1 G/DL (ref 31.4–37.4)
MCV RBC AUTO: 95 FL (ref 82–98)
MONOCYTES # BLD AUTO: 0.27 THOUSAND/ÂΜL (ref 0.17–1.22)
MONOCYTES NFR BLD AUTO: 7 % (ref 4–12)
NEUTROPHILS # BLD AUTO: 2.22 THOUSANDS/ÂΜL (ref 1.85–7.62)
NEUTS SEG NFR BLD AUTO: 55 % (ref 43–75)
NRBC BLD AUTO-RTO: 0 /100 WBCS
PLATELET # BLD AUTO: 107 THOUSANDS/UL (ref 149–390)
PMV BLD AUTO: 12.9 FL (ref 8.9–12.7)
POTASSIUM SERPL-SCNC: 4.1 MMOL/L (ref 3.5–5.3)
PROT SERPL-MCNC: 7.1 G/DL (ref 6.4–8.4)
RBC # BLD AUTO: 3.62 MILLION/UL (ref 3.81–5.12)
SODIUM SERPL-SCNC: 140 MMOL/L (ref 135–147)
TSH SERPL DL<=0.05 MIU/L-ACNC: 0.81 UIU/ML (ref 0.45–4.5)
WBC # BLD AUTO: 4.08 THOUSAND/UL (ref 4.31–10.16)

## 2023-06-12 PROCEDURE — 85025 COMPLETE CBC W/AUTO DIFF WBC: CPT

## 2023-06-12 PROCEDURE — 36415 COLL VENOUS BLD VENIPUNCTURE: CPT

## 2023-06-12 PROCEDURE — 84443 ASSAY THYROID STIM HORMONE: CPT

## 2023-06-12 PROCEDURE — 80053 COMPREHEN METABOLIC PANEL: CPT

## 2023-06-16 NOTE — TELEPHONE ENCOUNTER
Spoke w/pt  Advised her of note below  Pt verbalized understanding  She is agreeable to both Pain Mgmt and Physiatry  Dr Kaylah Solomon - please place referrals  Thank you!

## 2023-06-21 ENCOUNTER — TELEPHONE (OUTPATIENT)
Dept: PAIN MEDICINE | Facility: MEDICAL CENTER | Age: 43
End: 2023-06-21

## 2023-06-27 ENCOUNTER — HOSPITAL ENCOUNTER (OUTPATIENT)
Dept: INFUSION CENTER | Facility: HOSPITAL | Age: 43
Discharge: HOME | End: 2023-06-27
Payer: COMMERCIAL

## 2023-06-27 VITALS
SYSTOLIC BLOOD PRESSURE: 87 MMHG | OXYGEN SATURATION: 93 % | DIASTOLIC BLOOD PRESSURE: 54 MMHG | RESPIRATION RATE: 16 BRPM | TEMPERATURE: 98.7 F | HEART RATE: 68 BPM

## 2023-06-27 DIAGNOSIS — G35 MS (MULTIPLE SCLEROSIS) (HCC): Primary | ICD-10-CM

## 2023-06-27 RX ORDER — SODIUM CHLORIDE 9 MG/ML
20 INJECTION, SOLUTION INTRAVENOUS ONCE
Status: COMPLETED | OUTPATIENT
Start: 2023-06-27 | End: 2023-06-27

## 2023-06-27 RX ORDER — SODIUM CHLORIDE 9 MG/ML
20 INJECTION, SOLUTION INTRAVENOUS ONCE
OUTPATIENT
Start: 2023-07-27

## 2023-06-27 RX ADMIN — SODIUM CHLORIDE 1000 MG: 0.9 INJECTION, SOLUTION INTRAVENOUS at 11:21

## 2023-06-27 RX ADMIN — SODIUM CHLORIDE 20 ML/HR: 0.9 INJECTION, SOLUTION INTRAVENOUS at 11:21

## 2023-06-27 NOTE — PLAN OF CARE
Problem: INFECTION - ADULT  Goal: Absence or prevention of progression during hospitalization  Description: INTERVENTIONS:  - Assess and monitor for signs and symptoms of infection  - Monitor lab/diagnostic results  - Monitor all insertion sites, i e  indwelling lines, tubes, and drains  - Monitor endotracheal if appropriate and nasal secretions for changes in amount and color  - [place] appropriate cooling/warming therapies per order  - Administer medications as ordered  - Instruct and encourage patient and family to use good hand hygiene technique  - Identify and instruct in appropriate isolation precautions for identified infection/condition  Outcome: Progressing     Problem: Knowledge Deficit  Goal: Patient/family/caregiver demonstrates understanding of disease process, treatment plan, medications, and discharge instructions  Description: Complete learning assessment and assess knowledge base    Interventions:  - Provide teaching at level of understanding  - Provide teaching via preferred learning methods  Outcome: Progressing

## 2023-07-21 ENCOUNTER — TELEPHONE (OUTPATIENT)
Dept: UROLOGY | Facility: AMBULATORY SURGERY CENTER | Age: 43
End: 2023-07-21

## 2023-07-21 DIAGNOSIS — N39.0 URINARY TRACT INFECTION WITHOUT HEMATURIA, SITE UNSPECIFIED: Primary | ICD-10-CM

## 2023-07-21 RX ORDER — CEPHALEXIN 500 MG/1
500 CAPSULE ORAL EVERY 6 HOURS SCHEDULED
Qty: 28 CAPSULE | Refills: 0 | Status: SHIPPED | OUTPATIENT
Start: 2023-07-21 | End: 2023-07-28

## 2023-07-21 NOTE — TELEPHONE ENCOUNTER
Spoke with Chance Hatfield and made her aware antibiotic was sent to her pharmacy per request and that urine testing prior to starting antibiotic is recommended.

## 2023-07-21 NOTE — TELEPHONE ENCOUNTER
Due to symptom reports an upcoming weekend I will send a prescription for Keflex to patient's pharmacy. I do encourage urine testing before starting antibiotics.

## 2023-07-21 NOTE — TELEPHONE ENCOUNTER
Returned call to Girma Dey and reviewed her symptoms. Patient reports urinary urgency, frequency, and a lot of burning with urination. She is requesting antibiotic be sent to [facility] in [place] prior to the weekend. Advised that normally a urine specimen has to be provided at the lab prior to starting on antibiotic. Patient states she has MS and it is very difficult for her to get to the lab to leave a sample as her legs don't cooperate.  Will forward to provider for antibiotic per request.

## 2023-07-21 NOTE — TELEPHONE ENCOUNTER
Pt under care of: Deloris Duncan     Last Seen: 7/7/22    Pt calling due to: possible uti     Pt Symptoms are:     Burning with urination  Urinary frequency/urgency      For the last two days      Patient states our office usually calls something in for her while waiting on urine testing    Patient states she has MS and it is very hard for her to get urine testing done      Pt can be reached at: [phone]

## 2023-07-27 ENCOUNTER — HOSPITAL ENCOUNTER (OUTPATIENT)
Dept: INFUSION CENTER | Facility: HOSPITAL | Age: 43
Discharge: HOME | End: 2023-07-27
Attending: PSYCHIATRY & NEUROLOGY
Payer: COMMERCIAL

## 2023-07-27 VITALS
TEMPERATURE: 98.7 F | OXYGEN SATURATION: 95 % | HEART RATE: 73 BPM | SYSTOLIC BLOOD PRESSURE: 105 MMHG | RESPIRATION RATE: 18 BRPM | DIASTOLIC BLOOD PRESSURE: 73 MMHG

## 2023-07-27 DIAGNOSIS — G35 MS (MULTIPLE SCLEROSIS) (HCC): Primary | ICD-10-CM

## 2023-07-27 PROCEDURE — 96365 THER/PROPH/DIAG IV INF INIT: CPT

## 2023-07-27 RX ORDER — SODIUM CHLORIDE 9 MG/ML
20 INJECTION, SOLUTION INTRAVENOUS ONCE
Status: COMPLETED | OUTPATIENT
Start: 2023-07-27 | End: 2023-07-27

## 2023-07-27 RX ORDER — SODIUM CHLORIDE 9 MG/ML
20 INJECTION, SOLUTION INTRAVENOUS ONCE
OUTPATIENT
Start: 2023-08-26

## 2023-07-27 RX ADMIN — SODIUM CHLORIDE 1000 MG: 0.9 INJECTION, SOLUTION INTRAVENOUS at 11:38

## 2023-07-27 RX ADMIN — SODIUM CHLORIDE 20 ML/HR: 0.9 INJECTION, SOLUTION INTRAVENOUS at 11:37

## 2023-07-27 NOTE — PLAN OF CARE
Problem: INFECTION - ADULT  Goal: Absence or prevention of progression during hospitalization  Description: INTERVENTIONS:  - Assess and monitor for signs and symptoms of infection  - Monitor lab/diagnostic results  - Monitor all insertion sites, i.e. indwelling lines, tubes, and drains  - Monitor endotracheal if appropriate and nasal secretions for changes in amount and color  - Kouts appropriate cooling/warming therapies per order  - Administer medications as ordered  - Instruct and encourage patient and family to use good hand hygiene technique  - Identify and instruct in appropriate isolation precautions for identified infection/condition  Outcome: Progressing     Problem: Knowledge Deficit  Goal: Patient/family/caregiver demonstrates understanding of disease process, treatment plan, medications, and discharge instructions  Description: Complete learning assessment and assess knowledge base.   Interventions:  - Provide teaching at level of understanding  - Provide teaching via preferred learning methods  Outcome: Progressing

## 2023-08-03 RX ORDER — CYCLOSPORINE 0.5 MG/ML
EMULSION OPHTHALMIC
COMMUNITY
Start: 2023-06-21

## 2023-08-07 ENCOUNTER — CONSULT (OUTPATIENT)
Dept: PAIN MEDICINE | Facility: CLINIC | Age: 43
End: 2023-08-07
Payer: COMMERCIAL

## 2023-08-07 ENCOUNTER — APPOINTMENT (OUTPATIENT)
Dept: RADIOLOGY | Facility: MEDICAL CENTER | Age: 43
End: 2023-08-07
Payer: COMMERCIAL

## 2023-08-07 VITALS — DIASTOLIC BLOOD PRESSURE: 58 MMHG | SYSTOLIC BLOOD PRESSURE: 86 MMHG | HEART RATE: 90 BPM

## 2023-08-07 DIAGNOSIS — G89.4 CHRONIC PAIN SYNDROME: ICD-10-CM

## 2023-08-07 DIAGNOSIS — M54.2 NECK PAIN: ICD-10-CM

## 2023-08-07 DIAGNOSIS — M54.16 LUMBAR RADICULOPATHY: ICD-10-CM

## 2023-08-07 DIAGNOSIS — R10.2 PELVIC PAIN IN FEMALE: ICD-10-CM

## 2023-08-07 DIAGNOSIS — G89.29 MUSCULOSKELETAL PAIN, CHRONIC: ICD-10-CM

## 2023-08-07 DIAGNOSIS — M47.816 LUMBAR SPONDYLOSIS: ICD-10-CM

## 2023-08-07 DIAGNOSIS — M79.18 MUSCULOSKELETAL PAIN, CHRONIC: ICD-10-CM

## 2023-08-07 DIAGNOSIS — M16.6 OTHER SECONDARY OSTEOARTHRITIS OF BOTH HIPS: ICD-10-CM

## 2023-08-07 DIAGNOSIS — M54.9 MID BACK PAIN: ICD-10-CM

## 2023-08-07 DIAGNOSIS — M54.16 LUMBAR RADICULOPATHY: Primary | ICD-10-CM

## 2023-08-07 PROCEDURE — 99204 OFFICE O/P NEW MOD 45 MIN: CPT | Performed by: ANESTHESIOLOGY

## 2023-08-07 PROCEDURE — 72110 X-RAY EXAM L-2 SPINE 4/>VWS: CPT

## 2023-08-07 RX ORDER — CELECOXIB 200 MG/1
200 CAPSULE ORAL DAILY
Qty: 30 CAPSULE | Refills: 0 | Status: SHIPPED | OUTPATIENT
Start: 2023-08-07 | End: 2023-09-06

## 2023-08-07 RX ORDER — ALBENDAZOLE 200 MG/1
TABLET, FILM COATED ORAL
COMMUNITY
Start: 2023-08-03

## 2023-08-07 NOTE — PROGRESS NOTES
Assessment:  1. Lumbar radiculopathy    2. Musculoskeletal pain, chronic    3. Pelvic pain in female    4. Other secondary osteoarthritis of both hips    5. Lumbar spondylosis    6. Chronic pain syndrome    7. Neck pain    8. Mid back pain        Plan:  Patient is a 56-year-old female complains of neck pain, mid back pain, low back pain and bilateral leg pain, bilateral leg with history of chronic pain syndrome secondary to lumbar degenerative disease and lumbar radiculopathy complicated by MS presents to office for initial consultation. She complains of bilateral hip pain and bilateral lower extremity pain with radiculopathy which appears to be in the L1 and L5 nerve root distribution bilaterally. Patient reports difficulty standing or walking long distances often needing a cane or walker based on her neurological deficit of the day. Patient reports inability to have any comfortable positioning laying down and standing up or sitting. Patient's pain is currently managed with Suboxone however she can continues to have significant neuropathic pain in bilateral amities. 1.  We will provide patient with aqua therapy for cervical, thoracic, lumbar spine strengthening  2. We will obtain an x-ray of the lumbar and an MRI of the lumbar spine to better assess the discogenic pathology and patient's current presentation. 3.  Follow-up 1 month to review diagnostic imaging and plan for interventional management      History of Present Illness: The patient is a 44 y.o. female who presents for consultation in regards to Back Pain, Leg Pain (BILATERAL), and Neck Pain. Symptoms have been present for several years. Symptoms began without any precipitating injury or trauma. Pain is reported to be 6 on the numeric rating scale. Symptoms are felt constantly and worst in the no typical pattern. Symptoms are characterized as cramping, shooting, sharp, dull/aching, numbing, tingling and pressure-like.   Symptoms are associated with bilateral leg weakness. Aggravating factors include lying down, standing, bending, leaning forward, leaning bckward, sitting, walking, exercise and bowel movements. Relieving factors include nithing. No change in symptoms with kneeling, turning the head, relaxation and coughing/sneezing. Treatments that have been helpful include chiropractic manipulation. physical therapy, home exercise, TENS unit and heat/ice have provided no relief. Medications to relieve symptoms include Suboxone, ibuprofen. Review of Systems:    Review of Systems   Genitourinary: Positive for pelvic pain. Musculoskeletal: Positive for back pain, gait problem and neck pain. Skin: Positive for rash. Neurological: Positive for weakness and numbness. All other systems reviewed and are negative. Past Medical History:   Diagnosis Date   • Back pain    • Chronic constipation 6/27/2016   • Chronic pain disorder    • Collar bone fracture     right side   • Crutches as ambulation aid    • Depression    • Dyssynergic defecation     Type 1   • ETOH abuse    • Head injury 2014   • Infectious viral hepatitis    • Lyme disease    • Meningitis spinal 2007?    • Multiple sclerosis (HCC)    • Opioid abuse ([address])    • Rib fractures    • Thrombocytopenia (HCC)    • Use of cane as ambulatory aid        Past Surgical History:   Procedure Laterality Date   • APPENDECTOMY     • CLAVICLE SURGERY     • COLONOSCOPY     • FL LUMBAR PUNCTURE DIAGNOSTIC  11/25/2020   • KNEE ARTHROSCOPY     • MULTIPLE TOOTH EXTRACTIONS     • CO COLONOSCOPY FLX DX W/COLLJ SPEC WHEN PFRMD N/A 6/27/2016    Procedure: COLONOSCOPY;  Surgeon: Jagruti Church MD;  Location: [facility];  Service: Colorectal   • CO OPEN IMPLANTATION CHARITY SACRAL NERVE N/A 11/15/2021    Procedure: INSERTION OF NEUROSTIMULATOR ELECTRODE ARRAY SACRAL NERVE; FLUOROSCOPY; [facility] ANALYS[facility];  Surgeon: Brent Grande MD;  Location: [facility];  Service: UroGynecology          • TONSILLECTOMY AND ADENOIDECTOMY     • TUBAL LIGATION         Family History   Problem Relation Age of Onset   • Skin cancer Mother    • No Known Problems Brother        Social History     Occupational History   • Not on file   Tobacco Use   • Smoking status: Every Day     Packs/day: 1.00     Types: Cigarettes   • Smokeless tobacco: Never   Vaping Use   • Vaping Use: Never used   Substance and Sexual Activity   • Alcohol use:  Yes     Alcohol/week: 14.0 standard drinks of alcohol     Types: 14 Shots of liquor per week     Comment: 2 mixed drinks each night   • Drug use: No   • Sexual activity: Not Currently         Current Outpatient Medications:   •  albendazole (ALBENZA) 200 mg tablet, TAKE 1 & 1/2 TABLETS BY MOUTH TWICE A DAY AS DIRECTED(PLAN LIMIT 4 TABS PER FILL), Disp: , Rfl:   •  ascorbic acid (VITAMIN C) 500 mg tablet, Take 500 mg by mouth daily, Disp: , Rfl:   •  b complex vitamins tablet, Take 1 tablet by mouth daily, Disp: , Rfl:   •  Biotin 11628 MCG TABS, Take 1 tablet by mouth daily  , Disp: , Rfl:   •  buprenorphine-naloxone (SUBOXONE) 2-0.5 mg per SL tablet, Place 0.5 tablets under the tongue 3 (three) times a day 2mg  three times a day, Disp: , Rfl:   •  Calcium Carbonate (CALCIUM 600 PO), Take 600 mg by mouth daily pt reports not having & not taking, Disp: , Rfl:   •  celecoxib (CeleBREX) 200 mg capsule, Take 200 mg by mouth daily pt reports not having & not taking- 5/18/23, Disp: , Rfl:   •  Cholecalciferol (Vitamin D3) 125 MCG (5000 UT) TABS, Take 5,000 Units by mouth daily pt reports taking 2 tab, Disp: , Rfl:   •  cloNIDine (CATAPRES) 0.2 mg tablet, Take 0.2 mg by mouth daily at bedtime , Disp: , Rfl:   •  clotrimazole-betamethasone (LOTRISONE) 1-0.05 % cream, Apply to affected area 2 times daily prn (Patient taking differently: Apply to affected area 2 times daily prn. pt reports running out & does not have in home 5/18/23.), Disp: 45 g, Rfl: 1  •  flavoxATE (URISPAS) 100 mg tablet, Take 1 tablet (100 mg total) by mouth 3 (three) times a day as needed for bladder spasms, Disp: 90 tablet, Rfl: 3  •  Ginkgo Biloba 40 MG TABS, Take 120 mg by mouth daily pt reports taking 2 tabs daily, Disp: , Rfl:   •  ibuprofen (MOTRIN) 200 mg tablet, Take 200 mg by mouth as needed for mild pain pt reports taking 3-4tabs every 6hrs. , Disp: , Rfl:   •  lubiprostone (AMITIZA) 24 mcg capsule, Take 24 mcg by mouth 2 (two) times a day with meals pt reports not taking, Disp: , Rfl:   •  LUTEIN PO, Take by mouth in the morning 20mg on bottle , Disp: , Rfl:   •  methylPREDNISolone sodium succinate in sodium chloride 0.9 % 250 mL IVPB, Inject 1,000 mg into a catheter in a vein every 30 (thirty) days. 1x/month, provided at medical office. per EPIC  Indications: MS, Disp: , Rfl:   •  Misc Natural Products (GINSENG COMPLEX PO), Take 2 tablets by mouth daily 500mg on bottle., Disp: , Rfl:   •  Movantik 25 MG tablet, Take 25 mg by mouth every evening, Disp: , Rfl:   •  Nerve Stimulator (STANDARD TENS) VICKY, by Does not apply route 2 (two) times a day, Disp: 1 Device, Rfl: 0  •  Nerve Stimulator (TENS THERAPY REPLACE BACK PADS) MISC, 30 pad by Does not apply route 2 (two) times a day, Disp: 30 each, Rfl: 0  •  Nerve Stimulator VICKY, by Does not apply route 2 (two) times a day, Disp: 1 each, Rfl: 0  •  Nerve Stimulator Supplies MISC, 30 pad by Does not apply route 2 (two) times a day, Disp: 30 pad, Rfl: 0  •  NON FORMULARY, HEMPANOL BRAIN DETOX 200MG .  pt reports not taking per 5/18/23., Disp: , Rfl:   •  Omega-3 Fatty Acids (FISH OIL OMEGA-3 PO), Take 1,200 mg by mouth daily , Disp: , Rfl:   •  Potassium 99 MG TABS, Take 99 mg by mouth daily pt reports taking 3 tabs daily, Disp: , Rfl:   •  Probiotic Product (PROBIOTIC DAILY PO), Take 2 tablets by mouth daily  , Disp: , Rfl:   •  promethazine (PHENERGAN) 50 MG tablet, daily at bedtime 1.5 tabs daily at bedtime, Disp: , Rfl: 1  •  Restasis 0.05 % ophthalmic emulsion, INSTILL 1 DROP BY OPHTHALMIC ROUTE EVERY 12 HOURS BOTH EYES, Disp: , Rfl:   •  selenium sulfide (SELSUN) 2.5 % shampoo, Apply topically daily as needed for dandruff (Patient taking differently: Apply 1 application. topically daily as needed for dandruff pt reports running out 5/18/23.), Disp: 118 mL, Rfl: 0  •  topiramate (TOPAMAX) 50 MG tablet, Take 50 mg by mouth daily  , Disp: , Rfl: 5  •  TURMERIC PO, Take by mouth Turmeric Curcumin 500mg on bottle. 1 tab daily, Disp: , Rfl:   •  ZINC-VITAMIN C PO, Take by mouth pt not taking due to taking regular zinc per pt report on 5/18/23., Disp: , Rfl:   •  docusate sodium (COLACE) 100 mg capsule, Take 100 mg by mouth 3 (three) times a day   (Patient not taking: Reported on 12/12/2022), Disp: , Rfl:   •  Multiple Vitamins-Minerals (ZINC PO), Take by mouth (Patient not taking: Reported on 12/12/2022), Disp: , Rfl:   •  phenazopyridine (PYRIDIUM) 200 mg tablet, Take 1 tablet (200 mg total) by mouth 3 (three) times a day as needed for bladder spasms (Patient not taking: Reported on 5/12/2023), Disp: 10 tablet, Rfl: 0    Allergies   Allergen Reactions   • Penicillins GI Intolerance and Nausea Only     Other reaction(s): Unknown Reaction       Physical Exam:    BP (!) 86/58 (BP Location: Right arm, Patient Position: Sitting, Cuff Size: Standard)   Pulse 90     Constitutional: normal, well developed, well nourished, alert, in no distress and non-toxic and no overt pain behavior. Eyes: anicteric  HEENT: grossly intact  Neck: supple, symmetric, trachea midline and no masses   Pulmonary:even and unlabored  Cardiovascular:No edema or pitting edema present  Skin:Normal without rashes or lesions and well hydrated  Psychiatric:Mood and affect appropriate  Neurologic:Cranial Nerves II-XII grossly intact  Musculoskeletal:antalgic     Lumbar/Sacral Spine examination demonstrates.   Decreased range of motion lumbar spine with pain upon: flexion, lateral rotation to the left/right, and bending to the left/right. Bilateral lumbar paraspinals tender to palpation. Muscle spasms noted in the lumbar area bilaterally. 3/5 lower extremity strength in all muscle groups bilaterally. Positive seated straight leg raise for bilateral lower extremities. Sensitivity to light touch intact bilateral lower extremities. 2+ reflexes in the patella and Achilles. No ankle clonus    Imaging      Study Result    Narrative & Impression   MRI THORACIC SPINE WITH AND WITHOUT CONTRAST     INDICATION: G35: Multiple sclerosis.     COMPARISON:  7/11/2020     TECHNIQUE:  Sagittal T1, sagittal T2, sagittal inversion recovery, axial T2,  axial gradient. Sagittal and axial T1 postcontrast.     IV Contrast:  4 mL of Gadobutrol injection (SINGLE-DOSE)      IMAGE QUALITY:  Diagnostic.     FINDINGS:     ALIGNMENT:  Normal alignment of the thoracic spine. No compression fracture. No subluxation. No evidence of scoliosis.     MARROW SIGNAL:  Normal marrow signal is identified within the visualized bony structures. No discrete marrow lesion.     THORACIC CORD:  Cord signal normality ventrally in the distal thoracic cord from the inferior T11 endplate through mid K44 without abnormal enhancement. This is not identified on the prior MRI and is suspicious for new demyelinating lesion in the   setting of known multiple sclerosis.     PREVERTEBRAL AND PARASPINAL SOFT TISSUES:   Normal.     THORACIC DEGENERATIVE CHANGE:  T12-L1: Tiny central protrusion type disc herniation. No significant central or foraminal narrowing.     POSTCONTRAST:  No abnormal enhancement.     IMPRESSION:     Focus of cord signal abnormality centrally in the distal thoracic spinal cord from T11 inferior endplate through Y78 mid vertebral level is compatible with demyelinating lesions in the setting of known multiple sclerosis. This is new from the prior   study. No abnormal enhancement.     The study was marked in EPIC for significant notification.          No orders to display       No orders of the defined types were placed in this encounter.

## 2023-08-07 NOTE — PATIENT INSTRUCTIONS
Core Strengthening Exercises   WHAT YOU NEED TO KNOW:   What do I need to know about core strengthening exercises? Your core includes the muscles of your lower back, hip, pelvis, and abdomen. Core strengthening exercises help heal and strengthen these muscles. This helps prevent another injury, and keeps your pelvis, spine, and hips in the correct position. What do I need to know about exercise safety? Talk to your healthcare provider before you start an exercise program. A physical therapist can teach you how to do core strengthening exercises safely. Do the exercises on a mat or firm surface. A firm surface will support your spine and prevent low back pain. Do not do these exercises on a bed. Move slowly and smoothly. Avoid fast or jerky motions. Stop if you feel pain. You may feel some discomfort at first, but you should not feel pain. Tell your provider or physical therapist if you have pain while you exercise. Regular exercise will help decrease your discomfort over time. Breathe normally during core exercises. Do not hold your breath. This may cause an increase in blood pressure and prevent muscle strengthening. Your healthcare provider will tell you when to inhale and exhale during the exercise. Begin all of your exercises with abdominal bracing. Abdominal bracing helps warm up your core muscles. You can also practice abdominal bracing throughout the day. Lie on your back with your knees bent and feet flat on the floor. Place your arms in a relaxed position beside your body. Tighten your abdominal muscles. Pull your belly button in and up toward your spine. Hold for 5 seconds. Relax your muscles. Repeat 10 times. How do I perform core strengthening exercises? Your healthcare provider will tell you how often to do these exercises. The provider will also tell you how many repetitions of each exercise you should do. Hold each exercise for 5 seconds or as directed.  As you get stronger, increase your hold to 10 to 15 seconds. You can do some of these exercises on a stability ball, or with a weight. Ask your healthcare provider how to use a stability ball or weight for these exercises:  Bridging:  Lie on your back with your knees bent and feet flat on the floor. Rest your arms at your side. Tighten your buttocks, and then lift your hips 1 inch off the floor. Hold for 5 seconds. When you can do this exercise without pain for 10 seconds, increase the distance you lift your hips. A good goal is to be able to lift your hips so that your shoulders, hips, and knees are in a straight line. Dead bug:  Lie on your back with your knees bent and feet flat on the floor. Place your arms in a relaxed position beside your body. Begin with abdominal bracing. Next, raise one leg, keeping your knee bent. Hold for 5 seconds. Repeat with the other leg. When you can do this exercise without pain for 10 to 15 seconds, you may raise one straight leg and hold. Repeat with the other leg. Quadruped:  Place your hands and knees on the floor. Keep your wrists directly below your shoulders and your knees directly below your hips. Pull your belly button in toward your spine. Do not flatten or arch your back. Tighten your abdominal muscles below your belly button. Hold for 5 seconds. When you can do this exercise without pain for 10 to 15 seconds, you may extend one arm and hold. Repeat on the other side. Side bridge exercises:      Standing side bridge:  Stand next to a wall and extend one arm toward the wall. Place your palm flat on the wall with your fingers pointing upward. Begin with abdominal bracing. Next, without moving your feet, slowly bend your arm to 90 degrees. Hold for 5 seconds. Repeat on the other side. When you can do this exercise without pain for 10 to 15 seconds, you may do the bent leg side bridge on the floor.          Bent leg side bridge:  Lie on one side with your legs, hips, and shoulders in a straight line. Prop yourself up onto your forearm so your elbow is directly below your shoulder. Bend your knees back to 90 degrees. Begin with abdominal bracing. Next, lift your hips and balance yourself on your forearm and knees. Hold for 5 seconds. Repeat on the other side. When you can do this exercise without pain for 10 to 15 seconds, you may do the straight leg side bridge on the floor. Straight leg side bridge:  Lie on one side with your legs, hips, and shoulders in a straight line. Prop yourself up onto your forearm so your elbow is directly below your shoulder. Begin with abdominal bracing. Lift your hips off the floor and balance yourself on your forearm and the outside of your flexed foot. Do not let your ankle bend sideways. Hold for 5 seconds. Repeat on the other side. When you can do this exercise without pain for 10 to 15 seconds, ask your healthcare provider for more advanced exercises. Superman:  Lie on your stomach. Extend your arms forward on the floor. Tighten your abdominal muscles and lift your right hand and left leg off the floor. Hold this position. Slowly return to the starting position. Tighten your abdominal muscles and lift your left hand and right leg off the floor. Hold this position. Slowly return to the starting position. Clam:  Lie on your side with your knees bent. Put your bottom arm under your head to keep your neck in line. Put your top hand on your hip to keep your pelvis from moving. Put your heels together, and keep them together during this exercise. Slowly raise your top knee toward the ceiling. Then lower your leg so your knees are together. Repeat this exercise 10 times. Then switch sides and do the exercise 10 times with the other leg. Curl up:  Lie on your back with your knees bent and feet flat on the floor. Place your hands, palms down, underneath your lower back.  Next, with your elbows on the floor, lift your shoulders and chest 2 to 3 inches off the floor. Keep your head in line with your shoulders. Hold this position. Slowly return to the starting position. Straight leg raises:  Lie on your back with one leg straight. Bend the other knee and place your foot flat on the floor. Tighten your abdominal muscles. Keep your leg straight and slowly lift it straight up 6 to 12 inches off the floor. Hold this position. Lower your leg slowly. Do as many repetitions as directed on this side. Repeat with the other leg. Plank:  Lie on your stomach. Bend your elbows and place your forearms flat on the floor. Lift your chest, stomach, and knees off the floor. Make sure your elbows are below your shoulders. Your body should be in a straight line. Do not let your hips or lower back sink to the ground. Squeeze your abdominal muscles together and hold for 15 seconds. To make this exercise harder, hold for 30 seconds or lift 1 leg at a time. Bicycles:  Lie on your back. Bend both knees and bring them toward your chest. Your calves should be parallel to the floor. Place the palms of your hands on the back of your head. Straighten your right leg and keep it lifted 2 inches off the floor. Raise your head and shoulders off the floor and twist towards your left. Keep your head and shoulders lifted. Bend your right knee while you straighten your left leg. Keep your left leg 2 inches off the floor. Twist your head and chest towards the left leg. Continue to straighten 1 leg at a time and twist.       When should I call my doctor or physical therapist?   You have sharp or worsening pain during exercise or at rest.    You have questions or concerns about your condition, care, or exercise program.    CARE AGREEMENT:   You have the right to help plan your care. Learn about your health condition and how it may be treated. Discuss treatment options with your healthcare providers to decide what care you want to receive.  You always have the right to refuse treatment. The above information is an  only. It is not intended as medical advice for individual conditions or treatments. Talk to your doctor, nurse or pharmacist before following any medical regimen to see if it is safe and effective for you. © Copyright Liz Russo 2022 Information is for End User's use only and may not be sold, redistributed or otherwise used for commercial purposes.

## 2023-08-18 DIAGNOSIS — R39.11 URINARY HESITANCY: ICD-10-CM

## 2023-08-18 DIAGNOSIS — N31.9 NEUROGENIC BLADDER: ICD-10-CM

## 2023-08-18 RX ORDER — FLAVOXATE HYDROCHLORIDE 100 MG/1
100 TABLET ORAL 3 TIMES DAILY PRN
Qty: 90 TABLET | Refills: 3 | Status: SHIPPED | OUTPATIENT
Start: 2023-08-18

## 2023-08-29 ENCOUNTER — HOSPITAL ENCOUNTER (OUTPATIENT)
Dept: MRI IMAGING | Facility: HOSPITAL | Age: 43
Discharge: HOME | End: 2023-08-29
Attending: ANESTHESIOLOGY
Payer: COMMERCIAL

## 2023-08-29 ENCOUNTER — HOSPITAL ENCOUNTER (OUTPATIENT)
Dept: INFUSION CENTER | Facility: HOSPITAL | Age: 43
Discharge: HOME | End: 2023-08-29
Attending: PSYCHIATRY & NEUROLOGY
Payer: COMMERCIAL

## 2023-08-29 DIAGNOSIS — M54.16 LUMBAR RADICULOPATHY: ICD-10-CM

## 2023-08-29 DIAGNOSIS — G89.4 CHRONIC PAIN SYNDROME: ICD-10-CM

## 2023-08-29 DIAGNOSIS — M47.816 LUMBAR SPONDYLOSIS: ICD-10-CM

## 2023-08-29 DIAGNOSIS — G35 MS (MULTIPLE SCLEROSIS) (HCC): Primary | ICD-10-CM

## 2023-08-29 PROCEDURE — 72148 MRI LUMBAR SPINE W/O DYE: CPT

## 2023-08-29 PROCEDURE — 96365 THER/PROPH/DIAG IV INF INIT: CPT

## 2023-08-29 PROCEDURE — G1004 CDSM NDSC: HCPCS

## 2023-08-29 RX ORDER — SODIUM CHLORIDE 9 MG/ML
20 INJECTION, SOLUTION INTRAVENOUS ONCE
OUTPATIENT
Start: 2023-09-25

## 2023-08-29 RX ORDER — SODIUM CHLORIDE 9 MG/ML
20 INJECTION, SOLUTION INTRAVENOUS ONCE
Status: COMPLETED | OUTPATIENT
Start: 2023-08-29 | End: 2023-08-29

## 2023-08-29 RX ADMIN — SODIUM CHLORIDE 1000 MG: 0.9 INJECTION, SOLUTION INTRAVENOUS at 11:58

## 2023-08-29 RX ADMIN — SODIUM CHLORIDE 20 ML/HR: 0.9 INJECTION, SOLUTION INTRAVENOUS at 11:57

## 2023-08-29 NOTE — PLAN OF CARE
Problem: Potential for Falls  Goal: Patient will remain free of falls  Description: INTERVENTIONS:  - Educate patient/family on patient safety including physical limitations  - Instruct patient to call for assistance with activity   - Consult OT/PT to assist with strengthening/mobility   - Keep Call bell within reach  - Keep bed low and locked with side rails adjusted as appropriate        - Consider moving patient to room near nurses station  Outcome: Progressing     Problem: Knowledge Deficit  Goal: Patient/family/caregiver demonstrates understanding of disease process, treatment plan, medications, and discharge instructions  Description: Complete learning assessment and assess knowledge base.   Interventions:  - Provide teaching at level of understanding  - Provide teaching via preferred learning methods  Outcome: Progressing

## 2023-09-01 ENCOUNTER — TELEPHONE (OUTPATIENT)
Age: 43
End: 2023-09-01

## 2023-09-05 DIAGNOSIS — G89.29 MUSCULOSKELETAL PAIN, CHRONIC: ICD-10-CM

## 2023-09-05 DIAGNOSIS — M54.9 MID BACK PAIN: ICD-10-CM

## 2023-09-05 DIAGNOSIS — M54.2 NECK PAIN: ICD-10-CM

## 2023-09-05 DIAGNOSIS — M79.18 MUSCULOSKELETAL PAIN, CHRONIC: ICD-10-CM

## 2023-09-05 DIAGNOSIS — M16.6 OTHER SECONDARY OSTEOARTHRITIS OF BOTH HIPS: ICD-10-CM

## 2023-09-05 DIAGNOSIS — G89.4 CHRONIC PAIN SYNDROME: ICD-10-CM

## 2023-09-05 DIAGNOSIS — M54.16 LUMBAR RADICULOPATHY: ICD-10-CM

## 2023-09-05 DIAGNOSIS — M47.816 LUMBAR SPONDYLOSIS: ICD-10-CM

## 2023-09-05 DIAGNOSIS — R10.2 PELVIC PAIN IN FEMALE: ICD-10-CM

## 2023-09-05 RX ORDER — CELECOXIB 200 MG/1
200 CAPSULE ORAL DAILY
Qty: 30 CAPSULE | Refills: 0 | Status: SHIPPED | OUTPATIENT
Start: 2023-09-05 | End: 2023-09-12 | Stop reason: SDUPTHER

## 2023-09-05 NOTE — TELEPHONE ENCOUNTER
Please refuse medication request with note "Patient needs to contact office for refill", request came from pharmacy.

## 2023-09-12 ENCOUNTER — OFFICE VISIT (OUTPATIENT)
Dept: PAIN MEDICINE | Facility: CLINIC | Age: 43
End: 2023-09-12
Payer: COMMERCIAL

## 2023-09-12 VITALS
WEIGHT: 103.6 LBS | DIASTOLIC BLOOD PRESSURE: 68 MMHG | HEIGHT: 64 IN | BODY MASS INDEX: 17.69 KG/M2 | HEART RATE: 101 BPM | RESPIRATION RATE: 16 BRPM | SYSTOLIC BLOOD PRESSURE: 102 MMHG

## 2023-09-12 DIAGNOSIS — G89.29 MUSCULOSKELETAL PAIN, CHRONIC: ICD-10-CM

## 2023-09-12 DIAGNOSIS — M79.18 MUSCULOSKELETAL PAIN, CHRONIC: ICD-10-CM

## 2023-09-12 DIAGNOSIS — M54.2 NECK PAIN: ICD-10-CM

## 2023-09-12 DIAGNOSIS — M16.6 OTHER SECONDARY OSTEOARTHRITIS OF BOTH HIPS: ICD-10-CM

## 2023-09-12 DIAGNOSIS — M54.16 LUMBAR RADICULOPATHY: ICD-10-CM

## 2023-09-12 DIAGNOSIS — G89.4 CHRONIC PAIN SYNDROME: ICD-10-CM

## 2023-09-12 DIAGNOSIS — R10.2 PELVIC PAIN IN FEMALE: ICD-10-CM

## 2023-09-12 DIAGNOSIS — M54.9 MID BACK PAIN: ICD-10-CM

## 2023-09-12 DIAGNOSIS — M47.816 LUMBAR SPONDYLOSIS: ICD-10-CM

## 2023-09-12 PROCEDURE — 99214 OFFICE O/P EST MOD 30 MIN: CPT | Performed by: NURSE PRACTITIONER

## 2023-09-12 RX ORDER — CELECOXIB 200 MG/1
200 CAPSULE ORAL 2 TIMES DAILY
Qty: 60 CAPSULE | Refills: 0 | Status: SHIPPED | OUTPATIENT
Start: 2023-09-12

## 2023-09-12 NOTE — PROGRESS NOTES
Assessment:  1. Musculoskeletal pain, chronic    2. Pelvic pain in female    3. Other secondary osteoarthritis of both hips    4. Lumbar radiculopathy    5. Lumbar spondylosis    6. Chronic pain syndrome    7. Neck pain    8. Mid back pain        Plan:  While the patient was in the office today, I did have a thorough conversation regarding their chronic pain syndrome, medication management, and treatment plan options. Patient is being seen for follow-up visit. She was initially seen here for consultation on 8/7/2023. X-ray and MRI of her lumbar spine was ordered. X-ray reveals minimal levoscoliosis centered at L3. No significant degenerative changes noted. MRI of the lumbar spine reveals mild degenerative changes of the lumbar spine without significant canal stenosis or foraminal narrowing. There is mild bony marrow edema in the right L4 and right L5 pedicles may represent a stress reaction. X-ray and MRI results were reviewed with patient during today's visit. Patient previously participated in aqua therapy without significant improvement in her symptoms. It was more recently recommended that she start up with aqua therapy. Patient states that she has a hard time getting to aqua therapy and participating in aqua therapy due to progressive weakness in her legs related to MS. May take Celebrex 200 mg twice daily. She may take Tylenol in between if needed. New prescription for Celebrex 200 mg twice daily with food was sent to her pharmacy. I advised patient to not combine Celebrex and ibuprofen together since they are both NSAIDs. The patient was agreeable and verbalized an understanding. Patient states that her neurologist previously tried her on a muscle relaxer. She does not take it because it seems to make her legs feel weaker. She has tried topical medications in the past including Lidoderm patches without relief. She uses diclofenac gel with some improvement.     Discussed possibility of trying something like gabapentin. I provided her with information to take home and read over. I also urged her to discuss this medication with her neurologist.    Patient has significant thoracic and lumbar paraspinal muscle spasms present. We will schedule patient for diagnostic/therapeutic thoracic/lumbar paraspinal trigger point injections in the near future. Follow-up 1 month after trigger point injections. History of Present Illness: The patient is a 44 y.o. female who presents for a follow up office visit in regards to Back Pain and Leg Pain. The patient’s current symptoms include complaints of thoracic and low back pain. Intermittent pain in her thighs. Quality pain is described as sharp, cramping, shooting. Current pain level is a 5/10. Current pain medications includes: Celebrex 200 mg daily. She tells me that she is also taking ibuprofen along with it. .    I have personally reviewed and/or updated the patient's past medical history, past surgical history, family history, social history, current medications, allergies, and vital signs today. Review of Systems  Review of Systems   Gastrointestinal: Positive for constipation. Musculoskeletal: Positive for back pain, gait problem and myalgias. Joint stiffness  Pain (back, legs)  Decreased ROM   Skin: Positive for rash. Neurological:        Memory loss   All other systems reviewed and are negative. Past Medical History:   Diagnosis Date   • Back pain    • Chronic constipation 6/27/2016   • Chronic pain disorder    • Collar bone fracture     right side   • Crutches as ambulation aid    • Depression    • Dyssynergic defecation     Type 1   • ETOH abuse    • Head injury 2014   • Infectious viral hepatitis    • Lyme disease    • Meningitis spinal 2007?    • Multiple sclerosis (HCC)    • Opioid abuse ([address])    • Rib fractures    • Thrombocytopenia (HCC)    • Use of cane as ambulatory aid        Past Surgical History: Procedure Laterality Date   • APPENDECTOMY     • CLAVICLE SURGERY     • COLONOSCOPY     • FL LUMBAR PUNCTURE DIAGNOSTIC  11/25/2020   • KNEE ARTHROSCOPY     • MULTIPLE TOOTH EXTRACTIONS     • DC COLONOSCOPY FLX DX W/COLLJ SPEC WHEN PFRMD N/A 6/27/2016    Procedure: COLONOSCOPY;  Surgeon: Yaya Sharp MD;  Location: [facility];  Service: Colorectal   • DC OPEN IMPLANTATION CHARITY SACRAL NERVE N/A 11/15/2021    Procedure: INSERTION OF NEUROSTIMULATOR ELECTRODE ARRAY SACRAL NERVE; FLUOROSCOPY; [facility];  Surgeon: Gregoria Morales MD;  Location: [facility];  Service: UroGynecology          • TONSILLECTOMY AND ADENOIDECTOMY     • TUBAL LIGATION         Family History   Problem Relation Age of Onset   • Skin cancer Mother    • No Known Problems Brother        Social History     Occupational History   • Not on file   Tobacco Use   • Smoking status: Every Day     Packs/day: 1.00     Types: Cigarettes   • Smokeless tobacco: Never   Vaping Use   • Vaping Use: Never used   Substance and Sexual Activity   • Alcohol use:  Yes     Alcohol/week: 14.0 standard drinks of alcohol     Types: 14 Shots of liquor per week     Comment: 2 mixed drinks each night   • Drug use: No   • Sexual activity: Yes     Partners: Male         Current Outpatient Medications:   •  albendazole (ALBENZA) 200 mg tablet, TAKE 1 & 1/2 TABLETS BY MOUTH TWICE A DAY AS DIRECTED(PLAN LIMIT 4 TABS PER FILL), Disp: , Rfl:   •  ascorbic acid (VITAMIN C) 500 mg tablet, Take 500 mg by mouth daily, Disp: , Rfl:   •  b complex vitamins tablet, Take 1 tablet by mouth daily, Disp: , Rfl:   •  Biotin 03419 MCG TABS, Take 1 tablet by mouth daily  , Disp: , Rfl:   •  buprenorphine-naloxone (SUBOXONE) 2-0.5 mg per SL tablet, Place 0.5 tablets under the tongue 3 (three) times a day 2mg  three times a day, Disp: , Rfl:   •  Calcium Carbonate (CALCIUM 600 PO), Take 600 mg by mouth daily pt reports not having & not taking, Disp: , Rfl:   •  celecoxib (CeleBREX) 200 mg capsule, Take 1 capsule (200 mg total) by mouth 2 (two) times a day With food, Disp: 60 capsule, Rfl: 0  •  Cholecalciferol (Vitamin D3) 125 MCG (5000 UT) TABS, Take 5,000 Units by mouth daily pt reports taking 2 tab, Disp: , Rfl:   •  cloNIDine (CATAPRES) 0.2 mg tablet, Take 0.2 mg by mouth daily at bedtime , Disp: , Rfl:   •  clotrimazole-betamethasone (LOTRISONE) 1-0.05 % cream, Apply to affected area 2 times daily prn (Patient taking differently: Apply to affected area 2 times daily prn. pt reports running out & does not have in home 5/18/23.), Disp: 45 g, Rfl: 1  •  flavoxATE (URISPAS) 100 mg tablet, TAKE 1 TABLET BY MOUTH 3 TIMES A DAY AS NEEDED FOR BLADDER SPASMS., Disp: 90 tablet, Rfl: 3  •  Ginkgo Biloba 40 MG TABS, Take 120 mg by mouth daily pt reports taking 2 tabs daily, Disp: , Rfl:   •  ibuprofen (MOTRIN) 200 mg tablet, Take 200 mg by mouth as needed for mild pain pt reports taking 3-4tabs every 6hrs. , Disp: , Rfl:   •  lubiprostone (AMITIZA) 24 mcg capsule, Take 24 mcg by mouth 2 (two) times a day with meals pt reports not taking, Disp: , Rfl:   •  LUTEIN PO, Take by mouth in the morning 20mg on bottle , Disp: , Rfl:   •  methylPREDNISolone sodium succinate in sodium chloride 0.9 % 250 mL IVPB, Inject 1,000 mg into a catheter in a vein every 30 (thirty) days. 1x/month, provided at medical office.  per EPIC  Indications: MS, Disp: , Rfl:   •  Misc Natural Products (GINSENG COMPLEX PO), Take 2 tablets by mouth daily 500mg on bottle., Disp: , Rfl:   •  Movantik 25 MG tablet, Take 25 mg by mouth every evening, Disp: , Rfl:   •  Nerve Stimulator (STANDARD TENS) VICKY, by Does not apply route 2 (two) times a day, Disp: 1 Device, Rfl: 0  •  Nerve Stimulator (TENS THERAPY REPLACE BACK PADS) MISC, 30 pad by Does not apply route 2 (two) times a day, Disp: 30 each, Rfl: 0  •  Nerve Stimulator VICKY, by Does not apply route 2 (two) times a day, Disp: 1 each, Rfl: 0  •  Nerve Stimulator Supplies MISC, 30 pad by Does not apply route 2 (two) times a day, Disp: 30 pad, Rfl: 0  •  NON FORMULARY, HEMPANOL BRAIN DETOX 200MG . pt reports not taking per 5/18/23., Disp: , Rfl:   •  Omega-3 Fatty Acids (FISH OIL OMEGA-3 PO), Take 1,200 mg by mouth daily , Disp: , Rfl:   •  Potassium 99 MG TABS, Take 99 mg by mouth daily pt reports taking 3 tabs daily, Disp: , Rfl:   •  Probiotic Product (PROBIOTIC DAILY PO), Take 2 tablets by mouth daily  , Disp: , Rfl:   •  promethazine (PHENERGAN) 50 MG tablet, daily at bedtime 1.5 tabs daily at bedtime, Disp: , Rfl: 1  •  Restasis 0.05 % ophthalmic emulsion, INSTILL 1 DROP BY OPHTHALMIC ROUTE EVERY 12 HOURS BOTH EYES, Disp: , Rfl:   •  selenium sulfide (SELSUN) 2.5 % shampoo, Apply topically daily as needed for dandruff (Patient taking differently: Apply 1 application. topically daily as needed for dandruff pt reports running out 5/18/23.), Disp: 118 mL, Rfl: 0  •  topiramate (TOPAMAX) 50 MG tablet, Take 50 mg by mouth daily  , Disp: , Rfl: 5  •  TURMERIC PO, Take by mouth Turmeric Curcumin 500mg on bottle.  1 tab daily, Disp: , Rfl:   •  ZINC-VITAMIN C PO, Take by mouth pt not taking due to taking regular zinc per pt report on 5/18/23., Disp: , Rfl:   •  docusate sodium (COLACE) 100 mg capsule, Take 100 mg by mouth 3 (three) times a day   (Patient not taking: Reported on 12/12/2022), Disp: , Rfl:   •  Multiple Vitamins-Minerals (ZINC PO), Take by mouth (Patient not taking: Reported on 12/12/2022), Disp: , Rfl:   •  phenazopyridine (PYRIDIUM) 200 mg tablet, Take 1 tablet (200 mg total) by mouth 3 (three) times a day as needed for bladder spasms (Patient not taking: Reported on 5/12/2023), Disp: 10 tablet, Rfl: 0    Allergies   Allergen Reactions   • Penicillins GI Intolerance and Nausea Only     Other reaction(s): Unknown Reaction       Physical Exam:    /68   Pulse 101   Resp 16   Ht 5' 4" (1.626 m)   Wt 47 kg (103 lb 9.6 oz)   BMI 17.78 kg/m²     Constitutional:normal, well developed, well nourished, alert, in no distress and non-toxic and no overt pain behavior. Eyes:anicteric  HEENT:grossly intact  Neck:supple, symmetric, trachea midline and no masses   Pulmonary:even and unlabored  Cardiovascular:No edema or pitting edema present  Skin:Normal without rashes or lesions and well hydrated  Psychiatric:Mood and affect appropriate  Neurologic:Cranial Nerves II-XII grossly intact  Musculoskeletal:in wheelchair    Imaging    Study Result    Narrative & Impression   MRI LUMBAR SPINE WITHOUT CONTRAST     INDICATION: M54.16: Radiculopathy, lumbar region  M47.816: Spondylosis without myelopathy or radiculopathy, lumbar region  G89.4: Chronic pain syndrome.     COMPARISON: Lumbar spine radiograph 8/7/2023., 11/28/2018 sacrum and coccyx radiograph 11/15/2021. Lumbar puncture 11/25/2020. MRI lumbar spine with and without contrast 7/11/2020.     TECHNIQUE:  Multiplanar, multisequence imaging of the lumbar spine was performed. .        IMAGE QUALITY:  Diagnostic     FINDINGS:     VERTEBRAL BODIES:  There are 5 lumbar type vertebral bodies. Normal alignment of the lumbar spine. No spondylolysis or spondylolisthesis. No scoliosis. No compression fracture.     Mildly bone marrow edema in right L4 and right L5 pedicles, may represent stress reaction.     L3 and L5 intraosseous vertebral body hemangiomas. Otherwise, normal bone marrow signal.     SACRUM:  Normal signal within the sacrum. No evidence of insufficiency or stress fracture.     DISTAL CORD AND CONUS:  Normal size and signal within the distal cord and conus.     PARASPINAL SOFT TISSUES:  Paraspinal soft tissues are unremarkable.     LOWER THORACIC DISC SPACES: Mild noncompressive lower thoracic degenerative change.     LUMBAR DISC SPACES: Mild multilevel disc height loss, worse at L5-S1.     L1-L2: Normal.     L2-L3: Normal.     L3-L4: Mild facet arthropathy, trace left facet joint effusion.  No significant canal stenosis or foraminal narrowing.     L4-L5: Mild diffuse disc bulge. Mild facet arthropathy, small bilateral facet joint effusions (right greater than left). No significant canal stenosis or foraminal narrowing.     L5-S1: Mild diffuse disc bulge, tiny posterior annular fissure. No significant canal stenosis or foraminal narrowing.     OTHER FINDINGS: Small right renal cyst.     IMPRESSION:     Mildly bone marrow edema in right L4 and right L5 pedicles, may represent stress reaction.     Mild degenerative changes of the lumbar spine, as detailed above. No significant canal stenosis or foraminal narrowing.     The study was marked in EPIC for significant notification. No orders to display       No orders of the defined types were placed in this encounter.

## 2023-09-12 NOTE — PATIENT INSTRUCTIONS
Gabapentin (By mouth)   Gabapentin (dinorah-a-PEN-tin)  Treats seizures and pain caused by shingles. Brand Name(s): FusePaq Fanatrex, Neurontin   There may be other brand names for this medicine. When This Medicine Should Not Be Used: This medicine is not right for everyone. Do not use it if you had an allergic reaction to gabapentin. How to Use This Medicine:   Capsule, Liquid, Tablet  Take your medicine as directed. Your dose may need to be changed several times to find what works best for you. If you have epilepsy, do not allow more than 12 hours to pass between doses. Capsule: Swallow the capsule whole with plenty of water. Do not open, crush, or chew it. Gralise® tablet: Swallow the tablet whole . Do not crush, break, or chew it. Neurontin® tablet: If you break a tablet into 2 pieces, use the second half as your next dose. Do not use the half-tablet if the whole tablet has been cut or broken after 28 days. Oral liquid: Measure the oral liquid medicine with a marked measuring spoon, oral syringe, or medicine cup. This medicine should come with a Medication Guide. Ask your pharmacist for a copy if you do not have one. Missed dose: Take a dose as soon as you remember. If it is almost time for your next dose, wait until then and take a regular dose. Do not take extra medicine to make up for a missed dose. Store the medicine in a closed container at room temperature, away from heat, moisture, and direct light. Store the Neurontin® oral liquid in the refrigerator. Do not freeze. Drugs and Foods to Avoid:   Ask your doctor or pharmacist before using any other medicine, including over-the-counter medicines, vitamins, and herbal products. Some medicines can affect how gabapentin works. Tell your doctor if you also using hydrocodone or morphine. If you take an antacid, wait at least 2 hours before you take gabapentin. Do not drink alcohol while you are using this medicine.   Tell your doctor if you use anything else that makes you sleepy. Some examples are allergy medicine, narcotic pain medicine, and alcohol. Tell your doctor if you are also using lorazepam, oxycodone, or zolpidem. Warnings While Using This Medicine:   Tell your doctor if you are pregnant or breastfeeding, or if you have kidney problems (including patients receiving dialysis) or lung problems. Tell your doctor if you have a history of depression or mental health problems. This medicine may cause the following problems:  Drug reaction with eosinophilia and systemic symptoms (DRESS) or multiorgan hypersensitivity, which may damage the liver, kidney, blood, heart, or muscles  Changes in mood or behavior, including suicidal thoughts or behavior  Respiratory depression (serious breathing problem that can be life-threatening), when used with narcotic pain medicines  Do not stop using this medicine suddenly. Your doctor will need to slowly decrease your dose before you stop it completely. This medicine may make you dizzy or drowsy. Do not drive or do anything else that could be dangerous until you know how this medicine affects you. Tell any doctor or dentist who treats you that you are using this medicine. This medicine may affect certain medical test results. Your doctor will check your progress and the effects of this medicine at regular visits. Keep all appointments. Keep all medicine out of the reach of children. Never share your medicine with anyone. Possible Side Effects While Using This Medicine:   Call your doctor right away if you notice any of these side effects:   Allergic reaction: Itching or hives, swelling in your face or hands, swelling or tingling in your mouth or throat, chest tightness, trouble breathing  Behavior problems, aggression, restlessness, trouble concentrating, moodiness (especially in children)  Blistering, peeling, red skin rash  Blue lips, fingernails, or skin, chest pain, fast heartbeat, trouble breathing  Change in how much or how often you urinate, bloody or cloudy urine  Dark urine or pale stools, nausea, vomiting, loss of appetite, stomach pain, yellow skin or eyes  Fever, chills, cough, sore throat, body aches  Problems with coordination, shakiness, unsteadiness, unusual eye movement  Rapid weight gain, swelling in your hands, ankles, or feet  Rash, swollen or tender glands in the neck, armpit, or groin  Unusual moods or behaviors, thoughts of hurting yourself, feeling depressed  If you notice these less serious side effects, talk with your doctor:   Dizziness, drowsiness, sleepiness, tiredness  If you notice other side effects that you think are caused by this medicine, tell your doctor. Call your doctor for medical advice about side effects. You may report side effects to FDA at 2-236-FDA-7396  © Copyright [employer] 2022 Information is for End User's use only and may not be sold, redistributed or otherwise used for commercial purposes. The above information is an  only. It is not intended as medical advice for individual conditions or treatments. Talk to your doctor, nurse or pharmacist before following any medical regimen to see if it is safe and effective for you.

## 2023-09-25 ENCOUNTER — HOSPITAL ENCOUNTER (OUTPATIENT)
Dept: INFUSION CENTER | Facility: HOSPITAL | Age: 43
Discharge: HOME | End: 2023-09-25
Attending: PSYCHIATRY & NEUROLOGY
Payer: COMMERCIAL

## 2023-09-25 VITALS
SYSTOLIC BLOOD PRESSURE: 90 MMHG | OXYGEN SATURATION: 98 % | TEMPERATURE: 97 F | DIASTOLIC BLOOD PRESSURE: 62 MMHG | RESPIRATION RATE: 17 BRPM | HEART RATE: 74 BPM

## 2023-09-25 DIAGNOSIS — G35 MS (MULTIPLE SCLEROSIS) (HCC): Primary | ICD-10-CM

## 2023-09-25 PROCEDURE — 96365 THER/PROPH/DIAG IV INF INIT: CPT

## 2023-09-25 RX ORDER — SODIUM CHLORIDE 9 MG/ML
20 INJECTION, SOLUTION INTRAVENOUS ONCE
Status: COMPLETED | OUTPATIENT
Start: 2023-09-25 | End: 2023-09-25

## 2023-09-25 RX ORDER — SODIUM CHLORIDE 9 MG/ML
20 INJECTION, SOLUTION INTRAVENOUS ONCE
OUTPATIENT
Start: 2023-09-28

## 2023-09-25 RX ADMIN — SODIUM CHLORIDE 1000 MG: 0.9 INJECTION, SOLUTION INTRAVENOUS at 12:17

## 2023-09-25 RX ADMIN — SODIUM CHLORIDE 20 ML/HR: 0.9 INJECTION, SOLUTION INTRAVENOUS at 12:06

## 2023-10-18 ENCOUNTER — PROCEDURE VISIT (OUTPATIENT)
Dept: PAIN MEDICINE | Facility: CLINIC | Age: 43
End: 2023-10-18
Payer: COMMERCIAL

## 2023-10-18 DIAGNOSIS — M54.9 MID BACK PAIN: ICD-10-CM

## 2023-10-18 DIAGNOSIS — M79.18 MUSCULOSKELETAL PAIN, CHRONIC: Primary | ICD-10-CM

## 2023-10-18 DIAGNOSIS — G89.29 MUSCULOSKELETAL PAIN, CHRONIC: Primary | ICD-10-CM

## 2023-10-18 DIAGNOSIS — M79.18 MYOFASCIAL PAIN SYNDROME: ICD-10-CM

## 2023-10-18 PROCEDURE — 76942 ECHO GUIDE FOR BIOPSY: CPT | Performed by: ANESTHESIOLOGY

## 2023-10-18 PROCEDURE — 20553 NJX 1/MLT TRIGGER POINTS 3/>: CPT | Performed by: ANESTHESIOLOGY

## 2023-10-18 RX ORDER — BUPIVACAINE HYDROCHLORIDE 2.5 MG/ML
10 INJECTION, SOLUTION EPIDURAL; INFILTRATION; INTRACAUDAL
Status: COMPLETED | OUTPATIENT
Start: 2023-10-18 | End: 2023-10-18

## 2023-10-18 RX ORDER — TRIAMCINOLONE ACETONIDE 40 MG/ML
40 INJECTION, SUSPENSION INTRA-ARTICULAR; INTRAMUSCULAR
Status: COMPLETED | OUTPATIENT
Start: 2023-10-18 | End: 2023-10-18

## 2023-10-18 RX ADMIN — TRIAMCINOLONE ACETONIDE 40 MG: 40 INJECTION, SUSPENSION INTRA-ARTICULAR; INTRAMUSCULAR at 13:00

## 2023-10-18 RX ADMIN — BUPIVACAINE HYDROCHLORIDE 10 ML: 2.5 INJECTION, SOLUTION EPIDURAL; INFILTRATION; INTRACAUDAL at 13:00

## 2023-10-18 NOTE — PROGRESS NOTES
Universal Protocol:  Consent: Verbal consent obtained. Written consent obtained. Risks and benefits: risks, benefits and alternatives were discussed  Consent given by: patient  Time out: Immediately prior to procedure a "time out" was called to verify the correct patient, procedure, equipment, support staff and site/side marked as required. Timeout called at: 10/18/2023 12:56 PM.  Patient understanding: patient states understanding of the procedure being performed  Patient consent: the patient's understanding of the procedure matches consent given  Procedure consent: procedure consent matches procedure scheduled  Relevant documents: relevant documents present and verified  Test results: test results available and properly labeled  Site marked: the operative site was marked  Radiology Images displayed and confirmed.  If images not available, report reviewed: imaging studies not available  Required items: required blood products, implants, devices, and special equipment available  Patient identity confirmed: verbally with patient  Supporting Documentation  Indications: pain   Trigger Point Injections: multiple trigger points: 3 or more muscle groups    Injection site identified by: ultrasound  Procedure Details  Location(s):    Middle Back: L thoracic paraspinals, R latissimus dorsi, L latissimus dorsi, L lower trapezius, R lower trapezius and R thoracic paraspinals     Prep: patient was prepped and draped in usual sterile fashion  Needle size: 22 G  Medications: 10 mL bupivacaine (PF) 0.25 %; 40 mg triamcinolone acetonide 40 mg/mL  Patient tolerance: patient tolerated the procedure well with no immediate complications

## 2023-10-25 ENCOUNTER — TELEPHONE (OUTPATIENT)
Dept: PAIN MEDICINE | Facility: CLINIC | Age: 43
End: 2023-10-25

## 2023-10-26 ENCOUNTER — HOSPITAL ENCOUNTER (OUTPATIENT)
Dept: INFUSION CENTER | Facility: HOSPITAL | Age: 43
Discharge: HOME | End: 2023-10-26
Attending: PSYCHIATRY & NEUROLOGY
Payer: COMMERCIAL

## 2023-10-26 VITALS
SYSTOLIC BLOOD PRESSURE: 137 MMHG | HEART RATE: 88 BPM | TEMPERATURE: 98.9 F | DIASTOLIC BLOOD PRESSURE: 68 MMHG | RESPIRATION RATE: 16 BRPM | OXYGEN SATURATION: 99 %

## 2023-10-26 DIAGNOSIS — G35 MS (MULTIPLE SCLEROSIS) (HCC): Primary | ICD-10-CM

## 2023-10-26 PROCEDURE — 96365 THER/PROPH/DIAG IV INF INIT: CPT

## 2023-10-26 RX ORDER — SODIUM CHLORIDE 9 MG/ML
20 INJECTION, SOLUTION INTRAVENOUS ONCE
Status: COMPLETED | OUTPATIENT
Start: 2023-10-26 | End: 2023-10-26

## 2023-10-26 RX ORDER — SODIUM CHLORIDE 9 MG/ML
20 INJECTION, SOLUTION INTRAVENOUS ONCE
OUTPATIENT
Start: 2023-10-28

## 2023-10-26 RX ADMIN — SODIUM CHLORIDE 1000 MG: 0.9 INJECTION, SOLUTION INTRAVENOUS at 13:50

## 2023-10-26 RX ADMIN — SODIUM CHLORIDE 20 ML/HR: 0.9 INJECTION, SOLUTION INTRAVENOUS at 13:50

## 2023-10-26 NOTE — PROGRESS NOTES
Pt tolerated IV Solumedrol well with no issues. Peripheral IV removed without incident. AVS declined. Pt aware of next scheduled appt date and time. Pt discharged off unit in w/c by RN in stable condition.

## 2023-10-29 DIAGNOSIS — M79.18 MUSCULOSKELETAL PAIN, CHRONIC: ICD-10-CM

## 2023-10-29 DIAGNOSIS — G89.29 MUSCULOSKELETAL PAIN, CHRONIC: ICD-10-CM

## 2023-10-29 DIAGNOSIS — M54.9 MID BACK PAIN: ICD-10-CM

## 2023-10-29 DIAGNOSIS — R10.2 PELVIC PAIN IN FEMALE: ICD-10-CM

## 2023-10-29 DIAGNOSIS — M47.816 LUMBAR SPONDYLOSIS: ICD-10-CM

## 2023-10-29 DIAGNOSIS — G89.4 CHRONIC PAIN SYNDROME: ICD-10-CM

## 2023-10-29 DIAGNOSIS — M54.16 LUMBAR RADICULOPATHY: ICD-10-CM

## 2023-10-29 DIAGNOSIS — M54.2 NECK PAIN: ICD-10-CM

## 2023-10-29 DIAGNOSIS — M16.6 OTHER SECONDARY OSTEOARTHRITIS OF BOTH HIPS: ICD-10-CM

## 2023-10-30 RX ORDER — CELECOXIB 200 MG/1
200 CAPSULE ORAL 2 TIMES DAILY
Qty: 60 CAPSULE | Refills: 0 | Status: SHIPPED | OUTPATIENT
Start: 2023-10-30

## 2023-11-24 ENCOUNTER — HOSPITAL ENCOUNTER (OUTPATIENT)
Dept: INFUSION CENTER | Facility: HOSPITAL | Age: 43
End: 2023-11-24
Attending: PSYCHIATRY & NEUROLOGY
Payer: COMMERCIAL

## 2023-11-24 VITALS
DIASTOLIC BLOOD PRESSURE: 64 MMHG | OXYGEN SATURATION: 95 % | TEMPERATURE: 97.2 F | HEART RATE: 70 BPM | RESPIRATION RATE: 16 BRPM | SYSTOLIC BLOOD PRESSURE: 118 MMHG

## 2023-11-24 DIAGNOSIS — G35 MS (MULTIPLE SCLEROSIS) (HCC): Primary | ICD-10-CM

## 2023-11-24 PROCEDURE — 96365 THER/PROPH/DIAG IV INF INIT: CPT

## 2023-11-24 RX ORDER — SODIUM CHLORIDE 9 MG/ML
20 INJECTION, SOLUTION INTRAVENOUS ONCE
Status: CANCELLED | OUTPATIENT
Start: 2023-11-25

## 2023-11-24 RX ORDER — SODIUM CHLORIDE 9 MG/ML
20 INJECTION, SOLUTION INTRAVENOUS ONCE
Status: COMPLETED | OUTPATIENT
Start: 2023-11-24 | End: 2023-11-24

## 2023-11-24 RX ADMIN — SODIUM CHLORIDE 20 ML/HR: 0.9 INJECTION, SOLUTION INTRAVENOUS at 13:51

## 2023-11-24 RX ADMIN — SODIUM CHLORIDE 1000 MG: 0.9 INJECTION, SOLUTION INTRAVENOUS at 13:51

## 2023-11-24 NOTE — PROGRESS NOTES
Pt tolerated last ordered dose of IV Solumedrol well with no issues. Peripheral IV removed without incident. TigerTexted Dr. Gorge Oreilly since pt has no more ordered Solumedrol infusions. Per Dr. Gorge Oreilly, no more doses are to be ordered until pt has follow-up with her. AVS declined. Pt discharged off unit in stable condition with walker.

## 2023-11-24 NOTE — PLAN OF CARE
Problem: Potential for Falls  Goal: Patient will remain free of falls  Description: INTERVENTIONS:  - Educate patient/family on patient safety including physical limitations  - Instruct patient to call for assistance with activity   - Consult OT/PT to assist with strengthening/mobility   - Keep Call bell within reach  - Keep bed low and locked with side rails adjusted as appropriate  - Keep care items and personal belongings within reach  - Initiate and maintain comfort rounds  - Make Fall Risk Sign visible to staff  - Consider moving patient to room near nurses station  Outcome: Progressing     Problem: INFECTION - ADULT  Goal: Absence or prevention of progression during hospitalization  Description: INTERVENTIONS:  - Assess and monitor for signs and symptoms of infection  - Monitor lab/diagnostic results  - Monitor all insertion sites, i.e. indwelling lines, tubes, and drains  - Monitor endotracheal if appropriate and nasal secretions for changes in amount and color  - [place] appropriate cooling/warming therapies per order  - Administer medications as ordered  - Instruct and encourage patient and family to use good hand hygiene technique  - Identify and instruct in appropriate isolation precautions for identified infection/condition  Outcome: Progressing     Problem: Knowledge Deficit  Goal: Patient/family/caregiver demonstrates understanding of disease process, treatment plan, medications, and discharge instructions  Description: Complete learning assessment and assess knowledge base.   Interventions:  - Provide teaching at level of understanding  - Provide teaching via preferred learning methods  Outcome: Progressing

## 2023-11-27 ENCOUNTER — TELEPHONE (OUTPATIENT)
Dept: NEUROLOGY | Facility: CLINIC | Age: 43
End: 2023-11-27

## 2023-11-27 NOTE — TELEPHONE ENCOUNTER
11/24/23 at 3:30 pm:    Yes, maria dolores, this is Ritu Belcher. I just got done with my steroid infusion, and I was given the news that I am no longer going to receive any until  the next time I see Dr. Alma Art, and so on, not happy about this by any means. 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 I absolutely need it, and I don't think I should have to quit my job over that. She said, until I see her, so maybe you can get an appointment asap, or just get me my infusion because I'm not happy about this. Like by any means. She wanted me to do a bone density test. I got that done. My bones are fine and healthy, so yeah. Okay on that note because I'm not getting any better, I'm getting worse. The freaking medication made me worse, which I'm not happy about. So I don't want to take anymore of the freaking medication. So the only thing that's going to help me are steroids. So again, I'm not happy about this, not at all. Okay, like, no, I'm not happy. I'm leaving in tears right now. # [phone].

## 2023-11-28 ENCOUNTER — OFFICE VISIT (OUTPATIENT)
Dept: PAIN MEDICINE | Facility: CLINIC | Age: 43
End: 2023-11-28
Payer: COMMERCIAL

## 2023-11-28 VITALS
HEIGHT: 64 IN | HEART RATE: 71 BPM | WEIGHT: 103 LBS | DIASTOLIC BLOOD PRESSURE: 68 MMHG | RESPIRATION RATE: 16 BRPM | BODY MASS INDEX: 17.58 KG/M2 | SYSTOLIC BLOOD PRESSURE: 105 MMHG

## 2023-11-28 DIAGNOSIS — M79.18 MUSCULOSKELETAL PAIN, CHRONIC: ICD-10-CM

## 2023-11-28 DIAGNOSIS — G89.4 CHRONIC PAIN SYNDROME: ICD-10-CM

## 2023-11-28 DIAGNOSIS — G89.29 MUSCULOSKELETAL PAIN, CHRONIC: ICD-10-CM

## 2023-11-28 DIAGNOSIS — M54.2 NECK PAIN: ICD-10-CM

## 2023-11-28 DIAGNOSIS — M47.816 LUMBAR SPONDYLOSIS: ICD-10-CM

## 2023-11-28 DIAGNOSIS — M54.9 MID BACK PAIN: ICD-10-CM

## 2023-11-28 DIAGNOSIS — M16.6 OTHER SECONDARY OSTEOARTHRITIS OF BOTH HIPS: ICD-10-CM

## 2023-11-28 PROCEDURE — 99214 OFFICE O/P EST MOD 30 MIN: CPT | Performed by: NURSE PRACTITIONER

## 2023-11-28 RX ORDER — CELECOXIB 200 MG/1
200 CAPSULE ORAL 2 TIMES DAILY
Qty: 60 CAPSULE | Refills: 2 | Status: SHIPPED | OUTPATIENT
Start: 2023-11-28

## 2023-11-28 NOTE — PROGRESS NOTES
Assessment:  1. Chronic pain syndrome    2. Musculoskeletal pain, chronic    3. Mid back pain    4. Neck pain    5. Lumbar spondylosis    6. Other secondary osteoarthritis of both hips        Plan:  While the patient was in the office today, I did have a thorough conversation regarding their chronic pain syndrome, medication management, and treatment plan options. Patient is being seen for a follow-up visit. She underwent ultrasound-guided thoracic paraspinal trigger point injections on 10/18/2023. Patient reports that the pain in her mid back is no longer constant and a little less intense when it does occur. We will plan to repeat thoracic paraspinal trigger point injections in the near future. Patient states that her neurologist previously tried her on a muscle relaxer. She does not take it because it seems to make her legs feel weaker. She has tried topical medications in the past including Lidoderm patches without relief. She uses diclofenac gel with some improvement. Patient previously participated in aqua therapy without significant improvement in her symptoms. It was more recently recommended that she start up with aqua therapy. Patient states that she has a hard time getting to aqua therapy and participating in aqua therapy due to progressive weakness in her legs related to MS. Continue Celebrex 200 mg twice daily with food. She may take Tylenol in between if needed. A prescription for Celebrex was sent to her pharmacy with refills. Follow-up 1 month after the injections. History of Present Illness: The patient is a 44 y.o. female who presents for a follow up office visit in regards to Back Pain, Neck Pain, Leg Pain (BILATERAL), Knee Pain (BILATERAL), and Eye Pain. The patient’s current symptoms include complaints of upper back pain, mid back pain, low back pain. Current pain level is a 4-5/10. Quality pain is described as shooting, numb, pins-and-needles.     Current pain medications includes: Celebrex 200 mg twice daily, Tylenol if needed. I have personally reviewed and/or updated the patient's past medical history, past surgical history, family history, social history, current medications, allergies, and vital signs today. Review of Systems  Review of Systems   Gastrointestinal:  Positive for constipation. Musculoskeletal:  Positive for back pain, gait problem, myalgias and neck pain. DECREASED ROM  JOINT STIFFNESS  PAIN ( KNEES )   Neurological:         MEMORY LOSS   All other systems reviewed and are negative. Past Medical History:   Diagnosis Date    Back pain     Chronic constipation 6/27/2016    Chronic pain disorder     Collar bone fracture     right side    Crutches as ambulation aid     Depression     Dyssynergic defecation     Type 1    ETOH abuse     Head injury 2014    Infectious viral hepatitis     Lyme disease     Meningitis spinal 2007?     Multiple sclerosis (HCC)     Opioid abuse ([address])     Rib fractures     Thrombocytopenia ([address])     Use of cane as ambulatory aid        Past Surgical History:   Procedure Laterality Date    APPENDECTOMY      CLAVICLE SURGERY      COLONOSCOPY      FL LUMBAR PUNCTURE DIAGNOSTIC  11/25/2020    KNEE ARTHROSCOPY      MULTIPLE TOOTH EXTRACTIONS      MT COLONOSCOPY FLX DX W/COLLJ SPEC WHEN PFRMD N/A 6/27/2016    Procedure: COLONOSCOPY;  Surgeon: Jackson Anthony MD;  Location: [facility];  Service: Colorectal    MT OPEN IMPLANTATION CHARITY SACRAL NERVE N/A 11/15/2021    Procedure: INSERTION OF NEUROSTIMULATOR ELECTRODE ARRAY SACRAL NERVE; FLUOROSCOPY; [facility];  Surgeon: Erasmo Rush MD;  Location: [facility];  Service: UroGynecology           TONSILLECTOMY AND ADENOIDECTOMY      TUBAL LIGATION         Family History   Problem Relation Age of Onset    Skin cancer Mother     No Known Problems Brother        Social History     Occupational History    Not on file   Tobacco Use    Smoking status: Every Day     Packs/day: 1.00     Types: Cigarettes    Smokeless tobacco: Never   Vaping Use    Vaping Use: Never used   Substance and Sexual Activity    Alcohol use:  Yes     Alcohol/week: 14.0 standard drinks of alcohol     Types: 14 Shots of liquor per week     Comment: 2 mixed drinks each night    Drug use: No    Sexual activity: Yes     Partners: Male         Current Outpatient Medications:     albendazole (ALBENZA) 200 mg tablet, TAKE 1 & 1/2 TABLETS BY MOUTH TWICE A DAY AS DIRECTED(PLAN LIMIT 4 TABS PER FILL), Disp: , Rfl:     ascorbic acid (VITAMIN C) 500 mg tablet, Take 500 mg by mouth daily, Disp: , Rfl:     b complex vitamins tablet, Take 1 tablet by mouth daily, Disp: , Rfl:     Biotin 12308 MCG TABS, Take 1 tablet by mouth daily  , Disp: , Rfl:     buprenorphine-naloxone (SUBOXONE) 2-0.5 mg per SL tablet, Place 0.5 tablets under the tongue 3 (three) times a day 2mg  three times a day, Disp: , Rfl:     Calcium Carbonate (CALCIUM 600 PO), Take 600 mg by mouth daily pt reports not having & not taking, Disp: , Rfl:     celecoxib (CeleBREX) 200 mg capsule, Take 1 capsule (200 mg total) by mouth 2 (two) times a day With food, Disp: 60 capsule, Rfl: 2    Cholecalciferol (Vitamin D3) 125 MCG (5000 UT) TABS, Take 5,000 Units by mouth daily pt reports taking 2 tab, Disp: , Rfl:     cloNIDine (CATAPRES) 0.2 mg tablet, Take 0.2 mg by mouth daily at bedtime , Disp: , Rfl:     clotrimazole-betamethasone (LOTRISONE) 1-0.05 % cream, Apply to affected area 2 times daily prn (Patient taking differently: Apply to affected area 2 times daily prn. pt reports running out & does not have in home 5/18/23.), Disp: 45 g, Rfl: 1    flavoxATE (URISPAS) 100 mg tablet, TAKE 1 TABLET BY MOUTH 3 TIMES A DAY AS NEEDED FOR BLADDER SPASMS., Disp: 90 tablet, Rfl: 3    Ginkgo Biloba 40 MG TABS, Take 120 mg by mouth daily pt reports taking 2 tabs daily, Disp: , Rfl:     ibuprofen (MOTRIN) 200 mg tablet, Take 200 mg by mouth as needed for mild pain pt reports taking 3-4tabs every 6hrs. , Disp: , Rfl:     lubiprostone (AMITIZA) 24 mcg capsule, Take 24 mcg by mouth 2 (two) times a day with meals pt reports not taking, Disp: , Rfl:     LUTEIN PO, Take by mouth in the morning 20mg on bottle , Disp: , Rfl:     methylPREDNISolone sodium succinate in sodium chloride 0.9 % 250 mL IVPB, Inject 1,000 mg into a catheter in a vein every 30 (thirty) days. 1x/month, provided at medical office. per EPIC  Indications: MS, Disp: , Rfl:     Misc Natural Products (GINSENG COMPLEX PO), Take 2 tablets by mouth daily 500mg on bottle., Disp: , Rfl:     Movantik 25 MG tablet, Take 25 mg by mouth every evening, Disp: , Rfl:     Nerve Stimulator (STANDARD TENS) VICKY, by Does not apply route 2 (two) times a day, Disp: 1 Device, Rfl: 0    Nerve Stimulator (TENS THERAPY REPLACE BACK PADS) MISC, 30 pad by Does not apply route 2 (two) times a day, Disp: 30 each, Rfl: 0    Nerve Stimulator VICKY, by Does not apply route 2 (two) times a day, Disp: 1 each, Rfl: 0    Nerve Stimulator Supplies MISC, 30 pad by Does not apply route 2 (two) times a day, Disp: 30 pad, Rfl: 0    NON FORMULARY, HEMPANOL BRAIN DETOX 200MG . pt reports not taking per 5/18/23., Disp: , Rfl:     Omega-3 Fatty Acids (FISH OIL OMEGA-3 PO), Take 1,200 mg by mouth daily , Disp: , Rfl:     Potassium 99 MG TABS, Take 99 mg by mouth daily pt reports taking 3 tabs daily, Disp: , Rfl:     Probiotic Product (PROBIOTIC DAILY PO), Take 2 tablets by mouth daily  , Disp: , Rfl:     promethazine (PHENERGAN) 50 MG tablet, daily at bedtime 1.5 tabs daily at bedtime, Disp: , Rfl: 1    Restasis 0.05 % ophthalmic emulsion, INSTILL 1 DROP BY OPHTHALMIC ROUTE EVERY 12 HOURS BOTH EYES, Disp: , Rfl:     selenium sulfide (SELSUN) 2.5 % shampoo, Apply topically daily as needed for dandruff (Patient taking differently: Apply 1 application.  topically daily as needed for dandruff pt reports running out 5/18/23.), Disp: 118 mL, Rfl: 0    topiramate (TOPAMAX) 50 MG tablet, Take 50 mg by mouth daily  , Disp: , Rfl: 5    TURMERIC PO, Take by mouth Turmeric Curcumin 500mg on bottle. 1 tab daily, Disp: , Rfl:     ZINC-VITAMIN C PO, Take by mouth pt not taking due to taking regular zinc per pt report on 5/18/23., Disp: , Rfl:     docusate sodium (COLACE) 100 mg capsule, Take 100 mg by mouth 3 (three) times a day   (Patient not taking: Reported on 11/28/2023), Disp: , Rfl:     Multiple Vitamins-Minerals (ZINC PO), Take by mouth (Patient not taking: Reported on 12/12/2022), Disp: , Rfl:     phenazopyridine (PYRIDIUM) 200 mg tablet, Take 1 tablet (200 mg total) by mouth 3 (three) times a day as needed for bladder spasms (Patient not taking: Reported on 5/12/2023), Disp: 10 tablet, Rfl: 0    Allergies   Allergen Reactions    Penicillins GI Intolerance and Nausea Only     Other reaction(s): Unknown Reaction       Physical Exam:    /68   Pulse 71   Resp 16   Ht 5' 4" (1.626 m)   Wt 46.7 kg (103 lb)   BMI 17.68 kg/m²     Constitutional:normal, well developed, well nourished, alert, in no distress and non-toxic and no overt pain behavior. Eyes:anicteric  HEENT:grossly intact  Neck:supple, symmetric, trachea midline and no masses   Pulmonary:even and unlabored  Cardiovascular:No edema or pitting edema present  Skin:Normal without rashes or lesions and well hydrated  Psychiatric:Mood and affect appropriate  Neurologic:Cranial Nerves II-XII grossly intact  Musculoskeletal:antalgic and ambulates with a walker. Imaging      Study Result    Narrative & Impression   MRI LUMBAR SPINE WITHOUT CONTRAST     INDICATION: M54.16: Radiculopathy, lumbar region  M47.816: Spondylosis without myelopathy or radiculopathy, lumbar region  G89.4: Chronic pain syndrome. COMPARISON: Lumbar spine radiograph 8/7/2023., 11/28/2018 sacrum and coccyx radiograph 11/15/2021. Lumbar puncture 11/25/2020. MRI lumbar spine with and without contrast 7/11/2020.      TECHNIQUE:  Multiplanar, multisequence imaging of the lumbar spine was performed. .        IMAGE QUALITY:  Diagnostic     FINDINGS:     VERTEBRAL BODIES:  There are 5 lumbar type vertebral bodies. Normal alignment of the lumbar spine. No spondylolysis or spondylolisthesis. No scoliosis. No compression fracture. Mildly bone marrow edema in right L4 and right L5 pedicles, may represent stress reaction. L3 and L5 intraosseous vertebral body hemangiomas. Otherwise, normal bone marrow signal.     SACRUM:  Normal signal within the sacrum. No evidence of insufficiency or stress fracture. DISTAL CORD AND CONUS:  Normal size and signal within the distal cord and conus. PARASPINAL SOFT TISSUES:  Paraspinal soft tissues are unremarkable. LOWER THORACIC DISC SPACES: Mild noncompressive lower thoracic degenerative change. LUMBAR DISC SPACES: Mild multilevel disc height loss, worse at L5-S1. L1-L2: Normal.     L2-L3: Normal.     L3-L4: Mild facet arthropathy, trace left facet joint effusion. No significant canal stenosis or foraminal narrowing. L4-L5: Mild diffuse disc bulge. Mild facet arthropathy, small bilateral facet joint effusions (right greater than left). No significant canal stenosis or foraminal narrowing. L5-S1: Mild diffuse disc bulge, tiny posterior annular fissure. No significant canal stenosis or foraminal narrowing. OTHER FINDINGS: Small right renal cyst.     IMPRESSION:     Mildly bone marrow edema in right L4 and right L5 pedicles, may represent stress reaction. Mild degenerative changes of the lumbar spine, as detailed above. No significant canal stenosis or foraminal narrowing. The study was marked in EPIC for significant notification. Workstation performed: [id number]              No orders to display       No orders of the defined types were placed in this encounter.

## 2023-12-04 ENCOUNTER — TELEPHONE (OUTPATIENT)
Age: 43
End: 2023-12-04

## 2023-12-04 ENCOUNTER — HOSPITAL ENCOUNTER (EMERGENCY)
Facility: HOSPITAL | Age: 43
Discharge: HOME | End: 2023-12-04
Attending: EMERGENCY MEDICINE
Payer: COMMERCIAL

## 2023-12-04 VITALS
RESPIRATION RATE: 20 BRPM | OXYGEN SATURATION: 98 % | DIASTOLIC BLOOD PRESSURE: 71 MMHG | HEIGHT: 64 IN | SYSTOLIC BLOOD PRESSURE: 124 MMHG | HEART RATE: 94 BPM | WEIGHT: 105.6 LBS | TEMPERATURE: 99.9 F | BODY MASS INDEX: 18.03 KG/M2

## 2023-12-04 DIAGNOSIS — G35 MS (MULTIPLE SCLEROSIS) (HCC): ICD-10-CM

## 2023-12-04 DIAGNOSIS — G89.29 MUSCULOSKELETAL PAIN, CHRONIC: ICD-10-CM

## 2023-12-04 DIAGNOSIS — R68.89 FLU-LIKE SYMPTOMS: Primary | ICD-10-CM

## 2023-12-04 DIAGNOSIS — M79.18 MUSCULOSKELETAL PAIN, CHRONIC: ICD-10-CM

## 2023-12-04 LAB
FLUAV RNA RESP QL NAA+PROBE: NEGATIVE
FLUBV RNA RESP QL NAA+PROBE: NEGATIVE
RSV RNA RESP QL NAA+PROBE: NEGATIVE
SARS-COV-2 RNA RESP QL NAA+PROBE: NEGATIVE

## 2023-12-04 PROCEDURE — 0241U HB NFCT DS VIR RESP RNA 4 TRGT: CPT

## 2023-12-04 PROCEDURE — 99284 EMERGENCY DEPT VISIT MOD MDM: CPT | Performed by: EMERGENCY MEDICINE

## 2023-12-04 PROCEDURE — 96360 HYDRATION IV INFUSION INIT: CPT

## 2023-12-04 PROCEDURE — 99284 EMERGENCY DEPT VISIT MOD MDM: CPT

## 2023-12-04 RX ADMIN — SODIUM CHLORIDE 1000 ML: 0.9 INJECTION, SOLUTION INTRAVENOUS at 17:24

## 2023-12-04 NOTE — DISCHARGE INSTRUCTIONS
Return to the emergency department for new/worsening symptoms including weakness, shortness of breath, numbness/tingling, or anything else of concern.

## 2023-12-04 NOTE — ED PROVIDER NOTES
History  Chief Complaint   Patient presents with    Flu Symptoms     Patient has a hx of MS and experienced a fall within the past week and explains that she has had flu like symptoms ever since the fall which is exacerbating her MS. Patient denies NVD. Patient is a 43-year-old female with history of chronic pain syndrome, multiple sclerosis receiving intermittent Solu-Medrol infusions with longitudinal marked lower extremity weakness presenting to the emergency department for evaluation of URI-like symptoms. Patient reports that beginning approximately 5 days ago she developed URI-like symptoms including congestion, cough, runny nose. She states she had a sick contact and her daughter who also had some URI-like symptoms. Patient reports she has had longitudinal leg weakness for a very long time. She ascribes this to beginning multiple sclerosis medications. She uses a walker at home to aid in ambulation. She states that beginning with the development of her URI-like symptoms she has had increased lower extremity weakness to the point where she feels she cannot move her legs at all. She presents today stating she hopes it is a viral illness. Patient is not vaccinated against COVID or flu. She has had no recent antibiotics or recent travel. Prior to Admission Medications   Prescriptions Last Dose Informant Patient Reported? Taking?    Biotin 14295 MCG TABS   Yes No   Sig: Take 1 tablet by mouth daily     Calcium Carbonate (CALCIUM 600 PO)   Yes No   Sig: Take 600 mg by mouth daily pt reports not having & not taking   Cholecalciferol (Vitamin D3) 125 MCG (5000 UT) TABS   Yes No   Sig: Take 5,000 Units by mouth daily pt reports taking 2 tab   Ginkgo Biloba 40 MG TABS   Yes No   Sig: Take 120 mg by mouth daily pt reports taking 2 tabs daily   LUTEIN PO   Yes No   Sig: Take by mouth in the morning 20mg on bottle    Misc Natural Products (GINSENG COMPLEX PO)   Yes No   Sig: Take 2 tablets by mouth daily 500mg on bottle. Movantik 25 MG tablet   Yes No   Sig: Take 25 mg by mouth every evening   Multiple Vitamins-Minerals (ZINC PO)   Yes No   Sig: Take by mouth   Patient not taking: Reported on 2022   NON FORMULARY   Yes No   Sig: HEMPANOL BRAIN DETOX 200MG . pt reports not taking per 23. Nerve Stimulator (STANDARD TENS) VICKY   No No   Sig: by Does not apply route 2 (two) times a day   Nerve Stimulator (TENS THERAPY REPLACE BACK PADS) MISC  Self No No   Si pad by Does not apply route 2 (two) times a day   Nerve Stimulator VICKY  Self No No   Sig: by Does not apply route 2 (two) times a day   Nerve Stimulator Supplies MISC  Self No No   Si pad by Does not apply route 2 (two) times a day   Omega-3 Fatty Acids (FISH OIL OMEGA-3 PO)  Self Yes No   Sig: Take 1,200 mg by mouth daily    Potassium 99 MG TABS   Yes No   Sig: Take 99 mg by mouth daily pt reports taking 3 tabs daily   Probiotic Product (PROBIOTIC DAILY PO)  Self Yes No   Sig: Take 2 tablets by mouth daily     Restasis 0.05 % ophthalmic emulsion   Yes No   Sig: INSTILL 1 DROP BY OPHTHALMIC ROUTE EVERY 12 HOURS BOTH EYES   TURMERIC PO   Yes No   Sig: Take by mouth Turmeric Curcumin 500mg on bottle. 1 tab daily   ZINC-VITAMIN C PO   Yes No   Sig: Take by mouth pt not taking due to taking regular zinc per pt report on 23.    albendazole (ALBENZA) 200 mg tablet   Yes No   Sig: TAKE 1 & 1/2 TABLETS BY MOUTH TWICE A DAY AS DIRECTED(PLAN LIMIT 4 TABS PER FILL)   ascorbic acid (VITAMIN C) 500 mg tablet   Yes No   Sig: Take 500 mg by mouth daily   b complex vitamins tablet  Self Yes No   Sig: Take 1 tablet by mouth daily   buprenorphine-naloxone (SUBOXONE) 2-0.5 mg per SL tablet  Self Yes No   Sig: Place 0.5 tablets under the tongue 3 (three) times a day 2mg  three times a day   celecoxib (CeleBREX) 200 mg capsule   No No   Sig: Take 1 capsule (200 mg total) by mouth 2 (two) times a day With food   cloNIDine (CATAPRES) 0.2 mg tablet Self Yes No   Sig: Take 0.2 mg by mouth daily at bedtime    clotrimazole-betamethasone (LOTRISONE) 1-0.05 % cream   No No   Sig: Apply to affected area 2 times daily prn   Patient taking differently: Apply to affected area 2 times daily prn.  pt reports running out & does not have in home 5/18/23. docusate sodium (COLACE) 100 mg capsule  Self Yes No   Sig: Take 100 mg by mouth 3 (three) times a day     Patient not taking: Reported on 11/28/2023   flavoxATE (URISPAS) 100 mg tablet   No No   Sig: TAKE 1 TABLET BY MOUTH 3 TIMES A DAY AS NEEDED FOR BLADDER SPASMS.   ibuprofen (MOTRIN) 200 mg tablet   Yes No   Sig: Take 200 mg by mouth as needed for mild pain pt reports taking 3-4tabs every 6hrs. lubiprostone (AMITIZA) 24 mcg capsule  Self Yes No   Sig: Take 24 mcg by mouth 2 (two) times a day with meals pt reports not taking   methylPREDNISolone sodium succinate in sodium chloride 0.9 % 250 mL IVPB   Yes No   Sig: Inject 1,000 mg into a catheter in a vein every 30 (thirty) days. 1x/month, provided at medical office. per EPIC  Indications: MS   phenazopyridine (PYRIDIUM) 200 mg tablet   No No   Sig: Take 1 tablet (200 mg total) by mouth 3 (three) times a day as needed for bladder spasms   Patient not taking: Reported on 5/12/2023   promethazine (PHENERGAN) 50 MG tablet  Self Yes No   Sig: daily at bedtime 1.5 tabs daily at bedtime   selenium sulfide (SELSUN) 2.5 % shampoo   No No   Sig: Apply topically daily as needed for dandruff   Patient taking differently: Apply 1 application. topically daily as needed for dandruff pt reports running out 5/18/23.    topiramate (TOPAMAX) 50 MG tablet  Self Yes No   Sig: Take 50 mg by mouth daily        Facility-Administered Medications: None       Past Medical History:   Diagnosis Date    Back pain     Chronic constipation 6/27/2016    Chronic pain disorder     Collar bone fracture     right side    Crutches as ambulation aid     Depression     Dyssynergic defecation     Type 1 ETOH abuse     Head injury 2014    Infectious viral hepatitis     Lyme disease     Meningitis spinal 2007? Multiple sclerosis (HCC)     Opioid abuse ([address])     Rib fractures     Thrombocytopenia ([address])     Use of cane as ambulatory aid        Past Surgical History:   Procedure Laterality Date    APPENDECTOMY      CLAVICLE SURGERY      COLONOSCOPY      FL LUMBAR PUNCTURE DIAGNOSTIC  11/25/2020    KNEE ARTHROSCOPY      MULTIPLE TOOTH EXTRACTIONS      VA COLONOSCOPY FLX DX W/COLLJ SPEC WHEN PFRMD N/A 6/27/2016    Procedure: COLONOSCOPY;  Surgeon: Marci Ho MD;  Location: [facility];  Service: Colorectal    VA OPEN IMPLANTATION CHARITY SACRAL NERVE N/A 11/15/2021    Procedure: INSERTION OF NEUROSTIMULATOR ELECTRODE ARRAY SACRAL NERVE; FLUOROSCOPY; Franc[facility] Shallow ANALYSIS;  Surgeon: Kiya Lorenzana MD;  Location: [facility];  Service: UroGynecology           TONSILLECTOMY AND ADENOIDECTOMY      TUBAL LIGATION         Family History   Problem Relation Age of Onset    Skin cancer Mother     No Known Problems Brother      I have reviewed and agree with the history as documented. E-Cigarette/Vaping    E-Cigarette Use Never User      E-Cigarette/Vaping Substances    Nicotine No     THC No     CBD No     Flavoring No     Other No     Unknown No      Social History     Tobacco Use    Smoking status: Every Day     Packs/day: 1.00     Types: Cigarettes    Smokeless tobacco: Never   Vaping Use    Vaping Use: Never used   Substance Use Topics    Alcohol use: Yes     Alcohol/week: 14.0 standard drinks of alcohol     Types: 14 Shots of liquor per week     Comment: 2 mixed drinks each night    Drug use: No        Review of Systems   Constitutional: Negative. HENT:  Positive for congestion and rhinorrhea. Eyes: Negative. Respiratory:  Positive for cough. Cardiovascular: Negative. Gastrointestinal: Negative. Endocrine: Negative. Genitourinary: Negative. Musculoskeletal: Negative. Skin: Negative. Allergic/Immunologic: Negative. Neurological:  Positive for weakness. Hematological: Negative. Psychiatric/Behavioral: Negative. All other systems reviewed and are negative. Physical Exam  ED Triage Vitals [12/04/23 1641]   Temperature Pulse Respirations Blood Pressure SpO2   99.9 °F (37.7 °C) 94 20 124/71 98 %      Temp Source Heart Rate Source Patient Position - Orthostatic VS BP Location FiO2 (%)   Temporal Monitor Lying Left arm --      Pain Score       No Pain             Orthostatic Vital Signs  Vitals:    12/04/23 1641   BP: 124/71   Pulse: 94   Patient Position - Orthostatic VS: Lying       Physical Exam  Vitals and nursing note reviewed. Constitutional:       General: She is not in acute distress. Appearance: Normal appearance. She is not ill-appearing, toxic-appearing or diaphoretic. HENT:      Head: Normocephalic and atraumatic. Eyes:      General: No scleral icterus. Right eye: No discharge. Left eye: No discharge. Conjunctiva/sclera: Conjunctivae normal.   Cardiovascular:      Rate and Rhythm: Normal rate. Pulses: Normal pulses. Heart sounds: Normal heart sounds. No murmur heard. No friction rub. No gallop. Pulmonary:      Effort: Pulmonary effort is normal. No respiratory distress. Breath sounds: Normal breath sounds. No stridor. No wheezing, rhonchi or rales. Abdominal:      General: Abdomen is flat. Bowel sounds are normal. There is no distension. Palpations: Abdomen is soft. Tenderness: There is no abdominal tenderness. There is no guarding or rebound. Musculoskeletal:         General: No swelling. Normal range of motion. Cervical back: Normal range of motion. No rigidity. Right lower leg: No edema. Left lower leg: No edema. Comments: 5/5 BL UE strength.  1/5 lower extremity strength b/l which patient states is her baseline "since they started me on MS medications"   Skin:     General: Skin is warm and dry. Capillary Refill: Capillary refill takes less than 2 seconds. Coloration: Skin is not jaundiced. Findings: No bruising or lesion. Neurological:      General: No focal deficit present. Mental Status: She is alert and oriented to person, place, and time. Mental status is at baseline. Psychiatric:         Mood and Affect: Mood normal.         Behavior: Behavior normal.         Thought Content: Thought content normal.         Judgment: Judgment normal.         ED Medications  Medications   sodium chloride 0.9 % bolus 1,000 mL (1,000 mL Intravenous New Bag 12/4/23 1724)       Diagnostic Studies  Results Reviewed       Procedure Component Value Units Date/Time    FLU/RSV/COVID - if FLU/RSV clinically relevant [[id number]]  (Normal) Collected: 12/04/23 1703    Lab Status: Final result Specimen: Nares from Nose Updated: 12/04/23 1802     SARS-CoV-2 Negative     INFLUENZA A PCR Negative     INFLUENZA B PCR Negative     RSV PCR Negative    Narrative:      FOR PEDIATRIC PATIENTS - copy/paste COVID Guidelines URL to browser: [email or web address]    SARS-CoV-2 assay is a Nucleic Acid Amplification assay intended for the  qualitative detection of nucleic acid from SARS-CoV-2 in nasopharyngeal  swabs. Results are for the presumptive identification of SARS-CoV-2 RNA. Positive results are indicative of infection with SARS-CoV-2, the virus  causing COVID-19, but do not rule out bacterial infection or co-infection  with other viruses. Laboratories within the [facility] and its  territories are required to report all positive results to the appropriate  public health authorities. Negative results do not preclude SARS-CoV-2  infection and should not be used as the sole basis for treatment or other  patient management decisions. Negative results must be combined with  clinical observations, patient history, and epidemiological information.   This test has not been FDA cleared or approved. This test has been authorized by FDA under an Emergency Use Authorization  (EUA). This test is only authorized for the duration of time the  declaration that circumstances exist justifying the authorization of the  emergency use of an in vitro diagnostic tests for detection of SARS-CoV-2  virus and/or diagnosis of COVID-19 infection under section 564(b)(1) of  the Act, 21 U. S.C. 623SYD-9(W)(2), unless the authorization is terminated  or revoked sooner. The test has been validated but independent review by FDA  and CLIA is pending. Test performed using [employer] GeneXpert: This RT-PCR assay targets N2,  a region unique to SARS-CoV-2. A conserved region in the E-gene was chosen  for pan-Sarbecovirus detection which includes SARS-CoV-2. According to CMS-2020-01-R, this platform meets the definition of high-throughput technology. No orders to display         Procedures  Procedures      ED Course  ED Course as of 12/04/23 1825   Mon Dec 04, 2023   1804 FLU/RSV/COVID - if FLU/RSV clinically relevant                             SBIRT 22yo+      Flowsheet Row Most Recent Value   Initial Alcohol Screen: US AUDIT-C     1. How often do you have a drink containing alcohol? 6 Filed at: 12/04/2023 1642   2. How many drinks containing alcohol do you have on a typical day you are drinking? 1 Filed at: 12/04/2023 1642   3a. Male UNDER 65: How often do you have five or more drinks on one occasion? 0 Filed at: 12/04/2023 1642   3b. FEMALE Any Age, or MALE 65+: How often do you have 4 or more drinks on one occassion? 0 Filed at: 12/04/2023 1642   Audit-C Score 7 Filed at: 12/04/2023 1642   Full Alcohol Screen: US AUDIT    4. How often during the last year have you found that you were not able to stop drinking once you had started? 0 Filed at: 12/04/2023 1642   5.  How often during past year have you failed to do what was normally expected of you because of drinking?  0 Filed at: 12/04/2023 1642   6. How often in past year have you needed a first drink in the morning to get yourself going after a heavy drinking session? 0 Filed at: 12/04/2023 1642   7. How often in past year have you had feeling of guilt or remorse after drinking? 0 Filed at: 12/04/2023 1642   8. How often in past year have you been unable to remember what happened night before because you had been drinking? 0 Filed at: 12/04/2023 1642   9. Have you or someone else been injured as a result of your drinking? 0 Filed at: 12/04/2023 1642   10. Has a relative, friend, doctor or other health worker been concerned about your drinking and suggested you cut down?  0 Filed at: 12/04/2023 1642   AUDIT Total Score 7 Filed at: 12/04/2023 1642   MARU: How many times in the past year have you. .. Used an illegal drug or used a prescription medication for non-medical reasons? Never Filed at: 12/04/2023 1642                  Medical Decision Making  Patient is a 42y F presenting to ED for evaluation of overall not feeling well. Patient does report feeling ill over the past week, including cough, congestion. Unsure of fevers. Patient has received intermittent solu-medrol infusions in the past however these have stopped and patient reports an inability to resume them until she sees her neurologist again which is difficult for her. She does have longitudinal BL LE weakness which makes it difficult for her to ambulate at home, so much so she reports she cannot climb stairs. Believe this is viral in nature, was preceded by general URI symptoms. She reports mild hand weakness but denies any new neurologic deficits including pain w/ EOM, new visual deficits, new numbness/tingling. She feels more weak than usual, endorses worsening b/l LE weakness. Discussed options with patient including admission for evaluation of potential multiple sclerosis flare versus discharge home for patient to see how she feels over the next few days. Patient opts to be discharged home. She understands she can return at any time for further evaluation if she feels she is not improving. Return precautions given. Amount and/or Complexity of Data Reviewed  Labs:  Decision-making details documented in ED Course. Disposition  Final diagnoses:   Flu-like symptoms   MS (multiple sclerosis) (HCC)   Musculoskeletal pain, chronic     Time reflects when diagnosis was documented in both MDM as applicable and the Disposition within this note       Time User Action Codes Description Comment    12/4/2023  6:23 PM Mary Morrison Add [R68.89] Flu-like symptoms     12/4/2023  6:23 PM Jonas, [address] MS (multiple sclerosis) ([address])     12/4/2023  6:23 PM Mary Morrison Add [G05.26,  G89.29] Musculoskeletal pain, chronic           ED Disposition       ED Disposition   Discharge    Condition   Stable    Date/Time   Mon Dec 4, 2023 [address]  discharge to home/self care. Follow-up Information       Follow up With Specialties Details Why Contact Info Additional Information    [address] [facility] AFFILIATED WITH [facility]  As needed for follow-up for multiple sclerosis [facility] [id number]  [address] [facility] [address] [facility] AFFILIATED WITH [facility], [address] [place] [address] AFFILIATED WITH [place], [place], [facility] [address]    Elvin Coronado DO Emergency Medicine, Family Medicine  As needed, If symptoms worsen [address]  [phone]               Patient's Medications   Discharge Prescriptions    No medications on file         PDMP Review       None             ED Provider  Attending physically available and evaluated Denisha Curran. I managed the patient along with the ED Attending.     Electronically Signed by           Dorothy Nieto DO  12/04/23 1092

## 2023-12-04 NOTE — ED TRIAGE NOTES
Patient has a hx of MS and experienced a fall within the past week and explains that she has had flu like symptoms ever since the fall which is exacerbating her MS. Patient denies NVD.

## 2023-12-04 NOTE — ED ATTENDING ATTESTATION
Final Diagnosis:  1. Flu-like symptoms    2. MS (multiple sclerosis) ([address])    3. Musculoskeletal pain, chronic           Barbara RAMESH MD, saw and evaluated the patient. All available labs and X-rays were ordered by me or the resident and have been reviewed by myself. I discussed the patient with the resident / non-physician and agree with the resident's / non-physician practitioner's findings and plan as documented in the resident's / non-physician practicitioner's note, except where noted. At this point, I agree with the current assessment done in the ED. I was present during key portions of all procedures performed unless otherwise stated. Chief Complaint   Patient presents with    Flu Symptoms     Patient has a hx of MS and experienced a fall within the past week and explains that she has had flu like symptoms ever since the fall which is exacerbating her MS. Patient denies NVD. This is a 44 y.o. female presenting for evaluation of generalized malaise. Patient states she has felt unwell over the past couple days. Denies any fever, chills, nausea or vomiting. She has some history of MS. Gets chronic steroid injections. Review records show that she is following with pain management as well. Has a history of lower extremity atrophy as well as weakness. PMH:   has a past medical history of Back pain, Chronic constipation (6/27/2016), Chronic pain disorder, Collar bone fracture, Crutches as ambulation aid, Depression, Dyssynergic defecation, ETOH abuse, Head injury (2014), Infectious viral hepatitis, Lyme disease, Meningitis spinal (2007?), Multiple sclerosis ([address]), Opioid abuse ([address]), Rib fractures, Thrombocytopenia ([address]), and Use of cane as ambulatory aid. PSH:   has a past surgical history that includes Clavicle surgery; Knee arthroscopy; Tonsillectomy and adenoidectomy; Appendectomy; Tubal ligation; pr colonoscopy flx dx w/collj spec when pfrmd (N/A, 6/27/2016);  Multiple tooth extractions; FL lumbar puncture diagnostic (11/25/2020); Colonoscopy; and pr open implantation jennifer sacral nerve (N/A, 11/15/2021). Procedures     Social:  Social History     Substance and Sexual Activity   Alcohol Use Yes    Alcohol/week: 14.0 standard drinks of alcohol    Types: 14 Shots of liquor per week    Comment: 2 mixed drinks each night     Social History     Tobacco Use   Smoking Status Every Day    Packs/day: 1.00    Types: Cigarettes   Smokeless Tobacco Never     Social History     Substance and Sexual Activity   Drug Use No     PE:  Vitals:    12/04/23 1641   BP: 124/71   BP Location: Left arm   Pulse: 94   Resp: 20   Temp: 99.9 °F (37.7 °C)   TempSrc: Temporal   SpO2: 98%   Weight: 47.9 kg (105 lb 9.6 oz)   Height: 5' 4" (1.626 m)       A:    Unless otherwise specified above:     General: VS reviewed  Appears in NAD     Head: Normocephalic, atraumatic     CV: No pallor noted  Lungs:   No respiratory distress     Abdomen:  Soft, non-tender, non-distended     MSK:   No obvious deformity     Skin: No obvious rash. Neuro: Awake, alert, GCS15, CN II-XII grossly intact. Speaking in full sentences. Motor grossly intact. Psychiatric/Behavioral: Appropriate mood and affect   Exam: deferred    P:  -Patient is nondistressed in bed. No actual work of breathing. She does appear will appear thin and cachectic. She tells me that she has some kind of feeling of foreign body sensation or film in her mouth. Oropharynx appears moist.  No obvious trauma. I discussed with her that maybe she has some salivary issues. Should use aloe logs. - Discussed treatment and evaluation. Discussed possible evaluation for MS flare, blood work for infection. Currently patient would like a COVID/flu swab as well as some fluids and believe that she will feel better. Will try this and reassess  - Patient comfortable with plan. Discharge.   - 13 point ROS was performed and all are normal unless stated in the history above.   - Nursing note reviewed. Vitals reviewed. - Orders placed by myself and/or advanced practitioner / resident.    - Previous chart was reviewed  - No language barrier.   - History obtained from patient. - There are no limitations to the history obtained. Code Status: No Order  Advance Directive and Living Will:      Power of :    POLST:      Medications   sodium chloride 0.9 % bolus 1,000 mL (0 mL Intravenous Stopped 12/4/23 6899)     No orders to display     Orders Placed This Encounter   Procedures    FLU/RSV/COVID - if FLU/RSV clinically relevant    Ambulatory Referral to Neurology    Insert peripheral IV     Labs Reviewed   COVID19, INFLUENZA A/B, RSV PCR, [facility] - Normal       Result Value Ref Range Status    SARS-CoV-2 Negative  Negative Final    INFLUENZA A PCR Negative  Negative Final    INFLUENZA B PCR Negative  Negative Final    RSV PCR Negative  Negative Final    Narrative:     FOR PEDIATRIC PATIENTS - copy/paste COVID Guidelines URL to browser: [email or web address]    SARS-CoV-2 assay is a Nucleic Acid Amplification assay intended for the  qualitative detection of nucleic acid from SARS-CoV-2 in nasopharyngeal  swabs. Results are for the presumptive identification of SARS-CoV-2 RNA. Positive results are indicative of infection with SARS-CoV-2, the virus  causing COVID-19, but do not rule out bacterial infection or co-infection  with other viruses. Laboratories within the [facility] and its  territories are required to report all positive results to the appropriate  public health authorities. Negative results do not preclude SARS-CoV-2  infection and should not be used as the sole basis for treatment or other  patient management decisions. Negative results must be combined with  clinical observations, patient history, and epidemiological information. This test has not been FDA cleared or approved.     This test has been authorized by FDA under an Emergency Use Authorization  (EUA). This test is only authorized for the duration of time the  declaration that circumstances exist justifying the authorization of the  emergency use of an in vitro diagnostic tests for detection of SARS-CoV-2  virus and/or diagnosis of COVID-19 infection under section 564(b)(1) of  the Act, 21 U. S.C. 129JSG-9(N)(1), unless the authorization is terminated  or revoked sooner. The test has been validated but independent review by FDA  and CLIA is pending. Test performed using [employer] Gene[employer]: This RT-PCR assay targets N2,  a region unique to SARS-CoV-2. A conserved region in the E-gene was chosen  for pan-Sarbecovirus detection which includes SARS-CoV-2. According to CMS-2020-01-R, this platform meets the definition of high-throughput technology. Time reflects when diagnosis was documented in both MDM as applicable and the Disposition within this note       Time User Action Codes Description Comment    12/4/2023  6:23 PM David Sleeper Add [R68.89] Flu-like symptoms     12/4/2023  6:23 PM Pritesh, [address] MS (multiple sclerosis) ([address])     12/4/2023  6:23 PM Deltre Sleeper Add [R57.70,  G89.29] Musculoskeletal pain, chronic           ED Disposition       ED Disposition   Discharge    Condition   Stable    Date/Time   Mon Dec 4, 2023  6:23 PM    Comment   Lorena Broody discharge to home/self care. Follow-up Information       Follow up With Specialties Details Why Contact Info Additional Information    [facility] [place] [facility]  As needed for follow-up for multiple sclerosis [facility] [id number] [address] [facility] [address] [facility] [place], [address] [facility], [place], [place], [facility] E Kyrie Gaines, DO Emergency Medicine, Family Medicine  As needed, If symptoms worsen [address]  [address] [facility]  [phone] Discharge Medication List as of 12/4/2023  6:25 PM        CONTINUE these medications which have NOT CHANGED    Details   albendazole (ALBENZA) 200 mg tablet TAKE 1 & 1/2 TABLETS BY MOUTH TWICE A DAY AS DIRECTED(PLAN LIMIT 4 TABS PER FILL), Historical Med      ascorbic acid (VITAMIN C) 500 mg tablet Take 500 mg by mouth daily, Historical Med      b complex vitamins tablet Take 1 tablet by mouth daily, Historical Med      Biotin 48662 MCG TABS Take 1 tablet by mouth daily  , Historical Med      buprenorphine-naloxone (SUBOXONE) 2-0.5 mg per SL tablet Place 0.5 tablets under the tongue 3 (three) times a day 2mg  three times a day, Historical Med      Calcium Carbonate (CALCIUM 600 PO) Take 600 mg by mouth daily pt reports not having & not taking, Historical Med      celecoxib (CeleBREX) 200 mg capsule Take 1 capsule (200 mg total) by mouth 2 (two) times a day With food, Starting Tue 11/28/2023, Normal      Cholecalciferol (Vitamin D3) 125 MCG (5000 UT) TABS Take 5,000 Units by mouth daily pt reports taking 2 tab, Historical Med      cloNIDine (CATAPRES) 0.2 mg tablet Take 0.2 mg by mouth daily at bedtime , Historical Med      clotrimazole-betamethasone (LOTRISONE) 1-0.05 % cream Apply to affected area 2 times daily prn, Normal      docusate sodium (COLACE) 100 mg capsule Take 100 mg by mouth 3 (three) times a day  , Historical Med      flavoxATE (URISPAS) 100 mg tablet TAKE 1 TABLET BY MOUTH 3 TIMES A DAY AS NEEDED FOR BLADDER SPASMS., Starting Fri 8/18/2023, Normal      Ginkgo Biloba 40 MG TABS Take 120 mg by mouth daily pt reports taking 2 tabs daily, Historical Med      ibuprofen (MOTRIN) 200 mg tablet Take 200 mg by mouth as needed for mild pain pt reports taking 3-4tabs every 6hrs. , Historical Med      lubiprostone (AMITIZA) 24 mcg capsule Take 24 mcg by mouth 2 (two) times a day with meals pt reports not taking, Historical Med      LUTEIN PO Take by mouth in the morning 20mg on bottle , Historical Med methylPREDNISolone sodium succinate in sodium chloride 0.9 % 250 mL IVPB Inject 1,000 mg into a catheter in a vein every 30 (thirty) days. 1x/month, provided at medical office. per EPIC  Indications: MS, Historical Med      Misc Natural Products (GINSENG COMPLEX PO) Take 2 tablets by mouth daily 500mg on bottle., Historical Med      Movantik 25 MG tablet Take 25 mg by mouth every evening, Starting Mon 6/20/2022, Historical Med      Multiple Vitamins-Minerals (ZINC PO) Take by mouth, Historical Med      !! Nerve Stimulator (STANDARD TENS) VICKY by Does not apply route 2 (two) times a day, Starting Wed 11/28/2018, Print      !! Nerve Stimulator (TENS THERAPY REPLACE BACK PADS) MISC 30 pad by Does not apply route 2 (two) times a day, Starting Wed 11/28/2018, Print      !! Nerve Stimulator VICKY by Does not apply route 2 (two) times a day, Starting Thu 11/29/2018, Print      !! Nerve Stimulator Supplies MISC 30 pad by Does not apply route 2 (two) times a day, Starting Thu 11/29/2018, Print      NON FORMULARY HEMPANOL BRAIN DETOX 200MG .   pt reports not taking per 5/18/23., Historical Med      Omega-3 Fatty Acids (FISH OIL OMEGA-3 PO) Take 1,200 mg by mouth daily , Historical Med      phenazopyridine (PYRIDIUM) 200 mg tablet Take 1 tablet (200 mg total) by mouth 3 (three) times a day as needed for bladder spasms, Starting Fri 3/31/2023, Normal      Potassium 99 MG TABS Take 99 mg by mouth daily pt reports taking 3 tabs daily, Historical Med      Probiotic Product (PROBIOTIC DAILY PO) Take 2 tablets by mouth daily  , Historical Med      promethazine (PHENERGAN) 50 MG tablet daily at bedtime 1.5 tabs daily at bedtime, Starting Thu 11/15/2018, Historical Med      Restasis 0.05 % ophthalmic emulsion INSTILL 1 DROP BY OPHTHALMIC ROUTE EVERY 12 HOURS BOTH EYES, Historical Med      selenium sulfide (SELSUN) 2.5 % shampoo Apply topically daily as needed for dandruff, Starting Mon 12/19/2022, Normal      topiramate (TOPAMAX) 50 MG tablet Take 50 mg by mouth daily  , Starting Wed 10/31/2018, Historical Med      TURMERIC PO Take by mouth Turmeric Curcumin 500mg on bottle. 1 tab daily, Historical Med      ZINC-VITAMIN C PO Take by mouth pt not taking due to taking regular zinc per pt report on 23., Historical Med       !! - Potential duplicate medications found. Please discuss with provider. Prior to Admission Medications   Prescriptions Last Dose Informant Patient Reported? Taking? Biotin 56804 MCG TABS   Yes No   Sig: Take 1 tablet by mouth daily     Calcium Carbonate (CALCIUM 600 PO)   Yes No   Sig: Take 600 mg by mouth daily pt reports not having & not taking   Cholecalciferol (Vitamin D3) 125 MCG (5000 UT) TABS   Yes No   Sig: Take 5,000 Units by mouth daily pt reports taking 2 tab   Ginkgo Biloba 40 MG TABS   Yes No   Sig: Take 120 mg by mouth daily pt reports taking 2 tabs daily   LUTEIN PO   Yes No   Sig: Take by mouth in the morning 20mg on bottle    Misc Natural Products (GINSENG COMPLEX PO)   Yes No   Sig: Take 2 tablets by mouth daily 500mg on bottle. Movantik 25 MG tablet   Yes No   Sig: Take 25 mg by mouth every evening   Multiple Vitamins-Minerals (ZINC PO)   Yes No   Sig: Take by mouth   Patient not taking: Reported on 2022   NON FORMULARY   Yes No   Sig: HEMPANOL BRAIN DETOX 200MG . pt reports not taking per 23.    Nerve Stimulator (STANDARD TENS) VICKY   No No   Sig: by Does not apply route 2 (two) times a day   Nerve Stimulator (TENS THERAPY REPLACE BACK PADS) MISC  Self No No   Si pad by Does not apply route 2 (two) times a day   Nerve Stimulator VICKY  Self No No   Sig: by Does not apply route 2 (two) times a day   Nerve Stimulator Supplies MISC  Self No No   Si pad by Does not apply route 2 (two) times a day   Omega-3 Fatty Acids (FISH OIL OMEGA-3 PO)  Self Yes No   Sig: Take 1,200 mg by mouth daily    Potassium 99 MG TABS   Yes No   Sig: Take 99 mg by mouth daily pt reports taking 3 tabs daily   Probiotic Product (PROBIOTIC DAILY PO)  Self Yes No   Sig: Take 2 tablets by mouth daily     Restasis 0.05 % ophthalmic emulsion   Yes No   Sig: INSTILL 1 DROP BY OPHTHALMIC ROUTE EVERY 12 HOURS BOTH EYES   TURMERIC PO   Yes No   Sig: Take by mouth Turmeric Curcumin 500mg on bottle. 1 tab daily   ZINC-VITAMIN C PO   Yes No   Sig: Take by mouth pt not taking due to taking regular zinc per pt report on 5/18/23. albendazole (ALBENZA) 200 mg tablet   Yes No   Sig: TAKE 1 & 1/2 TABLETS BY MOUTH TWICE A DAY AS DIRECTED(PLAN LIMIT 4 TABS PER FILL)   ascorbic acid (VITAMIN C) 500 mg tablet   Yes No   Sig: Take 500 mg by mouth daily   b complex vitamins tablet  Self Yes No   Sig: Take 1 tablet by mouth daily   buprenorphine-naloxone (SUBOXONE) 2-0.5 mg per SL tablet  Self Yes No   Sig: Place 0.5 tablets under the tongue 3 (three) times a day 2mg  three times a day   celecoxib (CeleBREX) 200 mg capsule   No No   Sig: Take 1 capsule (200 mg total) by mouth 2 (two) times a day With food   cloNIDine (CATAPRES) 0.2 mg tablet  Self Yes No   Sig: Take 0.2 mg by mouth daily at bedtime    clotrimazole-betamethasone (LOTRISONE) 1-0.05 % cream   No No   Sig: Apply to affected area 2 times daily prn   Patient taking differently: Apply to affected area 2 times daily prn.  pt reports running out & does not have in home 5/18/23. docusate sodium (COLACE) 100 mg capsule  Self Yes No   Sig: Take 100 mg by mouth 3 (three) times a day     Patient not taking: Reported on 11/28/2023   flavoxATE (URISPAS) 100 mg tablet   No No   Sig: TAKE 1 TABLET BY MOUTH 3 TIMES A DAY AS NEEDED FOR BLADDER SPASMS.   ibuprofen (MOTRIN) 200 mg tablet   Yes No   Sig: Take 200 mg by mouth as needed for mild pain pt reports taking 3-4tabs every 6hrs.     lubiprostone (AMITIZA) 24 mcg capsule  Self Yes No   Sig: Take 24 mcg by mouth 2 (two) times a day with meals pt reports not taking   methylPREDNISolone sodium succinate in sodium chloride 0.9 % 250 mL IVPB   Yes No   Sig: Inject 1,000 mg into a catheter in a vein every 30 (thirty) days. 1x/month, provided at medical office. per EPIC  Indications: MS   phenazopyridine (PYRIDIUM) 200 mg tablet   No No   Sig: Take 1 tablet (200 mg total) by mouth 3 (three) times a day as needed for bladder spasms   Patient not taking: Reported on 5/12/2023   promethazine (PHENERGAN) 50 MG tablet  Self Yes No   Sig: daily at bedtime 1.5 tabs daily at bedtime   selenium sulfide (SELSUN) 2.5 % shampoo   No No   Sig: Apply topically daily as needed for dandruff   Patient taking differently: Apply 1 application. topically daily as needed for dandruff pt reports running out 5/18/23. topiramate (TOPAMAX) 50 MG tablet  Self Yes No   Sig: Take 50 mg by mouth daily        Facility-Administered Medications: None       Portions of the record may have been created with voice recognition software. Occasional wrong word or "sound a like" substitutions may have occurred due to the inherent limitations of voice recognition software. Read the chart carefully and recognize, using context, where substitutions have occurred.     Electronically signed by:  Eva Farrell

## 2023-12-04 NOTE — TELEPHONE ENCOUNTER
Caller: pt    Doctor: Clyde Tang    Reason for call: pt needs to r/s her procedure for today.     Call back#: [phone]

## 2023-12-04 NOTE — TELEPHONE ENCOUNTER
I called and spoke with patient, rescheduled her procedure and follow up appt. Patient is not feeling well and needs to reschedule for a couple weeks.

## 2023-12-05 NOTE — TELEPHONE ENCOUNTER
Patient was last seen in May 2023 by me - she missed 6 months follow up already. Patient requires re-evaluation before another 6 months steroids provided. Patient received Solumedrol on 11/24/2023 and she described significant amount of neurologic complaints which suggest that steroids are not beneficial for the patient and she should consider actual MS DMT, including Alec or Gabo Addison.     Please offer sooner appointment with Iwona Lombardo - in-office visit advised

## 2023-12-06 NOTE — TELEPHONE ENCOUNTER
Called pt to r/s to a sooner appt. Pt was not feeling well and asked that we call her back to r/s appt. I will call her back tomorrow.

## 2023-12-06 NOTE — TELEPHONE ENCOUNTER
Per chart review, pt seen in [facility] ED on 12/4/23 d/t flu-like symptoms       Flu Symptoms       Patient has a hx of MS and experienced a fall within the past week and explains that she has had flu like symptoms ever since the fall which is exacerbating her MS. Patient denies NVD. Covid - negative; no UA collected    Per 5/2023 LOV note:  Return in about 1 year (around 5/12/2024). Is to continue following with [facility]'s neurology within 6 months. F/U - 5/13/24    __________________________________________    Left detailed msg (per consent in chart) regarding note above. Requested return call to schedule appt. Dr. Lupillo Menard - there were conflicting f/u appt timeframes in LOV note (see above). To confirm - patient should be seen q6mos or sooner if needed?

## 2023-12-07 NOTE — TELEPHONE ENCOUNTER
12/6 11:17    Pt left a VM returning Cortney's call. Transcribed VM:   Yeah, I'm calling back Cortney, I'm leaving a message back for Cortney. This is Sherry Salmon. Right at the end of her message, she's like, oh, my jean marie was on 0 or something like that. You probably didn't hear anything I said. I technically really didn't. Yeah, so you guys got my number. Again, my name is Sherry Salmon. Not feeling too well. So, all right. Talk to you later.  Bye.

## 2023-12-08 NOTE — TELEPHONE ENCOUNTER
Left detailed msg (per consent in chart) re: Dr. Elina Jeff note below. Scheduling - please reach out to patient to schedule soonest available appt krishna/Janelle or Sy per Dr. Elina Jeff note below.  Thanks!        "Please offer sooner appointment with Sy or Randell Mendez - in-office visit advised"

## 2023-12-19 RX ORDER — CLOPIDOGREL BISULFATE 75 MG/1
75 TABLET ORAL EVERY MORNING
COMMUNITY
Start: 2023-11-29

## 2023-12-19 RX ORDER — CONJUGATED ESTROGENS 0.62 MG/1
0.62 TABLET, FILM COATED ORAL DAILY
COMMUNITY
Start: 2023-11-29

## 2023-12-20 ENCOUNTER — PROCEDURE VISIT (OUTPATIENT)
Dept: PAIN MEDICINE | Facility: CLINIC | Age: 43
End: 2023-12-20
Payer: COMMERCIAL

## 2023-12-20 DIAGNOSIS — M54.9 MID BACK PAIN: Primary | ICD-10-CM

## 2023-12-20 DIAGNOSIS — R39.11 URINARY HESITANCY: ICD-10-CM

## 2023-12-20 DIAGNOSIS — N31.9 NEUROGENIC BLADDER: ICD-10-CM

## 2023-12-20 DIAGNOSIS — M79.18 MYOFASCIAL PAIN SYNDROME: ICD-10-CM

## 2023-12-20 PROCEDURE — 76942 ECHO GUIDE FOR BIOPSY: CPT | Performed by: ANESTHESIOLOGY

## 2023-12-20 PROCEDURE — 20553 NJX 1/MLT TRIGGER POINTS 3/>: CPT | Performed by: ANESTHESIOLOGY

## 2023-12-20 RX ORDER — BUPIVACAINE HYDROCHLORIDE 2.5 MG/ML
10 INJECTION, SOLUTION EPIDURAL; INFILTRATION; INTRACAUDAL
Status: COMPLETED | OUTPATIENT
Start: 2023-12-20 | End: 2023-12-20

## 2023-12-20 RX ORDER — FLAVOXATE HYDROCHLORIDE 100 MG/1
100 TABLET ORAL 3 TIMES DAILY PRN
Qty: 90 TABLET | Refills: 3 | Status: SHIPPED | OUTPATIENT
Start: 2023-12-20

## 2023-12-20 RX ORDER — TRIAMCINOLONE ACETONIDE 40 MG/ML
40 INJECTION, SUSPENSION INTRA-ARTICULAR; INTRAMUSCULAR
Status: COMPLETED | OUTPATIENT
Start: 2023-12-20 | End: 2023-12-20

## 2023-12-20 RX ADMIN — BUPIVACAINE HYDROCHLORIDE 10 ML: 2.5 INJECTION, SOLUTION EPIDURAL; INFILTRATION; INTRACAUDAL at 13:00

## 2023-12-20 RX ADMIN — TRIAMCINOLONE ACETONIDE 40 MG: 40 INJECTION, SUSPENSION INTRA-ARTICULAR; INTRAMUSCULAR at 13:00

## 2023-12-20 NOTE — PROGRESS NOTES
" Universal Protocol:  Consent: Verbal consent obtained. Written consent obtained.  Risks and benefits: risks, benefits and alternatives were discussed  Consent given by: patient  Time out: Immediately prior to procedure a \"time out\" was called to verify the correct patient, procedure, equipment, support staff and site/side marked as required.  Timeout called at: 12/20/2023 1:11 PM.  Patient understanding: patient states understanding of the procedure being performed  Patient consent: the patient's understanding of the procedure matches consent given  Procedure consent: procedure consent matches procedure scheduled  Relevant documents: relevant documents present and verified  Test results: test results available and properly labeled  Site marked: the operative site was marked  Radiology Images displayed and confirmed. If images not available, report reviewed: imaging studies not available  Required items: required blood products, implants, devices, and special equipment available  Patient identity confirmed: verbally with patient  Supporting Documentation  Indications: pain   Trigger Point Injections: multiple trigger points: 3 or more muscle groups    Injection site identified by: ultrasound  Procedure Details  Location(s):    Middle Back: L thoracic paraspinals, R thoracic paraspinals, R latissimus dorsi, L latissimus dorsi, L longissimus and R longissimus     Prep: patient was prepped and draped in usual sterile fashion  Needle size: 22 G  Medications: 10 mL bupivacaine (PF) 0.25 %; 40 mg triamcinolone acetonide 40 mg/mL  Patient tolerance: patient tolerated the procedure well with no immediate complications          "

## 2023-12-20 NOTE — PATIENT INSTRUCTIONS
Do not apply heat to any area that is numb. If you have discomfort or soreness at the injection site, you may apply ice today, 20 minutes on and 20 minutes off. Tomorrow you may use ice or warm, moist heat. Do not apply ice or heat directly to the skin.  If you experience severe shortness of breath, go to the Emergency Room.  You may have numbness for several hours from the local anesthetic. Please use caution and common sense, especially with weight-bearing activities.  You may have an increase or change in the discomfort for 36-48 hours after your treatment. Apply ice and continue with any pain medicine you have been prescribed.  Do not do anything strenuous today. You may shower, but no tub baths or hot tubs today. You may resume your normal activities tomorrow, but do not “overdo it”. Resume normal activities slowly when you are feeling better.  If you experience redness, drainage or swelling at the injection site, or if you develop a fever above 100 degrees, please call [facility] at [phone] or go to the Emergency Room.  Continue to take all routine medicines prescribed by your primary care physician unless otherwise instructed by our staff. Most blood thinners should be started again according to your regularly scheduled dosing. If you have any questions, please give our office a call.    As no general anesthesia was used in today's procedure, you should not experience any side effects related to anesthesia.       If you have a problem specifically related to your procedure, please call our office at [phone].  Problems not related to your procedure should be directed to your primary care physician.

## 2023-12-27 ENCOUNTER — TELEPHONE (OUTPATIENT)
Dept: PAIN MEDICINE | Facility: CLINIC | Age: 43
End: 2023-12-27

## 2023-12-29 NOTE — TELEPHONE ENCOUNTER
Caller: Christie  Doctor/office: Ricardo  CB#:     % of improvement: 40%  Pain Scale (1-10):        Pt states much relief she couldn't pin point exactly how much the pain level is as it comes and goes.

## 2024-01-03 ENCOUNTER — OFFICE VISIT (OUTPATIENT)
Dept: NEUROLOGY | Facility: CLINIC | Age: 44
End: 2024-01-03
Payer: COMMERCIAL

## 2024-01-03 VITALS
WEIGHT: 93.6 LBS | DIASTOLIC BLOOD PRESSURE: 76 MMHG | OXYGEN SATURATION: 95 % | SYSTOLIC BLOOD PRESSURE: 112 MMHG | BODY MASS INDEX: 15.98 KG/M2 | HEIGHT: 64 IN | RESPIRATION RATE: 14 BRPM | HEART RATE: 80 BPM

## 2024-01-03 DIAGNOSIS — R26.2 AMBULATORY DYSFUNCTION: ICD-10-CM

## 2024-01-03 DIAGNOSIS — G35 MS (MULTIPLE SCLEROSIS) (HCC): Primary | ICD-10-CM

## 2024-01-03 PROCEDURE — 99214 OFFICE O/P EST MOD 30 MIN: CPT | Performed by: PHYSICIAN ASSISTANT

## 2024-01-03 RX ORDER — TESTOSTERONE CYPIONATE 100 MG/ML
INJECTION, SOLUTION INTRAMUSCULAR
COMMUNITY
Start: 2023-12-22

## 2024-01-03 NOTE — PROGRESS NOTES
Patient ID: Christie Hemphill is a 44 y.o. female.    Assessment/Plan:    MS (multiple sclerosis) ([facility])  Patient with MS diagnosed in 2020, although with longstanding symptoms.  She was started on Tysabri in early 2021, took x 6 months, then d/c due to feeling her symptoms were progressing despite radiographic stability.  Kesimpta was then started and she took this x 6 months before feeling like it was causing worsening symptoms, again, despite radiographic stability.  She has been adamant that she does not want any further DMTs, as she feels they caused her MS to become worse.  She has been getting a dose of IV steroids monthly for the last 18 months.    We again discussed that it is unlikely that the 2 DMTs she has tried (high-efficacy DMTs) caused her MS to become worse, and reviewed radiographic stability.  Discussed symptom progression more likely due to SPMS.  Discussed risks of long term steroid use.  She had a DEXA nearly 1 year ago which was WNL.  Discussed ideally do not want her to cont IV steroids long-term, but she feels this is the only thing that helps her.  Does not want to consider DMTs indicated for SPMS.    Reviewed PT/OT would have more lasting benefits than steroids.  Reviewed she likely feels temporarily better with the steroids, and then once the effects wear off, she feels worse and feels ready for another dose each month.  I placed referrals for neuro PT and OT.    She is scheduled to see Dr. HILL again in May and would like to re-initiate IV steroids monthly until then (last infusion 11/2023).  She can discuss long term use with Dr. HILL    She should have updated blood work and MRI brain.  She has known thrombocytopenia, previously seen by pari.    She should maintain good nutrition, routine exercise as tolerated, vit D supplement.    Exam is stable today.       Diagnoses and all orders for this visit:    MS (multiple sclerosis) ([facility])  -     CBC and differential; Future  -     Comprehensive metabolic  panel; Future  -     Cancel: Ambulatory Referral to Physical Therapy; Future  -     Ambulatory Referral to Occupational Therapy; Future  -     MRI brain MS wo contrast; Future  -     Ambulatory Referral to Physical Therapy; Future    Ambulatory dysfunction  -     Ambulatory Referral to Physical Therapy; Future    Other orders  -     Depo-Testosterone 100 MG/ML IM injection; INJECT 25MG (0.25ML) INTO THE MUSCLE EVERY 2 WEEKS(VIAL ONLY GOOD FOR 28DAYS)           Subjective:    VIRGILIO Hemphill is a 44 y.o. female who presents today for MS follow up. She was last seen in May 2023.    To review, patient initially presented to our office in June 2020 and was seen by Dr. Power for evaluation of gait and balance difficulty. She had reported altered gait since childhood without falls, supposed history of Lyme disease treated in her teenage years, along with multi-trauma in 2014. She also has self admitted history of prescription opioid addiction in the past for chronic pain, heavy alcohol intake. Patient had been pulled over by police over a year before her consult here and failed a DUI test at that time. She went to balance therapy afterwards and the therapist noticed incoordination. She also reported chronic back pain starting in 2017, along with leg weakness. She also reported some difficulty controlling her bowels. Eventual testing revealed MRI brain compatible with subtle white matter hyperintensities in the immediate periventricular region of both cerebral hemispheres and subtle changes within the left cerebellum. MRI cervical spine demonstrated moderate, abnormal cord signal within the lateral aspects of the cervical cord bilaterally at C2-3, C4-6 suspicious for chronic demyelinating disease. MRI thoracic spine with normal cord signal. MRI lumbar spine with only mild degenerative spondylosis. She was then seen in the MS center. She had a lumbar puncture in November of 2020. CSF Lyme was negative. MS panel with 8  oligoclonal bands, elevated IgG synthesis rate and elevated myelin basic protein. NMO negative. JCV negative 1/28/21 with index 0.22.   She was started on Tysabri as first DMT in March 2021.  Patient then reported ongoing symptoms and progression of leg heaviness and ambulatory dysfunction while on Tysabri and decided to d/c the medication.  Her imaging was stable while on Tysabri with no radiographic progression.  She requested monthly IV solumedrol.  She was then started on Kesimpta in December 2021.  She also had a very brief trial of dalfampridine and felt it caused side effects, therefore she d/c.  She continued to report ongoing symptoms while on Kesimpta and we had discussed that DMTs will not improve already acquired disability/symptoms.  She called the office in July 2022 and reported she felt Kesimpta was making her worse and wanted to stop taking the medication.  She was informed more likely she has SPMS which is causing worsening symptoms, not the medications.  Patient no longer wanted to continue on Kesimpta.  She requested monthly IV steroids.    Patient has been getting monthly IV steroids since June 2022.  She had DEXA 2/14/23 which was normal.      More recently, patient contacted the office in November 2023 reporting she was told by the infusion center that “this would be her last IV steroid treatment” and she was very upset by that. (likely her 6 month infusion orders were over and that was her last infusion with that order).  Patient was advised to make an appt before more steroids could be considered.  Patient continued to report significant amount of neurologic complaints despite steroids.  She was advised to consider actual DMT such as Mavenclad, Ocrevus etc.    She is here to unaccompanied.  She is very upset that steroids were stopped (last infusion in late November, so only has missed 1 monthly infusion).  She does not want to go on DMT.  She is adamant that Tysabri and Kesimpta caused  "worsening of her symptoms.  She feels steroids are the only thing that keeps her going.  She has not done any PT or OT in a few years.  She reports multiple falls, which is not uncommon for her, ambulates with walker.       The following portions of the patient's history were reviewed and updated as appropriate: current medications, past family history, past medical history, past social history, past surgical history, and problem list.         Objective:    Blood pressure 112/76, pulse 80, resp. rate 14, height 5' 4\" (1.626 m), weight 42.5 kg (93 lb 9.6 oz), SpO2 95%.    Physical Exam  Constitutional:       Comments: Thin appearing   Eyes:      Extraocular Movements: EOM normal.      Pupils: Pupils are equal, round, and reactive to light.   Neurological:      Deep Tendon Reflexes: Reflexes are normal and symmetric.   Psychiatric:         Mood and Affect: Mood normal.         Speech: Speech normal.         Behavior: Behavior normal.         Neurological Exam  Mental Status   Oriented to person, place, time and situation. Speech is normal. Language is fluent with no aphasia. Attention and concentration are normal.    Cranial Nerves  CN II: Visual fields full to confrontation.  CN III, IV, VI: Extraocular movements intact bilaterally. Pupils equal round and reactive to light bilaterally.  CN V: Facial sensation is normal.  CN VII: Full and symmetric facial movement.  CN VIII: Hearing is normal.  CN IX, X: Palate elevates symmetrically  CN XI: Shoulder shrug strength is normal.  CN XII: Tongue midline without atrophy or fasciculations.    Motor                                               Right                     Left   Shoulder abduction               4+                          4+  Elbow flexion                         5-                          5-  Elbow extension                    5-                          5-  Hip flexion                              3+                          3+  Dorsiflexion                 "            3+                          3+  Decreased muscle bulk .    Sensory  Light touch is normal in upper and lower extremities.     Reflexes  Deep tendon reflexes are 2+ and symmetric in all four extremities.    Coordination  Right: Finger-to-nose normal.Left: Finger-to-nose normal.    Gait    Significant gait dysfunction, dragging legs, using walker.        ROS:    Review of Systems   Constitutional:  Negative for appetite change, fatigue and fever.   HENT: Negative.  Negative for hearing loss, tinnitus, trouble swallowing and voice change.    Eyes: Negative.  Negative for photophobia, pain and visual disturbance.   Respiratory: Negative.  Negative for shortness of breath.    Cardiovascular: Negative.  Negative for palpitations.   Gastrointestinal: Negative.  Negative for nausea and vomiting.   Endocrine: Negative.  Negative for cold intolerance.   Genitourinary: Negative.  Negative for dysuria, frequency and urgency.   Musculoskeletal:  Positive for back pain, gait problem, neck pain and neck stiffness. Negative for myalgias.   Skin: Negative.  Negative for rash.   Allergic/Immunologic: Negative.    Neurological:  Positive for weakness and numbness. Negative for dizziness, tremors, seizures, syncope, facial asymmetry, speech difficulty, light-headedness and headaches.   Hematological:  Bruises/bleeds easily.   Psychiatric/Behavioral: Negative.  Negative for confusion, hallucinations and sleep disturbance.    All other systems reviewed and are negative.    I personally reviewed and updated the ROS as appropriate

## 2024-01-03 NOTE — PATIENT INSTRUCTIONS
Will refer to PT and OT at [facility] for neuro rehab.  Can do this whenever you are ready  Will update blood work and brain MRI  Will order monthly IV steroid dose and then re-evaluate with Dr. HILL in May  Follow up in May with Dr. HILL as scheduled

## 2024-01-04 ENCOUNTER — TELEPHONE (OUTPATIENT)
Dept: NEUROLOGY | Facility: CLINIC | Age: 44
End: 2024-01-04

## 2024-01-04 DIAGNOSIS — G35 MS (MULTIPLE SCLEROSIS) (HCC): Primary | ICD-10-CM

## 2024-01-04 RX ORDER — SODIUM CHLORIDE 9 MG/ML
20 INJECTION, SOLUTION INTRAVENOUS ONCE
Status: CANCELLED | OUTPATIENT
Start: 2024-01-05

## 2024-01-04 NOTE — TELEPHONE ENCOUNTER
" received the following message:    Forwarded:  Jackelin Mccall([facility])  I just sent you an in basket to please complete or add orders for monthly methylprednisolone for Christie SHULTZ 82. She called today and said she just had an appointment with you and you okayed her to continue. Thank you!    Pt was seen by Janelle Meneses yesterday. Note not complete however pt instructions state \"Will order monthly IV steroid dose and then re-evaluate with Dr. HILL in May.\"    Last Solumedrol infusion 23, receiving 1,000mg each visit.     Pt scheduled solumedrol infusion for tomorrow at [facility]. New orders needed.     Therapy plan entered and sent for signature.     No PA needed for , [id number], or [id number] per [facility] family website. Referral updated.     Janelle, please review and sign therapy plan if agreeable. Pt scheduled next infusion for tomorrow.   "

## 2024-01-04 NOTE — TELEPHONE ENCOUNTER
Did not even get a chance to message you, she was my last patient yesterday and arrived late.  Surprised she went ahead and made herself an appt.    I did let her know I would allow monthly steroids until she sees Dr. HILL in May, as she was adamant about not considering DMT.  This will all be detailed in my note.      Therapy plan signed

## 2024-01-05 ENCOUNTER — HOSPITAL ENCOUNTER (OUTPATIENT)
Dept: INFUSION CENTER | Facility: HOSPITAL | Age: 44
End: 2024-01-05
Attending: PSYCHIATRY & NEUROLOGY
Payer: COMMERCIAL

## 2024-01-05 VITALS
OXYGEN SATURATION: 94 % | TEMPERATURE: 97.6 F | DIASTOLIC BLOOD PRESSURE: 61 MMHG | HEART RATE: 91 BPM | RESPIRATION RATE: 18 BRPM | SYSTOLIC BLOOD PRESSURE: 92 MMHG

## 2024-01-05 DIAGNOSIS — G35 MS (MULTIPLE SCLEROSIS) (HCC): Primary | ICD-10-CM

## 2024-01-05 LAB
ALBUMIN SERPL BCP-MCNC: 4.2 G/DL (ref 3.5–5)
ALP SERPL-CCNC: 93 U/L (ref 34–104)
ALT SERPL W P-5'-P-CCNC: 9 U/L (ref 7–52)
ANION GAP SERPL CALCULATED.3IONS-SCNC: 11 MMOL/L
AST SERPL W P-5'-P-CCNC: 22 U/L (ref 13–39)
BASOPHILS # BLD AUTO: 0.04 THOUSANDS/ÂΜL (ref 0–0.1)
BASOPHILS NFR BLD AUTO: 1 % (ref 0–1)
BILIRUB SERPL-MCNC: 0.61 MG/DL (ref 0.2–1)
BUN SERPL-MCNC: 9 MG/DL (ref 5–25)
CALCIUM SERPL-MCNC: 9.1 MG/DL (ref 8.4–10.2)
CHLORIDE SERPL-SCNC: 105 MMOL/L (ref 96–108)
CO2 SERPL-SCNC: 25 MMOL/L (ref 21–32)
CREAT SERPL-MCNC: 0.61 MG/DL (ref 0.6–1.3)
EOSINOPHIL # BLD AUTO: 0.22 THOUSAND/ÂΜL (ref 0–0.61)
EOSINOPHIL NFR BLD AUTO: 6 % (ref 0–6)
ERYTHROCYTE [DISTWIDTH] IN BLOOD BY AUTOMATED COUNT: 12.3 % (ref 11.6–15.1)
GFR SERPL CREATININE-BSD FRML MDRD: 112 ML/MIN/1.73SQ M
GLUCOSE SERPL-MCNC: 89 MG/DL (ref 65–140)
HCT VFR BLD AUTO: 37.3 % (ref 34.8–46.1)
HGB BLD-MCNC: 12.1 G/DL (ref 11.5–15.4)
IMM GRANULOCYTES # BLD AUTO: 0.01 THOUSAND/UL (ref 0–0.2)
IMM GRANULOCYTES NFR BLD AUTO: 0 % (ref 0–2)
LYMPHOCYTES # BLD AUTO: 1.54 THOUSANDS/ÂΜL (ref 0.6–4.47)
LYMPHOCYTES NFR BLD AUTO: 39 % (ref 14–44)
MCH RBC QN AUTO: 31.3 PG (ref 26.8–34.3)
MCHC RBC AUTO-ENTMCNC: 32.4 G/DL (ref 31.4–37.4)
MCV RBC AUTO: 96 FL (ref 82–98)
MONOCYTES # BLD AUTO: 0.25 THOUSAND/ÂΜL (ref 0.17–1.22)
MONOCYTES NFR BLD AUTO: 6 % (ref 4–12)
NEUTROPHILS # BLD AUTO: 1.92 THOUSANDS/ÂΜL (ref 1.85–7.62)
NEUTS SEG NFR BLD AUTO: 48 % (ref 43–75)
NRBC BLD AUTO-RTO: 0 /100 WBCS
PLATELET # BLD AUTO: 100 THOUSANDS/UL (ref 149–390)
PMV BLD AUTO: 10.5 FL (ref 8.9–12.7)
POTASSIUM SERPL-SCNC: 3.6 MMOL/L (ref 3.5–5.3)
PROT SERPL-MCNC: 6.9 G/DL (ref 6.4–8.4)
RBC # BLD AUTO: 3.87 MILLION/UL (ref 3.81–5.12)
SODIUM SERPL-SCNC: 141 MMOL/L (ref 135–147)
WBC # BLD AUTO: 3.98 THOUSAND/UL (ref 4.31–10.16)

## 2024-01-05 PROCEDURE — 96365 THER/PROPH/DIAG IV INF INIT: CPT

## 2024-01-05 PROCEDURE — 85025 COMPLETE CBC W/AUTO DIFF WBC: CPT

## 2024-01-05 PROCEDURE — 80053 COMPREHEN METABOLIC PANEL: CPT

## 2024-01-05 RX ORDER — SODIUM CHLORIDE 9 MG/ML
20 INJECTION, SOLUTION INTRAVENOUS ONCE
OUTPATIENT
Start: 2024-02-02

## 2024-01-05 RX ORDER — SODIUM CHLORIDE 9 MG/ML
20 INJECTION, SOLUTION INTRAVENOUS ONCE
Status: COMPLETED | OUTPATIENT
Start: 2024-01-05 | End: 2024-01-05

## 2024-01-05 RX ADMIN — SODIUM CHLORIDE 1000 MG: 0.9 INJECTION, SOLUTION INTRAVENOUS at 10:48

## 2024-01-05 RX ADMIN — SODIUM CHLORIDE 20 ML/HR: 0.9 INJECTION, SOLUTION INTRAVENOUS at 10:48

## 2024-01-05 NOTE — PROGRESS NOTES
Christie Hemphill  tolerated treatment well with no complications.      Christie Hemphill is aware of future appt on 2/2 at 1130.     AVS printed and given to Christie Hemphill:    No (Declined by Christie Hemphill) x

## 2024-01-05 NOTE — PLAN OF CARE
Problem: INFECTION - ADULT  Goal: Absence of fever/infection during neutropenic period  Description: INTERVENTIONS:  - Monitor WBC    Outcome: Progressing     Problem: Knowledge Deficit  Goal: Patient/family/caregiver demonstrates understanding of disease process, treatment plan, medications, and discharge instructions  Description: Complete learning assessment and assess knowledge base.  Interventions:  - Provide teaching at level of understanding  - Provide teaching via preferred learning methods  Outcome: Progressing

## 2024-01-15 DIAGNOSIS — G35 MS (MULTIPLE SCLEROSIS) (HCC): Primary | ICD-10-CM

## 2024-01-15 RX ORDER — SODIUM CHLORIDE 9 MG/ML
20 INJECTION, SOLUTION INTRAVENOUS ONCE
OUTPATIENT
Start: 2024-01-15

## 2024-01-16 NOTE — ASSESSMENT & PLAN NOTE
Patient with MS diagnosed in 2020, although with longstanding symptoms.  She was started on Tysabri in early 2021, took x 6 months, then d/c due to feeling her symptoms were progressing despite radiographic stability.  Kesimpta was then started and she took this x 6 months before feeling like it was causing worsening symptoms, again, despite radiographic stability.  She has been adamant that she does not want any further DMTs, as she feels they caused her MS to become worse.  She has been getting a dose of IV steroids monthly for the last 18 months.    We again discussed that it is unlikely that the 2 DMTs she has tried (high-efficacy DMTs) caused her MS to become worse, and reviewed radiographic stability.  Discussed symptom progression more likely due to SPMS.  Discussed risks of long term steroid use.  She had a DEXA nearly 1 year ago which was WNL.  Discussed ideally do not want her to cont IV steroids long-term, but she feels this is the only thing that helps her.  Does not want to consider DMTs indicated for SPMS.    Reviewed PT/OT would have more lasting benefits than steroids.  Reviewed she likely feels temporarily better with the steroids, and then once the effects wear off, she feels worse and feels ready for another dose each month.  I placed referrals for neuro PT and OT.    She is scheduled to see Dr. HILL again in May and would like to re-initiate IV steroids monthly until then (last infusion 11/2023).  She can discuss long term use with Dr. HILL    She should have updated blood work and MRI brain.  She has known thrombocytopenia, previously seen by pari.    She should maintain good nutrition, routine exercise as tolerated, vit D supplement.    Exam is stable today.

## 2024-01-23 ENCOUNTER — OFFICE VISIT (OUTPATIENT)
Dept: PAIN MEDICINE | Facility: CLINIC | Age: 44
End: 2024-01-23
Payer: COMMERCIAL

## 2024-01-23 VITALS
RESPIRATION RATE: 16 BRPM | HEIGHT: 64 IN | HEART RATE: 100 BPM | WEIGHT: 93 LBS | BODY MASS INDEX: 15.88 KG/M2 | SYSTOLIC BLOOD PRESSURE: 88 MMHG | DIASTOLIC BLOOD PRESSURE: 61 MMHG

## 2024-01-23 DIAGNOSIS — G89.29 MUSCULOSKELETAL PAIN, CHRONIC: Primary | ICD-10-CM

## 2024-01-23 DIAGNOSIS — M54.9 MID BACK PAIN: ICD-10-CM

## 2024-01-23 DIAGNOSIS — M54.50 CHRONIC MIDLINE LOW BACK PAIN WITHOUT SCIATICA: ICD-10-CM

## 2024-01-23 DIAGNOSIS — M79.18 MUSCULOSKELETAL PAIN, CHRONIC: Primary | ICD-10-CM

## 2024-01-23 DIAGNOSIS — G35 MS (MULTIPLE SCLEROSIS) (HCC): ICD-10-CM

## 2024-01-23 DIAGNOSIS — G89.29 CHRONIC MIDLINE LOW BACK PAIN WITHOUT SCIATICA: ICD-10-CM

## 2024-01-23 PROCEDURE — 99213 OFFICE O/P EST LOW 20 MIN: CPT | Performed by: NURSE PRACTITIONER

## 2024-01-23 NOTE — PROGRESS NOTES
Assessment:  1. Musculoskeletal pain, chronic    2. Mid back pain    3. Chronic midline low back pain without sciatica    4. MS (multiple sclerosis) ([facility])        Plan:  While the patient was in the office today, I did have a thorough conversation regarding their chronic pain syndrome, medication management, and treatment plan options.  Patient is being seen for a follow-up visit.  She recently underwent ultrasound-guided thoracic paraspinal trigger point injections on 12/20/2023.  Overall, she is reporting about 50% improvement in her pain.    Continue Celebrex 200 mg twice daily with food.  She did not require refill of this medication during today's visit.    Patient states that her neurologist previously tried her on a muscle relaxer.  She does not take it because it seems to make her legs feel weaker.  She has tried topical medications in the past including Lidoderm patches without relief.  She uses diclofenac gel with some improvement.     Patient previously participated in aqua therapy without significant improvement in her symptoms.  It was more recently recommended that she start up with aqua therapy.  Patient states that she has a hard time getting to aqua therapy and participating in aqua therapy due to progressive weakness in her legs related to MS.     Follow-up in 2 months.    History of Present Illness:  The patient is a 44 y.o. female who presents for a follow up office visit in regards to Back Pain and Neck Pain.   The patient’s current symptoms include complaints of upper back pain, low back pain.  Current pain level is a 4/10.  Quality pain is described as sharp, cramping, shooting, numb, pins-and-needles.    Current pain medications includes: Celebrex 200 mg twice daily.  The patient reports that this regimen is providing up to 50% pain relief.  The patient is reporting no side effects from this pain medication regimen.    I have personally reviewed and/or updated the patient's past medical  history, past surgical history, family history, social history, current medications, allergies, and vital signs today.         Review of Systems  Review of Systems   Gastrointestinal:  Positive for constipation.   Musculoskeletal:  Positive for back pain, gait problem, myalgias and neck pain.        Decreased ROM  Joint stiffness     Skin:  Positive for rash.   Neurological:         Memory loss   All other systems reviewed and are negative.          Past Medical History:   Diagnosis Date    Back pain     Chronic constipation 6/27/2016    Chronic pain disorder     Collar bone fracture     right side    Crutches as ambulation aid     Depression     Dyssynergic defecation     Type 1    ETOH abuse     Head injury 2014    Infectious viral hepatitis     Lyme disease     Meningitis spinal 2007?    Multiple sclerosis (HCC)     Opioid abuse (HCC)     Rib fractures     Thrombocytopenia (HCC)     Use of cane as ambulatory aid        Past Surgical History:   Procedure Laterality Date    APPENDECTOMY      CLAVICLE SURGERY      COLONOSCOPY      FL LUMBAR PUNCTURE DIAGNOSTIC  11/25/2020    KNEE ARTHROSCOPY      MULTIPLE TOOTH EXTRACTIONS      NM COLONOSCOPY FLX DX W/COLLJ SPEC WHEN PFRMD N/A 6/27/2016    Procedure: COLONOSCOPY;  Surgeon: Jason Rogers MD;  Location: [facility];  Service: Colorectal    NM OPEN IMPLANTATION CHARITY SACRAL NERVE N/A 11/15/2021    Procedure: INSERTION OF NEUROSTIMULATOR ELECTRODE ARRAY SACRAL NERVE; FLUOROSCOPY; ELECTRONICN ANALYSIS;  Surgeon: Abdulaziz Guevara MD;  Location: [facility];  Service: UroGynecology           TONSILLECTOMY AND ADENOIDECTOMY      TUBAL LIGATION         Family History   Problem Relation Age of Onset    Skin cancer Mother     No Known Problems Brother        Social History     Occupational History    Not on file   Tobacco Use    Smoking status: Every Day     Current packs/day: 1.00     Types: Cigarettes    Smokeless tobacco: Never   Vaping Use    Vaping status: Never Used    Substance and Sexual Activity    Alcohol use: Yes     Alcohol/week: 14.0 standard drinks of alcohol     Types: 14 Shots of liquor per week     Comment: 2 mixed drinks each night    Drug use: No    Sexual activity: Yes     Partners: Male         Current Outpatient Medications:     albendazole (ALBENZA) 200 mg tablet, TAKE 1 & 1/2 TABLETS BY MOUTH TWICE A DAY AS DIRECTED(PLAN LIMIT 4 TABS PER FILL), Disp: , Rfl:     ascorbic acid (VITAMIN C) 500 mg tablet, Take 500 mg by mouth daily, Disp: , Rfl:     b complex vitamins tablet, Take 1 tablet by mouth daily, Disp: , Rfl:     Biotin 33748 MCG TABS, Take 1 tablet by mouth daily  , Disp: , Rfl:     buprenorphine-naloxone (SUBOXONE) 2-0.5 mg per SL tablet, Place 0.5 tablets under the tongue 3 (three) times a day 2mg  three times a day, Disp: , Rfl:     Calcium Carbonate (CALCIUM 600 PO), Take 600 mg by mouth daily pt reports not having & not taking, Disp: , Rfl:     celecoxib (CeleBREX) 200 mg capsule, Take 1 capsule (200 mg total) by mouth 2 (two) times a day With food, Disp: 60 capsule, Rfl: 2    Cholecalciferol (Vitamin D3) 125 MCG (5000 UT) TABS, Take 5,000 Units by mouth daily pt reports taking 2 tab, Disp: , Rfl:     cloNIDine (CATAPRES) 0.2 mg tablet, Take 0.2 mg by mouth daily at bedtime , Disp: , Rfl:     clopidogrel (PLAVIX) 75 mg tablet, Take 75 mg by mouth every morning, Disp: , Rfl:     clotrimazole-betamethasone (LOTRISONE) 1-0.05 % cream, Apply to affected area 2 times daily prn (Patient taking differently: Apply to affected area 2 times daily prn. pt reports running out & does not have in home 5/18/23.), Disp: 45 g, Rfl: 1    Depo-Testosterone 100 MG/ML IM injection, INJECT 25MG (0.25ML) INTO THE MUSCLE EVERY 2 WEEKS(VIAL ONLY GOOD FOR 28DAYS), Disp: , Rfl:     flavoxATE (URISPAS) 100 mg tablet, TAKE 1 TABLET BY MOUTH 3 TIMES A DAY AS NEEDED FOR BLADDER SPASMS., Disp: 90 tablet, Rfl: 3    Ginkgo Biloba 40 MG TABS, Take 120 mg by mouth daily pt reports  taking 2 tabs daily, Disp: , Rfl:     ibuprofen (MOTRIN) 200 mg tablet, Take 200 mg by mouth as needed for mild pain pt reports taking 3-4tabs every 6hrs. , Disp: , Rfl:     lubiprostone (AMITIZA) 24 mcg capsule, Take 24 mcg by mouth 2 (two) times a day with meals pt reports not taking, Disp: , Rfl:     LUTEIN PO, Take by mouth in the morning 20mg on bottle , Disp: , Rfl:     Misc Natural Products (GINSENG COMPLEX PO), Take 2 tablets by mouth daily 500mg on bottle., Disp: , Rfl:     Movantik 25 MG tablet, Take 25 mg by mouth every evening, Disp: , Rfl:     Multiple Vitamins-Minerals (ZINC PO), Take by mouth, Disp: , Rfl:     Nerve Stimulator (STANDARD TENS) VICKY, by Does not apply route 2 (two) times a day, Disp: 1 Device, Rfl: 0    Nerve Stimulator (TENS THERAPY REPLACE BACK PADS) MISC, 30 pad by Does not apply route 2 (two) times a day, Disp: 30 each, Rfl: 0    Nerve Stimulator VICKY, by Does not apply route 2 (two) times a day, Disp: 1 each, Rfl: 0    Nerve Stimulator Supplies MISC, 30 pad by Does not apply route 2 (two) times a day, Disp: 30 pad, Rfl: 0    Omega-3 Fatty Acids (FISH OIL OMEGA-3 PO), Take 1,200 mg by mouth daily , Disp: , Rfl:     Potassium 99 MG TABS, Take 99 mg by mouth daily pt reports taking 3 tabs daily, Disp: , Rfl:     Premarin 0.625 MG tablet, Take 0.625 mg by mouth daily, Disp: , Rfl:     Probiotic Product (PROBIOTIC DAILY PO), Take 2 tablets by mouth daily  , Disp: , Rfl:     promethazine (PHENERGAN) 50 MG tablet, daily at bedtime 1.5 tabs daily at bedtime, Disp: , Rfl: 1    Restasis 0.05 % ophthalmic emulsion, INSTILL 1 DROP BY OPHTHALMIC ROUTE EVERY 12 HOURS BOTH EYES, Disp: , Rfl:     topiramate (TOPAMAX) 50 MG tablet, Take 50 mg by mouth daily  , Disp: , Rfl: 5    TURMERIC PO, Take by mouth Turmeric Curcumin 500mg on bottle. 1 tab daily, Disp: , Rfl:     ZINC-VITAMIN C PO, Take by mouth pt not taking due to taking regular zinc per pt report on 5/18/23., Disp: , Rfl:     docusate  "sodium (COLACE) 100 mg capsule, Take 100 mg by mouth 3 (three) times a day   (Patient not taking: Reported on 11/28/2023), Disp: , Rfl:     methylPREDNISolone sodium succinate in sodium chloride 0.9 % 250 mL IVPB, Inject 1,000 mg into a catheter in a vein every 30 (thirty) days. 1x/month, provided at medical office. per EPIC  Indications: MS (Patient not taking: Reported on 1/3/2024), Disp: , Rfl:     NON FORMULARY, HEMPANOL BRAIN DETOX 200MG . pt reports not taking per 5/18/23. (Patient not taking: Reported on 1/3/2024), Disp: , Rfl:     phenazopyridine (PYRIDIUM) 200 mg tablet, Take 1 tablet (200 mg total) by mouth 3 (three) times a day as needed for bladder spasms (Patient not taking: Reported on 5/12/2023), Disp: 10 tablet, Rfl: 0    selenium sulfide (SELSUN) 2.5 % shampoo, Apply topically daily as needed for dandruff (Patient not taking: Reported on 1/3/2024), Disp: 118 mL, Rfl: 0    Allergies   Allergen Reactions    Penicillins GI Intolerance and Nausea Only     Other reaction(s): Unknown Reaction       Physical Exam:    BP (!) 88/61   Pulse 100   Resp 16   Ht 5' 4\" (1.626 m)   Wt 42.2 kg (93 lb)   BMI 15.96 kg/m²     Constitutional:normal, well developed, well nourished, alert, in no distress and non-toxic and no overt pain behavior. and underweight  Eyes:anicteric  HEENT:grossly intact  Neck:supple, symmetric, trachea midline and no masses   Pulmonary:even and unlabored  Cardiovascular:No edema or pitting edema present  Skin:Normal without rashes or lesions and well hydrated  Psychiatric:Mood and affect appropriate  Neurologic:Cranial Nerves II-XII grossly intact  Musculoskeletal: Ambulates with a walker.    Imaging      Study Result    Narrative & Impression   MRI LUMBAR SPINE WITHOUT CONTRAST     INDICATION: M54.16: Radiculopathy, lumbar region  M47.816: Spondylosis without myelopathy or radiculopathy, lumbar region  G89.4: Chronic pain syndrome.     COMPARISON: Lumbar spine radiograph 8/7/2023., " 11/28/2018 sacrum and coccyx radiograph 11/15/2021. Lumbar puncture 11/25/2020. MRI lumbar spine with and without contrast 7/11/2020.     TECHNIQUE:  Multiplanar, multisequence imaging of the lumbar spine was performed. .        IMAGE QUALITY:  Diagnostic     FINDINGS:     VERTEBRAL BODIES:  There are 5 lumbar type vertebral bodies.  Normal alignment of the lumbar spine.  No spondylolysis or spondylolisthesis. No scoliosis.  No compression fracture.     Mildly bone marrow edema in right L4 and right L5 pedicles, may represent stress reaction.     L3 and L5 intraosseous vertebral body hemangiomas. Otherwise, normal bone marrow signal.     SACRUM:  Normal signal within the sacrum. No evidence of insufficiency or stress fracture.     DISTAL CORD AND CONUS:  Normal size and signal within the distal cord and conus.     PARASPINAL SOFT TISSUES:  Paraspinal soft tissues are unremarkable.     LOWER THORACIC DISC SPACES: Mild noncompressive lower thoracic degenerative change.     LUMBAR DISC SPACES: Mild multilevel disc height loss, worse at L5-S1.     L1-L2: Normal.     L2-L3: Normal.     L3-L4: Mild facet arthropathy, trace left facet joint effusion. No significant canal stenosis or foraminal narrowing.     L4-L5: Mild diffuse disc bulge. Mild facet arthropathy, small bilateral facet joint effusions (right greater than left). No significant canal stenosis or foraminal narrowing.     L5-S1: Mild diffuse disc bulge, tiny posterior annular fissure. No significant canal stenosis or foraminal narrowing.     OTHER FINDINGS: Small right renal cyst.     IMPRESSION:     Mildly bone marrow edema in right L4 and right L5 pedicles, may represent stress reaction.     Mild degenerative changes of the lumbar spine, as detailed above. No significant canal stenosis or foraminal narrowing.     The study was marked in EPIC for significant notification.        Workstation performed: [id number]              No orders to display       No orders  of the defined types were placed in this encounter.

## 2024-01-31 DIAGNOSIS — G35 MS (MULTIPLE SCLEROSIS) (HCC): Primary | ICD-10-CM

## 2024-01-31 RX ORDER — SODIUM CHLORIDE 9 MG/ML
20 INJECTION, SOLUTION INTRAVENOUS ONCE
Status: CANCELLED | OUTPATIENT
Start: 2024-02-02

## 2024-02-02 ENCOUNTER — HOSPITAL ENCOUNTER (OUTPATIENT)
Dept: INFUSION CENTER | Facility: HOSPITAL | Age: 44
End: 2024-02-02
Payer: COMMERCIAL

## 2024-02-02 VITALS
HEART RATE: 84 BPM | OXYGEN SATURATION: 96 % | TEMPERATURE: 97.3 F | RESPIRATION RATE: 18 BRPM | DIASTOLIC BLOOD PRESSURE: 83 MMHG | SYSTOLIC BLOOD PRESSURE: 136 MMHG

## 2024-02-02 DIAGNOSIS — G35 MS (MULTIPLE SCLEROSIS) (HCC): Primary | ICD-10-CM

## 2024-02-02 PROCEDURE — 96365 THER/PROPH/DIAG IV INF INIT: CPT

## 2024-02-02 RX ORDER — SODIUM CHLORIDE 9 MG/ML
20 INJECTION, SOLUTION INTRAVENOUS ONCE
OUTPATIENT
Start: 2024-03-01

## 2024-02-02 RX ORDER — SODIUM CHLORIDE 9 MG/ML
20 INJECTION, SOLUTION INTRAVENOUS ONCE
Status: COMPLETED | OUTPATIENT
Start: 2024-02-02 | End: 2024-02-02

## 2024-02-02 RX ADMIN — SODIUM CHLORIDE 20 ML/HR: 0.9 INJECTION, SOLUTION INTRAVENOUS at 11:43

## 2024-02-02 RX ADMIN — SODIUM CHLORIDE 1000 MG: 0.9 INJECTION, SOLUTION INTRAVENOUS at 11:43

## 2024-02-02 NOTE — PROGRESS NOTES
Christie Hemphill  tolerated treatment well with no complications.      Christie Hemphill is aware of future appt on 3/1 at 1200.     AVS printed and given to Christie Hemphill:   No (Declined by Christie Hemphill) x

## 2024-02-02 NOTE — PLAN OF CARE
Problem: INFECTION - ADULT  Goal: Absence or prevention of progression during hospitalization  Description: INTERVENTIONS:  - Assess and monitor for signs and symptoms of infection  - Monitor lab/diagnostic results  - Monitor all insertion sites, i.e. indwelling lines, tubes, and drains  - Monitor endotracheal if appropriate and nasal secretions for changes in amount and color  - [place] appropriate cooling/warming therapies per order  - Administer medications as ordered  - Instruct and encourage patient and family to use good hand hygiene technique  - Identify and instruct in appropriate isolation precautions for identified infection/condition  Outcome: Progressing     Problem: Knowledge Deficit  Goal: Patient/family/caregiver demonstrates understanding of disease process, treatment plan, medications, and discharge instructions  Description: Complete learning assessment and assess knowledge base.  Interventions:  - Provide teaching at level of understanding  - Provide teaching via preferred learning methods  Outcome: Progressing

## 2024-02-06 ENCOUNTER — HOSPITAL ENCOUNTER (OUTPATIENT)
Dept: MRI IMAGING | Facility: HOSPITAL | Age: 44
Discharge: HOME | End: 2024-02-06
Payer: COMMERCIAL

## 2024-02-06 DIAGNOSIS — G35 MS (MULTIPLE SCLEROSIS) (HCC): ICD-10-CM

## 2024-02-06 PROCEDURE — G1004 CDSM NDSC: HCPCS

## 2024-02-06 PROCEDURE — 70551 MRI BRAIN STEM W/O DYE: CPT

## 2024-03-01 ENCOUNTER — HOSPITAL ENCOUNTER (OUTPATIENT)
Dept: INFUSION CENTER | Facility: HOSPITAL | Age: 44
End: 2024-03-01
Attending: PSYCHIATRY & NEUROLOGY
Payer: COMMERCIAL

## 2024-03-01 DIAGNOSIS — G35 MS (MULTIPLE SCLEROSIS) (HCC): Primary | ICD-10-CM

## 2024-03-01 PROCEDURE — 96365 THER/PROPH/DIAG IV INF INIT: CPT

## 2024-03-01 RX ORDER — SODIUM CHLORIDE 9 MG/ML
20 INJECTION, SOLUTION INTRAVENOUS ONCE
OUTPATIENT
Start: 2024-03-29

## 2024-03-01 RX ORDER — SODIUM CHLORIDE 9 MG/ML
20 INJECTION, SOLUTION INTRAVENOUS ONCE
Status: COMPLETED | OUTPATIENT
Start: 2024-03-01 | End: 2024-03-01

## 2024-03-01 RX ADMIN — SODIUM CHLORIDE 1000 MG: 0.9 INJECTION, SOLUTION INTRAVENOUS at 12:59

## 2024-03-01 RX ADMIN — SODIUM CHLORIDE 20 ML/HR: 0.9 INJECTION, SOLUTION INTRAVENOUS at 12:51

## 2024-03-01 NOTE — PROGRESS NOTES
Patient tolerated IV solumedrol without issues.      Christie Hemphill is aware of future appt on 2/29/24 at 1200.     AVS -  No (Declined by Christie Hemphill)     Patient taken in wheelchair  off unit without incident.  All personal belongings taken with patient.

## 2024-03-05 ENCOUNTER — OFFICE VISIT (OUTPATIENT)
Dept: PAIN MEDICINE | Facility: CLINIC | Age: 44
End: 2024-03-05
Payer: COMMERCIAL

## 2024-03-05 VITALS — SYSTOLIC BLOOD PRESSURE: 91 MMHG | DIASTOLIC BLOOD PRESSURE: 61 MMHG | RESPIRATION RATE: 16 BRPM | HEART RATE: 73 BPM

## 2024-03-05 DIAGNOSIS — G89.4 CHRONIC PAIN SYNDROME: Primary | ICD-10-CM

## 2024-03-05 DIAGNOSIS — M54.2 NECK PAIN: ICD-10-CM

## 2024-03-05 DIAGNOSIS — M54.9 MID BACK PAIN: ICD-10-CM

## 2024-03-05 DIAGNOSIS — G35 MS (MULTIPLE SCLEROSIS) (HCC): ICD-10-CM

## 2024-03-05 DIAGNOSIS — G89.29 CHRONIC MIDLINE LOW BACK PAIN WITHOUT SCIATICA: ICD-10-CM

## 2024-03-05 DIAGNOSIS — G89.29 MUSCULOSKELETAL PAIN, CHRONIC: ICD-10-CM

## 2024-03-05 DIAGNOSIS — M79.18 MUSCULOSKELETAL PAIN, CHRONIC: ICD-10-CM

## 2024-03-05 DIAGNOSIS — M54.50 CHRONIC MIDLINE LOW BACK PAIN WITHOUT SCIATICA: ICD-10-CM

## 2024-03-05 PROCEDURE — 99214 OFFICE O/P EST MOD 30 MIN: CPT | Performed by: NURSE PRACTITIONER

## 2024-03-05 RX ORDER — CELECOXIB 200 MG/1
200 CAPSULE ORAL 2 TIMES DAILY
Qty: 60 CAPSULE | Refills: 2 | Status: SHIPPED | OUTPATIENT
Start: 2024-03-05

## 2024-03-05 NOTE — PROGRESS NOTES
Assessment:  1. Chronic pain syndrome    2. Chronic midline low back pain without sciatica    3. Mid back pain    4. Neck pain    5. Musculoskeletal pain, chronic    6. MS (multiple sclerosis) ([facility])        Plan:  While the patient was in the office today, I did have a thorough conversation regarding their chronic pain syndrome, medication management, and treatment plan options.  Patient is being seen for follow-up visit.  She underwent ultrasound-guided thoracic paraspinal trigger point injections on 12/20/2023.  Overall, her pain has been about 50% improved since the trigger point injections.  We will repeat trigger point injections in the future if needed.    Continue Celebrex 200 mg twice daily with food.  Prescription was sent to her pharmacy with 2 refills.    Follow-up in 6 weeks.      History of Present Illness:  The patient is a 44 y.o. female who presents for a follow up office visit in regards to Back Pain, Neck Pain, and Leg Pain (bilateral).   The patient’s current symptoms include complaints of upper to mid back pain and low back pain.  Current pain level is a 4/10.  She states that pain varies from day-to-day.  She has good and bad days.  Quality pain is described as dull, aching, sharp, throbbing, cramping, pressure-like, shooting, numb, pins-and-needles.    Current pain medications includes: Celebrex 200 mg twice daily.  The patient reports that this regimen is providing unknown % pain relief.  The patient is reporting no side effects from this pain medication regimen.    I have personally reviewed and/or updated the patient's past medical history, past surgical history, family history, social history, current medications, allergies, and vital signs today.         Review of Systems  Review of Systems   Gastrointestinal:  Positive for constipation.   Musculoskeletal:  Positive for back pain, gait problem and myalgias.        Joint stiffness  Decreased ROM     Neurological:         Memory loss   All  other systems reviewed and are negative.          Past Medical History:   Diagnosis Date    Back pain     Chronic constipation 6/27/2016    Chronic pain disorder     Collar bone fracture     right side    Crutches as ambulation aid     Depression     Dyssynergic defecation     Type 1    ETOH abuse     Head injury 2014    Infectious viral hepatitis     Lyme disease     Meningitis spinal 2007?    Multiple sclerosis (HCC)     Opioid abuse (HCC)     Rib fractures     Thrombocytopenia (HCC)     Use of cane as ambulatory aid        Past Surgical History:   Procedure Laterality Date    APPENDECTOMY      CLAVICLE SURGERY      COLONOSCOPY      FL LUMBAR PUNCTURE DIAGNOSTIC  11/25/2020    KNEE ARTHROSCOPY      MULTIPLE TOOTH EXTRACTIONS      GA COLONOSCOPY FLX DX W/COLLJ SPEC WHEN PFRMD N/A 6/27/2016    Procedure: COLONOSCOPY;  Surgeon: Jason Rogers MD;  Location: [facility];  Service: Colorectal    GA OPEN IMPLANTATION CHARITY SACRAL NERVE N/A 11/15/2021    Procedure: INSERTION OF NEUROSTIMULATOR ELECTRODE ARRAY SACRAL NERVE; FLUOROSCOPY; ELECTRONICN ANALYSIS;  Surgeon: Abdulaziz Guevara MD;  Location: [facility];  Service: UroGynecology           TONSILLECTOMY AND ADENOIDECTOMY      TUBAL LIGATION         Family History   Problem Relation Age of Onset    Skin cancer Mother     No Known Problems Brother        Social History     Occupational History    Not on file   Tobacco Use    Smoking status: Every Day     Current packs/day: 1.00     Types: Cigarettes    Smokeless tobacco: Never   Vaping Use    Vaping status: Never Used   Substance and Sexual Activity    Alcohol use: Yes     Alcohol/week: 14.0 standard drinks of alcohol     Types: 14 Shots of liquor per week     Comment: 2 mixed drinks each night    Drug use: No    Sexual activity: Yes     Partners: Male         Current Outpatient Medications:     albendazole (ALBENZA) 200 mg tablet, TAKE 1 & 1/2 TABLETS BY MOUTH TWICE A DAY AS DIRECTED(PLAN LIMIT 4 TABS PER FILL), Disp:  , Rfl:     ascorbic acid (VITAMIN C) 500 mg tablet, Take 500 mg by mouth daily, Disp: , Rfl:     b complex vitamins tablet, Take 1 tablet by mouth daily, Disp: , Rfl:     Biotin 93553 MCG TABS, Take 1 tablet by mouth daily  , Disp: , Rfl:     buprenorphine-naloxone (SUBOXONE) 2-0.5 mg per SL tablet, Place 0.5 tablets under the tongue 3 (three) times a day 2mg  three times a day, Disp: , Rfl:     Calcium Carbonate (CALCIUM 600 PO), Take 600 mg by mouth daily pt reports not having & not taking, Disp: , Rfl:     celecoxib (CeleBREX) 200 mg capsule, Take 1 capsule (200 mg total) by mouth 2 (two) times a day With food, Disp: 60 capsule, Rfl: 2    Cholecalciferol (Vitamin D3) 125 MCG (5000 UT) TABS, Take 5,000 Units by mouth daily pt reports taking 2 tab, Disp: , Rfl:     cloNIDine (CATAPRES) 0.2 mg tablet, Take 0.2 mg by mouth daily at bedtime , Disp: , Rfl:     clopidogrel (PLAVIX) 75 mg tablet, Take 75 mg by mouth every morning, Disp: , Rfl:     clotrimazole-betamethasone (LOTRISONE) 1-0.05 % cream, Apply to affected area 2 times daily prn (Patient taking differently: Apply to affected area 2 times daily prn. pt reports running out & does not have in home 5/18/23.), Disp: 45 g, Rfl: 1    Depo-Testosterone 100 MG/ML IM injection, INJECT 25MG (0.25ML) INTO THE MUSCLE EVERY 2 WEEKS(VIAL ONLY GOOD FOR 28DAYS), Disp: , Rfl:     flavoxATE (URISPAS) 100 mg tablet, TAKE 1 TABLET BY MOUTH 3 TIMES A DAY AS NEEDED FOR BLADDER SPASMS., Disp: 90 tablet, Rfl: 3    Ginkgo Biloba 40 MG TABS, Take 120 mg by mouth daily pt reports taking 2 tabs daily, Disp: , Rfl:     ibuprofen (MOTRIN) 200 mg tablet, Take 200 mg by mouth as needed for mild pain pt reports taking 3-4tabs every 6hrs. , Disp: , Rfl:     lubiprostone (AMITIZA) 24 mcg capsule, Take 24 mcg by mouth 2 (two) times a day with meals pt reports not taking, Disp: , Rfl:     LUTEIN PO, Take by mouth in the morning 20mg on bottle , Disp: , Rfl:     Misc Natural Products (GINSENG  COMPLEX PO), Take 2 tablets by mouth daily 500mg on bottle., Disp: , Rfl:     Movantik 25 MG tablet, Take 25 mg by mouth every evening, Disp: , Rfl:     Multiple Vitamins-Minerals (ZINC PO), Take by mouth, Disp: , Rfl:     Nerve Stimulator (STANDARD TENS) VICKY, by Does not apply route 2 (two) times a day, Disp: 1 Device, Rfl: 0    Nerve Stimulator (TENS THERAPY REPLACE BACK PADS) MISC, 30 pad by Does not apply route 2 (two) times a day, Disp: 30 each, Rfl: 0    Nerve Stimulator VICKY, by Does not apply route 2 (two) times a day, Disp: 1 each, Rfl: 0    Nerve Stimulator Supplies MISC, 30 pad by Does not apply route 2 (two) times a day, Disp: 30 pad, Rfl: 0    Omega-3 Fatty Acids (FISH OIL OMEGA-3 PO), Take 1,200 mg by mouth daily , Disp: , Rfl:     Potassium 99 MG TABS, Take 99 mg by mouth daily pt reports taking 3 tabs daily, Disp: , Rfl:     Premarin 0.625 MG tablet, Take 0.625 mg by mouth daily, Disp: , Rfl:     Probiotic Product (PROBIOTIC DAILY PO), Take 2 tablets by mouth daily  , Disp: , Rfl:     promethazine (PHENERGAN) 50 MG tablet, daily at bedtime 1.5 tabs daily at bedtime, Disp: , Rfl: 1    Restasis 0.05 % ophthalmic emulsion, INSTILL 1 DROP BY OPHTHALMIC ROUTE EVERY 12 HOURS BOTH EYES, Disp: , Rfl:     topiramate (TOPAMAX) 50 MG tablet, Take 50 mg by mouth daily  , Disp: , Rfl: 5    TURMERIC PO, Take by mouth Turmeric Curcumin 500mg on bottle. 1 tab daily, Disp: , Rfl:     ZINC-VITAMIN C PO, Take by mouth pt not taking due to taking regular zinc per pt report on 5/18/23., Disp: , Rfl:     docusate sodium (COLACE) 100 mg capsule, Take 100 mg by mouth 3 (three) times a day   (Patient not taking: Reported on 11/28/2023), Disp: , Rfl:     methylPREDNISolone sodium succinate in sodium chloride 0.9 % 250 mL IVPB, Inject 1,000 mg into a catheter in a vein every 30 (thirty) days. 1x/month, provided at medical office. per EPIC  Indications: MS (Patient not taking: Reported on 1/3/2024), Disp: , Rfl:     NON  FORMULARY, HEMPANOL BRAIN DETOX 200MG . pt reports not taking per 5/18/23. (Patient not taking: Reported on 1/3/2024), Disp: , Rfl:     phenazopyridine (PYRIDIUM) 200 mg tablet, Take 1 tablet (200 mg total) by mouth 3 (three) times a day as needed for bladder spasms (Patient not taking: Reported on 5/12/2023), Disp: 10 tablet, Rfl: 0    selenium sulfide (SELSUN) 2.5 % shampoo, Apply topically daily as needed for dandruff (Patient not taking: Reported on 1/3/2024), Disp: 118 mL, Rfl: 0    Allergies   Allergen Reactions    Penicillins GI Intolerance and Nausea Only     Other reaction(s): Unknown Reaction       Physical Exam:    BP 91/61   Pulse 73   Resp 16     Constitutional:normal, well developed, well nourished, alert, in no distress and non-toxic and no overt pain behavior. and underweight  Eyes:anicteric  HEENT:grossly intact  Neck:supple, symmetric, trachea midline and no masses   Pulmonary:even and unlabored  Cardiovascular:No edema or pitting edema present  Skin:Normal without rashes or lesions and well hydrated  Psychiatric:Mood and affect appropriate  Neurologic:Cranial Nerves II-XII grossly intact  Musculoskeletal:in wheelchair    Imaging      Study Result    Narrative & Impression   MRI LUMBAR SPINE WITHOUT CONTRAST     INDICATION: M54.16: Radiculopathy, lumbar region  M47.816: Spondylosis without myelopathy or radiculopathy, lumbar region  G89.4: Chronic pain syndrome.     COMPARISON: Lumbar spine radiograph 8/7/2023., 11/28/2018 sacrum and coccyx radiograph 11/15/2021. Lumbar puncture 11/25/2020. MRI lumbar spine with and without contrast 7/11/2020.     TECHNIQUE:  Multiplanar, multisequence imaging of the lumbar spine was performed. .        IMAGE QUALITY:  Diagnostic     FINDINGS:     VERTEBRAL BODIES:  There are 5 lumbar type vertebral bodies.  Normal alignment of the lumbar spine.  No spondylolysis or spondylolisthesis. No scoliosis.  No compression fracture.     Mildly bone marrow edema in right  L4 and right L5 pedicles, may represent stress reaction.     L3 and L5 intraosseous vertebral body hemangiomas. Otherwise, normal bone marrow signal.     SACRUM:  Normal signal within the sacrum. No evidence of insufficiency or stress fracture.     DISTAL CORD AND CONUS:  Normal size and signal within the distal cord and conus.     PARASPINAL SOFT TISSUES:  Paraspinal soft tissues are unremarkable.     LOWER THORACIC DISC SPACES: Mild noncompressive lower thoracic degenerative change.     LUMBAR DISC SPACES: Mild multilevel disc height loss, worse at L5-S1.     L1-L2: Normal.     L2-L3: Normal.     L3-L4: Mild facet arthropathy, trace left facet joint effusion. No significant canal stenosis or foraminal narrowing.     L4-L5: Mild diffuse disc bulge. Mild facet arthropathy, small bilateral facet joint effusions (right greater than left). No significant canal stenosis or foraminal narrowing.     L5-S1: Mild diffuse disc bulge, tiny posterior annular fissure. No significant canal stenosis or foraminal narrowing.     OTHER FINDINGS: Small right renal cyst.     IMPRESSION:     Mildly bone marrow edema in right L4 and right L5 pedicles, may represent stress reaction.     Mild degenerative changes of the lumbar spine, as detailed above. No significant canal stenosis or foraminal narrowing.     The study was marked in EPIC for significant notification.        Workstation performed: [id number]              No orders to display       No orders of the defined types were placed in this encounter.

## 2024-03-14 ENCOUNTER — TELEPHONE (OUTPATIENT)
Age: 44
End: 2024-03-14

## 2024-03-14 NOTE — TELEPHONE ENCOUNTER
Caller: Christie    Doctor: dr mackey    Reason for call: pt would like to schedule another procedure before the 30th of this month    Call back#: [phone]

## 2024-03-18 ENCOUNTER — TELEPHONE (OUTPATIENT)
Dept: PAIN MEDICINE | Facility: CLINIC | Age: 44
End: 2024-03-18

## 2024-03-19 ENCOUNTER — TELEPHONE (OUTPATIENT)
Age: 44
End: 2024-03-19

## 2024-03-19 NOTE — TELEPHONE ENCOUNTER
Caller: shannon Soriano    Doctor: Ricardo    Reason for call: pt returning schedulers call    Call back#: [phone]

## 2024-03-27 ENCOUNTER — PROCEDURE VISIT (OUTPATIENT)
Dept: PAIN MEDICINE | Facility: CLINIC | Age: 44
End: 2024-03-27
Payer: COMMERCIAL

## 2024-03-27 DIAGNOSIS — M54.9 MID BACK PAIN: Primary | ICD-10-CM

## 2024-03-27 DIAGNOSIS — G89.4 CHRONIC PAIN SYNDROME: ICD-10-CM

## 2024-03-27 DIAGNOSIS — M79.18 MYOFASCIAL PAIN SYNDROME: ICD-10-CM

## 2024-03-27 PROCEDURE — 76942 ECHO GUIDE FOR BIOPSY: CPT | Performed by: ANESTHESIOLOGY

## 2024-03-27 PROCEDURE — 20553 NJX 1/MLT TRIGGER POINTS 3/>: CPT | Performed by: ANESTHESIOLOGY

## 2024-03-27 RX ORDER — TRIAMCINOLONE ACETONIDE 40 MG/ML
40 INJECTION, SUSPENSION INTRA-ARTICULAR; INTRAMUSCULAR
Status: COMPLETED | OUTPATIENT
Start: 2024-03-27 | End: 2024-03-27

## 2024-03-27 RX ORDER — BUPIVACAINE HYDROCHLORIDE 2.5 MG/ML
10 INJECTION, SOLUTION EPIDURAL; INFILTRATION; INTRACAUDAL
Status: COMPLETED | OUTPATIENT
Start: 2024-03-27 | End: 2024-03-27

## 2024-03-27 RX ADMIN — BUPIVACAINE HYDROCHLORIDE 10 ML: 2.5 INJECTION, SOLUTION EPIDURAL; INFILTRATION; INTRACAUDAL at 14:00

## 2024-03-27 RX ADMIN — TRIAMCINOLONE ACETONIDE 40 MG: 40 INJECTION, SUSPENSION INTRA-ARTICULAR; INTRAMUSCULAR at 14:00

## 2024-03-27 NOTE — PATIENT INSTRUCTIONS
Do not apply heat to any area that is numb. If you have discomfort or soreness at the injection site, you may apply ice today, 20 minutes on and 20 minutes off. Tomorrow you may use ice or warm, moist heat. Do not apply ice or heat directly to the skin.  If you experience severe shortness of breath, go to the Emergency Room.  You may have numbness for several hours from the local anesthetic. Please use caution and common sense, especially with weight-bearing activities.  You may have an increase or change in the discomfort for 36-48 hours after your treatment. Apply ice and continue with any pain medicine you have been prescribed.  Do not do anything strenuous today. You may shower, but no tub baths or hot tubs today. You may resume your normal activities tomorrow, but do not “overdo it”. Resume normal activities slowly when you are feeling better.  If you experience redness, drainage or swelling at the injection site, or if you develop a fever above 100 degrees, please call [facility] at [phone] or go to the Emergency Room.  Continue to take all routine medicines prescribed by your primary care physician unless otherwise instructed by our staff. Most blood thinners should be started again according to your regularly scheduled dosing. If you have any questions, please give our office a call.    As no general anesthesia was used in today's procedure, you should not experience any side effects related to anesthesia.       If you have a problem specifically related to your procedure, please call our office at [phone].  Problems not related to your procedure should be directed to your primary care physician.

## 2024-03-27 NOTE — PROGRESS NOTES
" Universal Protocol:  Consent: Verbal consent obtained. Written consent obtained.  Risks and benefits: risks, benefits and alternatives were discussed  Consent given by: patient  Time out: Immediately prior to procedure a \"time out\" was called to verify the correct patient, procedure, equipment, support staff and site/side marked as required.  Timeout called at: 3/27/2024 2:27 PM.  Patient understanding: patient states understanding of the procedure being performed  Patient consent: the patient's understanding of the procedure matches consent given  Procedure consent: procedure consent matches procedure scheduled  Relevant documents: relevant documents present and verified  Test results: test results available and properly labeled  Site marked: the operative site was marked  Radiology Images displayed and confirmed. If images not available, report reviewed: imaging studies not available  Required items: required blood products, implants, devices, and special equipment available  Patient identity confirmed: verbally with patient  Supporting Documentation  Indications: pain   Trigger Point Injections: multiple trigger points: 3 or more muscle groups    Injection site identified by: ultrasound  Procedure Details  Location(s):    Middle Back: R thoracic paraspinals, R latissimus dorsi, R lower trapezius and R longissimus     Prep: patient was prepped and draped in usual sterile fashion  Needle size: 22 G  Medications: 10 mL bupivacaine (PF) 0.25 %; 40 mg triamcinolone acetonide 40 mg/mL  Patient tolerance: patient tolerated the procedure well with no immediate complications          "

## 2024-03-29 ENCOUNTER — HOSPITAL ENCOUNTER (OUTPATIENT)
Dept: INFUSION CENTER | Facility: HOSPITAL | Age: 44
End: 2024-03-29
Attending: PSYCHIATRY & NEUROLOGY
Payer: COMMERCIAL

## 2024-03-29 VITALS
TEMPERATURE: 98.4 F | DIASTOLIC BLOOD PRESSURE: 68 MMHG | HEART RATE: 78 BPM | RESPIRATION RATE: 18 BRPM | SYSTOLIC BLOOD PRESSURE: 130 MMHG

## 2024-03-29 DIAGNOSIS — G35 MS (MULTIPLE SCLEROSIS) (HCC): Primary | ICD-10-CM

## 2024-03-29 PROCEDURE — 96365 THER/PROPH/DIAG IV INF INIT: CPT

## 2024-03-29 RX ORDER — SODIUM CHLORIDE 9 MG/ML
20 INJECTION, SOLUTION INTRAVENOUS ONCE
OUTPATIENT
Start: 2024-04-26

## 2024-03-29 RX ORDER — SODIUM CHLORIDE 9 MG/ML
20 INJECTION, SOLUTION INTRAVENOUS ONCE
Status: COMPLETED | OUTPATIENT
Start: 2024-03-29 | End: 2024-03-29

## 2024-03-29 RX ADMIN — SODIUM CHLORIDE 1000 MG: 0.9 INJECTION, SOLUTION INTRAVENOUS at 12:14

## 2024-03-29 RX ADMIN — SODIUM CHLORIDE 20 ML/HR: 0.9 INJECTION, SOLUTION INTRAVENOUS at 12:14

## 2024-03-29 NOTE — PLAN OF CARE
Problem: Potential for Falls  Goal: Patient will remain free of falls  Description: INTERVENTIONS:  - Educate patient/family on patient safety including physical limitations  - Instruct patient to call for assistance with activity   - Consult OT/PT to assist with strengthening/mobility   - Keep Call bell within reach  - Keep bed low and locked with side rails adjusted as appropriate  - Keep care items and personal belongings within reach  - Initiate and maintain comfort rounds  - Make Fall Risk Sign visible to staff  - Consider moving patient to room near nurses station  Outcome: Progressing     Problem: INFECTION - ADULT  Goal: Absence or prevention of progression during hospitalization  Description: INTERVENTIONS:  - Assess and monitor for signs and symptoms of infection  - Monitor lab/diagnostic results  - Monitor all insertion sites, i.e. indwelling lines, tubes, and drains  - Monitor endotracheal if appropriate and nasal secretions for changes in amount and color  - [place] appropriate cooling/warming therapies per order  - Administer medications as ordered  - Instruct and encourage patient and family to use good hand hygiene technique  - Identify and instruct in appropriate isolation precautions for identified infection/condition  Outcome: Progressing     Problem: Knowledge Deficit  Goal: Patient/family/caregiver demonstrates understanding of disease process, treatment plan, medications, and discharge instructions  Description: Complete learning assessment and assess knowledge base.  Interventions:  - Provide teaching at level of understanding  - Provide teaching via preferred learning methods  Outcome: Progressing

## 2024-03-29 NOTE — PROGRESS NOTES
Pt tolerated IV Solumedrol well with no issues. Peripheral IV removed without incident.     Christie Hemphill is aware of future appt on 4/24/24 at 2:30PM.     AVS declined by Christie Hemphill.    Pt discharged off unit in w/c by RN in stable condition.

## 2024-04-03 ENCOUNTER — TELEPHONE (OUTPATIENT)
Dept: PAIN MEDICINE | Facility: CLINIC | Age: 44
End: 2024-04-03

## 2024-04-03 NOTE — TELEPHONE ENCOUNTER
Caller: Christie   Doctor/office: dr mackey   CB#: [phone]    % of improvement: 50% and Up   Pain Scale (1-10): pt advises that it varies

## 2024-04-24 ENCOUNTER — HOSPITAL ENCOUNTER (OUTPATIENT)
Dept: INFUSION CENTER | Facility: HOSPITAL | Age: 44
Discharge: HOME | End: 2024-04-24
Attending: PSYCHIATRY & NEUROLOGY
Payer: COMMERCIAL

## 2024-04-24 VITALS
SYSTOLIC BLOOD PRESSURE: 119 MMHG | HEART RATE: 77 BPM | RESPIRATION RATE: 16 BRPM | DIASTOLIC BLOOD PRESSURE: 65 MMHG | TEMPERATURE: 97.7 F | OXYGEN SATURATION: 95 %

## 2024-04-24 DIAGNOSIS — G35 MS (MULTIPLE SCLEROSIS) (HCC): Primary | ICD-10-CM

## 2024-04-24 PROCEDURE — 96365 THER/PROPH/DIAG IV INF INIT: CPT

## 2024-04-24 RX ORDER — NEEDLES, DISPOSABLE 25GX5/8"
NEEDLE, DISPOSABLE MISCELLANEOUS
COMMUNITY
Start: 2024-03-20

## 2024-04-24 RX ORDER — LUBIPROSTONE 8 UG/1
8 CAPSULE ORAL 3 TIMES DAILY
COMMUNITY
Start: 2024-03-20

## 2024-04-24 RX ORDER — SODIUM CHLORIDE 9 MG/ML
20 INJECTION, SOLUTION INTRAVENOUS ONCE
OUTPATIENT
Start: 2024-04-26

## 2024-04-24 RX ORDER — TESTOSTERONE CYPIONATE 200 MG/ML
INJECTION, SOLUTION INTRAMUSCULAR
COMMUNITY
Start: 2024-03-08

## 2024-04-24 RX ORDER — SODIUM CHLORIDE 9 MG/ML
20 INJECTION, SOLUTION INTRAVENOUS ONCE
Status: COMPLETED | OUTPATIENT
Start: 2024-04-24 | End: 2024-04-24

## 2024-04-24 RX ADMIN — SODIUM CHLORIDE 1000 MG: 0.9 INJECTION, SOLUTION INTRAVENOUS at 14:41

## 2024-04-24 RX ADMIN — SODIUM CHLORIDE 20 ML/HR: 0.9 INJECTION, SOLUTION INTRAVENOUS at 14:41

## 2024-04-24 NOTE — PROGRESS NOTES
Christie Joby tolerated Solumedrol treatment well with no complications.      Christie Joby is aware of future appt on 5/22/24 at 1100.     AVS declined.

## 2024-04-26 ENCOUNTER — OFFICE VISIT (OUTPATIENT)
Dept: PAIN MEDICINE | Facility: CLINIC | Age: 44
End: 2024-04-26
Payer: COMMERCIAL

## 2024-04-26 VITALS
DIASTOLIC BLOOD PRESSURE: 69 MMHG | WEIGHT: 93 LBS | BODY MASS INDEX: 15.88 KG/M2 | SYSTOLIC BLOOD PRESSURE: 107 MMHG | HEIGHT: 64 IN | RESPIRATION RATE: 16 BRPM | HEART RATE: 77 BPM

## 2024-04-26 DIAGNOSIS — M54.50 CHRONIC MIDLINE LOW BACK PAIN WITHOUT SCIATICA: ICD-10-CM

## 2024-04-26 DIAGNOSIS — G89.4 CHRONIC PAIN SYNDROME: Primary | ICD-10-CM

## 2024-04-26 DIAGNOSIS — M54.9 MID BACK PAIN: ICD-10-CM

## 2024-04-26 DIAGNOSIS — G89.29 CHRONIC MIDLINE LOW BACK PAIN WITHOUT SCIATICA: ICD-10-CM

## 2024-04-26 DIAGNOSIS — G89.29 MUSCULOSKELETAL PAIN, CHRONIC: ICD-10-CM

## 2024-04-26 DIAGNOSIS — M79.18 MUSCULOSKELETAL PAIN, CHRONIC: ICD-10-CM

## 2024-04-26 PROCEDURE — 99213 OFFICE O/P EST LOW 20 MIN: CPT | Performed by: NURSE PRACTITIONER

## 2024-04-26 NOTE — PROGRESS NOTES
Assessment:  1. Chronic pain syndrome    2. Chronic midline low back pain without sciatica    3. Mid back pain    4. Musculoskeletal pain, chronic        Plan:  While the patient was in the office today, I did have a thorough conversation regarding their chronic pain syndrome, medication management, and treatment plan options.  Patient is being seen for a follow-up visit.  She recently underwent right-sided thoracic trigger point injections on 3/27/2024.  She is reporting more than 75% improvement in her pain.  Her pain has become pretty tolerable at this time.  We will repeat trigger point injections in the future when needed.    Continue Celebrex 200 mg twice daily with food.  She did not require refill of this medication during today's visit.    Follow-up in 6 weeks.  Call sooner if her pain increases.      History of Present Illness:  The patient is a 44 y.o. female who presents for a follow up office visit in regards to Back Pain, Neck Pain, and Leg Pain (bilateral).   The patient’s current symptoms include complaints of mid back pain, upper back pain.  Current pain level is a 0/10.  Pain varies from day-to-day and with activity.  Quality pain is described as sharp, shooting.    Current pain medications includes: Celebrex 200 mg twice daily.  The patient reports that this regimen is providing up to 75% % pain relief.  The patient is reporting no side effects from this pain medication regimen.    I have personally reviewed and/or updated the patient's past medical history, past surgical history, family history, social history, current medications, allergies, and vital signs today.         Review of Systems  Review of Systems   Gastrointestinal:  Positive for constipation.   Musculoskeletal:  Positive for back pain, gait problem and myalgias.        Decreased ROM  Joint stiffness  Pain (all joints)   Skin:  Positive for rash.   Neurological:         Memory loss   All other systems reviewed and are  negative.          Past Medical History:   Diagnosis Date    Back pain     Chronic constipation 6/27/2016    Chronic pain disorder     Collar bone fracture     right side    Crutches as ambulation aid     Depression     Dyssynergic defecation     Type 1    ETOH abuse     Head injury 2014    Infectious viral hepatitis     Lyme disease     Meningitis spinal 2007?    Multiple sclerosis (HCC)     Opioid abuse (HCC)     Rib fractures     Thrombocytopenia (HCC)     Use of cane as ambulatory aid        Past Surgical History:   Procedure Laterality Date    APPENDECTOMY      CLAVICLE SURGERY      COLONOSCOPY      FL LUMBAR PUNCTURE DIAGNOSTIC  11/25/2020    KNEE ARTHROSCOPY      MULTIPLE TOOTH EXTRACTIONS      NJ COLONOSCOPY FLX DX W/COLLJ SPEC WHEN PFRMD N/A 6/27/2016    Procedure: COLONOSCOPY;  Surgeon: Jason Rogers MD;  Location: [facility];  Service: Colorectal    NJ OPEN IMPLANTATION CHARITY SACRAL NERVE N/A 11/15/2021    Procedure: INSERTION OF NEUROSTIMULATOR ELECTRODE ARRAY SACRAL NERVE; FLUOROSCOPY; ELECTRONICN ANALYSIS;  Surgeon: Abdulaziz Guevara MD;  Location: [facility];  Service: UroGynecology           TONSILLECTOMY AND ADENOIDECTOMY      TUBAL LIGATION         Family History   Problem Relation Age of Onset    Skin cancer Mother     No Known Problems Brother        Social History     Occupational History    Not on file   Tobacco Use    Smoking status: Every Day     Current packs/day: 1.00     Types: Cigarettes    Smokeless tobacco: Never   Vaping Use    Vaping status: Never Used   Substance and Sexual Activity    Alcohol use: Yes     Alcohol/week: 14.0 standard drinks of alcohol     Types: 14 Shots of liquor per week     Comment: 2 mixed drinks each night    Drug use: No    Sexual activity: Yes     Partners: Male         Current Outpatient Medications:     albendazole (ALBENZA) 200 mg tablet, TAKE 1 & 1/2 TABLETS BY MOUTH TWICE A DAY AS DIRECTED(PLAN LIMIT 4 TABS PER FILL), Disp: , Rfl:     ascorbic acid  "(VITAMIN C) 500 mg tablet, Take 500 mg by mouth daily, Disp: , Rfl:     b complex vitamins tablet, Take 1 tablet by mouth daily, Disp: , Rfl:     B-D HYPODERMIC NEEDLE 23GX1\" 23G X 1\" MISC, USE ONE WEEKLY, Disp: , Rfl:     Biotin 64950 MCG TABS, Take 1 tablet by mouth daily  , Disp: , Rfl:     buprenorphine-naloxone (SUBOXONE) 2-0.5 mg per SL tablet, Place 0.5 tablets under the tongue 3 (three) times a day 2mg  three times a day, Disp: , Rfl:     Calcium Carbonate (CALCIUM 600 PO), Take 600 mg by mouth daily pt reports not having & not taking, Disp: , Rfl:     celecoxib (CeleBREX) 200 mg capsule, Take 1 capsule (200 mg total) by mouth 2 (two) times a day With food, Disp: 60 capsule, Rfl: 2    Cholecalciferol (Vitamin D3) 125 MCG (5000 UT) TABS, Take 5,000 Units by mouth daily pt reports taking 2 tab, Disp: , Rfl:     cloNIDine (CATAPRES) 0.2 mg tablet, Take 0.2 mg by mouth daily at bedtime , Disp: , Rfl:     clopidogrel (PLAVIX) 75 mg tablet, Take 75 mg by mouth every morning, Disp: , Rfl:     clotrimazole-betamethasone (LOTRISONE) 1-0.05 % cream, Apply to affected area 2 times daily prn (Patient taking differently: Apply to affected area 2 times daily prn. pt reports running out & does not have in home 5/18/23.), Disp: 45 g, Rfl: 1    Depo-Testosterone 100 MG/ML IM injection, INJECT 25MG (0.25ML) INTO THE MUSCLE EVERY 2 WEEKS(VIAL ONLY GOOD FOR 28DAYS), Disp: , Rfl:     flavoxATE (URISPAS) 100 mg tablet, TAKE 1 TABLET BY MOUTH 3 TIMES A DAY AS NEEDED FOR BLADDER SPASMS., Disp: 90 tablet, Rfl: 3    Ginkgo Biloba 40 MG TABS, Take 120 mg by mouth daily pt reports taking 2 tabs daily, Disp: , Rfl:     ibuprofen (MOTRIN) 200 mg tablet, Take 200 mg by mouth as needed for mild pain pt reports taking 3-4tabs every 6hrs. , Disp: , Rfl:     lubiprostone (AMITIZA) 24 mcg capsule, Take 24 mcg by mouth 2 (two) times a day with meals pt reports not taking, Disp: , Rfl:     lubiprostone (AMITIZA) 8 mcg capsule, Take 8 mcg by " mouth 3 (three) times a day, Disp: , Rfl:     LUTEIN PO, Take by mouth in the morning 20mg on bottle , Disp: , Rfl:     Misc Natural Products (GINSENG COMPLEX PO), Take 2 tablets by mouth daily 500mg on bottle., Disp: , Rfl:     Movantik 25 MG tablet, Take 25 mg by mouth every evening, Disp: , Rfl:     Multiple Vitamins-Minerals (ZINC PO), Take by mouth, Disp: , Rfl:     Nerve Stimulator (STANDARD TENS) VICKY, by Does not apply route 2 (two) times a day, Disp: 1 Device, Rfl: 0    Nerve Stimulator (TENS THERAPY REPLACE BACK PADS) MISC, 30 pad by Does not apply route 2 (two) times a day, Disp: 30 each, Rfl: 0    Nerve Stimulator VICKY, by Does not apply route 2 (two) times a day, Disp: 1 each, Rfl: 0    Nerve Stimulator Supplies MISC, 30 pad by Does not apply route 2 (two) times a day, Disp: 30 pad, Rfl: 0    Omega-3 Fatty Acids (FISH OIL OMEGA-3 PO), Take 1,200 mg by mouth daily , Disp: , Rfl:     Potassium 99 MG TABS, Take 99 mg by mouth daily pt reports taking 3 tabs daily, Disp: , Rfl:     Premarin 0.625 MG tablet, Take 0.625 mg by mouth daily, Disp: , Rfl:     Probiotic Product (PROBIOTIC DAILY PO), Take 2 tablets by mouth daily  , Disp: , Rfl:     promethazine (PHENERGAN) 50 MG tablet, daily at bedtime 1.5 tabs daily at bedtime, Disp: , Rfl: 1    Restasis 0.05 % ophthalmic emulsion, INSTILL 1 DROP BY OPHTHALMIC ROUTE EVERY 12 HOURS BOTH EYES, Disp: , Rfl:     testosterone cypionate (DEPO-TESTOSTERONE) 200 mg/mL SOLN, 25 MG (0.125ML) IM ONCE EVERY 7 DAYS, Disp: , Rfl:     topiramate (TOPAMAX) 50 MG tablet, Take 50 mg by mouth daily  , Disp: , Rfl: 5    TURMERIC PO, Take by mouth Turmeric Curcumin 500mg on bottle. 1 tab daily, Disp: , Rfl:     ZINC-VITAMIN C PO, Take by mouth pt not taking due to taking regular zinc per pt report on 5/18/23., Disp: , Rfl:     docusate sodium (COLACE) 100 mg capsule, Take 100 mg by mouth 3 (three) times a day   (Patient not taking: Reported on 11/28/2023), Disp: , Rfl:      "methylPREDNISolone sodium succinate in sodium chloride 0.9 % 250 mL IVPB, Inject 1,000 mg into a catheter in a vein every 30 (thirty) days. 1x/month, provided at medical office. per EPIC  Indications: MS (Patient not taking: Reported on 1/3/2024), Disp: , Rfl:     NON FORMULARY, HEMPANOL BRAIN DETOX 200MG . pt reports not taking per 5/18/23. (Patient not taking: Reported on 1/3/2024), Disp: , Rfl:     phenazopyridine (PYRIDIUM) 200 mg tablet, Take 1 tablet (200 mg total) by mouth 3 (three) times a day as needed for bladder spasms (Patient not taking: Reported on 5/12/2023), Disp: 10 tablet, Rfl: 0    selenium sulfide (SELSUN) 2.5 % shampoo, Apply topically daily as needed for dandruff (Patient not taking: Reported on 1/3/2024), Disp: 118 mL, Rfl: 0  No current facility-administered medications for this visit.    Facility-Administered Medications Ordered in Other Visits:     alteplase (CATHFLO) injection 2 mg, 2 mg, Intracatheter, Q1MIN PRN, Concepcion Beth MD    Allergies   Allergen Reactions    Penicillins GI Intolerance and Nausea Only     Other reaction(s): Unknown Reaction       Physical Exam:    /69   Pulse 77   Resp 16   Ht 5' 4\" (1.626 m)   Wt 42.2 kg (93 lb)   BMI 15.96 kg/m²     Constitutional:normal, well developed, well nourished, alert, in no distress and non-toxic and no overt pain behavior. and underweight  Eyes:anicteric  HEENT:grossly intact  Neck:supple, symmetric, trachea midline and no masses   Pulmonary:even and unlabored  Cardiovascular:No edema or pitting edema present  Skin:Normal without rashes or lesions and well hydrated  Psychiatric:Mood and affect appropriate  Neurologic:Cranial Nerves II-XII grossly intact  Musculoskeletal: Relates with a walker.    Imaging      Study Result    Narrative & Impression   MRI LUMBAR SPINE WITHOUT CONTRAST     INDICATION: M54.16: Radiculopathy, lumbar region  M47.816: Spondylosis without myelopathy or radiculopathy, lumbar region  G89.4: " Chronic pain syndrome.     COMPARISON: Lumbar spine radiograph 8/7/2023., 11/28/2018 sacrum and coccyx radiograph 11/15/2021. Lumbar puncture 11/25/2020. MRI lumbar spine with and without contrast 7/11/2020.     TECHNIQUE:  Multiplanar, multisequence imaging of the lumbar spine was performed. .        IMAGE QUALITY:  Diagnostic     FINDINGS:     VERTEBRAL BODIES:  There are 5 lumbar type vertebral bodies.  Normal alignment of the lumbar spine.  No spondylolysis or spondylolisthesis. No scoliosis.  No compression fracture.     Mildly bone marrow edema in right L4 and right L5 pedicles, may represent stress reaction.     L3 and L5 intraosseous vertebral body hemangiomas. Otherwise, normal bone marrow signal.     SACRUM:  Normal signal within the sacrum. No evidence of insufficiency or stress fracture.     DISTAL CORD AND CONUS:  Normal size and signal within the distal cord and conus.     PARASPINAL SOFT TISSUES:  Paraspinal soft tissues are unremarkable.     LOWER THORACIC DISC SPACES: Mild noncompressive lower thoracic degenerative change.     LUMBAR DISC SPACES: Mild multilevel disc height loss, worse at L5-S1.     L1-L2: Normal.     L2-L3: Normal.     L3-L4: Mild facet arthropathy, trace left facet joint effusion. No significant canal stenosis or foraminal narrowing.     L4-L5: Mild diffuse disc bulge. Mild facet arthropathy, small bilateral facet joint effusions (right greater than left). No significant canal stenosis or foraminal narrowing.     L5-S1: Mild diffuse disc bulge, tiny posterior annular fissure. No significant canal stenosis or foraminal narrowing.     OTHER FINDINGS: Small right renal cyst.     IMPRESSION:     Mildly bone marrow edema in right L4 and right L5 pedicles, may represent stress reaction.     Mild degenerative changes of the lumbar spine, as detailed above. No significant canal stenosis or foraminal narrowing.     The study was marked in EPIC for significant notification.         Workstation performed: [id number]            No orders to display       No orders of the defined types were placed in this encounter.

## 2024-05-01 DIAGNOSIS — N31.9 NEUROGENIC BLADDER: ICD-10-CM

## 2024-05-01 DIAGNOSIS — R39.11 URINARY HESITANCY: ICD-10-CM

## 2024-05-01 RX ORDER — FLAVOXATE HYDROCHLORIDE 100 MG/1
100 TABLET ORAL 3 TIMES DAILY PRN
Qty: 90 TABLET | Refills: 3 | Status: SHIPPED | OUTPATIENT
Start: 2024-05-01

## 2024-05-12 ENCOUNTER — TELEPHONE (OUTPATIENT)
Dept: NEUROLOGY | Facility: CLINIC | Age: 44
End: 2024-05-12

## 2024-05-12 NOTE — TELEPHONE ENCOUNTER
Provider is not available on that date - please offer re-scheduling.  No MA available to closely follow changes in the schedule

## 2024-05-13 NOTE — TELEPHONE ENCOUNTER
Called patient, left vm to reschedule. Sent MyChart msg. When patient returns call, please reschedule.

## 2024-05-13 NOTE — TELEPHONE ENCOUNTER
Called and spoke to patient. Rescheduled to 5/28. Patient prefers later in the day appointments in [place].

## 2024-05-22 ENCOUNTER — HOSPITAL ENCOUNTER (OUTPATIENT)
Dept: INFUSION CENTER | Facility: HOSPITAL | Age: 44
Discharge: HOME | End: 2024-05-22
Attending: PSYCHIATRY & NEUROLOGY
Payer: COMMERCIAL

## 2024-05-22 VITALS
DIASTOLIC BLOOD PRESSURE: 59 MMHG | SYSTOLIC BLOOD PRESSURE: 104 MMHG | TEMPERATURE: 97.2 F | RESPIRATION RATE: 16 BRPM | OXYGEN SATURATION: 97 % | HEART RATE: 73 BPM

## 2024-05-22 DIAGNOSIS — G35 MS (MULTIPLE SCLEROSIS) (HCC): Primary | ICD-10-CM

## 2024-05-22 PROCEDURE — 96365 THER/PROPH/DIAG IV INF INIT: CPT

## 2024-05-22 RX ORDER — SODIUM CHLORIDE 9 MG/ML
20 INJECTION, SOLUTION INTRAVENOUS ONCE
Status: COMPLETED | OUTPATIENT
Start: 2024-05-22 | End: 2024-05-22

## 2024-05-22 RX ORDER — SODIUM CHLORIDE 9 MG/ML
20 INJECTION, SOLUTION INTRAVENOUS ONCE
Status: CANCELLED | OUTPATIENT
Start: 2024-06-21

## 2024-05-22 RX ADMIN — SODIUM CHLORIDE 1000 MG: 0.9 INJECTION, SOLUTION INTRAVENOUS at 11:31

## 2024-05-22 RX ADMIN — SODIUM CHLORIDE 20 ML/HR: 0.9 INJECTION, SOLUTION INTRAVENOUS at 11:36

## 2024-05-22 NOTE — PROGRESS NOTES
Christie Joby tolerated IV Solumedrol well with no complications.      Christie Hemphill is aware of future appt on 06/19/24 at 1100.     AVS declined.

## 2024-05-28 ENCOUNTER — OFFICE VISIT (OUTPATIENT)
Dept: NEUROLOGY | Facility: CLINIC | Age: 44
End: 2024-05-28
Payer: COMMERCIAL

## 2024-05-28 VITALS
SYSTOLIC BLOOD PRESSURE: 110 MMHG | HEIGHT: 64 IN | BODY MASS INDEX: 15.96 KG/M2 | HEART RATE: 79 BPM | DIASTOLIC BLOOD PRESSURE: 72 MMHG

## 2024-05-28 DIAGNOSIS — R26.2 AMBULATORY DYSFUNCTION: ICD-10-CM

## 2024-05-28 DIAGNOSIS — E46 MALNUTRITION COMPROMISING BODILY FUNCTION (HCC): ICD-10-CM

## 2024-05-28 DIAGNOSIS — G35 MS (MULTIPLE SCLEROSIS) (HCC): Primary | ICD-10-CM

## 2024-05-28 PROCEDURE — 99214 OFFICE O/P EST MOD 30 MIN: CPT | Performed by: PSYCHIATRY & NEUROLOGY

## 2024-05-28 NOTE — PROGRESS NOTES
Patient ID: Christie Hemphill is a 44 y.o. female.    Assessment/Plan:           Problem List Items Addressed This Visit          Nervous and Auditory    MS (multiple sclerosis) ([facility]) - Primary       Care Coordination    Ambulatory dysfunction       Other    Malnutrition compromising bodily function ([facility])        Mrs. Hemphill has presented to [facility] multiple sclerosis center for follow-up on multiple sclerosis and related concerns.  Patient describes sensorimotor dysfunction but patient has profound malnutrition with diffuse muscle wasting which resulted of progressive weakness in upper lower extremity and muscle atrophy.    Patient states that she ambulates with a walker.  Patient stated using testosterone treatment helps with some energy level.  Patient has been offered Solu-Medrol 1 g on monthly basis as she is not on disease modifying regimen considering previous experience.  Patient stated to using Tysabri within 8 months and then follow-up with ofatumumab for 6 months maid her disabled, despite regimens has high efficacy and protect the patient from disability progression as well as MS relapses.    Patient has limited energy to accommodate regular physical therapy.  Patient will be trying to do bike in her house.    We also discussed potential for the outcomes if patient will transition to second stage of multiple sclerosis.  We discussed disease modifying therapy available on the market with interested was to consider Mavenclad which can be used for several weeks a year.  I am aware of underlying liver disorder due to alcoholism.  Patient has been taking Suboxone with GI dysfunction has been discussed today as well.    We personally reviewed patient brain MRI completed in 2024, IV agreed patient has multiple diminutive plaques in cerebellum but otherwise she has no significant demyelination in her brain parenchyma.  Patient has extensive white matter disease in her cervical and thoracic spine.  Imaging of the  spine done in 2022.    Patient is to continue following with [facility] neurology, follow-up will be scheduled with Janelle within 6 months.      Subjective: Multiple sclerosis and related concerns    HPI  Mrs. Hemphill has presented to [facility] multiple sclerosis center for follow-up.  Patient has been taking Solu-Medrol 1000 mg on monthly basis with no significant side effects been described.  Patient has slightly improving platelet level; CBC/CMP been noted and personally reviewed with the patient.  Today's complaints are:nothing has changed and she isn't any better since her last visit. She did say they started her on testosterone. She did not go back to PT she said it made her worse the last time she did it.    Patient is significantly emancipated with signs of malnutrition and profound muscle atrophy noted on exam.  Patient has no energy to accommodate regular physical activity or ambulation.  Patient stated she has good appetite.    Patient does have improvement which she believes after testosterone treatment, patient was not able to tolerate estrogen.    Leg swelling has been noted today but not much concerning.  Patient stated she is comfortable to go enema due to bowel dysfunction likely due to chronic opioid use such as Suboxone.    Patient completed brain MRI in February 2024 and she is here today to discuss current findings.  Patient describes no new focal neurological deficit but progressive worsening of ambulatory dysfunction with known multiple sclerosis and lower extremity weakness; patient is not interested in disease modifying regimen due to potential side effects; patient also present malnourished with profound muscle atrophy including her lower extremities with abnormal posture and poor endurance;  This time patient described having pain in the groin hips and anterior thigh where she had increased dose of Suboxone by primary care team; patient has known history of alcoholism in the past; patient  taking Topamax with potential side effects as a weight loss discussed.     Patient completed DEXA scan with no signs of profound osteoporosis appreciated.  Patient is interested to continue monthly Solu-Medrol 1 g infusion.  Patient requested to increase frequency of Solu-Medrol to twice monthly, we may consider Solu-Medrol 500 mg twice monthly;     Patient is to consider starting physical therapy with good patient therapy in her house due to her disability.  Patient is to follow-up with primary team for additional serological work-up if clinically advised; no disease modifying regimen treatment choices offered during this visit.  Patient is to consider x-ray of the hips and pelvis on her left and the right side.     Patient had help from her kids who are 16, 18 and 21-year-old;         The following portions of the patient's history were reviewed and updated as appropriate: She  has a past medical history of Back pain, Chronic constipation (6/27/2016), Chronic pain disorder, Collar bone fracture, Crutches as ambulation aid, Depression, Dyssynergic defecation, ETOH abuse, Head injury (2014), Infectious viral hepatitis, Lyme disease, Meningitis spinal (2007?), Multiple sclerosis ([facility]), Opioid abuse ([facility]), Rib fractures, Thrombocytopenia ([facility]), and Use of cane as ambulatory aid.  She   Patient Active Problem List    Diagnosis Date Noted    Malnutrition compromising bodily function ([facility]) 05/28/2024    Chronic pain syndrome 03/05/2024    Mid back pain 01/23/2024    Pelvic pain in female 05/12/2023    ETOH abuse     Smoking 11/14/2021    Thrombocytopenia ([facility]) 01/12/2021    History of Lyme disease 11/10/2020    Cervical myelopathy ([facility]) 11/10/2020    MS (multiple sclerosis) ([facility]) 11/10/2020    Neurogenic bladder 11/10/2020    Ambulatory dysfunction 11/10/2020    Unspecified abnormalities of gait and mobility 06/18/2020    Balance problems 06/18/2020    Chronic midline low back pain without sciatica 06/18/2020    Chronic  "constipation 06/27/2016    Encounter for screening colonoscopy 06/27/2016    IBS (irritable bowel syndrome) 07/15/2015    Chronic pain due to trauma 10/28/2014    Musculoskeletal pain, chronic 10/28/2014    Insomnia 07/08/2014    Leukopenia 11/12/2013    Alcoholism ([facility]) 11/08/2013    Lyme disease 11/08/2013    Nondependent opioid abuse in remission ([facility]) 11/08/2013     She  has a past surgical history that includes Clavicle surgery; Knee arthroscopy; Tonsillectomy and adenoidectomy; Appendectomy; Tubal ligation; pr colonoscopy flx dx w/collj spec when pfrmd (N/A, 6/27/2016); Multiple tooth extractions; [place] lumbar puncture diagnostic (11/25/2020); Colonoscopy; and pr open implantation jennifer sacral nerve (N/A, 11/15/2021).  Her family history includes No Known Problems in her brother; Skin cancer in her mother.  She  reports that she has been smoking cigarettes. She has never used smokeless tobacco. She reports current alcohol use of about 14.0 standard drinks of alcohol per week. She reports that she does not use drugs.  Current Outpatient Medications   Medication Sig Dispense Refill    albendazole (ALBENZA) 200 mg tablet TAKE 1 & 1/2 TABLETS BY MOUTH TWICE A DAY AS DIRECTED(PLAN LIMIT 4 TABS PER FILL)      ascorbic acid (VITAMIN C) 500 mg tablet Take 500 mg by mouth daily      b complex vitamins tablet Take 1 tablet by mouth daily      B-D HYPODERMIC NEEDLE 23GX1\" 23G X 1\" MISC USE ONE WEEKLY      Biotin 38721 MCG TABS Take 1 tablet by mouth daily        buprenorphine-naloxone (SUBOXONE) 2-0.5 mg per SL tablet Place 0.5 tablets under the tongue 3 (three) times a day 2mg  three times a day      Calcium Carbonate (CALCIUM 600 PO) Take 600 mg by mouth daily pt reports not having & not taking      celecoxib (CeleBREX) 200 mg capsule Take 1 capsule (200 mg total) by mouth 2 (two) times a day With food 60 capsule 2    Cholecalciferol (Vitamin D3) 125 MCG (5000 UT) TABS Take 5,000 Units by mouth daily pt reports taking 2 tab "      cloNIDine (CATAPRES) 0.2 mg tablet Take 0.2 mg by mouth daily at bedtime       clopidogrel (PLAVIX) 75 mg tablet Take 75 mg by mouth every morning      clotrimazole-betamethasone (LOTRISONE) 1-0.05 % cream Apply to affected area 2 times daily prn (Patient taking differently: Apply to affected area 2 times daily prn.  pt reports running out & does not have in home 5/18/23.) 45 g 1    Depo-Testosterone 100 MG/ML IM injection INJECT 25MG (0.25ML) INTO THE MUSCLE EVERY 2 WEEKS(VIAL ONLY GOOD FOR 28DAYS)      flavoxATE (URISPAS) 100 mg tablet TAKE 1 TABLET BY MOUTH 3 TIMES A DAY AS NEEDED FOR BLADDER SPASMS. 90 tablet 3    Ginkgo Biloba 40 MG TABS Take 120 mg by mouth daily pt reports taking 2 tabs daily      ibuprofen (MOTRIN) 200 mg tablet Take 200 mg by mouth as needed for mild pain pt reports taking 3-4tabs every 6hrs.       lubiprostone (AMITIZA) 24 mcg capsule Take 24 mcg by mouth 2 (two) times a day with meals pt reports not taking      lubiprostone (AMITIZA) 8 mcg capsule Take 8 mcg by mouth 3 (three) times a day      LUTEIN PO Take by mouth in the morning 20mg on bottle       Misc Natural Products (GINSENG COMPLEX PO) Take 2 tablets by mouth daily 500mg on bottle.      Movantik 25 MG tablet Take 25 mg by mouth every evening      Multiple Vitamins-Minerals (ZINC PO) Take by mouth      Nerve Stimulator (STANDARD TENS) VICKY by Does not apply route 2 (two) times a day 1 Device 0    Nerve Stimulator (TENS THERAPY REPLACE BACK PADS) MISC 30 pad by Does not apply route 2 (two) times a day 30 each 0    Nerve Stimulator VICKY by Does not apply route 2 (two) times a day 1 each 0    Nerve Stimulator Supplies MISC 30 pad by Does not apply route 2 (two) times a day 30 pad 0    Omega-3 Fatty Acids (FISH OIL OMEGA-3 PO) Take 1,200 mg by mouth daily       Potassium 99 MG TABS Take 99 mg by mouth daily pt reports taking 3 tabs daily      Premarin 0.625 MG tablet Take 0.625 mg by mouth daily      Probiotic Product (PROBIOTIC  "DAILY PO) Take 2 tablets by mouth daily        promethazine (PHENERGAN) 50 MG tablet daily at bedtime 1.5 tabs daily at bedtime  1    Restasis 0.05 % ophthalmic emulsion INSTILL 1 DROP BY OPHTHALMIC ROUTE EVERY 12 HOURS BOTH EYES      testosterone cypionate (DEPO-TESTOSTERONE) 200 mg/mL SOLN 25 MG (0.125ML) IM ONCE EVERY 7 DAYS      topiramate (TOPAMAX) 50 MG tablet Take 50 mg by mouth daily    5    TURMERIC PO Take by mouth Turmeric Curcumin 500mg on bottle. 1 tab daily      ZINC-VITAMIN C PO Take by mouth pt not taking due to taking regular zinc per pt report on 5/18/23.      docusate sodium (COLACE) 100 mg capsule Take 100 mg by mouth 3 (three) times a day   (Patient not taking: Reported on 11/28/2023)      methylPREDNISolone sodium succinate in sodium chloride 0.9 % 250 mL IVPB Inject 1,000 mg into a catheter in a vein every 30 (thirty) days. 1x/month, provided at medical office. per EPIC  Indications: MS (Patient not taking: Reported on 1/3/2024)      NON FORMULARY HEMPANOL BRAIN DETOX 200MG .  pt reports not taking per 5/18/23. (Patient not taking: Reported on 1/3/2024)      phenazopyridine (PYRIDIUM) 200 mg tablet Take 1 tablet (200 mg total) by mouth 3 (three) times a day as needed for bladder spasms (Patient not taking: Reported on 5/12/2023) 10 tablet 0    selenium sulfide (SELSUN) 2.5 % shampoo Apply topically daily as needed for dandruff (Patient not taking: Reported on 1/3/2024) 118 mL 0     No current facility-administered medications for this visit.     Current Outpatient Medications on File Prior to Visit   Medication Sig    albendazole (ALBENZA) 200 mg tablet TAKE 1 & 1/2 TABLETS BY MOUTH TWICE A DAY AS DIRECTED(PLAN LIMIT 4 TABS PER FILL)    ascorbic acid (VITAMIN C) 500 mg tablet Take 500 mg by mouth daily    b complex vitamins tablet Take 1 tablet by mouth daily    B-D HYPODERMIC NEEDLE 23GX1\" 23G X 1\" MISC USE ONE WEEKLY    Biotin 36954 MCG TABS Take 1 tablet by mouth daily      " buprenorphine-naloxone (SUBOXONE) 2-0.5 mg per SL tablet Place 0.5 tablets under the tongue 3 (three) times a day 2mg  three times a day    Calcium Carbonate (CALCIUM 600 PO) Take 600 mg by mouth daily pt reports not having & not taking    celecoxib (CeleBREX) 200 mg capsule Take 1 capsule (200 mg total) by mouth 2 (two) times a day With food    Cholecalciferol (Vitamin D3) 125 MCG (5000 UT) TABS Take 5,000 Units by mouth daily pt reports taking 2 tab    cloNIDine (CATAPRES) 0.2 mg tablet Take 0.2 mg by mouth daily at bedtime     clopidogrel (PLAVIX) 75 mg tablet Take 75 mg by mouth every morning    clotrimazole-betamethasone (LOTRISONE) 1-0.05 % cream Apply to affected area 2 times daily prn (Patient taking differently: Apply to affected area 2 times daily prn.  pt reports running out & does not have in home 5/18/23.)    Depo-Testosterone 100 MG/ML IM injection INJECT 25MG (0.25ML) INTO THE MUSCLE EVERY 2 WEEKS(VIAL ONLY GOOD FOR 28DAYS)    flavoxATE (URISPAS) 100 mg tablet TAKE 1 TABLET BY MOUTH 3 TIMES A DAY AS NEEDED FOR BLADDER SPASMS.    Ginkgo Biloba 40 MG TABS Take 120 mg by mouth daily pt reports taking 2 tabs daily    ibuprofen (MOTRIN) 200 mg tablet Take 200 mg by mouth as needed for mild pain pt reports taking 3-4tabs every 6hrs.     lubiprostone (AMITIZA) 24 mcg capsule Take 24 mcg by mouth 2 (two) times a day with meals pt reports not taking    lubiprostone (AMITIZA) 8 mcg capsule Take 8 mcg by mouth 3 (three) times a day    LUTEIN PO Take by mouth in the morning 20mg on bottle     Misc Natural Products (GINSENG COMPLEX PO) Take 2 tablets by mouth daily 500mg on bottle.    Movantik 25 MG tablet Take 25 mg by mouth every evening    Multiple Vitamins-Minerals (ZINC PO) Take by mouth    Nerve Stimulator (STANDARD TENS) VICKY by Does not apply route 2 (two) times a day    Nerve Stimulator (TENS THERAPY REPLACE BACK PADS) MISC 30 pad by Does not apply route 2 (two) times a day    Nerve Stimulator VICKY by  "Does not apply route 2 (two) times a day    Nerve Stimulator Supplies MISC 30 pad by Does not apply route 2 (two) times a day    Omega-3 Fatty Acids (FISH OIL OMEGA-3 PO) Take 1,200 mg by mouth daily     Potassium 99 MG TABS Take 99 mg by mouth daily pt reports taking 3 tabs daily    Premarin 0.625 MG tablet Take 0.625 mg by mouth daily    Probiotic Product (PROBIOTIC DAILY PO) Take 2 tablets by mouth daily      promethazine (PHENERGAN) 50 MG tablet daily at bedtime 1.5 tabs daily at bedtime    Restasis 0.05 % ophthalmic emulsion INSTILL 1 DROP BY OPHTHALMIC ROUTE EVERY 12 HOURS BOTH EYES    testosterone cypionate (DEPO-TESTOSTERONE) 200 mg/mL SOLN 25 MG (0.125ML) IM ONCE EVERY 7 DAYS    topiramate (TOPAMAX) 50 MG tablet Take 50 mg by mouth daily      TURMERIC PO Take by mouth Turmeric Curcumin 500mg on bottle. 1 tab daily    ZINC-VITAMIN C PO Take by mouth pt not taking due to taking regular zinc per pt report on 5/18/23.    docusate sodium (COLACE) 100 mg capsule Take 100 mg by mouth 3 (three) times a day   (Patient not taking: Reported on 11/28/2023)    methylPREDNISolone sodium succinate in sodium chloride 0.9 % 250 mL IVPB Inject 1,000 mg into a catheter in a vein every 30 (thirty) days. 1x/month, provided at medical office. per EPIC  Indications: MS (Patient not taking: Reported on 1/3/2024)    NON FORMULARY HEMPANOL BRAIN DETOX 200MG .  pt reports not taking per 5/18/23. (Patient not taking: Reported on 1/3/2024)    phenazopyridine (PYRIDIUM) 200 mg tablet Take 1 tablet (200 mg total) by mouth 3 (three) times a day as needed for bladder spasms (Patient not taking: Reported on 5/12/2023)    selenium sulfide (SELSUN) 2.5 % shampoo Apply topically daily as needed for dandruff (Patient not taking: Reported on 1/3/2024)     No current facility-administered medications on file prior to visit.     She is allergic to penicillins..         Objective:    Blood pressure 110/72, pulse 79, height 5' 4\" (1.626 " m).    Physical Exam    Neurological Exam  CONSTITUTIONAL: NAD, pleasant. NECK: supple, no lymphadenopathy, no thyromegaly, no JVD. CARDIOVASCULAR: RRR, normal S1S2, no murmurs, no rubs. RESP: clear to auscultation bilaterally, no wheezes/rhonchi/rales. ABDOMEN: soft, non tender, non distended. SKIN: no rash or skin lesions. EXTREMITIES: no edema, pulses 2+bilaterally. PSYCH: appropriate mood and affect  NEUROLOGIC COMPREHENSIVE EXAM: Patient is oriented to person, place and time, NAD; appropriate affect. CN II, III, IV, V, VI, VII,VIII,IX,X,XI-XII intact with EOMI, PERRLA, OKN intact, VF grossly intact, fundi poorly visualized secondary to pupillary constriction; symmetric face noted. Motor: 3-/5 UE/LE bilateral symmetric, 2/5 hip flexion/knee extension and 1/5 dorsiflexion; Sensory: abnormal to light touch and pinprick bilaterally; normal vibration sensation feet bilaterally; Coordination within normal limits on FTN and CECY testing; DTR: 2/4 through, no Babinski, no clonus. Tandem gait is abnormal. Romberg: absent.    ROS:    Review of Systems

## 2024-05-28 NOTE — PROGRESS NOTES
Review of Systems   Constitutional:  Negative for appetite change, fatigue and fever.   HENT: Negative.  Negative for hearing loss, tinnitus, trouble swallowing and voice change.    Eyes: Negative.  Negative for photophobia, pain and visual disturbance.   Respiratory: Negative.  Negative for shortness of breath.    Cardiovascular: Negative.  Negative for palpitations.   Gastrointestinal: Negative.  Negative for nausea and vomiting.   Endocrine: Negative.  Negative for cold intolerance.   Genitourinary: Negative.  Negative for dysuria, frequency and urgency.   Musculoskeletal:  Positive for back pain, gait problem and neck pain. Negative for myalgias and neck stiffness.   Skin: Negative.  Negative for rash.   Allergic/Immunologic: Negative.    Neurological:  Negative for dizziness, tremors, seizures, syncope, facial asymmetry, speech difficulty, weakness, light-headedness, numbness and headaches.   Hematological: Negative.  Does not bruise/bleed easily.   Psychiatric/Behavioral: Negative.  Negative for confusion, hallucinations and sleep disturbance.

## 2024-05-29 DIAGNOSIS — G89.4 CHRONIC PAIN SYNDROME: ICD-10-CM

## 2024-05-29 DIAGNOSIS — M79.18 MUSCULOSKELETAL PAIN, CHRONIC: ICD-10-CM

## 2024-05-29 DIAGNOSIS — M54.2 NECK PAIN: ICD-10-CM

## 2024-05-29 DIAGNOSIS — G89.29 MUSCULOSKELETAL PAIN, CHRONIC: ICD-10-CM

## 2024-05-29 DIAGNOSIS — M54.9 MID BACK PAIN: ICD-10-CM

## 2024-05-29 RX ORDER — CELECOXIB 200 MG/1
200 CAPSULE ORAL 2 TIMES DAILY
Qty: 60 CAPSULE | Refills: 2 | Status: SHIPPED | OUTPATIENT
Start: 2024-05-29

## 2024-06-05 ENCOUNTER — TELEPHONE (OUTPATIENT)
Dept: PAIN MEDICINE | Facility: CLINIC | Age: 44
End: 2024-06-05

## 2024-06-05 NOTE — TELEPHONE ENCOUNTER
Caller: Christie    Doctor: Polly     Reason for call: patient is ready to schedule procedure     Call back#: [phone]

## 2024-06-05 NOTE — TELEPHONE ENCOUNTER
S/w Pt.  Pt requested repeat injection.   TPI Last performed 03/27/24 R thoracic paraspinals, R latissimus dorsi, R lower trapezius and R longissimus  Current pain level: 7/10, Rt back - shoulder   Percentage relief from previous injection: 75%  Duration of pain relief from previous injection: 3 months  Please advise.

## 2024-06-05 NOTE — TELEPHONE ENCOUNTER
Please schedule repeat TPI   Received response via fax from [facility]    PA FOR DULERA HAS BEEN DENIED    Coverage for dulera can be approved if:    Patient needs to try and fail Symboicort    Deirdre Fishman,

## 2024-06-09 ENCOUNTER — HOSPITAL ENCOUNTER (EMERGENCY)
Facility: HOSPITAL | Age: 44
Discharge: HOME | End: 2024-06-09
Attending: EMERGENCY MEDICINE
Payer: COMMERCIAL

## 2024-06-09 ENCOUNTER — APPOINTMENT (EMERGENCY)
Dept: RADIOLOGY | Facility: HOSPITAL | Age: 44
End: 2024-06-09
Payer: COMMERCIAL

## 2024-06-09 VITALS
TEMPERATURE: 97.4 F | DIASTOLIC BLOOD PRESSURE: 73 MMHG | SYSTOLIC BLOOD PRESSURE: 109 MMHG | RESPIRATION RATE: 18 BRPM | HEART RATE: 82 BPM | OXYGEN SATURATION: 98 %

## 2024-06-09 DIAGNOSIS — M25.522 LEFT ELBOW PAIN: Primary | ICD-10-CM

## 2024-06-09 DIAGNOSIS — L03.90 CELLULITIS: ICD-10-CM

## 2024-06-09 PROCEDURE — 99283 EMERGENCY DEPT VISIT LOW MDM: CPT

## 2024-06-09 PROCEDURE — 99284 EMERGENCY DEPT VISIT MOD MDM: CPT | Performed by: EMERGENCY MEDICINE

## 2024-06-09 PROCEDURE — 73080 X-RAY EXAM OF ELBOW: CPT

## 2024-06-09 RX ORDER — CEPHALEXIN 250 MG/1
500 CAPSULE ORAL ONCE
Status: COMPLETED | OUTPATIENT
Start: 2024-06-09 | End: 2024-06-09

## 2024-06-09 RX ORDER — CEPHALEXIN 500 MG/1
500 CAPSULE ORAL EVERY 6 HOURS SCHEDULED
Qty: 28 CAPSULE | Refills: 0 | Status: SHIPPED | OUTPATIENT
Start: 2024-06-09 | End: 2024-06-16

## 2024-06-09 RX ADMIN — CEPHALEXIN 500 MG: 250 CAPSULE ORAL at 16:03

## 2024-06-09 NOTE — ED PROVIDER NOTES
"History  Chief Complaint   Patient presents with    Elbow Pain     Patient presents with left elbow pain and swelling that started a week ago.      HPI    44-year-old female with past medical history of MS who presents for evaluation of left elbow pain.  Patient has been having left elbow pain and swelling for the past week.  She is also noticed redness.  She is able to flex and extend the elbow but has mild pain with doing so.  Denies fevers or chills.  Denies chest pain or shortness of breath.  Denies abdominal pain, nausea, vomiting, or diarrhea.  She states she does fall frequently due to her MS but does not remember the last time she fell.    Prior to Admission Medications   Prescriptions Last Dose Informant Patient Reported? Taking?   B-D HYPODERMIC NEEDLE 23GX1\" 23G X 1\" MISC  Self Yes No   Sig: USE ONE WEEKLY   Biotin 13249 MCG TABS  Self Yes No   Sig: Take 1 tablet by mouth daily     Calcium Carbonate (CALCIUM 600 PO)  Self Yes No   Sig: Take 600 mg by mouth daily pt reports not having & not taking   Cholecalciferol (Vitamin D3) 125 MCG (5000 UT) TABS  Self Yes No   Sig: Take 5,000 Units by mouth daily pt reports taking 2 tab   Depo-Testosterone 100 MG/ML IM injection  Self Yes No   Sig: INJECT 25MG (0.25ML) INTO THE MUSCLE EVERY 2 WEEKS(VIAL ONLY GOOD FOR 28DAYS)   Ginkgo Biloba 40 MG TABS  Self Yes No   Sig: Take 120 mg by mouth daily pt reports taking 2 tabs daily   LUTEIN PO  Self Yes No   Sig: Take by mouth in the morning 20mg on bottle    Misc Natural Products (GINSENG COMPLEX PO)  Self Yes No   Sig: Take 2 tablets by mouth daily 500mg on bottle.   Movantik 25 MG tablet  Self Yes No   Sig: Take 25 mg by mouth every evening   Multiple Vitamins-Minerals (ZINC PO)  Self Yes No   Sig: Take by mouth   NON FORMULARY  Self Yes No   Sig: HEMPANOL BRAIN DETOX 200MG .  pt reports not taking per 5/18/23.   Patient not taking: Reported on 1/3/2024   Nerve Stimulator (STANDARD TENS) VICKY  Self No No   Sig: by Does " not apply route 2 (two) times a day   Nerve Stimulator (TENS THERAPY REPLACE BACK PADS) MISC  Self No No   Si pad by Does not apply route 2 (two) times a day   Nerve Stimulator VICKY  Self No No   Sig: by Does not apply route 2 (two) times a day   Nerve Stimulator Supplies MISC  Self No No   Si pad by Does not apply route 2 (two) times a day   Omega-3 Fatty Acids (FISH OIL OMEGA-3 PO)  Self Yes No   Sig: Take 1,200 mg by mouth daily    Potassium 99 MG TABS  Self Yes No   Sig: Take 99 mg by mouth daily pt reports taking 3 tabs daily   Premarin 0.625 MG tablet  Self Yes No   Sig: Take 0.625 mg by mouth daily   Probiotic Product (PROBIOTIC DAILY PO)  Self Yes No   Sig: Take 2 tablets by mouth daily     Restasis 0.05 % ophthalmic emulsion  Self Yes No   Sig: INSTILL 1 DROP BY OPHTHALMIC ROUTE EVERY 12 HOURS BOTH EYES   TURMERIC PO  Self Yes No   Sig: Take by mouth Turmeric Curcumin 500mg on bottle. 1 tab daily   ZINC-VITAMIN C PO  Self Yes No   Sig: Take by mouth pt not taking due to taking regular zinc per pt report on 23.   albendazole (ALBENZA) 200 mg tablet  Self Yes No   Sig: TAKE 1 & 1/2 TABLETS BY MOUTH TWICE A DAY AS DIRECTED(PLAN LIMIT 4 TABS PER FILL)   ascorbic acid (VITAMIN C) 500 mg tablet  Self Yes No   Sig: Take 500 mg by mouth daily   b complex vitamins tablet  Self Yes No   Sig: Take 1 tablet by mouth daily   buprenorphine-naloxone (SUBOXONE) 2-0.5 mg per SL tablet  Self Yes No   Sig: Place 0.5 tablets under the tongue 3 (three) times a day 2mg  three times a day   celecoxib (CeleBREX) 200 mg capsule   No No   Sig: TAKE 1 CAPSULE (200 MG TOTAL) BY MOUTH 2 (TWO) TIMES A DAY WITH FOOD   cloNIDine (CATAPRES) 0.2 mg tablet  Self Yes No   Sig: Take 0.2 mg by mouth daily at bedtime    clopidogrel (PLAVIX) 75 mg tablet  Self Yes No   Sig: Take 75 mg by mouth every morning   clotrimazole-betamethasone (LOTRISONE) 1-0.05 % cream  Self No No   Sig: Apply to affected area 2 times daily prn   Patient  taking differently: Apply to affected area 2 times daily prn.  pt reports running out & does not have in home 23.   docusate sodium (COLACE) 100 mg capsule  Self Yes No   Sig: Take 100 mg by mouth 3 (three) times a day     Patient not taking: Reported on 2023   flavoxATE (URISPAS) 100 mg tablet   No No   Sig: TAKE 1 TABLET BY MOUTH 3 TIMES A DAY AS NEEDED FOR BLADDER SPASMS.   ibuprofen (MOTRIN) 200 mg tablet  Self Yes No   Sig: Take 200 mg by mouth as needed for mild pain pt reports taking 3-4tabs every 6hrs.    lubiprostone (AMITIZA) 24 mcg capsule  Self Yes No   Sig: Take 24 mcg by mouth 2 (two) times a day with meals pt reports not taking   lubiprostone (AMITIZA) 8 mcg capsule  Self Yes No   Sig: Take 8 mcg by mouth 3 (three) times a day   methylPREDNISolone sodium succinate in sodium chloride 0.9 % 250 mL IVPB  Self Yes No   Sig: Inject 1,000 mg into a catheter in a vein every 30 (thirty) days. 1x/month, provided at medical office. per EPIC  Indications: MS   Patient not taking: Reported on 1/3/2024   phenazopyridine (PYRIDIUM) 200 mg tablet  Self No No   Sig: Take 1 tablet (200 mg total) by mouth 3 (three) times a day as needed for bladder spasms   Patient not taking: Reported on 2023   promethazine (PHENERGAN) 50 MG tablet  Self Yes No   Sig: daily at bedtime 1.5 tabs daily at bedtime   selenium sulfide (SELSUN) 2.5 % shampoo  Self No No   Sig: Apply topically daily as needed for dandruff   Patient not taking: Reported on 1/3/2024   testosterone cypionate (DEPO-TESTOSTERONE) 200 mg/mL SOLN  Self Yes No   Si MG (0.125ML) IM ONCE EVERY 7 DAYS   topiramate (TOPAMAX) 50 MG tablet  Self Yes No   Sig: Take 50 mg by mouth daily        Facility-Administered Medications: None       Past Medical History:   Diagnosis Date    Back pain     Chronic constipation 2016    Chronic pain disorder     Collar bone fracture     right side    Crutches as ambulation aid     Depression     Dyssynergic  defecation     Type 1    ETOH abuse     Head injury 2014    Infectious viral hepatitis     Lyme disease     Meningitis spinal 2007?    Multiple sclerosis (HCC)     Opioid abuse (HCC)     Rib fractures     Thrombocytopenia (HCC)     Use of cane as ambulatory aid        Past Surgical History:   Procedure Laterality Date    APPENDECTOMY      CLAVICLE SURGERY      COLONOSCOPY      FL LUMBAR PUNCTURE DIAGNOSTIC  11/25/2020    KNEE ARTHROSCOPY      MULTIPLE TOOTH EXTRACTIONS      WA COLONOSCOPY FLX DX W/COLLJ SPEC WHEN PFRMD N/A 6/27/2016    Procedure: COLONOSCOPY;  Surgeon: Jason Rogers MD;  Location: [facility];  Service: Colorectal    WA OPEN IMPLANTATION CHARITY SACRAL NERVE N/A 11/15/2021    Procedure: INSERTION OF NEUROSTIMULATOR ELECTRODE ARRAY SACRAL NERVE; FLUOROSCOPY; ELECTRONICN ANALYSIS;  Surgeon: Abdulaziz Guevara MD;  Location: [facility];  Service: UroGynecology           TONSILLECTOMY AND ADENOIDECTOMY      TUBAL LIGATION         Family History   Problem Relation Age of Onset    Skin cancer Mother     No Known Problems Brother      I have reviewed and agree with the history as documented.    E-Cigarette/Vaping    E-Cigarette Use Never User      E-Cigarette/Vaping Substances    Nicotine No     THC No     CBD No     Flavoring No     Other No     Unknown No      Social History     Tobacco Use    Smoking status: Every Day     Current packs/day: 1.00     Types: Cigarettes    Smokeless tobacco: Never   Vaping Use    Vaping status: Never Used   Substance Use Topics    Alcohol use: Not Currently     Comment: 2 mixed drinks each night    Drug use: No       Review of Systems   Constitutional:  Negative for appetite change, chills and fever.   HENT:  Negative for congestion, rhinorrhea and sore throat.    Respiratory:  Negative for cough and shortness of breath.    Cardiovascular:  Negative for chest pain.   Gastrointestinal:  Negative for abdominal pain, diarrhea, nausea and vomiting.   Musculoskeletal:  Positive  for arthralgias. Negative for myalgias.   Skin:  Negative for rash.   Neurological:  Negative for dizziness, weakness, light-headedness, numbness and headaches.   All other systems reviewed and are negative.      Physical Exam  Physical Exam  Vitals and nursing note reviewed.   Constitutional:       General: She is not in acute distress.     Appearance: Normal appearance. She is well-developed and normal weight. She is not ill-appearing, toxic-appearing or diaphoretic.   HENT:      Head: Normocephalic and atraumatic.      Right Ear: External ear normal.      Left Ear: External ear normal.      Nose: Nose normal.      Mouth/Throat:      Mouth: Mucous membranes are moist.      Pharynx: Oropharynx is clear.   Eyes:      Extraocular Movements: Extraocular movements intact.      Conjunctiva/sclera: Conjunctivae normal.   Cardiovascular:      Rate and Rhythm: Normal rate and regular rhythm.      Pulses: Normal pulses.      Heart sounds: Normal heart sounds. No murmur heard.     No friction rub. No gallop.   Pulmonary:      Effort: Pulmonary effort is normal. No respiratory distress.      Breath sounds: Normal breath sounds. No wheezing or rales.   Abdominal:      General: There is no distension.      Palpations: Abdomen is soft.      Tenderness: There is no abdominal tenderness. There is no guarding or rebound.   Musculoskeletal:         General: Swelling and tenderness present.      Cervical back: Neck supple.      Comments: Mild swelling around the left elbow, able to flex and extend the elbow without significant pain.  Tenderness over the lateral and medial part of the left elbow.   Skin:     General: Skin is warm and dry.      Coloration: Skin is not pale.      Findings: No erythema or rash.      Comments: Mild erythema and warmth over the left elbow and distal left upper arm.    Neurological:      General: No focal deficit present.      Mental Status: She is alert and oriented to person, place, and time.      Cranial  Nerves: No cranial nerve deficit.      Sensory: No sensory deficit.      Motor: No weakness.   Psychiatric:         Mood and Affect: Mood normal.         Behavior: Behavior normal.         Vital Signs  ED Triage Vitals [06/09/24 1514]   Temperature Pulse Respirations Blood Pressure SpO2   (!) 97.4 °F (36.3 °C) 82 18 109/73 98 %      Temp Source Heart Rate Source Patient Position - Orthostatic VS BP Location FiO2 (%)   Temporal Monitor Sitting Right arm --      Pain Score       3           Vitals:    06/09/24 1514   BP: 109/73   Pulse: 82   Patient Position - Orthostatic VS: Sitting         Visual Acuity      ED Medications  Medications   cephalexin (KEFLEX) capsule 500 mg (500 mg Oral Given 6/9/24 1603)       Diagnostic Studies  Results Reviewed       None                   XR elbow 3+ vw LEFT    (Results Pending)              Procedures  Procedures         ED Course                               SBIRT 20yo+      Flowsheet Row Most Recent Value   Initial Alcohol Screen: US AUDIT-C     1. How often do you have a drink containing alcohol? 0 Filed at: 06/09/2024 1513   2. How many drinks containing alcohol do you have on a typical day you are drinking?  0 Filed at: 06/09/2024 1513   3a. Male UNDER 65: How often do you have five or more drinks on one occasion? 0 Filed at: 06/09/2024 1513   3b. FEMALE Any Age, or MALE 65+: How often do you have 4 or more drinks on one occassion? 0 Filed at: 06/09/2024 1513   Audit-C Score 0 Filed at: 06/09/2024 1513   MARU: How many times in the past year have you...    Used an illegal drug or used a prescription medication for non-medical reasons? Never Filed at: 06/09/2024 1513                      Medical Decision Making  Amount and/or Complexity of Data Reviewed  Radiology: ordered.    Risk  Prescription drug management.      44-year-old female with past medical history of MS who presents for evaluation of left elbow pain for one week.  Patient has mild swelling and redness around  the left elbow, able to flex and extend the elbow without significant pain.  No evidence of septic joint.  No evidence of distal arm swelling to suggest DVT.  Will obtain x-ray to evaluate for fracture or joint effusion.  Reviewed and interpreted x-ray, no acute fracture, no joint effusion present.  Patient does have some mild subcutaneous edema.  Will treat with Keflex for suspected cellulitis.  Will have patient follow-up with her primary care doctor.  Discussed with patient strict return precautions.  Patient expressed understanding and was agreeable for discharge.       Disposition  Final diagnoses:   Left elbow pain   Cellulitis     Time reflects when diagnosis was documented in both MDM as applicable and the Disposition within this note       Time User Action Codes Description Comment    6/9/2024  3:57 PM Rosalind Elias [M25.522] Left elbow pain     6/9/2024  3:59 PM Rosalind Elias [L03.90] Cellulitis           ED Disposition       ED Disposition   Discharge    Condition   Stable    Date/Time   Sun Jun 9, 2024 155    Comment   Christie Joby discharge to home/self care.                   Follow-up Information       Follow up With Specialties Details Why Contact Info    Crispin Lowry,  Emergency Medicine, Family Medicine Schedule an appointment as soon as possible for a visit   [address] [address]  [phone]              Discharge Medication List as of 6/9/2024  3:59 PM        START taking these medications    Details   cephalexin (KEFLEX) 500 mg capsule Take 1 capsule (500 mg total) by mouth every 6 (six) hours for 7 days, Starting Sun 6/9/2024, Until Sun 6/16/2024, Normal           CONTINUE these medications which have NOT CHANGED    Details   albendazole (ALBENZA) 200 mg tablet TAKE 1 & 1/2 TABLETS BY MOUTH TWICE A DAY AS DIRECTED(PLAN LIMIT 4 TABS PER FILL), Historical Med      ascorbic acid (VITAMIN C) 500 mg tablet Take 500 mg by mouth daily, Historical Med  "     b complex vitamins tablet Take 1 tablet by mouth daily, Historical Med      B-D HYPODERMIC NEEDLE 23GX1\" 23G X 1\" MISC USE ONE WEEKLY, Historical Med      Biotin 57507 MCG TABS Take 1 tablet by mouth daily  , Historical Med      buprenorphine-naloxone (SUBOXONE) 2-0.5 mg per SL tablet Place 0.5 tablets under the tongue 3 (three) times a day 2mg  three times a day, Historical Med      Calcium Carbonate (CALCIUM 600 PO) Take 600 mg by mouth daily pt reports not having & not taking, Historical Med      celecoxib (CeleBREX) 200 mg capsule TAKE 1 CAPSULE (200 MG TOTAL) BY MOUTH 2 (TWO) TIMES A DAY WITH FOOD, Starting Wed 5/29/2024, Normal      Cholecalciferol (Vitamin D3) 125 MCG (5000 UT) TABS Take 5,000 Units by mouth daily pt reports taking 2 tab, Historical Med      cloNIDine (CATAPRES) 0.2 mg tablet Take 0.2 mg by mouth daily at bedtime , Historical Med      clopidogrel (PLAVIX) 75 mg tablet Take 75 mg by mouth every morning, Starting Wed 11/29/2023, Historical Med      clotrimazole-betamethasone (LOTRISONE) 1-0.05 % cream Apply to affected area 2 times daily prn, Normal      Depo-Testosterone 100 MG/ML IM injection INJECT 25MG (0.25ML) INTO THE MUSCLE EVERY 2 WEEKS(VIAL ONLY GOOD FOR 28DAYS), Historical Med      docusate sodium (COLACE) 100 mg capsule Take 100 mg by mouth 3 (three) times a day  , Historical Med      flavoxATE (URISPAS) 100 mg tablet TAKE 1 TABLET BY MOUTH 3 TIMES A DAY AS NEEDED FOR BLADDER SPASMS., Starting Wed 5/1/2024, Normal      Ginkgo Biloba 40 MG TABS Take 120 mg by mouth daily pt reports taking 2 tabs daily, Historical Med      ibuprofen (MOTRIN) 200 mg tablet Take 200 mg by mouth as needed for mild pain pt reports taking 3-4tabs every 6hrs. , Historical Med      !! lubiprostone (AMITIZA) 24 mcg capsule Take 24 mcg by mouth 2 (two) times a day with meals pt reports not taking, Historical Med      !! lubiprostone (AMITIZA) 8 mcg capsule Take 8 mcg by mouth 3 (three) times a day, " Starting Wed 3/20/2024, Historical Med      LUTEIN PO Take by mouth in the morning 20mg on bottle , Historical Med      methylPREDNISolone sodium succinate in sodium chloride 0.9 % 250 mL IVPB Inject 1,000 mg into a catheter in a vein every 30 (thirty) days. 1x/month, provided at medical office. per EPIC  Indications: MS, Historical Med      Misc Natural Products (GINSENG COMPLEX PO) Take 2 tablets by mouth daily 500mg on bottle., Historical Med      Movantik 25 MG tablet Take 25 mg by mouth every evening, Starting Mon 6/20/2022, Historical Med      Multiple Vitamins-Minerals (ZINC PO) Take by mouth, Historical Med      !! Nerve Stimulator (STANDARD TENS) VICKY by Does not apply route 2 (two) times a day, Starting Wed 11/28/2018, Print      !! Nerve Stimulator (TENS THERAPY REPLACE BACK PADS) MISC 30 pad by Does not apply route 2 (two) times a day, Starting Wed 11/28/2018, Print      !! Nerve Stimulator VICKY by Does not apply route 2 (two) times a day, Starting Thu 11/29/2018, Print      !! Nerve Stimulator Supplies MISC 30 pad by Does not apply route 2 (two) times a day, Starting Thu 11/29/2018, Print      NON FORMULARY HEMPANOL BRAIN DETOX 200MG .  pt reports not taking per 5/18/23., Historical Med      Omega-3 Fatty Acids (FISH OIL OMEGA-3 PO) Take 1,200 mg by mouth daily , Historical Med      phenazopyridine (PYRIDIUM) 200 mg tablet Take 1 tablet (200 mg total) by mouth 3 (three) times a day as needed for bladder spasms, Starting Fri 3/31/2023, Normal      Potassium 99 MG TABS Take 99 mg by mouth daily pt reports taking 3 tabs daily, Historical Med      Premarin 0.625 MG tablet Take 0.625 mg by mouth daily, Starting Wed 11/29/2023, Historical Med      Probiotic Product (PROBIOTIC DAILY PO) Take 2 tablets by mouth daily  , Historical Med      promethazine (PHENERGAN) 50 MG tablet daily at bedtime 1.5 tabs daily at bedtime, Starting Thu 11/15/2018, Historical Med      Restasis 0.05 % ophthalmic emulsion INSTILL 1  DROP BY OPHTHALMIC ROUTE EVERY 12 HOURS BOTH EYES, Historical Med      selenium sulfide (SELSUN) 2.5 % shampoo Apply topically daily as needed for dandruff, Starting Mon 12/19/2022, Normal      testosterone cypionate (DEPO-TESTOSTERONE) 200 mg/mL SOLN 25 MG (0.125ML) IM ONCE EVERY 7 DAYS, Historical Med      topiramate (TOPAMAX) 50 MG tablet Take 50 mg by mouth daily  , Starting Wed 10/31/2018, Historical Med      TURMERIC PO Take by mouth Turmeric Curcumin 500mg on bottle. 1 tab daily, Historical Med      ZINC-VITAMIN C PO Take by mouth pt not taking due to taking regular zinc per pt report on 5/18/23., Historical Med       !! - Potential duplicate medications found. Please discuss with provider.          No discharge procedures on file.    PDMP Review       None            ED Provider  Electronically Signed by             Rosalind Elias MD  06/09/24 9728

## 2024-06-12 RX ORDER — SYRINGE WITH NEEDLE, 1 ML 25GX5/8"
SYRINGE, EMPTY DISPOSABLE MISCELLANEOUS
COMMUNITY
Start: 2024-05-23

## 2024-06-13 ENCOUNTER — PROCEDURE VISIT (OUTPATIENT)
Age: 44
End: 2024-06-13
Payer: COMMERCIAL

## 2024-06-13 VITALS — BODY MASS INDEX: 15.96 KG/M2 | HEIGHT: 64 IN

## 2024-06-13 DIAGNOSIS — G89.4 CHRONIC PAIN SYNDROME: ICD-10-CM

## 2024-06-13 DIAGNOSIS — M79.18 MYOFASCIAL PAIN SYNDROME: ICD-10-CM

## 2024-06-13 DIAGNOSIS — M54.2 NECK PAIN: Primary | ICD-10-CM

## 2024-06-13 PROCEDURE — 76942 ECHO GUIDE FOR BIOPSY: CPT | Performed by: ANESTHESIOLOGY

## 2024-06-13 PROCEDURE — 20553 NJX 1/MLT TRIGGER POINTS 3/>: CPT | Performed by: ANESTHESIOLOGY

## 2024-06-13 RX ORDER — BUPIVACAINE HYDROCHLORIDE 2.5 MG/ML
10 INJECTION, SOLUTION EPIDURAL; INFILTRATION; INTRACAUDAL
Status: COMPLETED | OUTPATIENT
Start: 2024-06-13 | End: 2024-06-13

## 2024-06-13 RX ORDER — TRIAMCINOLONE ACETONIDE 40 MG/ML
40 INJECTION, SUSPENSION INTRA-ARTICULAR; INTRAMUSCULAR
Status: COMPLETED | OUTPATIENT
Start: 2024-06-13 | End: 2024-06-13

## 2024-06-13 RX ADMIN — BUPIVACAINE HYDROCHLORIDE 10 ML: 2.5 INJECTION, SOLUTION EPIDURAL; INFILTRATION; INTRACAUDAL at 16:00

## 2024-06-13 RX ADMIN — TRIAMCINOLONE ACETONIDE 40 MG: 40 INJECTION, SUSPENSION INTRA-ARTICULAR; INTRAMUSCULAR at 16:00

## 2024-06-13 NOTE — PROGRESS NOTES
" Universal Protocol:  Consent: Verbal consent obtained. Written consent obtained.  Risks and benefits: risks, benefits and alternatives were discussed  Consent given by: patient  Time out: Immediately prior to procedure a \"time out\" was called to verify the correct patient, procedure, equipment, support staff and site/side marked as required.  Timeout called at: 6/13/2024 4:32 PM.  Patient understanding: patient states understanding of the procedure being performed  Patient consent: the patient's understanding of the procedure matches consent given  Procedure consent: procedure consent matches procedure scheduled  Relevant documents: relevant documents present and verified  Test results: test results available and properly labeled  Site marked: the operative site was marked  Radiology Images displayed and confirmed. If images not available, report reviewed: imaging studies not available  Required items: required blood products, implants, devices, and special equipment available  Patient identity confirmed: verbally with patient  Supporting Documentation  Indications: pain   Trigger Point Injections: multiple trigger points: 3 or more muscle groups    Injection site identified by: ultrasound  Procedure Details  Location(s):    Neck/Upper Back: R upper trapezius, R levator scapulae and R rhomboid     Prep: patient was prepped and draped in usual sterile fashion  Needle size: 22 G  Medications: 10 mL bupivacaine (PF) 0.25 %; 40 mg triamcinolone acetonide 40 mg/mL  Patient tolerance: patient tolerated the procedure well with no immediate complications          "

## 2024-06-14 ENCOUNTER — OFFICE VISIT (OUTPATIENT)
Dept: PAIN MEDICINE | Facility: CLINIC | Age: 44
End: 2024-06-14
Payer: COMMERCIAL

## 2024-06-14 VITALS
SYSTOLIC BLOOD PRESSURE: 99 MMHG | RESPIRATION RATE: 16 BRPM | HEART RATE: 77 BPM | HEIGHT: 64 IN | WEIGHT: 93 LBS | BODY MASS INDEX: 15.88 KG/M2 | DIASTOLIC BLOOD PRESSURE: 66 MMHG

## 2024-06-14 DIAGNOSIS — G89.29 CHRONIC MIDLINE LOW BACK PAIN WITHOUT SCIATICA: ICD-10-CM

## 2024-06-14 DIAGNOSIS — G35 MS (MULTIPLE SCLEROSIS) (HCC): ICD-10-CM

## 2024-06-14 DIAGNOSIS — G89.29 MUSCULOSKELETAL PAIN, CHRONIC: ICD-10-CM

## 2024-06-14 DIAGNOSIS — G89.4 CHRONIC PAIN SYNDROME: Primary | ICD-10-CM

## 2024-06-14 DIAGNOSIS — M54.9 MID BACK PAIN: ICD-10-CM

## 2024-06-14 DIAGNOSIS — M54.50 CHRONIC MIDLINE LOW BACK PAIN WITHOUT SCIATICA: ICD-10-CM

## 2024-06-14 DIAGNOSIS — M79.18 MUSCULOSKELETAL PAIN, CHRONIC: ICD-10-CM

## 2024-06-14 PROCEDURE — 99213 OFFICE O/P EST LOW 20 MIN: CPT | Performed by: NURSE PRACTITIONER

## 2024-06-14 NOTE — PROGRESS NOTES
Assessment:  1. Chronic pain syndrome    2. Chronic midline low back pain without sciatica    3. Mid back pain    4. MS (multiple sclerosis) ([facility])    5. Musculoskeletal pain, chronic        Plan:  While the patient was in the office today, I did have a thorough conversation regarding their chronic pain syndrome, medication management, and treatment plan options.  Patient is being seen for a follow-up visit.  She underwent an ultrasound-guided trigger point injection of the right upper trapezius, right levator scapula and right rhomboid muscle yesterday.  Today, pain is pretty minimal.  Will repeat ultrasound-guided trigger point injections in the future if needed.    Patient discontinued Celebrex.  She has been using Aleve or Advil on an as-needed basis.    Follow-up in 2 months.  Call sooner if pain increases.      History of Present Illness:  The patient is a 44 y.o. female who presents for a follow up office visit in regards to Follow-up.   The patient’s current symptoms include complaints of upper back pain, low back pain.  Current pain level is a 2/10.  Quality pain is described as dull, aching, sharp, pressure-like, shooting, numb, pins-and-needles.    Current pain medications includes: Advil or Aleve as needed.     I have personally reviewed and/or updated the patient's past medical history, past surgical history, family history, social history, current medications, allergies, and vital signs today.         Review of Systems  Review of Systems   Gastrointestinal:  Positive for constipation.   Musculoskeletal:  Positive for back pain and gait problem.        Decrease ROM  Joint stiffness   Skin:  Positive for rash.   Neurological:  Positive for weakness.        Memory loss   All other systems reviewed and are negative.          Past Medical History:   Diagnosis Date    Back pain     Chronic constipation 6/27/2016    Chronic pain disorder     Collar bone fracture     right side    Crutches as ambulation aid      "Depression     Dyssynergic defecation     Type 1    ETOH abuse     Head injury 2014    Infectious viral hepatitis     Lyme disease     Meningitis spinal 2007?    Multiple sclerosis (HCC)     Opioid abuse (HCC)     Rib fractures     Thrombocytopenia (HCC)     Use of cane as ambulatory aid        Past Surgical History:   Procedure Laterality Date    APPENDECTOMY      CLAVICLE SURGERY      COLONOSCOPY      FL LUMBAR PUNCTURE DIAGNOSTIC  11/25/2020    KNEE ARTHROSCOPY      MULTIPLE TOOTH EXTRACTIONS      SC COLONOSCOPY FLX DX W/COLLJ SPEC WHEN PFRMD N/A 6/27/2016    Procedure: COLONOSCOPY;  Surgeon: Jason Rogers MD;  Location: [facility];  Service: Colorectal    SC OPEN IMPLANTATION CHARITY SACRAL NERVE N/A 11/15/2021    Procedure: INSERTION OF NEUROSTIMULATOR ELECTRODE ARRAY SACRAL NERVE; FLUOROSCOPY; ELECTRONICN ANALYSIS;  Surgeon: Abdulaziz Guevara MD;  Location: [facility];  Service: UroGynecology           TONSILLECTOMY AND ADENOIDECTOMY      TUBAL LIGATION         Family History   Problem Relation Age of Onset    Skin cancer Mother     No Known Problems Brother        Social History     Occupational History    Not on file   Tobacco Use    Smoking status: Every Day     Current packs/day: 1.00     Types: Cigarettes    Smokeless tobacco: Never   Vaping Use    Vaping status: Never Used   Substance and Sexual Activity    Alcohol use: Not Currently     Comment: 2 mixed drinks each night    Drug use: No    Sexual activity: Yes     Partners: Male         Current Outpatient Medications:     albendazole (ALBENZA) 200 mg tablet, TAKE 1 & 1/2 TABLETS BY MOUTH TWICE A DAY AS DIRECTED(PLAN LIMIT 4 TABS PER FILL), Disp: , Rfl:     ascorbic acid (VITAMIN C) 500 mg tablet, Take 500 mg by mouth daily, Disp: , Rfl:     b complex vitamins tablet, Take 1 tablet by mouth daily, Disp: , Rfl:     B-D HYPODERMIC NEEDLE 23GX1\" 23G X 1\" MISC, USE ONE WEEKLY, Disp: , Rfl:     BD Plastipak Syringe 21G X 1\" 3 ML MISC, USE ONE WEEKLY, Disp: , " Rfl:     Biotin 80547 MCG TABS, Take 1 tablet by mouth daily  , Disp: , Rfl:     buprenorphine-naloxone (SUBOXONE) 2-0.5 mg per SL tablet, Place 0.5 tablets under the tongue 3 (three) times a day 2mg  three times a day, Disp: , Rfl:     Calcium Carbonate (CALCIUM 600 PO), Take 600 mg by mouth daily pt reports not having & not taking, Disp: , Rfl:     cephalexin (KEFLEX) 500 mg capsule, Take 1 capsule (500 mg total) by mouth every 6 (six) hours for 7 days, Disp: 28 capsule, Rfl: 0    Cholecalciferol (Vitamin D3) 125 MCG (5000 UT) TABS, Take 5,000 Units by mouth daily pt reports taking 2 tab, Disp: , Rfl:     cloNIDine (CATAPRES) 0.2 mg tablet, Take 0.2 mg by mouth daily at bedtime , Disp: , Rfl:     clopidogrel (PLAVIX) 75 mg tablet, Take 75 mg by mouth every morning, Disp: , Rfl:     clotrimazole-betamethasone (LOTRISONE) 1-0.05 % cream, Apply to affected area 2 times daily prn (Patient taking differently: Apply to affected area 2 times daily prn. pt reports running out & does not have in home 5/18/23.), Disp: 45 g, Rfl: 1    Depo-Testosterone 100 MG/ML IM injection, INJECT 25MG (0.25ML) INTO THE MUSCLE EVERY 2 WEEKS(VIAL ONLY GOOD FOR 28DAYS), Disp: , Rfl:     flavoxATE (URISPAS) 100 mg tablet, TAKE 1 TABLET BY MOUTH 3 TIMES A DAY AS NEEDED FOR BLADDER SPASMS., Disp: 90 tablet, Rfl: 3    Ginkgo Biloba 40 MG TABS, Take 120 mg by mouth daily pt reports taking 2 tabs daily, Disp: , Rfl:     ibuprofen (MOTRIN) 200 mg tablet, Take 200 mg by mouth as needed for mild pain pt reports taking 3-4tabs every 6hrs. , Disp: , Rfl:     lubiprostone (AMITIZA) 24 mcg capsule, Take 24 mcg by mouth 2 (two) times a day with meals pt reports not taking, Disp: , Rfl:     lubiprostone (AMITIZA) 8 mcg capsule, Take 8 mcg by mouth 3 (three) times a day, Disp: , Rfl:     LUTEIN PO, Take by mouth in the morning 20mg on bottle , Disp: , Rfl:     Misc Natural Products (GINSENG COMPLEX PO), Take 2 tablets by mouth daily 500mg on bottle., Disp:  , Rfl:     Movantik 25 MG tablet, Take 25 mg by mouth every evening, Disp: , Rfl:     Multiple Vitamins-Minerals (ZINC PO), Take by mouth, Disp: , Rfl:     Nerve Stimulator (TENS THERAPY REPLACE BACK PADS) MISC, 30 pad by Does not apply route 2 (two) times a day, Disp: 30 each, Rfl: 0    Nerve Stimulator VICKY, by Does not apply route 2 (two) times a day, Disp: 1 each, Rfl: 0    Nerve Stimulator Supplies MISC, 30 pad by Does not apply route 2 (two) times a day, Disp: 30 pad, Rfl: 0    Omega-3 Fatty Acids (FISH OIL OMEGA-3 PO), Take 1,200 mg by mouth daily , Disp: , Rfl:     Potassium 99 MG TABS, Take 99 mg by mouth daily pt reports taking 3 tabs daily, Disp: , Rfl:     Premarin 0.625 MG tablet, Take 0.625 mg by mouth daily, Disp: , Rfl:     Probiotic Product (PROBIOTIC DAILY PO), Take 2 tablets by mouth daily  , Disp: , Rfl:     promethazine (PHENERGAN) 50 MG tablet, daily at bedtime 1.5 tabs daily at bedtime, Disp: , Rfl: 1    Restasis 0.05 % ophthalmic emulsion, INSTILL 1 DROP BY OPHTHALMIC ROUTE EVERY 12 HOURS BOTH EYES, Disp: , Rfl:     testosterone cypionate (DEPO-TESTOSTERONE) 200 mg/mL SOLN, 25 MG (0.125ML) IM ONCE EVERY 7 DAYS, Disp: , Rfl:     topiramate (TOPAMAX) 50 MG tablet, Take 50 mg by mouth daily  , Disp: , Rfl: 5    TURMERIC PO, Take by mouth Turmeric Curcumin 500mg on bottle. 1 tab daily, Disp: , Rfl:     ZINC-VITAMIN C PO, Take by mouth pt not taking due to taking regular zinc per pt report on 5/18/23., Disp: , Rfl:     docusate sodium (COLACE) 100 mg capsule, Take 100 mg by mouth 3 (three) times a day   (Patient not taking: Reported on 11/28/2023), Disp: , Rfl:     methylPREDNISolone sodium succinate in sodium chloride 0.9 % 250 mL IVPB, Inject 1,000 mg into a catheter in a vein every 30 (thirty) days. 1x/month, provided at medical office. per EPIC  Indications: MS (Patient not taking: Reported on 1/3/2024), Disp: , Rfl:     Nerve Stimulator (STANDARD TENS) VICKY, by Does not apply route 2 (two)  "times a day, Disp: 1 Device, Rfl: 0    NON FORMULARY, HEMPANOL BRAIN DETOX 200MG . pt reports not taking per 5/18/23. (Patient not taking: Reported on 1/3/2024), Disp: , Rfl:     phenazopyridine (PYRIDIUM) 200 mg tablet, Take 1 tablet (200 mg total) by mouth 3 (three) times a day as needed for bladder spasms (Patient not taking: Reported on 5/12/2023), Disp: 10 tablet, Rfl: 0    selenium sulfide (SELSUN) 2.5 % shampoo, Apply topically daily as needed for dandruff (Patient not taking: Reported on 1/3/2024), Disp: 118 mL, Rfl: 0  No current facility-administered medications for this visit.    Allergies   Allergen Reactions    Penicillins GI Intolerance and Nausea Only     Other reaction(s): Unknown Reaction       Physical Exam:    BP 99/66   Pulse 77   Resp 16   Ht 5' 4\" (1.626 m)   Wt 42.2 kg (93 lb)   LMP  (LMP Unknown)   BMI 15.96 kg/m²     Constitutional:normal, well developed, well nourished, alert, in no distress and non-toxic and no overt pain behavior.  Eyes:anicteric  HEENT:grossly intact  Neck:supple, symmetric, trachea midline and no masses   Pulmonary:even and unlabored  Cardiovascular:No edema or pitting edema present  Skin:Normal without rashes or lesions and well hydrated  Psychiatric:Mood and affect appropriate  Neurologic:Cranial Nerves II-XII grossly intact  Musculoskeletal:antalgic and Billetz with a walker    Imaging  No orders to display       No orders of the defined types were placed in this encounter.     "

## 2024-06-18 RX ORDER — SODIUM CHLORIDE 9 MG/ML
20 INJECTION, SOLUTION INTRAVENOUS ONCE
Status: CANCELLED | OUTPATIENT
Start: 2024-06-19

## 2024-06-19 ENCOUNTER — HOSPITAL ENCOUNTER (OUTPATIENT)
Dept: INFUSION CENTER | Facility: HOSPITAL | Age: 44
Discharge: HOME | End: 2024-06-19
Attending: PSYCHIATRY & NEUROLOGY
Payer: COMMERCIAL

## 2024-06-19 VITALS
DIASTOLIC BLOOD PRESSURE: 53 MMHG | OXYGEN SATURATION: 98 % | SYSTOLIC BLOOD PRESSURE: 108 MMHG | TEMPERATURE: 98.7 F | RESPIRATION RATE: 17 BRPM | HEART RATE: 85 BPM

## 2024-06-19 DIAGNOSIS — G35 MS (MULTIPLE SCLEROSIS) (HCC): Primary | ICD-10-CM

## 2024-06-19 RX ORDER — SODIUM CHLORIDE 9 MG/ML
20 INJECTION, SOLUTION INTRAVENOUS ONCE
OUTPATIENT
Start: 2024-07-17

## 2024-06-19 RX ORDER — SODIUM CHLORIDE 9 MG/ML
20 INJECTION, SOLUTION INTRAVENOUS ONCE
Status: COMPLETED | OUTPATIENT
Start: 2024-06-19 | End: 2024-06-19

## 2024-06-19 RX ADMIN — SODIUM CHLORIDE 1000 MG: 0.9 INJECTION, SOLUTION INTRAVENOUS at 12:09

## 2024-06-19 RX ADMIN — SODIUM CHLORIDE 20 ML/HR: 0.9 INJECTION, SOLUTION INTRAVENOUS at 11:50

## 2024-06-19 NOTE — PROGRESS NOTES
Christie Hemphill  tolerated treatment well with no complications.      Christie Hemphill is aware of future appt on 7/17/24 at 1330.     AVS printed and given to Christie Hemphill:   No (Declined by Christie Hemphill) x Appointment card given

## 2024-06-27 ENCOUNTER — TELEPHONE (OUTPATIENT)
Dept: PAIN MEDICINE | Facility: CLINIC | Age: 44
End: 2024-06-27

## 2024-07-01 NOTE — TELEPHONE ENCOUNTER
Caller: Patient     Doctor: Ricardo     Reason for call: 80% improvement and pain level is at 2.      Call back#: [phone]

## 2024-07-17 ENCOUNTER — HOSPITAL ENCOUNTER (OUTPATIENT)
Dept: INFUSION CENTER | Facility: HOSPITAL | Age: 44
Discharge: HOME | End: 2024-07-17
Attending: PSYCHIATRY & NEUROLOGY
Payer: COMMERCIAL

## 2024-07-17 VITALS
SYSTOLIC BLOOD PRESSURE: 106 MMHG | TEMPERATURE: 98 F | OXYGEN SATURATION: 95 % | DIASTOLIC BLOOD PRESSURE: 67 MMHG | HEART RATE: 66 BPM | RESPIRATION RATE: 17 BRPM

## 2024-07-17 DIAGNOSIS — G35 MS (MULTIPLE SCLEROSIS) (HCC): Primary | ICD-10-CM

## 2024-07-17 PROCEDURE — 96365 THER/PROPH/DIAG IV INF INIT: CPT

## 2024-07-17 RX ORDER — SODIUM CHLORIDE 9 MG/ML
20 INJECTION, SOLUTION INTRAVENOUS ONCE
OUTPATIENT
Start: 2024-08-14

## 2024-07-17 RX ORDER — SODIUM CHLORIDE 9 MG/ML
20 INJECTION, SOLUTION INTRAVENOUS ONCE
Status: COMPLETED | OUTPATIENT
Start: 2024-07-17 | End: 2024-07-17

## 2024-07-17 RX ADMIN — SODIUM CHLORIDE 20 ML/HR: 0.9 INJECTION, SOLUTION INTRAVENOUS at 14:00

## 2024-07-17 RX ADMIN — SODIUM CHLORIDE 1000 MG: 0.9 INJECTION, SOLUTION INTRAVENOUS at 14:08

## 2024-07-17 NOTE — PROGRESS NOTES
Christie Joby  tolerated treatment well with no complications.  Discharged in satisfactory condition via wheelchair to her car accompanied by myself.    Christie Hemphill is aware of future appt on 8/13/24 at 1230.     AVS printed and given to Christie Hemphill:   No (Declined by Christie Hemphill)

## 2024-08-13 ENCOUNTER — HOSPITAL ENCOUNTER (OUTPATIENT)
Dept: INFUSION CENTER | Facility: HOSPITAL | Age: 44
Discharge: HOME | End: 2024-08-13
Payer: COMMERCIAL

## 2024-08-13 VITALS
TEMPERATURE: 98 F | DIASTOLIC BLOOD PRESSURE: 56 MMHG | RESPIRATION RATE: 16 BRPM | SYSTOLIC BLOOD PRESSURE: 116 MMHG | OXYGEN SATURATION: 98 % | HEART RATE: 79 BPM

## 2024-08-13 DIAGNOSIS — G35 MS (MULTIPLE SCLEROSIS) (HCC): Primary | ICD-10-CM

## 2024-08-13 PROCEDURE — 96365 THER/PROPH/DIAG IV INF INIT: CPT

## 2024-08-13 RX ORDER — SODIUM CHLORIDE 9 MG/ML
20 INJECTION, SOLUTION INTRAVENOUS ONCE
Status: COMPLETED | OUTPATIENT
Start: 2024-08-13 | End: 2024-08-13

## 2024-08-13 RX ORDER — SODIUM CHLORIDE 9 MG/ML
20 INJECTION, SOLUTION INTRAVENOUS ONCE
OUTPATIENT
Start: 2024-08-14

## 2024-08-13 RX ORDER — FUROSEMIDE 20 MG
10 TABLET ORAL DAILY
COMMUNITY
Start: 2024-07-18

## 2024-08-13 RX ORDER — POTASSIUM CHLORIDE 750 MG/1
TABLET, EXTENDED RELEASE ORAL
COMMUNITY
Start: 2024-07-18

## 2024-08-13 RX ADMIN — SODIUM CHLORIDE 20 ML/HR: 0.9 INJECTION, SOLUTION INTRAVENOUS at 12:40

## 2024-08-13 RX ADMIN — SODIUM CHLORIDE 1000 MG: 0.9 INJECTION, SOLUTION INTRAVENOUS at 12:35

## 2024-08-13 NOTE — PROGRESS NOTES
Christie Joby  tolerated IV solumedrol well with no complications.      Christie Hemphill is aware of future appt on 09/10/24 at 1000.     AVS declined.

## 2024-08-13 NOTE — PLAN OF CARE
Problem: Knowledge Deficit  Goal: Patient/family/caregiver demonstrates understanding of disease process, treatment plan, medications, and discharge instructions  Description: Complete learning assessment and assess knowledge base.  Interventions:  - Provide teaching at level of understanding  - Provide teaching via preferred learning methods  Outcome: Progressing      Chief Complaint   Patient presents with     Follow Up     33 y/o for 3 month follow up of HTN     Vitals were taken and medications were reconciled.     Marika Mayberry RMA  3:19 PM

## 2024-08-15 ENCOUNTER — OFFICE VISIT (OUTPATIENT)
Dept: PAIN MEDICINE | Facility: CLINIC | Age: 44
End: 2024-08-15
Payer: COMMERCIAL

## 2024-08-15 VITALS
RESPIRATION RATE: 16 BRPM | DIASTOLIC BLOOD PRESSURE: 61 MMHG | HEIGHT: 64 IN | SYSTOLIC BLOOD PRESSURE: 95 MMHG | BODY MASS INDEX: 15.96 KG/M2 | TEMPERATURE: 98.9 F | OXYGEN SATURATION: 95 % | HEART RATE: 70 BPM

## 2024-08-15 DIAGNOSIS — M54.50 CHRONIC MIDLINE LOW BACK PAIN WITHOUT SCIATICA: ICD-10-CM

## 2024-08-15 DIAGNOSIS — G89.4 CHRONIC PAIN SYNDROME: Primary | ICD-10-CM

## 2024-08-15 DIAGNOSIS — G89.29 MUSCULOSKELETAL PAIN, CHRONIC: ICD-10-CM

## 2024-08-15 DIAGNOSIS — M54.9 MID BACK PAIN: ICD-10-CM

## 2024-08-15 DIAGNOSIS — G89.29 CHRONIC MIDLINE LOW BACK PAIN WITHOUT SCIATICA: ICD-10-CM

## 2024-08-15 DIAGNOSIS — M79.18 MUSCULOSKELETAL PAIN, CHRONIC: ICD-10-CM

## 2024-08-15 PROCEDURE — 99213 OFFICE O/P EST LOW 20 MIN: CPT | Performed by: NURSE PRACTITIONER

## 2024-08-15 NOTE — PROGRESS NOTES
Assessment:  1. Chronic pain syndrome    2. Chronic midline low back pain without sciatica    3. Mid back pain    4. Musculoskeletal pain, chronic        Plan:  While the patient was in the office today, I did have a thorough conversation regarding their chronic pain syndrome, medication management, and treatment plan options.  Patient is being seen for a follow-up visit.  She has been experiencing increasing upper back pain, predominantly right-sided.  She underwent ultrasound-guided trigger point injections involving the right upper trapezius muscle, the right levator scapulae muscle and right rhomboid muscle on 6/13/2024.  Pain was pretty minimal until recently.  As such, we will plan to repeat trigger point injections in the near future.    Continue Aleve or Advil as needed.    Follow-up 1 month after injection.      History of Present Illness:  The patient is a 44 y.o. female who presents for a follow up office visit in regards to Back Pain.   The patient’s current symptoms include complaints of upper back pain, dominantly right sided.  Current pain level is a 5/10.  Quality of pain is described as dull, aching, sharp, shooting, numb, pins-and-needles.    Current pain medications includes: Over-the-counter Advil or Aleve as needed.     I have personally reviewed and/or updated the patient's past medical history, past surgical history, family history, social history, current medications, allergies, and vital signs today.         Review of Systems  Review of Systems   Gastrointestinal:  Positive for constipation.   Musculoskeletal:  Positive for gait problem and joint swelling.        Decreased rom, joint stiffness. Pain in legs   Neurological:         Memory loss   All other systems reviewed and are negative.          Past Medical History:   Diagnosis Date    Back pain     Chronic constipation 6/27/2016    Chronic pain disorder     Collar bone fracture     right side    Crutches as ambulation aid     Depression   "   Dyssynergic defecation     Type 1    ETOH abuse     Head injury 2014    Infectious viral hepatitis     Lyme disease     Meningitis spinal 2007?    Multiple sclerosis (HCC)     Opioid abuse (HCC)     Rib fractures     Thrombocytopenia (HCC)     Use of cane as ambulatory aid        Past Surgical History:   Procedure Laterality Date    APPENDECTOMY      CLAVICLE SURGERY      COLONOSCOPY      FL LUMBAR PUNCTURE DIAGNOSTIC  11/25/2020    KNEE ARTHROSCOPY      MULTIPLE TOOTH EXTRACTIONS      MT COLONOSCOPY FLX DX W/COLLJ SPEC WHEN PFRMD N/A 6/27/2016    Procedure: COLONOSCOPY;  Surgeon: Jason Rogers MD;  Location: [facility];  Service: Colorectal    MT OPEN IMPLANTATION CHARITY SACRAL NERVE N/A 11/15/2021    Procedure: INSERTION OF NEUROSTIMULATOR ELECTRODE ARRAY SACRAL NERVE; FLUOROSCOPY; ELECTRONICN ANALYSIS;  Surgeon: Abdulaziz Guevara MD;  Location: [facility];  Service: UroGynecology           TONSILLECTOMY AND ADENOIDECTOMY      TUBAL LIGATION         Family History   Problem Relation Age of Onset    Skin cancer Mother     No Known Problems Brother        Social History     Occupational History    Not on file   Tobacco Use    Smoking status: Every Day     Current packs/day: 1.00     Types: Cigarettes    Smokeless tobacco: Never   Vaping Use    Vaping status: Never Used   Substance and Sexual Activity    Alcohol use: Not Currently     Comment: 2 mixed drinks each night    Drug use: No    Sexual activity: Yes     Partners: Male         Current Outpatient Medications:     albendazole (ALBENZA) 200 mg tablet, TAKE 1 & 1/2 TABLETS BY MOUTH TWICE A DAY AS DIRECTED(PLAN LIMIT 4 TABS PER FILL), Disp: , Rfl:     ascorbic acid (VITAMIN C) 500 mg tablet, Take 500 mg by mouth daily, Disp: , Rfl:     b complex vitamins tablet, Take 1 tablet by mouth daily, Disp: , Rfl:     B-D HYPODERMIC NEEDLE 23GX1\" 23G X 1\" MISC, USE ONE WEEKLY, Disp: , Rfl:     BD Plastipak Syringe 21G X 1\" 3 ML MISC, USE ONE WEEKLY, Disp: , Rfl:     " Biotin 37571 MCG TABS, Take 1 tablet by mouth daily  , Disp: , Rfl:     buprenorphine-naloxone (SUBOXONE) 2-0.5 mg per SL tablet, Place 0.5 tablets under the tongue 3 (three) times a day 2mg  three times a day, Disp: , Rfl:     Calcium Carbonate (CALCIUM 600 PO), Take 600 mg by mouth daily pt reports not having & not taking, Disp: , Rfl:     Cholecalciferol (Vitamin D3) 125 MCG (5000 UT) TABS, Take 5,000 Units by mouth daily pt reports taking 2 tab, Disp: , Rfl:     cloNIDine (CATAPRES) 0.2 mg tablet, Take 0.2 mg by mouth daily at bedtime , Disp: , Rfl:     clopidogrel (PLAVIX) 75 mg tablet, Take 75 mg by mouth every morning, Disp: , Rfl:     clotrimazole-betamethasone (LOTRISONE) 1-0.05 % cream, Apply to affected area 2 times daily prn (Patient taking differently: Apply to affected area 2 times daily prn. pt reports running out & does not have in home 5/18/23.), Disp: 45 g, Rfl: 1    Depo-Testosterone 100 MG/ML IM injection, INJECT 25MG (0.25ML) INTO THE MUSCLE EVERY 2 WEEKS(VIAL ONLY GOOD FOR 28DAYS), Disp: , Rfl:     flavoxATE (URISPAS) 100 mg tablet, TAKE 1 TABLET BY MOUTH 3 TIMES A DAY AS NEEDED FOR BLADDER SPASMS., Disp: 90 tablet, Rfl: 3    furosemide (LASIX) 20 mg tablet, Take 10 mg by mouth daily, Disp: , Rfl:     Ginkgo Biloba 40 MG TABS, Take 120 mg by mouth daily pt reports taking 2 tabs daily, Disp: , Rfl:     ibuprofen (MOTRIN) 200 mg tablet, Take 200 mg by mouth as needed for mild pain pt reports taking 3-4tabs every 6hrs. , Disp: , Rfl:     lubiprostone (AMITIZA) 24 mcg capsule, Take 24 mcg by mouth 2 (two) times a day with meals pt reports not taking, Disp: , Rfl:     lubiprostone (AMITIZA) 8 mcg capsule, Take 8 mcg by mouth 3 (three) times a day, Disp: , Rfl:     LUTEIN PO, Take by mouth in the morning 20mg on bottle , Disp: , Rfl:     Misc Natural Products (GINSENG COMPLEX PO), Take 2 tablets by mouth daily 500mg on bottle., Disp: , Rfl:     Movantik 25 MG tablet, Take 25 mg by mouth every  evening, Disp: , Rfl:     Multiple Vitamins-Minerals (ZINC PO), Take by mouth, Disp: , Rfl:     Nerve Stimulator (STANDARD TENS) VICKY, by Does not apply route 2 (two) times a day, Disp: 1 Device, Rfl: 0    Nerve Stimulator (TENS THERAPY REPLACE BACK PADS) MISC, 30 pad by Does not apply route 2 (two) times a day, Disp: 30 each, Rfl: 0    Nerve Stimulator VICKY, by Does not apply route 2 (two) times a day, Disp: 1 each, Rfl: 0    Nerve Stimulator Supplies MISC, 30 pad by Does not apply route 2 (two) times a day, Disp: 30 pad, Rfl: 0    Omega-3 Fatty Acids (FISH OIL OMEGA-3 PO), Take 1,200 mg by mouth daily , Disp: , Rfl:     Potassium 99 MG TABS, Take 99 mg by mouth daily pt reports taking 3 tabs daily, Disp: , Rfl:     potassium chloride (Klor-Con) 10 mEq tablet, 1 TABLET DAILY, Disp: , Rfl:     Premarin 0.625 MG tablet, Take 0.625 mg by mouth daily, Disp: , Rfl:     Probiotic Product (PROBIOTIC DAILY PO), Take 2 tablets by mouth daily  , Disp: , Rfl:     promethazine (PHENERGAN) 50 MG tablet, daily at bedtime 1.5 tabs daily at bedtime, Disp: , Rfl: 1    Restasis 0.05 % ophthalmic emulsion, INSTILL 1 DROP BY OPHTHALMIC ROUTE EVERY 12 HOURS BOTH EYES, Disp: , Rfl:     testosterone cypionate (DEPO-TESTOSTERONE) 200 mg/mL SOLN, 25 MG (0.125ML) IM ONCE EVERY 7 DAYS, Disp: , Rfl:     topiramate (TOPAMAX) 50 MG tablet, Take 50 mg by mouth daily  , Disp: , Rfl: 5    TURMERIC PO, Take by mouth Turmeric Curcumin 500mg on bottle. 1 tab daily, Disp: , Rfl:     ZINC-VITAMIN C PO, Take by mouth pt not taking due to taking regular zinc per pt report on 5/18/23., Disp: , Rfl:     docusate sodium (COLACE) 100 mg capsule, Take 100 mg by mouth 3 (three) times a day   (Patient not taking: Reported on 11/28/2023), Disp: , Rfl:     methylPREDNISolone sodium succinate in sodium chloride 0.9 % 250 mL IVPB, Inject 1,000 mg into a catheter in a vein every 30 (thirty) days. 1x/month, provided at medical office. per EPIC  Indications: MS  "(Patient not taking: Reported on 1/3/2024), Disp: , Rfl:     NON FORMULARY, HEMPANOL BRAIN DETOX 200MG . pt reports not taking per 5/18/23. (Patient not taking: Reported on 1/3/2024), Disp: , Rfl:     phenazopyridine (PYRIDIUM) 200 mg tablet, Take 1 tablet (200 mg total) by mouth 3 (three) times a day as needed for bladder spasms (Patient not taking: Reported on 5/12/2023), Disp: 10 tablet, Rfl: 0    selenium sulfide (SELSUN) 2.5 % shampoo, Apply topically daily as needed for dandruff (Patient not taking: Reported on 1/3/2024), Disp: 118 mL, Rfl: 0  No current facility-administered medications for this visit.    Facility-Administered Medications Ordered in Other Visits:     alteplase (CATHFLO) injection 2 mg, 2 mg, Intracatheter, Q1MIN PRN, Concepcion Beth MD    Allergies   Allergen Reactions    Penicillins GI Intolerance and Nausea Only     Other reaction(s): Unknown Reaction       Physical Exam:    BP 95/61   Pulse 70   Temp 98.9 °F (37.2 °C) (Temporal)   Resp 16   Ht 5' 4\" (1.626 m)   SpO2 95%   BMI 15.96 kg/m²     Constitutional:normal, well developed, well nourished, alert, in no distress and non-toxic and no overt pain behavior.  Eyes:anicteric  HEENT:grossly intact  Neck:supple, symmetric, trachea midline and no masses   Pulmonary:even and unlabored  Cardiovascular:No edema or pitting edema present  Skin:Normal without rashes or lesions and well hydrated  Psychiatric:Mood and affect appropriate  Neurologic:Cranial Nerves II-XII grossly intact  Musculoskeletal: Tenderness over right trapezius muscle, right rhomboid muscle and right upper thoracic paraspinal muscle.    Imaging  No orders to display       Study Result    Narrative & Impression   LUMBAR SPINE     INDICATION:   M54.16: Radiculopathy, lumbar region  M47.816: Spondylosis without myelopathy or radiculopathy, lumbar region  G89.4: Chronic pain syndrome.     COMPARISON: Lumbar spine 8/5/2022, 11/20/2018     VIEWS:  XR SPINE LUMBAR MINIMUM 4 " VIEWS NON INJURY  Images: 4     FINDINGS:     There are 5 non rib bearing lumbar vertebral bodies. Slight rotational artifact contributes the appearance of the AP radiograph..     There is no evidence of acute fracture or destructive osseous lesion.     Minimal levoscoliosis centered at the L3 level     No significant lumbar degenerative change noted.     The pedicles appear intact.     Soft tissues are unremarkable.     IMPRESSION:     No acute fracture or subluxation.        Resident: GUY ANDRE I, the attending radiologist, have reviewed the images and agree with the final report above.     Workstation performed: [id number]        No orders of the defined types were placed in this encounter.

## 2024-08-16 ENCOUNTER — HOSPITAL ENCOUNTER (OUTPATIENT)
Dept: RADIOLOGY | Facility: HOSPITAL | Age: 44
Discharge: HOME | End: 2024-08-16
Payer: COMMERCIAL

## 2024-08-16 DIAGNOSIS — R19.07 GENERALIZED ABDOMINAL OR PELVIC SWELLING OR MASS OR LUMP: ICD-10-CM

## 2024-08-16 DIAGNOSIS — R26.9 ABNORMALITY OF GAIT: ICD-10-CM

## 2024-08-16 DIAGNOSIS — R29.898 WEAKNESS OF LEFT LEG: ICD-10-CM

## 2024-08-16 DIAGNOSIS — M79.671 RIGHT FOOT PAIN: ICD-10-CM

## 2024-08-16 PROCEDURE — 73630 X-RAY EXAM OF FOOT: CPT

## 2024-08-16 PROCEDURE — 73610 X-RAY EXAM OF ANKLE: CPT

## 2024-08-30 DIAGNOSIS — N31.9 NEUROGENIC BLADDER: ICD-10-CM

## 2024-08-30 DIAGNOSIS — R39.11 URINARY HESITANCY: ICD-10-CM

## 2024-08-30 RX ORDER — FLAVOXATE HYDROCHLORIDE 100 MG/1
100 TABLET ORAL 3 TIMES DAILY PRN
Qty: 90 TABLET | Refills: 3 | Status: SHIPPED | OUTPATIENT
Start: 2024-08-30

## 2024-09-10 ENCOUNTER — HOSPITAL ENCOUNTER (OUTPATIENT)
Dept: INFUSION CENTER | Facility: HOSPITAL | Age: 44
Discharge: HOME | End: 2024-09-10
Payer: COMMERCIAL

## 2024-09-10 ENCOUNTER — NURSE TRIAGE (OUTPATIENT)
Age: 44
End: 2024-09-10

## 2024-09-10 VITALS
TEMPERATURE: 96.6 F | RESPIRATION RATE: 17 BRPM | DIASTOLIC BLOOD PRESSURE: 71 MMHG | OXYGEN SATURATION: 97 % | SYSTOLIC BLOOD PRESSURE: 109 MMHG | HEART RATE: 78 BPM

## 2024-09-10 DIAGNOSIS — G35 MS (MULTIPLE SCLEROSIS) (HCC): Primary | ICD-10-CM

## 2024-09-10 PROCEDURE — 96365 THER/PROPH/DIAG IV INF INIT: CPT

## 2024-09-10 RX ORDER — SODIUM CHLORIDE 9 MG/ML
20 INJECTION, SOLUTION INTRAVENOUS ONCE
Status: COMPLETED | OUTPATIENT
Start: 2024-09-10 | End: 2024-09-10

## 2024-09-10 RX ORDER — SODIUM CHLORIDE 9 MG/ML
20 INJECTION, SOLUTION INTRAVENOUS ONCE
Status: CANCELLED | OUTPATIENT
Start: 2024-10-08

## 2024-09-10 RX ORDER — SODIUM CHLORIDE 9 MG/ML
20 INJECTION, SOLUTION INTRAVENOUS ONCE
OUTPATIENT
Start: 2024-10-08

## 2024-09-10 RX ADMIN — SODIUM CHLORIDE 20 ML/HR: 0.9 INJECTION, SOLUTION INTRAVENOUS at 10:31

## 2024-09-10 RX ADMIN — SODIUM CHLORIDE 1000 MG: 0.9 INJECTION, SOLUTION INTRAVENOUS at 10:31

## 2024-09-10 NOTE — TELEPHONE ENCOUNTER
Regarding: RX/Infusion Inquiry  ----- Message from Bea MARIEE sent at 9/10/2024 12:54 PM EDT -----  Patient called and stated she has two more infusion therapy appointments scheduled and that Dr. HILL went over RX information with patient at [facility] 5/28/24; patient stated she does not feel comfortable taking any medication at this time. Patient is requesting to continue infusion therapy at least until her next office visit scheduled 12/3/24 1 PM REGINALD Blakely; patient stated she would be willing to discuss other options again at this time.  Advised someone will call her back; could not warm transfer. Please assist, thank you.

## 2024-09-10 NOTE — PROGRESS NOTES
Patient tolerated IV solumedrol without issues.      Christie Hemphill is aware of future appt on 10/8/24 at 1100.     AVS -  No (Declined by Christie Hemphill) [facility] has Mychart.    Patient taken in wheelchair off unit without incident.  All personal belongings taken with patient.

## 2024-09-10 NOTE — TELEPHONE ENCOUNTER
Adjusted current plan to allow for infusions every 28 days through December (roughly 4 more infusions). Sent for signature.

## 2024-09-10 NOTE — TELEPHONE ENCOUNTER
"Outbound call made to patient to gather questions for provider. Patient states she already asked her questions and was expecting a call back with answers. Patient clarified questions further, please see triage and PEP message.     Dr. Beth please advise, thank you!    Janelle Meneses PA-C please be aware. Thank you!    Last office appointment was with Dr. Beth 05/28/2024.     Next office appointment is with Janelle Meneses PA-C    Reason for Disposition   Caller has NON-URGENT medicine question about med that PCP or specialist prescribed and triager unable to answer question    Answer Assessment - Initial Assessment Questions  1. NAME of MEDICATION: \"What medicine are you calling about?\"      Solu Medrol Infusions.     2. QUESTION: \"What is your question?\" (e.g., medication refill, side effect)      Patient states she has one more Solu Medrol infusion left but she would like to continue them until December. Patient said she thought about the \"new medication\" that is \"two pills you take and then you are done\" but does not want to try it. Name of medication was unknown to Patient. Patient explained she tried Tysabri and Kesimpta but they \"screwed her up.\" Patient is concerned her MS symptoms will worsen and she will not be able to stop the medication because it will already be in her body.     3. PRESCRIBING HCP: \"Who prescribed it?\" Reason: if prescribed by specialist, call should be referred to that group.      Dr. Beth    Protocols used: Medication Question Call-ADULT-[place]    "

## 2024-09-18 ENCOUNTER — PROCEDURE VISIT (OUTPATIENT)
Dept: PAIN MEDICINE | Facility: CLINIC | Age: 44
End: 2024-09-18

## 2024-09-18 VITALS
SYSTOLIC BLOOD PRESSURE: 110 MMHG | OXYGEN SATURATION: 97 % | RESPIRATION RATE: 16 BRPM | HEART RATE: 85 BPM | BODY MASS INDEX: 17.07 KG/M2 | HEIGHT: 64 IN | TEMPERATURE: 99.9 F | WEIGHT: 100 LBS | DIASTOLIC BLOOD PRESSURE: 60 MMHG

## 2024-09-18 DIAGNOSIS — M79.18 MYOFASCIAL PAIN SYNDROME: ICD-10-CM

## 2024-09-18 DIAGNOSIS — G89.4 CHRONIC PAIN SYNDROME: ICD-10-CM

## 2024-09-18 DIAGNOSIS — M54.9 MID BACK PAIN: Primary | ICD-10-CM

## 2024-09-18 RX ORDER — ROPIVACAINE HYDROCHLORIDE 2 MG/ML
10 INJECTION, SOLUTION EPIDURAL; INFILTRATION; PERINEURAL
Status: DISCONTINUED | OUTPATIENT
Start: 2024-09-18 | End: 2024-09-18

## 2024-09-18 NOTE — PATIENT INSTRUCTIONS
Do not apply heat to any area that is numb. If you have discomfort or soreness at the injection site, you may apply ice today, 20 minutes on and 20 minutes off. Tomorrow you may use ice or warm, moist heat. Do not apply ice or heat directly to the skin.  If you experience severe shortness of breath, go to the Emergency Room.  You may have numbness for several hours from the local anesthetic. Please use caution and common sense, especially with weight-bearing activities.  You may have an increase or change in the discomfort for 36-48 hours after your treatment. Apply ice and continue with any pain medicine you have been prescribed.  Do not do anything strenuous today. You may shower, but no tub baths or hot tubs today. You may resume your normal activities tomorrow, but do not “overdo it”. Resume normal activities slowly when you are feeling better.  If you experience redness, drainage or swelling at the injection site, or if you develop a fever above 100 degrees, please call [facility] at [phone] or go to the Emergency Room.  Continue to take all routine medicines prescribed by your primary care physician unless otherwise instructed by our staff. Most blood thinners should be started again according to your regularly scheduled dosing. If you have any questions, please give our office a call.    As no general anesthesia was used in today's procedure, you should not experience any side effects related to anesthesia.       If you have a problem specifically related to your procedure, please call our office at [phone].  Problems not related to your procedure should be directed to your primary care physician.

## 2024-09-18 NOTE — PROGRESS NOTES
" Universal Protocol:  procedure performed by consultantConsent: Verbal consent obtained. Written consent obtained.  Risks and benefits: risks, benefits and alternatives were discussed  Consent given by: patient  Time out: Immediately prior to procedure a \"time out\" was called to verify the correct patient, procedure, equipment, support staff and site/side marked as required.  Timeout called at: 9/18/2024 1:46 PM.  Patient understanding: patient states understanding of the procedure being performed  Patient consent: the patient's understanding of the procedure matches consent given  Procedure consent: procedure consent matches procedure scheduled  Relevant documents: relevant documents present and verified  Test results: test results available and properly labeled  Site marked: the operative site was marked  Radiology Images displayed and confirmed. If images not available, report reviewed: imaging studies not available  Required items: required blood products, implants, devices, and special equipment available  Patient identity confirmed: verbally with patient  Supporting Documentation  Indications: pain   Trigger Point Injections: multiple trigger points: 3 or more muscle groups    Injection site identified by: ultrasound  Procedure Details  Location(s):    Middle Back: R thoracic paraspinals, R latissimus dorsi, R lower trapezius and R longissimus     Prep: patient was prepped and draped in usual sterile fashion  Needle size: 22 G  Medications: 10 mL ropivacaine 0.2 %  Patient tolerance: patient tolerated the procedure well with no immediate complications          "

## 2024-09-25 ENCOUNTER — TELEPHONE (OUTPATIENT)
Age: 44
End: 2024-09-25

## 2024-09-25 NOTE — TELEPHONE ENCOUNTER
Patient has called in stating that she has to make an appointment with physiatry team. I advised patient she will receive a call back from the office in order to schedule.    Patient officially needed to complain to management in regards to the scheduling process. Please reach back to patient .    Thanks

## 2024-09-26 ENCOUNTER — TELEPHONE (OUTPATIENT)
Dept: NEUROLOGY | Facility: CLINIC | Age: 44
End: 2024-09-26

## 2024-09-26 NOTE — TELEPHONE ENCOUNTER
Patient wants to be seen in the [place] office with Dr Ortiz.  I offered her 1/9/25 at either 1 or 2.  Please message me back if she agrees to either appt. Thanks

## 2024-09-26 NOTE — TELEPHONE ENCOUNTER
Pt calling in requesting for PMR appt with provider who goes to [place] .     Please assist, Thank you.

## 2024-09-27 NOTE — TELEPHONE ENCOUNTER
Inbound call received from patient.     Pt stated she will accept the 1/9/25 appt at 2pm with Dr. Ortiz. Please placed pt on the schedule, RN unable to see provider's schedule.    Pt will have her PCP fax the referral for Physiatry to [phone].

## 2024-10-07 ENCOUNTER — HOSPITAL ENCOUNTER (EMERGENCY)
Facility: HOSPITAL | Age: 44
Discharge: HOME | End: 2024-10-07
Attending: EMERGENCY MEDICINE
Payer: COMMERCIAL

## 2024-10-07 VITALS
HEART RATE: 64 BPM | BODY MASS INDEX: 16.23 KG/M2 | DIASTOLIC BLOOD PRESSURE: 76 MMHG | TEMPERATURE: 98.5 F | OXYGEN SATURATION: 95 % | SYSTOLIC BLOOD PRESSURE: 114 MMHG | RESPIRATION RATE: 16 BRPM | WEIGHT: 101 LBS | HEIGHT: 66 IN

## 2024-10-07 DIAGNOSIS — J06.9 URI (UPPER RESPIRATORY INFECTION): Primary | ICD-10-CM

## 2024-10-07 PROCEDURE — 99282 EMERGENCY DEPT VISIT SF MDM: CPT

## 2024-10-07 RX ORDER — AZITHROMYCIN 250 MG/1
500 TABLET, FILM COATED ORAL ONCE
Status: COMPLETED | OUTPATIENT
Start: 2024-10-07 | End: 2024-10-07

## 2024-10-07 RX ORDER — AZITHROMYCIN 250 MG/1
250 TABLET, FILM COATED ORAL EVERY 24 HOURS
Qty: 4 TABLET | Refills: 0 | Status: SHIPPED | OUTPATIENT
Start: 2024-10-08 | End: 2024-10-12

## 2024-10-07 RX ADMIN — AZITHROMYCIN DIHYDRATE 500 MG: 250 TABLET ORAL at 19:28

## 2024-10-07 NOTE — DISCHARGE INSTRUCTIONS
Rest, plenty of fluids.  OTC medications as needed for symptoms.  Follow up with PCP if not improving or return to ER as needed.

## 2024-10-07 NOTE — ED PROVIDER NOTES
Final diagnoses:   URI (upper respiratory infection)     ED Disposition       ED Disposition   Discharge    Condition   Stable    Date/Time   Mon Oct 7, 2024  7:19 PM    Comment   Christie Hemphill discharge to home/self care.                   Assessment & Plan       Medical Decision Making  43 yo female presenting for evaluation of cough and congestion.  Offered viral swab and she declined.  Lungs are clear with normal oxygen saturation, doubt pneumonia.  Offered CXR, declined.  Given duration of symptoms, will treat with Abx therapy.  Pt afebrile, hemodynamically stable.  Respiratory status stable on room air.  I do not suspect sepsis or systemic illness.    Reviewed symptomatic management.  OTC meds reviewed.  Anticipatory guidance.  Advised recheck with PCP or return to ER as needed.  Strict return precautions outlined.  Patient voiced understanding and had no further questions.    Please refer to above ER course for further details/discussion.      Problems Addressed:  URI (upper respiratory infection): acute illness or injury    Amount and/or Complexity of Data Reviewed  External Data Reviewed: notes.    Risk  OTC drugs.  Prescription drug management.             Medications   azithromycin (ZITHROMAX) tablet 500 mg (500 mg Oral Given 10/7/24 1928)       ED Risk Strat Scores                           SBIRT 22yo+      Flowsheet Row Most Recent Value   Initial Alcohol Screen: US AUDIT-C     1. How often do you have a drink containing alcohol? 0 Filed at: 10/07/2024 1842   2. How many drinks containing alcohol do you have on a typical day you are drinking?  0 Filed at: 10/07/2024 1842   3b. FEMALE Any Age, or MALE 65+: How often do you have 4 or more drinks on one occassion? 0 Filed at: 10/07/2024 1842   Audit-C Score 0 Filed at: 10/07/2024 1842                            History of Present Illness       Chief Complaint   Patient presents with    Nasal Congestion     Congestion and cough started a month ago. No N/V/D.  No fever/no chills, no weight loss. No OTC meds. Productive cough, yellow mucous.        Past Medical History:   Diagnosis Date    Back pain     Chronic constipation 6/27/2016    Chronic pain disorder     Collar bone fracture     right side    Crutches as ambulation aid     Depression     Dyssynergic defecation     Type 1    ETOH abuse     Head injury 2014    Infectious viral hepatitis     Lyme disease     Meningitis spinal 2007?    Multiple sclerosis (HCC)     Opioid abuse (HCC)     Rib fractures     Thrombocytopenia (HCC)     Use of cane as ambulatory aid       Past Surgical History:   Procedure Laterality Date    APPENDECTOMY      CLAVICLE SURGERY      COLONOSCOPY      FL LUMBAR PUNCTURE DIAGNOSTIC  11/25/2020    KNEE ARTHROSCOPY      MULTIPLE TOOTH EXTRACTIONS      OH COLONOSCOPY FLX DX W/COLLJ SPEC WHEN PFRMD N/A 6/27/2016    Procedure: COLONOSCOPY;  Surgeon: Jason Rogers MD;  Location: [facility];  Service: Colorectal    OH OPEN IMPLANTATION CHARITY SACRAL NERVE N/A 11/15/2021    Procedure: INSERTION OF NEUROSTIMULATOR ELECTRODE ARRAY SACRAL NERVE; FLUOROSCOPY; ELECTRONICN ANALYSIS;  Surgeon: Abdulaziz Guevara MD;  Location: [facility];  Service: UroGynecology           TONSILLECTOMY AND ADENOIDECTOMY      TUBAL LIGATION        Family History   Problem Relation Age of Onset    Skin cancer Mother     No Known Problems Brother       Social History     Tobacco Use    Smoking status: Every Day     Current packs/day: 0.25     Average packs/day: 0.3 packs/day for 0.6 years (0.2 ttl pk-yrs)     Types: Cigarettes     Start date: 03/2024    Smokeless tobacco: Never   Vaping Use    Vaping status: Every Day    Substances: Nicotine, Flavoring   Substance Use Topics    Alcohol use: Not Currently     Comment: 2 mixed drinks each night    Drug use: No      E-Cigarette/Vaping    E-Cigarette Use Current Every Day User       E-Cigarette/Vaping Substances    Nicotine Yes     THC No     CBD No     Flavoring Yes     Other No      Unknown No       I have reviewed and agree with the history as documented.     44 year old female with PMH MS, chronic pain presenting ambulatory from home with daughter for evaluation of cough and congestion.  Pt reports symptoms started approximately 4 weeks ago.  Spouse and child have been sick with same symptoms.  She reports sinus congestion, mucous/phlegm.  Productive cough with yellow phlegm.  She notes fever when symptoms first started but no longer having fever.  Denies chills.  Denies ear pain, headache, sore throat.  Denies chest pain, wheezing, SOB, N/V/D, abdominal pain.  No reported aggravating or alleviating factors.  Has been doing OTC meds without relief.  No prior evaluation since onset.      History provided by:  Medical records and patient   used: No        Review of Systems   Constitutional: Negative.  Negative for chills, fatigue and fever.   HENT:  Positive for congestion and postnasal drip. Negative for ear pain, rhinorrhea and sore throat.    Eyes: Negative.    Respiratory:  Positive for cough. Negative for shortness of breath and wheezing.    Cardiovascular: Negative.  Negative for chest pain and leg swelling.   Gastrointestinal: Negative.  Negative for abdominal pain, diarrhea, nausea and vomiting.   Genitourinary: Negative.  Negative for dysuria and frequency.   Musculoskeletal: Negative.  Negative for back pain.   Skin: Negative.  Negative for rash.   Neurological: Negative.  Negative for dizziness, light-headedness and headaches.   Psychiatric/Behavioral: Negative.     All other systems reviewed and are negative.          Objective       ED Triage Vitals   Temperature Pulse Blood Pressure Respirations SpO2 Patient Position - Orthostatic VS   10/07/24 1838 10/07/24 1839 10/07/24 1839 10/07/24 1839 10/07/24 1839 10/07/24 1839   98.5 °F (36.9 °C) 64 114/76 16 95 % Sitting      Temp Source Heart Rate Source BP Location FiO2 (%) Pain Score    10/07/24 1838 10/07/24 1839  10/07/24 1839 -- 10/07/24 1839    Temporal Monitor Left arm  No Pain      Vitals      Date and Time Temp Pulse SpO2 Resp BP Pain Score FACES Pain Rating User   10/07/24 1839 -- 64 95 % 16 114/76 No Pain -- RJY   10/07/24 1838 98.5 °F (36.9 °C) -- -- -- -- -- -- RJY            Physical Exam  Vitals and nursing note reviewed.   Constitutional:       General: She is awake. She is not in acute distress.     Appearance: She is well-developed. She is not toxic-appearing or diaphoretic.   HENT:      Head: Normocephalic and atraumatic.      Right Ear: Hearing, tympanic membrane, ear canal and external ear normal.      Left Ear: Hearing, tympanic membrane, ear canal and external ear normal.      Nose: Congestion present.      Mouth/Throat:      Lips: Pink.      Mouth: Oropharynx is clear and moist and mucous membranes are normal. Mucous membranes are moist.      Tongue: Tongue does not deviate from midline.      Pharynx: Oropharynx is clear. Uvula midline. Postnasal drip present. No oropharyngeal exudate.   Eyes:      General: Lids are normal. No scleral icterus.     Extraocular Movements: EOM normal.      Conjunctiva/sclera: Conjunctivae normal.      Pupils: Pupils are equal, round, and reactive to light.   Neck:      Trachea: Trachea and phonation normal. No tracheal deviation.   Cardiovascular:      Rate and Rhythm: Normal rate and regular rhythm.      Pulses: Normal pulses.      Heart sounds: Normal heart sounds, S1 normal and S2 normal. No murmur heard.  Pulmonary:      Effort: Pulmonary effort is normal. No tachypnea or respiratory distress.      Breath sounds: Normal breath sounds. No stridor. No wheezing, rhonchi or rales.   Abdominal:      General: Bowel sounds are normal. There is no distension.      Palpations: Abdomen is soft.      Tenderness: There is no abdominal tenderness. There is no CVA tenderness, guarding or rebound.   Musculoskeletal:         General: No edema.   Skin:     General: Skin is warm and dry.  "     Capillary Refill: Capillary refill takes less than 2 seconds.      Findings: No rash.   Neurological:      General: No focal deficit present.      Mental Status: She is alert and oriented to person, place, and time.      GCS: GCS eye subscore is 4. GCS verbal subscore is 5. GCS motor subscore is 6.      Comments: Ambulated in with walker   Psychiatric:         Mood and Affect: Mood and affect and mood normal.         Speech: Speech normal.         Behavior: Behavior normal. Behavior is cooperative.         Results Reviewed       None            No orders to display       Procedures    ED Medication and Procedure Management   Prior to Admission Medications   Prescriptions Last Dose Informant Patient Reported? Taking?   B-D HYPODERMIC NEEDLE 23GX1\" 23G X 1\" MISC  Self Yes No   Sig: USE ONE WEEKLY   BD Plastipak Syringe 21G X 1\" 3 ML MISC  Self Yes No   Sig: USE ONE WEEKLY   Biotin 59684 MCG TABS 10/7/2024 Self Yes Yes   Sig: Take 1 tablet by mouth daily     Calcium Carbonate (CALCIUM 600 PO) Not Taking Self Yes No   Sig: Take 600 mg by mouth daily pt reports not having & not taking   Patient not taking: Reported on 10/7/2024   Cholecalciferol (Vitamin D3) 125 MCG (5000 UT) TABS 10/7/2024 Self Yes Yes   Sig: Take 5,000 Units by mouth daily pt reports taking 2 tab   Depo-Testosterone 100 MG/ML IM injection 10/6/2024 Self Yes Yes   Sig: INJECT 25MG (0.25ML) INTO THE MUSCLE EVERY 2 WEEKS(VIAL ONLY GOOD FOR 28DAYS)   Ginkgo Biloba 40 MG TABS 10/7/2024 Self Yes Yes   Sig: Take 120 mg by mouth daily pt reports taking 2 tabs daily   LUTEIN PO Not Taking Self Yes No   Sig: Take by mouth in the morning 20mg on bottle    Patient not taking: Reported on 10/7/2024   Misc Natural Products (GINSENG COMPLEX PO) 10/7/2024 Self Yes Yes   Sig: Take 2 tablets by mouth daily 500mg on bottle.   Movantik 25 MG tablet 10/7/2024 Self Yes Yes   Sig: Take 25 mg by mouth every evening   Multiple Vitamins-Minerals (ZINC PO) 10/7/2024 Self " Yes Yes   Sig: Take by mouth   NON FORMULARY  Self Yes No   Sig: HEMPANOL BRAIN DETOX 200MG .  pt reports not taking per 23.   Patient not taking: Reported on 1/3/2024   Nerve Stimulator (STANDARD TENS) VICKY More than a month Self No No   Sig: by Does not apply route 2 (two) times a day   Nerve Stimulator (TENS THERAPY REPLACE BACK PADS) MISC More than a month Self No No   Si pad by Does not apply route 2 (two) times a day   Nerve Stimulator VICKY More than a month Self No No   Sig: by Does not apply route 2 (two) times a day   Nerve Stimulator Supplies MISC More than a month Self No No   Si pad by Does not apply route 2 (two) times a day   Omega-3 Fatty Acids (FISH OIL OMEGA-3 PO) 10/7/2024 Self Yes Yes   Sig: Take 1,200 mg by mouth daily    Potassium 99 MG TABS 10/7/2024 Self Yes Yes   Sig: Take 99 mg by mouth daily pt reports taking 3 tabs daily   Premarin 0.625 MG tablet 10/7/2024 Self Yes Yes   Sig: Take 0.625 mg by mouth daily   Probiotic Product (PROBIOTIC DAILY PO) 10/7/2024 Self Yes Yes   Sig: Take 2 tablets by mouth daily     Restasis 0.05 % ophthalmic emulsion 10/7/2024 Self Yes Yes   Sig: INSTILL 1 DROP BY OPHTHALMIC ROUTE EVERY 12 HOURS BOTH EYES   TURMERIC PO 10/7/2024 Self Yes Yes   Sig: Take by mouth Turmeric Curcumin 500mg on bottle. 1 tab daily   ZINC-VITAMIN C PO 10/7/2024 Self Yes Yes   Sig: Take by mouth pt not taking due to taking regular zinc per pt report on 23.   albendazole (ALBENZA) 200 mg tablet Not Taking Self Yes No   Sig: TAKE 1 & 1/2 TABLETS BY MOUTH TWICE A DAY AS DIRECTED(PLAN LIMIT 4 TABS PER FILL)   Patient not taking: Reported on 10/7/2024   ascorbic acid (VITAMIN C) 500 mg tablet 10/7/2024 Self Yes Yes   Sig: Take 500 mg by mouth daily   b complex vitamins tablet 10/7/2024 Self Yes Yes   Sig: Take 1 tablet by mouth daily   buprenorphine-naloxone (SUBOXONE) 2-0.5 mg per SL tablet 10/7/2024 Self Yes Yes   Sig: Place 0.5 tablets under the tongue 3 (three) times a  day 2mg  three times a day   cloNIDine (CATAPRES) 0.2 mg tablet Not Taking Self Yes No   Sig: Take 0.2 mg by mouth daily at bedtime    Patient not taking: Reported on 10/7/2024   clopidogrel (PLAVIX) 75 mg tablet 10/7/2024 Self Yes Yes   Sig: Take 75 mg by mouth every morning   clotrimazole-betamethasone (LOTRISONE) 1-0.05 % cream  Self No No   Sig: Apply to affected area 2 times daily prn   Patient taking differently: Apply to affected area 2 times daily prn.  pt reports running out & does not have in home 23.   docusate sodium (COLACE) 100 mg capsule  Self Yes No   Sig: Take 100 mg by mouth 3 (three) times a day     Patient not taking: Reported on 2023   flavoxATE (URISPAS) 100 mg tablet Unknown  No No   Sig: TAKE 1 TABLET BY MOUTH 3 TIMES A DAY AS NEEDED FOR BLADDER SPASMS.   furosemide (LASIX) 20 mg tablet 10/7/2024 Self Yes Yes   Sig: Take 10 mg by mouth daily   ibuprofen (MOTRIN) 200 mg tablet Past Month Self Yes Yes   Sig: Take 200 mg by mouth as needed for mild pain pt reports taking 3-4tabs every 6hrs.    lubiprostone (AMITIZA) 24 mcg capsule 10/7/2024 Self Yes Yes   Sig: Take 24 mcg by mouth 2 (two) times a day with meals pt reports not taking   lubiprostone (AMITIZA) 8 mcg capsule 10/7/2024 Self Yes Yes   Sig: Take 8 mcg by mouth 3 (three) times a day   methylPREDNISolone sodium succinate in sodium chloride 0.9 % 250 mL IVPB Not Taking Self Yes No   Sig: Inject 1,000 mg into a catheter in a vein every 30 (thirty) days. 1x/month, provided at medical office. per EPIC  Indications: MS   Patient not taking: Reported on 1/3/2024   phenazopyridine (PYRIDIUM) 200 mg tablet Not Taking Self No No   Sig: Take 1 tablet (200 mg total) by mouth 3 (three) times a day as needed for bladder spasms   Patient not taking: Reported on 2023   potassium chloride (Klor-Con) 10 mEq tablet 10/7/2024 Self Yes Yes   Si TABLET DAILY   promethazine (PHENERGAN) 50 MG tablet 10/7/2024 Self Yes Yes   Sig: daily at  bedtime 1.5 tabs daily at bedtime   selenium sulfide (SELSUN) 2.5 % shampoo Not Taking Self No No   Sig: Apply topically daily as needed for dandruff   Patient not taking: Reported on 1/3/2024   testosterone cypionate (DEPO-TESTOSTERONE) 200 mg/mL SOLN 10/6/2024 Self Yes Yes   Si MG (0.125ML) IM ONCE EVERY 7 DAYS   topiramate (TOPAMAX) 50 MG tablet 10/7/2024 Self Yes Yes   Sig: Take 50 mg by mouth daily        Facility-Administered Medications: None     Discharge Medication List as of 10/7/2024  7:21 PM        START taking these medications    Details   azithromycin (ZITHROMAX) 250 mg tablet Take 1 tablet (250 mg total) by mouth every 24 hours for 4 days Do not start before 2024., Starting Tue 10/8/2024, Until Sat 10/12/2024, Normal           CONTINUE these medications which have NOT CHANGED    Details   ascorbic acid (VITAMIN C) 500 mg tablet Take 500 mg by mouth daily, Historical Med      b complex vitamins tablet Take 1 tablet by mouth daily, Historical Med      Biotin 23111 MCG TABS Take 1 tablet by mouth daily  , Historical Med      buprenorphine-naloxone (SUBOXONE) 2-0.5 mg per SL tablet Place 0.5 tablets under the tongue 3 (three) times a day 2mg  three times a day, Historical Med      Cholecalciferol (Vitamin D3) 125 MCG (5000 UT) TABS Take 5,000 Units by mouth daily pt reports taking 2 tab, Historical Med      clopidogrel (PLAVIX) 75 mg tablet Take 75 mg by mouth every morning, Starting Wed 2023, Historical Med      Depo-Testosterone 100 MG/ML IM injection INJECT 25MG (0.25ML) INTO THE MUSCLE EVERY 2 WEEKS(VIAL ONLY GOOD FOR 28DAYS), Historical Med      furosemide (LASIX) 20 mg tablet Take 10 mg by mouth daily, Starting Thu 2024, Historical Med      Ginkgo Biloba 40 MG TABS Take 120 mg by mouth daily pt reports taking 2 tabs daily, Historical Med      ibuprofen (MOTRIN) 200 mg tablet Take 200 mg by mouth as needed for mild pain pt reports taking 3-4tabs every 6hrs. , Historical  "Med      !! lubiprostone (AMITIZA) 24 mcg capsule Take 24 mcg by mouth 2 (two) times a day with meals pt reports not taking, Historical Med      !! lubiprostone (AMITIZA) 8 mcg capsule Take 8 mcg by mouth 3 (three) times a day, Starting Wed 3/20/2024, Historical Med      Misc Natural Products (GINSENG COMPLEX PO) Take 2 tablets by mouth daily 500mg on bottle., Historical Med      Movantik 25 MG tablet Take 25 mg by mouth every evening, Starting Mon 6/20/2022, Historical Med      Multiple Vitamins-Minerals (ZINC PO) Take by mouth, Historical Med      Omega-3 Fatty Acids (FISH OIL OMEGA-3 PO) Take 1,200 mg by mouth daily , Historical Med      Potassium 99 MG TABS Take 99 mg by mouth daily pt reports taking 3 tabs daily, Historical Med      potassium chloride (Klor-Con) 10 mEq tablet 1 TABLET DAILY, Historical Med      Premarin 0.625 MG tablet Take 0.625 mg by mouth daily, Starting Wed 11/29/2023, Historical Med      Probiotic Product (PROBIOTIC DAILY PO) Take 2 tablets by mouth daily  , Historical Med      promethazine (PHENERGAN) 50 MG tablet daily at bedtime 1.5 tabs daily at bedtime, Starting Thu 11/15/2018, Historical Med      Restasis 0.05 % ophthalmic emulsion INSTILL 1 DROP BY OPHTHALMIC ROUTE EVERY 12 HOURS BOTH EYES, Historical Med      testosterone cypionate (DEPO-TESTOSTERONE) 200 mg/mL SOLN 25 MG (0.125ML) IM ONCE EVERY 7 DAYS, Historical Med      topiramate (TOPAMAX) 50 MG tablet Take 50 mg by mouth daily  , Starting Wed 10/31/2018, Historical Med      TURMERIC PO Take by mouth Turmeric Curcumin 500mg on bottle. 1 tab daily, Historical Med      ZINC-VITAMIN C PO Take by mouth pt not taking due to taking regular zinc per pt report on 5/18/23., Historical Med      albendazole (ALBENZA) 200 mg tablet TAKE 1 & 1/2 TABLETS BY MOUTH TWICE A DAY AS DIRECTED(PLAN LIMIT 4 TABS PER FILL), Historical Med      B-D HYPODERMIC NEEDLE 23GX1\" 23G X 1\" MISC USE ONE WEEKLY, Historical Med      BD Plastipak Syringe 21G X 1\" " 3 ML MISC USE ONE WEEKLY, Historical Med      Calcium Carbonate (CALCIUM 600 PO) Take 600 mg by mouth daily pt reports not having & not taking, Historical Med      cloNIDine (CATAPRES) 0.2 mg tablet Take 0.2 mg by mouth daily at bedtime , Historical Med      clotrimazole-betamethasone (LOTRISONE) 1-0.05 % cream Apply to affected area 2 times daily prn, Normal      docusate sodium (COLACE) 100 mg capsule Take 100 mg by mouth 3 (three) times a day  , Historical Med      flavoxATE (URISPAS) 100 mg tablet TAKE 1 TABLET BY MOUTH 3 TIMES A DAY AS NEEDED FOR BLADDER SPASMS., Starting Fri 8/30/2024, Normal      LUTEIN PO Take by mouth in the morning 20mg on bottle , Historical Med      methylPREDNISolone sodium succinate in sodium chloride 0.9 % 250 mL IVPB Inject 1,000 mg into a catheter in a vein every 30 (thirty) days. 1x/month, provided at medical office. per EPIC  Indications: MS, Historical Med      !! Nerve Stimulator (STANDARD TENS) VICKY by Does not apply route 2 (two) times a day, Starting Wed 11/28/2018, Print      !! Nerve Stimulator (TENS THERAPY REPLACE BACK PADS) MISC 30 pad by Does not apply route 2 (two) times a day, Starting Wed 11/28/2018, Print      !! Nerve Stimulator VICKY by Does not apply route 2 (two) times a day, Starting Thu 11/29/2018, Print      !! Nerve Stimulator Supplies MISC 30 pad by Does not apply route 2 (two) times a day, Starting Thu 11/29/2018, Print      NON FORMULARY HEMPANOL BRAIN DETOX 200MG .  pt reports not taking per 5/18/23., Historical Med      phenazopyridine (PYRIDIUM) 200 mg tablet Take 1 tablet (200 mg total) by mouth 3 (three) times a day as needed for bladder spasms, Starting Fri 3/31/2023, Normal      selenium sulfide (SELSUN) 2.5 % shampoo Apply topically daily as needed for dandruff, Starting Mon 12/19/2022, Normal       !! - Potential duplicate medications found. Please discuss with provider.        No discharge procedures on file.  ED SEPSIS DOCUMENTATION   Time  reflects when diagnosis was documented in both MDM as applicable and the Disposition within this note       Time User Action Codes Description Comment    10/7/2024  7:19 PM Layne García Add [J06.9] URI (upper respiratory infection)                  Layne García PA-C  10/07/24 2152

## 2024-10-08 ENCOUNTER — HOSPITAL ENCOUNTER (OUTPATIENT)
Dept: INFUSION CENTER | Facility: HOSPITAL | Age: 44
Discharge: HOME | End: 2024-10-08
Payer: COMMERCIAL

## 2024-10-08 VITALS
RESPIRATION RATE: 16 BRPM | SYSTOLIC BLOOD PRESSURE: 97 MMHG | OXYGEN SATURATION: 100 % | TEMPERATURE: 98.1 F | DIASTOLIC BLOOD PRESSURE: 56 MMHG | HEART RATE: 77 BPM

## 2024-10-08 DIAGNOSIS — G35 MS (MULTIPLE SCLEROSIS) (HCC): Primary | ICD-10-CM

## 2024-10-08 RX ORDER — SODIUM CHLORIDE 9 MG/ML
20 INJECTION, SOLUTION INTRAVENOUS ONCE
Status: COMPLETED | OUTPATIENT
Start: 2024-10-08 | End: 2024-10-08

## 2024-10-08 RX ORDER — SODIUM CHLORIDE 9 MG/ML
20 INJECTION, SOLUTION INTRAVENOUS ONCE
OUTPATIENT
Start: 2024-11-05

## 2024-10-08 RX ADMIN — METHYLPREDNISOLONE SODIUM SUCCINATE 1000 MG: 1 INJECTION, POWDER, LYOPHILIZED, FOR SOLUTION INTRAMUSCULAR; INTRAVENOUS at 11:15

## 2024-10-08 RX ADMIN — SODIUM CHLORIDE 20 ML/HR: 0.9 INJECTION, SOLUTION INTRAVENOUS at 11:14

## 2024-10-08 NOTE — PROGRESS NOTES
Pt here for solumedrol, pt offered no acute complaints today. Vitals stable. Afebrile. Pt resting comfortably in chair with call bell, and drink./snacks.

## 2024-10-08 NOTE — PLAN OF CARE
Problem: Potential for Falls  Goal: Patient will remain free of falls  Description: INTERVENTIONS:  - Educate patient/family on patient safety including physical limitations  - Instruct patient to call for assistance with activity     - Keep Call bell within reach    Outcome: Progressing

## 2024-10-08 NOTE — PROGRESS NOTES
Christie Hemphill  tolerated treatment well with no complications.      Christie Hemphill is aware of future appt on 11/5 at 11a.     AVS printed and given to Christie Hemphill:  No (Declined by Christie Hemphill)

## 2024-10-21 ENCOUNTER — NURSE TRIAGE (OUTPATIENT)
Age: 44
End: 2024-10-21

## 2024-10-21 DIAGNOSIS — R39.9 UTI SYMPTOMS: Primary | ICD-10-CM

## 2024-10-21 NOTE — TELEPHONE ENCOUNTER
"Patient of David Schwartz, last seen 7/7/2022, called stating she feels like she has to go to the bathroom and nothing comes out, she believes she has a UTI. She states her symptoms started a week ago. She denies a burning sensation, frequency, urgency, blood in urine, or fever. I ordered a UA and culture. I reviewed ED precautions, encouraged water intake, and avoiding bladder irritants. Please advise.      Reason for Disposition   All other urine symptoms    Answer Assessment - Initial Assessment Questions  1. SYMPTOM: \"What's the main symptom you're concerned about?\" (e.g., frequency, incontinence)      Feels like she has to go to the bathroom and nothing comes out    2. ONSET: \"When did the symptoms start?\"      A week ago    3. PAIN: \"Is there any pain?\" If Yes, ask: \"How bad is it?\" (Scale: 1-10; mild, moderate, severe)      From the MS    4. CAUSE: \"What do you think is causing the symptoms?\"      UTI    5. OTHER SYMPTOMS: \"Do you have any other symptoms?\" (e.g., fever, flank pain, blood in urine, pain with urination)      Denies    Protocols used: Urinary Symptoms-ADULT-[place]    "

## 2024-10-22 ENCOUNTER — LAB (OUTPATIENT)
Dept: LAB | Facility: CLINIC | Age: 44
End: 2024-10-22
Payer: COMMERCIAL

## 2024-10-22 DIAGNOSIS — R39.9 UTI SYMPTOMS: ICD-10-CM

## 2024-10-22 LAB
AMORPH URATE CRY URNS QL MICRO: ABNORMAL
BACTERIA UR QL AUTO: ABNORMAL /HPF
BILIRUB UR QL STRIP: NEGATIVE
CLARITY UR: ABNORMAL
COLOR UR: ABNORMAL
GLUCOSE UR STRIP-MCNC: NEGATIVE MG/DL
HGB UR QL STRIP.AUTO: NEGATIVE
KETONES UR STRIP-MCNC: NEGATIVE MG/DL
LEUKOCYTE ESTERASE UR QL STRIP: ABNORMAL
NITRITE UR QL STRIP: NEGATIVE
NON-SQ EPI CELLS URNS QL MICRO: ABNORMAL /HPF
PH UR STRIP.AUTO: 7 [PH]
PROT UR STRIP-MCNC: NEGATIVE MG/DL
RBC #/AREA URNS AUTO: ABNORMAL /HPF
SP GR UR STRIP.AUTO: 1.01 (ref 1–1.03)
UROBILINOGEN UR STRIP-ACNC: <2 MG/DL
WBC #/AREA URNS AUTO: ABNORMAL /HPF

## 2024-10-22 PROCEDURE — 81001 URINALYSIS AUTO W/SCOPE: CPT

## 2024-10-22 PROCEDURE — 87086 URINE CULTURE/COLONY COUNT: CPT

## 2024-10-23 ENCOUNTER — TELEPHONE (OUTPATIENT)
Dept: UROLOGY | Facility: CLINIC | Age: 44
End: 2024-10-23

## 2024-10-23 LAB — BACTERIA UR CULT: NORMAL

## 2024-10-23 NOTE — TELEPHONE ENCOUNTER
----- Message from BELLE Zafar sent at 10/23/2024  2:11 PM EDT -----  Patient's urine culture is negative for infection.  I do not recommend antibiotics.

## 2024-11-05 ENCOUNTER — HOSPITAL ENCOUNTER (OUTPATIENT)
Dept: INFUSION CENTER | Facility: HOSPITAL | Age: 44
Discharge: HOME | End: 2024-11-05
Payer: COMMERCIAL

## 2024-11-05 VITALS
DIASTOLIC BLOOD PRESSURE: 60 MMHG | HEART RATE: 71 BPM | RESPIRATION RATE: 16 BRPM | SYSTOLIC BLOOD PRESSURE: 102 MMHG | TEMPERATURE: 98.2 F | OXYGEN SATURATION: 99 %

## 2024-11-05 DIAGNOSIS — G35 MS (MULTIPLE SCLEROSIS) (HCC): Primary | ICD-10-CM

## 2024-11-05 PROCEDURE — 96365 THER/PROPH/DIAG IV INF INIT: CPT

## 2024-11-05 RX ORDER — SODIUM CHLORIDE 9 MG/ML
20 INJECTION, SOLUTION INTRAVENOUS ONCE
Status: COMPLETED | OUTPATIENT
Start: 2024-11-05 | End: 2024-11-05

## 2024-11-05 RX ORDER — SODIUM CHLORIDE 9 MG/ML
20 INJECTION, SOLUTION INTRAVENOUS ONCE
OUTPATIENT
Start: 2024-12-03

## 2024-11-05 RX ADMIN — SODIUM CHLORIDE 1000 MG: 0.9 INJECTION, SOLUTION INTRAVENOUS at 11:17

## 2024-11-05 RX ADMIN — SODIUM CHLORIDE 20 ML/HR: 0.9 INJECTION, SOLUTION INTRAVENOUS at 11:17

## 2024-11-05 NOTE — PROGRESS NOTES
Pt tolerated IV Solumedrol well with no issues. Peripheral IV removed without incident.      Christie Hemphill is aware of future appt on 12/4/24 at 11AM.      AVS declined by Christie Hemphill.     Pt discharged off unit in w/c by RN in stable condition with all personal belongings.

## 2024-11-05 NOTE — PLAN OF CARE
Problem: Potential for Falls  Goal: Patient will remain free of falls  Description: INTERVENTIONS:  - Educate patient/family on patient safety including physical limitations  - Instruct patient to call for assistance with activity   - Consult OT/PT to assist with strengthening/mobility   - Keep Call bell within reach  - Keep bed low and locked with side rails adjusted as appropriate  - Keep care items and personal belongings within reach  - Initiate and maintain comfort rounds  - Make Fall Risk Sign visible to staff  - Consider moving patient to room near nurses station  Outcome: Progressing     Problem: INFECTION - ADULT  Goal: Absence or prevention of progression during hospitalization  Description: INTERVENTIONS:  - Assess and monitor for signs and symptoms of infection  - Monitor lab/diagnostic results  - Monitor all insertion sites, i.e. indwelling lines, tubes, and drains  - Monitor endotracheal if appropriate and nasal secretions for changes in amount and color  - [place] appropriate cooling/warming therapies per order  - Administer medications as ordered  - Instruct and encourage patient and family to use good hand hygiene technique  - Identify and instruct in appropriate isolation precautions for identified infection/condition  Outcome: Progressing     Problem: Knowledge Deficit  Goal: Patient/family/caregiver demonstrates understanding of disease process, treatment plan, medications, and discharge instructions  Description: Complete learning assessment and assess knowledge base.  Interventions:  - Provide teaching at level of understanding  - Provide teaching via preferred learning methods  Outcome: Progressing

## 2024-12-03 ENCOUNTER — OFFICE VISIT (OUTPATIENT)
Dept: NEUROLOGY | Facility: CLINIC | Age: 44
End: 2024-12-03
Payer: COMMERCIAL

## 2024-12-03 VITALS
HEIGHT: 66 IN | SYSTOLIC BLOOD PRESSURE: 110 MMHG | OXYGEN SATURATION: 98 % | WEIGHT: 101 LBS | RESPIRATION RATE: 18 BRPM | BODY MASS INDEX: 16.23 KG/M2 | TEMPERATURE: 98.4 F | HEART RATE: 74 BPM | DIASTOLIC BLOOD PRESSURE: 68 MMHG

## 2024-12-03 DIAGNOSIS — G35 MS (MULTIPLE SCLEROSIS) (HCC): Primary | ICD-10-CM

## 2024-12-03 DIAGNOSIS — R26.2 AMBULATORY DYSFUNCTION: ICD-10-CM

## 2024-12-03 DIAGNOSIS — G89.4 CHRONIC PAIN SYNDROME: ICD-10-CM

## 2024-12-03 PROCEDURE — 99214 OFFICE O/P EST MOD 30 MIN: CPT | Performed by: PHYSICIAN ASSISTANT

## 2024-12-03 NOTE — PROGRESS NOTES
"Name: Christie Hemphill      : 1980      MRN: [id number]  Encounter Provider: Janelle Meneses PA-C  Encounter Date: 12/3/2024   Encounter department: [facility]    :  Assessment & Plan  MS (multiple sclerosis) (HCC)  Patient with MS diagnosed in , although with longstanding symptoms. She was started on Tysabri in early , took x 6 months, then d/c due to feeling her symptoms were progressing despite radiographic stability. Kesimpta was then started and she took this x 6 months before feeling like it was causing worsening symptoms, again, despite radiographic stability. She has been adamant that she does not want any further DMTs, as she feels they caused her MS to become worse, although we have reviewed she has been on high-efficacy therapies with radiographic stability.  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 She has been getting a dose of IV steroids monthly for the last 2+ years.    Again reviewed risks of long-term steroid use.  Last DEXA early  was normal.  Would suggest repeating in .  Discussed ideally do not want her to cont IV steroids long-term, but she feels this is the only thing that helps her. Does not want to consider DMTs indicated for SPMS     Again discussed PT/OT would likely have more lasting benefits than steroids, but she declines, unless there is a therapy place close to her that has an anti-gravity treadmill.  Discussed I will inquire about this and let her know.  She feels PT/OT makes her too tired.  She is severely deconditioned and would greatly benefit from therapy.    Last MRI brain 2024 was stable compared to 2022.     Her neurologic exam is stable today.       Ambulatory dysfunction  Would greatly benefit from therapies, as above.  She should continue to use walker/WC to prevent falls.        Chronic pain syndrome  Patient reporting significant pain \"all over\" today and is asking me to give her " something for pain.  She asks for opioids specifically, although she is on Suboxone (tells me she would stop this to get opioids).  Discussed that I do not treat chronic pain or prescribe opioids.  She is established with pain management, although has not seen them in a while.  Much of her pain is likely unrelated to her MS as well.  I advised she contact pain management or her PCP.      Her son was with her today and asked many times about medical marijuana.  Discussed I do not have authority to issue a medical marijuana card, and if this is something that my MS patients are interested in, I direct them to the [facility] website to find a certified prescriber.  Her son brought this up constantly throughout the visit, although I addressed this each time he brought it up.         Follow up in 6 months       History of Present Illness   HPI  Christie Hemphill is a 44 y.o. female who presents today for MS follow up. She was last seen in May 2024.     To review, patient initially presented to our office in June 2020 and was seen by Dr. Power for evaluation of gait and balance difficulty. She had reported altered gait since childhood without falls, supposed history of Lyme disease treated in her teenage years, along with multi-trauma in 2014. She also has self admitted history of prescription opioid addiction in the past for chronic pain, heavy alcohol intake. Patient had been pulled over by police over a year before her consult here and failed a DUI test at that time. She went to balance therapy afterwards and the therapist noticed incoordination. She also reported chronic back pain starting in 2017, along with leg weakness. She also reported some difficulty controlling her bowels. Eventual testing revealed MRI brain compatible with subtle white matter hyperintensities in the immediate periventricular region of both cerebral hemispheres and subtle changes within the left cerebellum. MRI cervical spine demonstrated  moderate, abnormal cord signal within the lateral aspects of the cervical cord bilaterally at C2-3, C4-6 suspicious for chronic demyelinating disease. MRI thoracic spine with normal cord signal. MRI lumbar spine with only mild degenerative spondylosis. She was then seen in the MS center. She had a lumbar puncture in November of 2020. CSF Lyme was negative. MS panel with 8 oligoclonal bands, elevated IgG synthesis rate and elevated myelin basic protein. NMO negative. JCV negative 1/28/21 with index 0.22.   She was started on Tysabri as first DMT in March 2021.  Patient then reported ongoing symptoms and progression of leg heaviness and ambulatory dysfunction while on Tysabri and decided to d/c the medication. Her imaging was stable while on Tysabri with no radiographic progression. She requested monthly IV solumedrol.  She was then started on Kesimpta in December 2021. She also had a very brief trial of dalfampridine and felt it caused side effects, therefore she d/c.  She continued to report ongoing symptoms while on Kesimpta and we had discussed that DMTs will not improve already acquired disability/symptoms.  She called the office in July 2022 and reported she felt Kesimpta was making her worse and wanted to stop taking the medication. She was informed more likely she has SPMS which is causing worsening symptoms, not the medications. Patient no longer wanted to continue on Kesimpta. She requested monthly IV steroids.     Patient has been getting monthly IV steroids since June 2022. She had DEXA 2/14/23 which was normal.      Patient contacted the office in November 2023 reporting she was told by the infusion center that “this would be her last IV steroid treatment” and she was very upset by that. (likely her 6 month infusion orders were over and that was her last infusion with that order). Patient was advised to make an appt before more steroids could be considered. Patient continued to report significant amount  "of neurologic complaints despite steroids. She was advised to consider actual DMT such as Mavenclad, Ocrevus etc.    When I saw her in January 2024 she reported she was very upset that steroids were stopped (last infusion in late November, so only has missed 1 monthly infusion). She declined to go on DMT. She continues to be adamant that Tysabri and Kesimpta caused worsening of her symptoms and feels steroids are the only thing that keeps her going.   We reviewed concerns with long term use of steroids.  Reviewed PT and OT would likely benefit her more.  Therapy orders were placed.  I said I would order monthly steroids until she saw Dr. HILL in May 2024 and then ongoing steroid use would need to be discussed further.    She was last seen by Dr. Beth in May 2024.  She agreed to continue her monthly steroids.  MRI brain wo contrast 2/6/2024 was stable.    Today, patient presents with her son.  She reports she is “in a lot of pain”.  She has followed with pain management for chronic pain syndrome, last seen in August 2024.  They were doing TPIs.  She tells me today that she thinks she needs an opioid for the pain she is in and says \"I know I am on Suboxone, but I would stop taking that in order to get an opioid\".  She says her pain is \"all over\" but mainly in the lower back, buttocks, hips, legs.  She is in constant pain.  Her son asked many times about medical marijuana for his mom.  Says she is \"very stressed and in pain\" and \"just needs to get high I think\".  Even after addressing his questions about medical marijuana several times during the appt, he would continually bring this up, and I would have to address again.  Christie feels she needs to continue with the IV steroids.  She has no plan for DMT.  Mavenclad was discussed with Dr. HILL, but she remains convinced that the other DMTs she tried caused her to get worse.  She is unwilling to consider and wants to continue with the steroids.  She remains on " "testosterone therapy through her PCP.  She does not really want to do therapy since she says it makes her worse and tires her out.  She would only consider therapy with an anti-gravity system.      Review of Systems   Constitutional: Negative.  Negative for fatigue and fever.   HENT: Negative.  Negative for hearing loss, tinnitus and trouble swallowing.    Eyes:  Negative for photophobia, pain and visual disturbance.   Respiratory: Negative.  Negative for cough and shortness of breath.    Cardiovascular: Negative.  Negative for chest pain and palpitations.   Gastrointestinal:  Negative for constipation, diarrhea, nausea and vomiting.   Endocrine: Negative.    Genitourinary: Negative.  Negative for difficulty urinating and urgency.   Musculoskeletal:  Positive for arthralgias, back pain, gait problem, myalgias and neck pain.   Skin: Negative.  Negative for rash.   Neurological:  Positive for weakness. Negative for dizziness, tremors, seizures, syncope, speech difficulty, numbness and headaches.   Hematological: Negative.    Psychiatric/Behavioral:  Negative for decreased concentration and sleep disturbance. The patient is not nervous/anxious.      I have personally reviewed the MA's review of systems and made changes as necessary.    Current Outpatient Medications on File Prior to Visit   Medication Sig Dispense Refill    ascorbic acid (VITAMIN C) 500 mg tablet Take 500 mg by mouth daily      b complex vitamins tablet Take 1 tablet by mouth daily      B-D HYPODERMIC NEEDLE 23GX1\" 23G X 1\" MISC USE ONE WEEKLY      BD Plastipak Syringe 21G X 1\" 3 ML MISC USE ONE WEEKLY      Biotin 02310 MCG TABS Take 1 tablet by mouth daily        buprenorphine-naloxone (SUBOXONE) 2-0.5 mg per SL tablet Place 0.5 tablets under the tongue 3 (three) times a day 2mg  three times a day      Cholecalciferol (Vitamin D3) 125 MCG (5000 UT) TABS Take 5,000 Units by mouth daily pt reports taking 2 tab      clotrimazole-betamethasone (LOTRISONE) " 1-0.05 % cream Apply to affected area 2 times daily prn 45 g 1    Depo-Testosterone 100 MG/ML IM injection INJECT 25MG (0.25ML) INTO THE MUSCLE EVERY 2 WEEKS(VIAL ONLY GOOD FOR 28DAYS)      docusate sodium (COLACE) 100 mg capsule Take 100 mg by mouth 3 (three) times a day      flavoxATE (URISPAS) 100 mg tablet TAKE 1 TABLET BY MOUTH 3 TIMES A DAY AS NEEDED FOR BLADDER SPASMS. 90 tablet 3    furosemide (LASIX) 20 mg tablet Take 10 mg by mouth daily      Ginkgo Biloba 40 MG TABS Take 120 mg by mouth daily pt reports taking 2 tabs daily      ibuprofen (MOTRIN) 200 mg tablet Take 200 mg by mouth as needed for mild pain pt reports taking 3-4tabs every 6hrs.       lubiprostone (AMITIZA) 24 mcg capsule Take 24 mcg by mouth 2 (two) times a day with meals pt reports not taking      lubiprostone (AMITIZA) 8 mcg capsule Take 8 mcg by mouth 3 (three) times a day      Misc Natural Products (GINSENG COMPLEX PO) Take 2 tablets by mouth daily 500mg on bottle.      Movantik 25 MG tablet Take 25 mg by mouth every evening      Multiple Vitamins-Minerals (ZINC PO) Take by mouth      Nerve Stimulator (STANDARD TENS) VICKY by Does not apply route 2 (two) times a day 1 Device 0    Nerve Stimulator (TENS THERAPY REPLACE BACK PADS) MISC 30 pad by Does not apply route 2 (two) times a day 30 each 0    Nerve Stimulator VICKY by Does not apply route 2 (two) times a day 1 each 0    Nerve Stimulator Supplies MISC 30 pad by Does not apply route 2 (two) times a day 30 pad 0    Omega-3 Fatty Acids (FISH OIL OMEGA-3 PO) Take 1,200 mg by mouth daily       Potassium 99 MG TABS Take 99 mg by mouth daily pt reports taking 3 tabs daily      potassium chloride (Klor-Con) 10 mEq tablet 1 TABLET DAILY      Premarin 0.625 MG tablet Take 0.625 mg by mouth daily      Probiotic Product (PROBIOTIC DAILY PO) Take 2 tablets by mouth daily        promethazine (PHENERGAN) 50 MG tablet daily at bedtime 1.5 tabs daily at bedtime  1    Restasis 0.05 % ophthalmic emulsion  INSTILL 1 DROP BY OPHTHALMIC ROUTE EVERY 12 HOURS BOTH EYES      testosterone cypionate (DEPO-TESTOSTERONE) 200 mg/mL SOLN 25 MG (0.125ML) IM ONCE EVERY 7 DAYS      topiramate (TOPAMAX) 50 MG tablet Take 50 mg by mouth daily    5    TURMERIC PO Take by mouth Turmeric Curcumin 500mg on bottle. 1 tab daily      ZINC-VITAMIN C PO Take by mouth pt not taking due to taking regular zinc per pt report on 5/18/23.      albendazole (ALBENZA) 200 mg tablet TAKE 1 & 1/2 TABLETS BY MOUTH TWICE A DAY AS DIRECTED(PLAN LIMIT 4 TABS PER FILL) (Patient not taking: Reported on 10/7/2024)      Calcium Carbonate (CALCIUM 600 PO) Take 600 mg by mouth daily pt reports not having & not taking (Patient not taking: Reported on 10/7/2024)      cloNIDine (CATAPRES) 0.2 mg tablet Take 0.2 mg by mouth daily at bedtime  (Patient not taking: Reported on 10/7/2024)      clopidogrel (PLAVIX) 75 mg tablet Take 75 mg by mouth every morning (Patient not taking: Reported on 12/3/2024)      LUTEIN PO Take by mouth in the morning 20mg on bottle  (Patient not taking: Reported on 10/7/2024)      methylPREDNISolone sodium succinate in sodium chloride 0.9 % 250 mL IVPB Inject 1,000 mg into a catheter in a vein every 30 (thirty) days. 1x/month, provided at medical office. per EPIC  Indications: MS (Patient not taking: Reported on 1/3/2024)      NON FORMULARY HEMPANOL BRAIN DETOX 200MG .  pt reports not taking per 5/18/23. (Patient not taking: Reported on 1/3/2024)      phenazopyridine (PYRIDIUM) 200 mg tablet Take 1 tablet (200 mg total) by mouth 3 (three) times a day as needed for bladder spasms (Patient not taking: Reported on 5/12/2023) 10 tablet 0    selenium sulfide (SELSUN) 2.5 % shampoo Apply topically daily as needed for dandruff (Patient not taking: Reported on 1/3/2024) 118 mL 0     No current facility-administered medications on file prior to visit.         Objective   /68 (BP Location: Right arm, Patient Position: Sitting, Cuff Size: Large)   " Pulse 74   Temp 98.4 °F (36.9 °C) (Temporal)   Resp 18   Ht 5' 6\" (1.676 m)   Wt 45.8 kg (101 lb)   SpO2 98%   BMI 16.30 kg/m²     Physical Exam  Constitutional:       Comments: Thin appearing    Eyes:      Extraocular Movements: EOM normal.      Pupils: Pupils are equal, round, and reactive to light.   Neurological:      Deep Tendon Reflexes: Reflexes are normal and symmetric.   Psychiatric:         Mood and Affect: Mood normal.         Speech: Speech normal.         Behavior: Behavior normal.       Neurological Exam  Mental Status   Oriented to person, place, time and situation. Speech is normal. Language is fluent with no aphasia. Attention and concentration are normal.    Cranial Nerves  CN II: Visual fields full to confrontation.  CN III, IV, VI: Extraocular movements intact bilaterally. Pupils equal round and reactive to light bilaterally.  CN V: Facial sensation is normal.  CN VII: Full and symmetric facial movement.  CN VIII: Hearing is normal.  CN IX, X: Palate elevates symmetrically  CN XI: Shoulder shrug strength is normal.  CN XII: Tongue midline without atrophy or fasciculations.    Motor                                               Right                     Left   Shoulder abduction               4+                          4+  Elbow flexion                         5-                          5-  Elbow extension                    5-                          5-  Hip flexion                              3                          3+  Dorsiflexion                            3                          3+  Decreased muscle bulk .    Sensory  Light touch is normal in upper and lower extremities.     Reflexes  Deep tendon reflexes are 2+ and symmetric in all four extremities.    Coordination  Right: Finger-to-nose normal.Left: Finger-to-nose normal.    Gait    Came in a wheelchair today, gait deferred .     Results/Data:  I have reviewed the results and images from the 2/6/2024 brain MRI in detail " with the patient.

## 2024-12-03 NOTE — ASSESSMENT & PLAN NOTE
Patient with MS diagnosed in 2020, although with longstanding symptoms. She was started on Tysabri in early 2021, took x 6 months, then d/c due to feeling her symptoms were progressing despite radiographic stability. Kesimpta was then started and she took this x 6 months before feeling like it was causing worsening symptoms, again, despite radiographic stability. She has been adamant that she does not want any further DMTs, as she feels they caused her MS to become worse, although we have reviewed she has been on high-efficacy therapies with radiographic stability.  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 She has been getting a dose of IV steroids monthly for the last 2+ years.    Again reviewed risks of long-term steroid use.  Last DEXA early 2023 was normal.  Would suggest repeating in 2025.  Discussed ideally do not want her to cont IV steroids long-term, but she feels this is the only thing that helps her. Does not want to consider DMTs indicated for SPMS     Again discussed PT/OT would likely have more lasting benefits than steroids, but she declines, unless there is a therapy place close to her that has an anti-gravity treadmill.  Discussed I will inquire about this and let her know.  She feels PT/OT makes her too tired.  She is severely deconditioned and would greatly benefit from therapy.    Last MRI brain 2/6/2024 was stable compared to 8/2022.     Her neurologic exam is stable today.

## 2024-12-03 NOTE — PATIENT INSTRUCTIONS
Can continue with monthly steroids   Contact pain management due to increase in pain  Continue to use walker to prevent falls   I will ask the physical therapist about the zero gravity walking machine and see if that is something you could do  Follow up in 6 months

## 2024-12-04 ENCOUNTER — HOSPITAL ENCOUNTER (OUTPATIENT)
Dept: INFUSION CENTER | Facility: HOSPITAL | Age: 44
Discharge: HOME | End: 2024-12-04
Payer: COMMERCIAL

## 2024-12-04 ENCOUNTER — OFFICE VISIT (OUTPATIENT)
Dept: PAIN MEDICINE | Facility: CLINIC | Age: 44
End: 2024-12-04
Payer: COMMERCIAL

## 2024-12-04 VITALS
BODY MASS INDEX: 16.23 KG/M2 | WEIGHT: 101 LBS | OXYGEN SATURATION: 92 % | SYSTOLIC BLOOD PRESSURE: 97 MMHG | HEART RATE: 75 BPM | TEMPERATURE: 99.6 F | HEIGHT: 66 IN | DIASTOLIC BLOOD PRESSURE: 68 MMHG | RESPIRATION RATE: 16 BRPM

## 2024-12-04 VITALS
DIASTOLIC BLOOD PRESSURE: 89 MMHG | SYSTOLIC BLOOD PRESSURE: 125 MMHG | RESPIRATION RATE: 16 BRPM | OXYGEN SATURATION: 97 % | TEMPERATURE: 98.1 F | HEART RATE: 83 BPM

## 2024-12-04 DIAGNOSIS — M54.16 LUMBAR RADICULOPATHY: ICD-10-CM

## 2024-12-04 DIAGNOSIS — M54.9 MID BACK PAIN: ICD-10-CM

## 2024-12-04 DIAGNOSIS — G95.9 CERVICAL MYELOPATHY (HCC): Primary | ICD-10-CM

## 2024-12-04 DIAGNOSIS — G35 MS (MULTIPLE SCLEROSIS) (HCC): Primary | ICD-10-CM

## 2024-12-04 PROCEDURE — 99214 OFFICE O/P EST MOD 30 MIN: CPT | Performed by: ANESTHESIOLOGY

## 2024-12-04 RX ORDER — DULOXETIN HYDROCHLORIDE 30 MG/1
30 CAPSULE, DELAYED RELEASE ORAL DAILY
Qty: 30 CAPSULE | Refills: 1 | Status: SHIPPED | OUTPATIENT
Start: 2024-12-04 | End: 2024-12-05

## 2024-12-04 RX ORDER — CHLORHEXIDINE GLUCONATE ORAL RINSE 1.2 MG/ML
SOLUTION DENTAL
COMMUNITY
Start: 2024-11-12

## 2024-12-04 RX ORDER — SODIUM CHLORIDE 9 MG/ML
20 INJECTION, SOLUTION INTRAVENOUS ONCE
OUTPATIENT
Start: 2024-12-31

## 2024-12-04 RX ORDER — IBUPROFEN 800 MG/1
800 TABLET, FILM COATED ORAL EVERY 8 HOURS PRN
COMMUNITY
Start: 2024-11-12

## 2024-12-04 RX ORDER — CLINDAMYCIN HYDROCHLORIDE 150 MG/1
1 CAPSULE ORAL 4 TIMES DAILY
COMMUNITY
Start: 2024-11-12

## 2024-12-04 RX ORDER — SODIUM CHLORIDE 9 MG/ML
20 INJECTION, SOLUTION INTRAVENOUS ONCE
Status: COMPLETED | OUTPATIENT
Start: 2024-12-04 | End: 2024-12-04

## 2024-12-04 RX ORDER — PSEUDOEPHED/ACETAMINOPH/DIPHEN 30MG-500MG
1 TABLET ORAL EVERY 8 HOURS PRN
COMMUNITY
Start: 2024-11-12

## 2024-12-04 RX ADMIN — SODIUM CHLORIDE 20 ML/HR: 0.9 INJECTION, SOLUTION INTRAVENOUS at 11:32

## 2024-12-04 RX ADMIN — SODIUM CHLORIDE 1000 MG: 0.9 INJECTION, SOLUTION INTRAVENOUS at 11:32

## 2024-12-04 NOTE — ASSESSMENT & PLAN NOTE
Would greatly benefit from therapies, as above.  She should continue to use walker/WC to prevent falls.

## 2024-12-04 NOTE — ASSESSMENT & PLAN NOTE
"Patient reporting significant pain \"all over\" today and is asking me to give her something for pain.  She asks for opioids specifically, although she is on Suboxone (tells me she would stop this to get opioids).  Discussed that I do not treat chronic pain or prescribe opioids.  She is established with pain management, although has not seen them in a while.  Much of her pain is likely unrelated to her MS as well.  I advised she contact pain management or her PCP.      Her son was with her today and asked many times about medical marijuana.  Discussed I do not have authority to issue a medical marijuana card, and if this is something that my MS patients are interested in, I direct them to the [facility] website to find a certified prescriber.  Her son brought this up constantly throughout the visit, although I addressed this each time he brought it up.         "

## 2024-12-04 NOTE — PROGRESS NOTES
Patient tolerated IV Solumedrol without issues.      Christie Hemphill is aware of future appt on 1/3/25 at 1100.     AVS -  No (Declined by Christie Hemphill) Appointment card given to patient.    Patient ambulated off unit without incident.  All personal belongings taken with patient.

## 2024-12-04 NOTE — PATIENT INSTRUCTIONS
Patient Education     Core Strengthening Exercises on Back or on Hands and Knees   About this topic   Your core muscles are in your chest, back, buttock, and stomach area. They are your abdominal, back, and pelvis muscles. These muscles help keep your body stable when using your arms or legs. They also help with balance and posture. There are many exercises you can do to keep these muscles strong.  If you have back problems like a compression fracture or a ruptured disc, doing some of these exercises could make your problem worse. Some of these exercises may cause lower back pain.  General   Before starting with a program, ask your doctor if you are healthy enough to do these exercises. Your doctor may have you work with a , chiropractor, or physical therapist to make a safe exercise program to meet your needs.  Strengthening Exercises   Strengthening exercises keep your muscles firm and strong. Start by repeating each exercise 2 to 3 times. Work up to doing each exercise 10 times. Try to do the exercises 2 to 3 times each day. Hold each exercise for 3 to 5 seconds. Do all exercises slowly.  Hip lifts ? Lie on your back with your knees bent and feet flat on the floor. Tighten your stomach muscles and push your heels into the floor to lift your buttocks off the floor. Relax.  Pelvic tilts ? Lie on your back with your knees bent and feet flat on the floor. Tighten your stomach muscles and press your lower back down to the floor. Relax.  Straight leg raises lying down ? Lie on your back with one leg straight. Bend your other knee so the foot is flat on the bed. Keeping your leg straight, lift the leg up to the level of your other knee. Lower it back down. Repeat with the other leg.  Knee flex lying down ? Lie on your back with both knees bent and your feet flat on the floor. Tighten your belly muscles. Raise one leg up and back down as if you are marching in slow motion. Keep belly muscles tight while you move  your leg. Switch legs. To make this exercise harder, raise both arms straight up in the air. Tighten your belly muscles. When you raise one leg up, reach the opposite arm over your head. Switch, moving the opposite arm and leg until you have done 10 repetitions on each side.  Abdominal crunches ? Lie on your back with both knees bent. Keep your feet flat on the floor. Place your hands in one of these positions. Try starting with the first position since it is the easiest. As you get better, use the other positions to make it harder.  Crunches with arms at sides.  Crunches with arms across chest.  Crunches with arms behind head. Be careful not to interlock your fingers behind your neck or head while doing crunches. This may add tension to your neck and cause strain.  Look at the ceiling. Tighten your belly muscles and lift your shoulders and upper back off the floor. Breathe out while you are doing this. Lower your shoulders to the floor. Breathe in while you are doing this. Relax your belly muscles all the way before starting another crunch.  Arm and leg lifts on hands and knees ? Start on your hands and knees. With all of these exercises, keep your back as level as possible. If you are having trouble with this, you may want to put a small object on your back such as a book. If it falls off, you are not keeping your back level enough during the exercise.  Lift one arm up to shoulder level and hold. Lower it back down. Now, lift up the other arm and hold.  Lift one leg up and kick it straight out until it is in line with your back and hold. Lower it back down. Now, lift up the other leg and hold.  Lift one arm and the OPPOSITE leg up at the same time and hold. Lower them down. Now, repeat using the other arm and leg. This is a very hard exercise. It may take time to be able to do this.               What will the results be?   Stronger core  Better balance  More toned belly and back muscles  Easier to do daily  activities  Better sports performance  Less low back pain  Helpful tips   Stay active and work out to keep your muscles strong and flexible.  Keep a healthy weight to avoid putting too much stress on your spine. Eat a healthy diet to keep your muscles healthy.  Be sure you do not hold your breath when exercising. This can raise your blood pressure. If you tend to hold your breath, try counting out loud when exercising. If any exercise bothers you, stop right away.  Try walking or cycling at an easy pace for a few minutes to warm up your muscles. Do this again after exercising.  Exercise may be slightly uncomfortable, but you should not have sharp pains. If you do get sharp pains, stop what you are doing. If the sharp pains continue, call your doctor.  Last Reviewed Date   2021-03-18  Consumer Information Use and Disclaimer   This generalized information is a limited summary of diagnosis, treatment, and/or medication information. It is not meant to be comprehensive and should be used as a tool to help the user understand and/or assess potential diagnostic and treatment options. It does NOT include all information about conditions, treatments, medications, side effects, or risks that may apply to a specific patient. It is not intended to be medical advice or a substitute for the medical advice, diagnosis, or treatment of a health care provider based on the health care provider's examination and assessment of a patient’s specific and unique circumstances. Patients must speak with a health care provider for complete information about their health, medical questions, and treatment options, including any risks or benefits regarding use of medications. This information does not endorse any treatments or medications as safe, effective, or approved for treating a specific patient. UpTo[employer], Inc[employer] and its affiliates disclaim any warranty or liability relating to this information or the use thereof. The use of this information  is governed by the Terms of Use, available at https://www.woltersAradigmuwer.com/en/know/clinical-effectiveness-terms   Copyright   Copyright © 2024 UpToDate, Inc. and its affiliates and/or licensors. All rights reserved.

## 2024-12-04 NOTE — PROGRESS NOTES
Assessment:  1. Cervical myelopathy (HCC)    2. Mid back pain    3. Lumbar radiculopathy        Plan:  Patient is a 44-year-old female complains of neck pain, mid back pain, low back pain and bilateral thigh pain with chronic pain since second lumbar spine recent, lumbar radiculopathy, MS, question psychiatric history presents to office for follow-up visit.  Patient reports worsening symptoms in her legs and her low back.  1.  We will trial Cymbalta 30 mg nightly for peripheral neuropathy  2.  Follow-up in 3      Complete risks and benefits including bleeding, infection, tissue reaction, nerve injury and allergic reaction were discussed. The approach was demonstrated using models and literature was provided. Verbal and written consent was obtained.      History of Present Illness:  The patient is a 44 y.o. female who presents for a follow up office visit in regards to Groin Pain and Buttocks Pain.   The patient’s current symptoms include 10 out of 10 constant dosaging, sharp, cramping, shooting, numbness,, pins-and-needles without a particular type pattern.    Current pain medications includes:  none.     I have personally reviewed and/or updated the patient's past medical history, past surgical history, family history, social history, current medications, allergies, and vital signs today.         Review of Systems  Review of Systems   Gastrointestinal:  Positive for constipation.   Musculoskeletal:  Positive for back pain, gait problem, joint swelling and myalgias.        Joint stiffness  Pain feet   Neurological:         Memory loss   All other systems reviewed and are negative.          Past Medical History:   Diagnosis Date    Back pain     Chronic constipation 6/27/2016    Chronic pain disorder     Collar bone fracture     right side    Crutches as ambulation aid     Depression     Dyssynergic defecation     Type 1    ETOH abuse     Head injury 2014    Infectious viral hepatitis     Lyme disease     Meningitis  spinal 2007?    Multiple sclerosis (HCC)     Opioid abuse (HCC)     Rib fractures     Thrombocytopenia (HCC)     Use of cane as ambulatory aid        Past Surgical History:   Procedure Laterality Date    APPENDECTOMY      CLAVICLE SURGERY      COLONOSCOPY      FL LUMBAR PUNCTURE DIAGNOSTIC  11/25/2020    KNEE ARTHROSCOPY      MULTIPLE TOOTH EXTRACTIONS      WA COLONOSCOPY FLX DX W/COLLJ SPEC WHEN PFRMD N/A 6/27/2016    Procedure: COLONOSCOPY;  Surgeon: Jason Rogers MD;  Location: [facility];  Service: Colorectal    WA OPEN IMPLANTATION CHARITY SACRAL NERVE N/A 11/15/2021    Procedure: INSERTION OF NEUROSTIMULATOR ELECTRODE ARRAY SACRAL NERVE; FLUOROSCOPY; ELECTRONICN ANALYSIS;  Surgeon: Abdulaziz Guevara MD;  Location: [facility];  Service: UroGynecology           TONSILLECTOMY AND ADENOIDECTOMY      TUBAL LIGATION         Family History   Problem Relation Age of Onset    Skin cancer Mother     No Known Problems Brother        Social History     Occupational History    Not on file   Tobacco Use    Smoking status: Every Day     Current packs/day: 0.25     Average packs/day: 0.3 packs/day for 0.8 years (0.2 ttl pk-yrs)     Types: Cigarettes     Start date: 03/2024    Smokeless tobacco: Never   Vaping Use    Vaping status: Every Day    Substances: Nicotine, Flavoring   Substance and Sexual Activity    Alcohol use: Not Currently     Comment: 2 mixed drinks each night    Drug use: No    Sexual activity: Yes     Partners: Male         Current Outpatient Medications:     Acetaminophen Extra Strength 500 MG TABS, Take 1 tablet by mouth every 8 (eight) hours as needed, Disp: , Rfl:     chlorhexidine (PERIDEX) 0.12 % solution, SWISH AND SPIT 15MLS ONCE A DAY AS DIRECTED, Disp: , Rfl:     clindamycin (CLEOCIN) 150 mg capsule, Take 1 capsule by mouth 4 times a day, Disp: , Rfl:     ibuprofen (MOTRIN) 800 mg tablet, Take 800 mg by mouth every 8 (eight) hours as needed, Disp: , Rfl:     albendazole (ALBENZA) 200 mg tablet, TAKE 1  "& 1/2 TABLETS BY MOUTH TWICE A DAY AS DIRECTED(PLAN LIMIT 4 TABS PER FILL) (Patient not taking: Reported on 10/7/2024), Disp: , Rfl:     ascorbic acid (VITAMIN C) 500 mg tablet, Take 500 mg by mouth daily, Disp: , Rfl:     b complex vitamins tablet, Take 1 tablet by mouth daily, Disp: , Rfl:     B-D HYPODERMIC NEEDLE 23GX1\" 23G X 1\" MISC, USE ONE WEEKLY, Disp: , Rfl:     BD Plastipak Syringe 21G X 1\" 3 ML MISC, USE ONE WEEKLY, Disp: , Rfl:     Biotin 36723 MCG TABS, Take 1 tablet by mouth daily  , Disp: , Rfl:     buprenorphine-naloxone (SUBOXONE) 2-0.5 mg per SL tablet, Place 0.5 tablets under the tongue 3 (three) times a day 2mg  three times a day, Disp: , Rfl:     Calcium Carbonate (CALCIUM 600 PO), Take 600 mg by mouth daily pt reports not having & not taking (Patient not taking: Reported on 10/7/2024), Disp: , Rfl:     Cholecalciferol (Vitamin D3) 125 MCG (5000 UT) TABS, Take 5,000 Units by mouth daily pt reports taking 2 tab, Disp: , Rfl:     cloNIDine (CATAPRES) 0.2 mg tablet, Take 0.2 mg by mouth daily at bedtime  (Patient not taking: Reported on 10/7/2024), Disp: , Rfl:     clopidogrel (PLAVIX) 75 mg tablet, Take 75 mg by mouth every morning (Patient not taking: Reported on 12/3/2024), Disp: , Rfl:     clotrimazole-betamethasone (LOTRISONE) 1-0.05 % cream, Apply to affected area 2 times daily prn, Disp: 45 g, Rfl: 1    Depo-Testosterone 100 MG/ML IM injection, INJECT 25MG (0.25ML) INTO THE MUSCLE EVERY 2 WEEKS(VIAL ONLY GOOD FOR 28DAYS), Disp: , Rfl:     docusate sodium (COLACE) 100 mg capsule, Take 100 mg by mouth 3 (three) times a day, Disp: , Rfl:     flavoxATE (URISPAS) 100 mg tablet, TAKE 1 TABLET BY MOUTH 3 TIMES A DAY AS NEEDED FOR BLADDER SPASMS., Disp: 90 tablet, Rfl: 3    furosemide (LASIX) 20 mg tablet, Take 10 mg by mouth daily, Disp: , Rfl:     Ginkgo Biloba 40 MG TABS, Take 120 mg by mouth daily pt reports taking 2 tabs daily, Disp: , Rfl:     ibuprofen (MOTRIN) 200 mg tablet, Take 200 mg by " mouth as needed for mild pain pt reports taking 3-4tabs every 6hrs. , Disp: , Rfl:     lubiprostone (AMITIZA) 24 mcg capsule, Take 24 mcg by mouth 2 (two) times a day with meals pt reports not taking, Disp: , Rfl:     lubiprostone (AMITIZA) 8 mcg capsule, Take 8 mcg by mouth 3 (three) times a day, Disp: , Rfl:     LUTEIN PO, Take by mouth in the morning 20mg on bottle  (Patient not taking: Reported on 10/7/2024), Disp: , Rfl:     methylPREDNISolone sodium succinate in sodium chloride 0.9 % 250 mL IVPB, Inject 1,000 mg into a catheter in a vein every 30 (thirty) days. 1x/month, provided at medical office. per EPIC  Indications: MS, Disp: , Rfl:     Misc Natural Products (GINSENG COMPLEX PO), Take 2 tablets by mouth daily 500mg on bottle., Disp: , Rfl:     Movantik 25 MG tablet, Take 25 mg by mouth every evening, Disp: , Rfl:     Multiple Vitamins-Minerals (ZINC PO), Take by mouth, Disp: , Rfl:     Nerve Stimulator (STANDARD TENS) VICKY, by Does not apply route 2 (two) times a day, Disp: 1 Device, Rfl: 0    Nerve Stimulator (TENS THERAPY REPLACE BACK PADS) MISC, 30 pad by Does not apply route 2 (two) times a day, Disp: 30 each, Rfl: 0    Nerve Stimulator VICKY, by Does not apply route 2 (two) times a day, Disp: 1 each, Rfl: 0    Nerve Stimulator Supplies MISC, 30 pad by Does not apply route 2 (two) times a day, Disp: 30 pad, Rfl: 0    NON FORMULARY, HEMPANOL BRAIN DETOX 200MG . pt reports not taking per 5/18/23. (Patient not taking: Reported on 1/3/2024), Disp: , Rfl:     Omega-3 Fatty Acids (FISH OIL OMEGA-3 PO), Take 1,200 mg by mouth daily , Disp: , Rfl:     phenazopyridine (PYRIDIUM) 200 mg tablet, Take 1 tablet (200 mg total) by mouth 3 (three) times a day as needed for bladder spasms (Patient not taking: Reported on 5/12/2023), Disp: 10 tablet, Rfl: 0    Potassium 99 MG TABS, Take 99 mg by mouth daily pt reports taking 3 tabs daily, Disp: , Rfl:     potassium chloride (Klor-Con) 10 mEq tablet, 1 TABLET DAILY,  "Disp: , Rfl:     Premarin 0.625 MG tablet, Take 0.625 mg by mouth daily, Disp: , Rfl:     Probiotic Product (PROBIOTIC DAILY PO), Take 2 tablets by mouth daily  , Disp: , Rfl:     promethazine (PHENERGAN) 50 MG tablet, daily at bedtime 1.5 tabs daily at bedtime, Disp: , Rfl: 1    Restasis 0.05 % ophthalmic emulsion, INSTILL 1 DROP BY OPHTHALMIC ROUTE EVERY 12 HOURS BOTH EYES, Disp: , Rfl:     selenium sulfide (SELSUN) 2.5 % shampoo, Apply topically daily as needed for dandruff (Patient not taking: Reported on 1/3/2024), Disp: 118 mL, Rfl: 0    testosterone cypionate (DEPO-TESTOSTERONE) 200 mg/mL SOLN, 25 MG (0.125ML) IM ONCE EVERY 7 DAYS, Disp: , Rfl:     topiramate (TOPAMAX) 50 MG tablet, Take 50 mg by mouth daily  , Disp: , Rfl: 5    TURMERIC PO, Take by mouth Turmeric Curcumin 500mg on bottle. 1 tab daily, Disp: , Rfl:     ZINC-VITAMIN C PO, Take by mouth pt not taking due to taking regular zinc per pt report on 5/18/23., Disp: , Rfl:     Allergies   Allergen Reactions    Penicillins GI Intolerance and Nausea Only     Other reaction(s): Unknown Reaction       Physical Exam:    BP 97/68   Pulse 75   Temp 99.6 °F (37.6 °C)   Resp 16   Ht 5' 6\" (1.676 m)   Wt 45.8 kg (101 lb)   SpO2 92%   BMI 16.30 kg/m²     Constitutional:normal, well developed, well nourished, alert, in no distress and non-toxic and no overt pain behavior.  Eyes:anicteric  HEENT:grossly intact  Neck:supple, symmetric, trachea midline and no masses   Pulmonary:even and unlabored  Cardiovascular:No edema or pitting edema present  Skin:Normal without rashes or lesions and well hydrated  Psychiatric:Mood and affect appropriate  Neurologic:Cranial Nerves II-XII grossly intact  Musculoskeletal:antalgic    Imaging    Study Result    Narrative & Impression   MRI LUMBAR SPINE WITHOUT CONTRAST     INDICATION: M54.16: Radiculopathy, lumbar region  M47.816: Spondylosis without myelopathy or radiculopathy, lumbar region  G89.4: Chronic pain syndrome.   "   COMPARISON: Lumbar spine radiograph 8/7/2023., 11/28/2018 sacrum and coccyx radiograph 11/15/2021. Lumbar puncture 11/25/2020. MRI lumbar spine with and without contrast 7/11/2020.     TECHNIQUE:  Multiplanar, multisequence imaging of the lumbar spine was performed. .        IMAGE QUALITY:  Diagnostic     FINDINGS:     VERTEBRAL BODIES:  There are 5 lumbar type vertebral bodies.  Normal alignment of the lumbar spine.  No spondylolysis or spondylolisthesis. No scoliosis.  No compression fracture.     Mildly bone marrow edema in right L4 and right L5 pedicles, may represent stress reaction.     L3 and L5 intraosseous vertebral body hemangiomas. Otherwise, normal bone marrow signal.     SACRUM:  Normal signal within the sacrum. No evidence of insufficiency or stress fracture.     DISTAL CORD AND CONUS:  Normal size and signal within the distal cord and conus.     PARASPINAL SOFT TISSUES:  Paraspinal soft tissues are unremarkable.     LOWER THORACIC DISC SPACES: Mild noncompressive lower thoracic degenerative change.     LUMBAR DISC SPACES: Mild multilevel disc height loss, worse at L5-S1.     L1-L2: Normal.     L2-L3: Normal.     L3-L4: Mild facet arthropathy, trace left facet joint effusion. No significant canal stenosis or foraminal narrowing.     L4-L5: Mild diffuse disc bulge. Mild facet arthropathy, small bilateral facet joint effusions (right greater than left). No significant canal stenosis or foraminal narrowing.     L5-S1: Mild diffuse disc bulge, tiny posterior annular fissure. No significant canal stenosis or foraminal narrowing.     OTHER FINDINGS: Small right renal cyst.     IMPRESSION:     Mildly bone marrow edema in right L4 and right L5 pedicles, may represent stress reaction.     Mild degenerative changes of the lumbar spine, as detailed above. No significant canal stenosis or foraminal narrowing.     The study was marked in EPIC for significant notification.        Workstation performed: [id number]        No orders of the defined types were placed in this encounter.  Dosing from, numbness and continues

## 2024-12-05 ENCOUNTER — TELEPHONE (OUTPATIENT)
Age: 44
End: 2024-12-05

## 2024-12-05 DIAGNOSIS — G89.4 CHRONIC PAIN SYNDROME: ICD-10-CM

## 2024-12-05 DIAGNOSIS — M54.9 MID BACK PAIN: ICD-10-CM

## 2024-12-05 DIAGNOSIS — G95.9 CERVICAL MYELOPATHY (HCC): Primary | ICD-10-CM

## 2024-12-05 DIAGNOSIS — G89.29 CHRONIC MIDLINE LOW BACK PAIN WITHOUT SCIATICA: ICD-10-CM

## 2024-12-05 DIAGNOSIS — M79.18 MYOFASCIAL PAIN SYNDROME: ICD-10-CM

## 2024-12-05 DIAGNOSIS — G35 MS (MULTIPLE SCLEROSIS) (HCC): ICD-10-CM

## 2024-12-05 DIAGNOSIS — M54.50 CHRONIC MIDLINE LOW BACK PAIN WITHOUT SCIATICA: ICD-10-CM

## 2024-12-05 RX ORDER — GABAPENTIN 100 MG/1
100 CAPSULE ORAL 3 TIMES DAILY
Qty: 90 CAPSULE | Refills: 1 | Status: SHIPPED | OUTPATIENT
Start: 2024-12-05 | End: 2025-01-04

## 2024-12-05 NOTE — TELEPHONE ENCOUNTER
Caller: Patient     Doctor:      Reason for call: Patient was prescribed duloxetine.     Patient picked up medication and was reading the  descriptions/ instructions.       Patient states it talks about it being an anti depressant and that there is nothing listed in regards to nerve pain. She also states it says to not mix with naproxen which is all she takes for pain at this time.    Looking to see if there are other options at this time.    Please advise     Call back#: [phone]

## 2024-12-05 NOTE — TELEPHONE ENCOUNTER
"S/w pt who states she is uncomfortable w/ the primary use of cymbalta as an antidepressant. Pt asking if she would be able to try a med strictly for neuropathic pain? RN advised pt that Cymbalta is a dual sx med and tx both depression and neuropathic disorders. Pt states she does not want to take this medication as she already deals w/ MS and brain lesions and does not want to further \"mess w/ my brain.\" RN questioned if pt has ever taken Gabapentin or Lyrica? Pt could not recall. RN reviewed med hx and no dinorah listed, lyrica last taken in 2020 briefly ( 1 time script). RN did advise pt that any of the neuromodulating meds can cause some brain fog/short term memory impairment. Pt verbalized understanding and would like something to aid in reducing le sx.     LOV 12/4/24 RM  Pls advise on med change. Thank you!  "

## 2024-12-12 ENCOUNTER — TELEPHONE (OUTPATIENT)
Age: 44
End: 2024-12-12

## 2024-12-12 DIAGNOSIS — G35 MS (MULTIPLE SCLEROSIS) (HCC): Primary | ICD-10-CM

## 2024-12-12 DIAGNOSIS — R26.2 AMBULATORY DYSFUNCTION: ICD-10-CM

## 2024-12-12 NOTE — TELEPHONE ENCOUNTER
Patient called to request update on if [facility] offers zero gravity walking machine as discussed at LOV 12/3/24 Janelle Meneses PA-C.  Spoke with Nancy at [facility] and was advised that the closest office with the zero gravity walking machine would be [place] office location in [place].  Provided PH #[phone] and offered to warm transfer however patient requested to call on her own.  Patient was very appreciative.    Thank you

## 2024-12-12 NOTE — TELEPHONE ENCOUNTER
"Please let the patient know that I spoke with one of our neuro physical therapists about this.  I am sorry for the delay in getting back to her on it.  Neuro PT does not have the Alter-G machines, only the ortho offices do.  The issue is, it requires patient being able to get up onto the machine, including a 12\" step, which is usually the limiting factor for this.  I am not sure she would be able to do that due to her significant lower extremity weakness.  If she would like to try, I can place a referral.  I have suggested PT for her in general (neuro PT), but she has declined and says she would only do it if able to use the Alter-G.  I am not sure if she would be able to physically get onto the machine   "

## 2024-12-12 NOTE — TELEPHONE ENCOUNTER
Outbound call placed to patient.    Reviewed message below with pt. Pt stated she would like to think do some more research about the machine to further see if she can physically get onto the machine due to LE weakness. Pt asked if a referral can be placed for Orthopedics just to get the process started.

## 2024-12-12 NOTE — TELEPHONE ENCOUNTER
"It is not an orthopedic referral.  It is a PT referral.  The machine is within the \"regular\"/ortho PT offices, not the neuro PT office.  I will place a PT referral for her   "

## 2024-12-13 NOTE — TELEPHONE ENCOUNTER
Patient called in and wanted to let Janelle Gideon Know that she feels like she can not do the Physical therapy. She wanted to make Janelle aware of this and she will not be scheduling anything for pt.     JEREMY

## 2024-12-17 ENCOUNTER — TELEPHONE (OUTPATIENT)
Age: 44
End: 2024-12-17

## 2024-12-17 DIAGNOSIS — M54.50 CHRONIC MIDLINE LOW BACK PAIN WITHOUT SCIATICA: ICD-10-CM

## 2024-12-17 DIAGNOSIS — M54.9 MID BACK PAIN: ICD-10-CM

## 2024-12-17 DIAGNOSIS — M79.18 MYOFASCIAL PAIN SYNDROME: Primary | ICD-10-CM

## 2024-12-17 DIAGNOSIS — G89.4 CHRONIC PAIN SYNDROME: ICD-10-CM

## 2024-12-17 DIAGNOSIS — G89.29 CHRONIC MIDLINE LOW BACK PAIN WITHOUT SCIATICA: ICD-10-CM

## 2024-12-17 RX ORDER — CELECOXIB 100 MG/1
100 CAPSULE ORAL 2 TIMES DAILY
Qty: 60 CAPSULE | Refills: 1 | Status: SHIPPED | OUTPATIENT
Start: 2024-12-17 | End: 2024-12-23

## 2024-12-17 NOTE — TELEPHONE ENCOUNTER
Caller: Patient    Doctor: Dr Silva    Reason for call: Calling to let the team know that the Gabapentin that was prescribed was making her feel worse. She states that it made her legs feel weaker than they already do, almost like they were going to give out on her.  She stopped taking about a week ago.  Patient states she was previously prescribed Celecoxib by Barbara Kocher in the past and was wondering if that could be tried again or something else to address her pain because the pain is unbearable.    Please assist.    Call back#: [phone]

## 2024-12-22 ENCOUNTER — TELEPHONE (OUTPATIENT)
Dept: OTHER | Facility: OTHER | Age: 44
End: 2024-12-22

## 2024-12-22 DIAGNOSIS — M54.9 MID BACK PAIN: ICD-10-CM

## 2024-12-22 DIAGNOSIS — M79.18 MUSCULOSKELETAL PAIN, CHRONIC: ICD-10-CM

## 2024-12-22 DIAGNOSIS — G89.4 CHRONIC PAIN SYNDROME: ICD-10-CM

## 2024-12-22 DIAGNOSIS — M54.2 NECK PAIN: ICD-10-CM

## 2024-12-22 DIAGNOSIS — G89.29 MUSCULOSKELETAL PAIN, CHRONIC: ICD-10-CM

## 2024-12-22 NOTE — TELEPHONE ENCOUNTER
Patient called today regarding her Prescription for Celecoxib was 200 mg in the past and the current prescription is for 100 mg. Patient is concerned that it won't be enough to help her Pain Level. Please call Patient back on Monday to discuss.

## 2024-12-22 NOTE — TELEPHONE ENCOUNTER
Patient states she was told by [facility] they do not provide telephone encounters  Called [facility]  Spoke w/Shabana  At the beginning of the conversation, the call was lost  Called again  Liya answered  Unable to hear me  Will try again later  , Color consistent with ethnicity/race, warm, dry intact, resilient.

## 2024-12-23 RX ORDER — CELECOXIB 200 MG/1
200 CAPSULE ORAL 2 TIMES DAILY
Qty: 60 CAPSULE | Refills: 2 | Status: SHIPPED | OUTPATIENT
Start: 2024-12-23

## 2024-12-23 NOTE — TELEPHONE ENCOUNTER
Sent prescription for Celebrex 200 mg twice daily to her pharmacy.  Please advise that if she has Celebrex 100 mg capsules, she can double them.

## 2024-12-24 ENCOUNTER — TELEPHONE (OUTPATIENT)
Dept: NEUROLOGY | Facility: CLINIC | Age: 44
End: 2024-12-24

## 2024-12-24 NOTE — TELEPHONE ENCOUNTER
Pt is scheduled for next Solumedrol infusion on 1/3/25.     Currently has coverage with [facility] family. Will confirm if , [id number], and [id number] need PA for 2025.

## 2024-12-26 NOTE — TELEPHONE ENCOUNTER
Called [facility] at [phone]. Staff reports benefits for 2025 will not be available until 1/1/25.

## 2024-12-31 DIAGNOSIS — G35 MS (MULTIPLE SCLEROSIS) (HCC): Primary | ICD-10-CM

## 2024-12-31 RX ORDER — SODIUM CHLORIDE 9 MG/ML
20 INJECTION, SOLUTION INTRAVENOUS ONCE
Status: CANCELLED | OUTPATIENT
Start: 2025-01-03

## 2025-01-03 ENCOUNTER — HOSPITAL ENCOUNTER (OUTPATIENT)
Dept: INFUSION CENTER | Facility: HOSPITAL | Age: 45
End: 2025-01-03
Attending: PSYCHIATRY & NEUROLOGY
Payer: COMMERCIAL

## 2025-01-03 VITALS
RESPIRATION RATE: 16 BRPM | HEART RATE: 89 BPM | SYSTOLIC BLOOD PRESSURE: 112 MMHG | TEMPERATURE: 96.6 F | OXYGEN SATURATION: 100 % | DIASTOLIC BLOOD PRESSURE: 57 MMHG

## 2025-01-03 DIAGNOSIS — G35 MS (MULTIPLE SCLEROSIS) (HCC): Primary | ICD-10-CM

## 2025-01-03 PROCEDURE — 96365 THER/PROPH/DIAG IV INF INIT: CPT

## 2025-01-03 RX ORDER — SODIUM CHLORIDE 9 MG/ML
20 INJECTION, SOLUTION INTRAVENOUS ONCE
Status: COMPLETED | OUTPATIENT
Start: 2025-01-03 | End: 2025-01-03

## 2025-01-03 RX ORDER — SODIUM CHLORIDE 9 MG/ML
20 INJECTION, SOLUTION INTRAVENOUS ONCE
Status: CANCELLED | OUTPATIENT
Start: 2025-01-31

## 2025-01-03 RX ADMIN — SODIUM CHLORIDE 20 ML/HR: 0.9 INJECTION, SOLUTION INTRAVENOUS at 11:44

## 2025-01-03 RX ADMIN — SODIUM CHLORIDE 1000 MG: 0.9 INJECTION, SOLUTION INTRAVENOUS at 11:36

## 2025-01-03 NOTE — PLAN OF CARE
Problem: Potential for Falls  Goal: Patient will remain free of falls  Description: INTERVENTIONS:  - Educate patient/family on patient safety including physical limitations  - Instruct patient to call for assistance with activity   Outcome: Progressing     Problem: Knowledge Deficit  Goal: Patient/family/caregiver demonstrates understanding of disease process, treatment plan, medications, and discharge instructions  Description: Complete learning assessment and assess knowledge base.  Interventions:  - Provide teaching at level of understanding  - Provide teaching via preferred learning methods  Outcome: Progressing

## 2025-01-03 NOTE — PROGRESS NOTES
Christie Hemphill  tolerated IV solumedrol treatment well with no complications.      Christie Joby is aware of future appt on 1/30/25 at 1100.     AVS declined.

## 2025-01-04 DIAGNOSIS — R39.11 URINARY HESITANCY: ICD-10-CM

## 2025-01-04 DIAGNOSIS — N31.9 NEUROGENIC BLADDER: ICD-10-CM

## 2025-01-06 RX ORDER — FLAVOXATE HYDROCHLORIDE 100 MG/1
100 TABLET ORAL 3 TIMES DAILY PRN
Qty: 90 TABLET | Refills: 3 | Status: SHIPPED | OUTPATIENT
Start: 2025-01-06

## 2025-01-07 ENCOUNTER — TELEPHONE (OUTPATIENT)
Age: 45
End: 2025-01-07

## 2025-01-07 NOTE — TELEPHONE ENCOUNTER
Caller: patient    Doctor: PAZ    Reason for call: would like to know if tomorrow she is able to get an injections  On her right upper shoulder and back     Call back#:

## 2025-01-08 NOTE — TELEPHONE ENCOUNTER
RN s/w RM regarding appt today. RM unable to accom TPI at todays appt.     RN s/w pt and advised of same. Pt asked that appt be cx due to diff w/ mobility today and states she has upcoming appt 2/5/25. Pt states she was under impress 2/5/25 appt was for TPI. RN advised pt she would ro to sched and RM to ensure that is poss.     RM--> Ok to repeat TPI? Rt cervical lc 6/13/24.  Sched--> can you pls see if there are any sooner TPI appt avail if RM ok to proceed. Thank you!

## 2025-01-08 NOTE — TELEPHONE ENCOUNTER
Caller: patient    Doctor: PAZ    Reason for call: wanted updates regarding getting a procedure today. Advise patient message was send to the DR,     Once we get a respond SPA will return her call..  Please leave a detail message on the VM, if patient does not answer    Patient stated will not come to the OV, if its not a procedure. Having hard time this morning with pain    Call back#:

## 2025-01-08 NOTE — TELEPHONE ENCOUNTER
Pt currently sched for OV on 2/5/25 w/ RM. If unable to do at OV can find sooner appt if avail. Thank you!

## 2025-01-09 ENCOUNTER — OFFICE VISIT (OUTPATIENT)
Dept: NEUROLOGY | Facility: CLINIC | Age: 45
End: 2025-01-09
Payer: COMMERCIAL

## 2025-01-09 ENCOUNTER — TELEPHONE (OUTPATIENT)
Age: 45
End: 2025-01-09

## 2025-01-09 VITALS
HEIGHT: 66 IN | DIASTOLIC BLOOD PRESSURE: 139 MMHG | SYSTOLIC BLOOD PRESSURE: 176 MMHG | BODY MASS INDEX: 16.07 KG/M2 | TEMPERATURE: 98.1 F | HEART RATE: 97 BPM | WEIGHT: 100 LBS

## 2025-01-09 DIAGNOSIS — R29.898 WEAKNESS OF RIGHT LEG: ICD-10-CM

## 2025-01-09 DIAGNOSIS — G35 MS (MULTIPLE SCLEROSIS) (HCC): Primary | ICD-10-CM

## 2025-01-09 DIAGNOSIS — G89.4 CHRONIC PAIN SYNDROME: ICD-10-CM

## 2025-01-09 DIAGNOSIS — R26.2 AMBULATORY DYSFUNCTION: ICD-10-CM

## 2025-01-09 PROCEDURE — 99204 OFFICE O/P NEW MOD 45 MIN: CPT | Performed by: STUDENT IN AN ORGANIZED HEALTH CARE EDUCATION/TRAINING PROGRAM

## 2025-01-09 RX ORDER — BUPRENORPHINE HYDROCHLORIDE AND NALOXONE HYDROCHLORIDE DIHYDRATE 8; 2 MG/1; MG/1
1 TABLET SUBLINGUAL DAILY
COMMUNITY
Start: 2025-01-03

## 2025-01-09 NOTE — PROGRESS NOTES
Name: Christie Hemphill      : 1980      MRN: [id number]  Encounter Provider: Roosevelt Ortiz DO  Encounter Date: 2025   Encounter department: NEUROLOGY [facility]  :  Assessment & Plan  MS (multiple sclerosis) ([facility])    Orders:    Brace    Ambulatory Referral to Physical Therapy; Future  Patient presents with weakness mostly in the bilateral lower extremity and although presents with only some trace movements in the ankle, there was some giveaway weakness with manual muscle testing.  I do think that she would benefit however from a light weight brace for the lower extremity and would also recommend a course of physical therapy in order to help obtain the brace and measure properly as well as teach how to ambulate utilizing it properly.  At this time I would not recommend getting braces for the bilateral lower extremities until reassessed after utilizing 1.  I did provide education that she does not need to wear this all the time is mostly for ambulation.  She should also continue to check her skin with the brace.  Ambulatory dysfunction    Orders:    Brace    Ambulatory Referral to Physical Therapy; Future  Patient with foot drop on the left and overall weakness in the bilateral lower extremities  Weakness of right leg    Orders:    Brace    Ambulatory Referral to Physical Therapy; Future    Chronic pain syndrome  Patient already sees pain specialist and has had lack of success with other medication and therapies for her overall pain.  We did talk briefly about medical cannabis as an option as well as the pathway in order to legally obtain a medical card in the [facility].  She will take this into consideration.     Patient educated on not mixing NSAIDs    Patient Instructions   You are seen today for ongoing discomfort but also difficulty with ambulating due to weakness in the bilateral lower extremities.  We talked about different options in order to help maximize her overall  function in the community and writing for a brace specifically a ankle-foot orthosis which would likely most likely be a anterior posterior leaf spring carbon fiber brace which is more light weight and easier to take on and off.  This would be only used while ambulating and not to be worn just at rest.  Make sure that if you get this brace that you check your skin periodically in order to make sure there is no skin breakdown.  A course of physical therapy has also been written in order to help measure and set up orders for this brace but also to learn how to use it appropriately and retrain some of the ambulation that we have been doing.  We also talked about different options for pain management including the pathway towards obtaining a card for medical cannabis if this is a direction you would like to consider.     History of Present Illness   Ms. Hemphill is a 45-year-old female who has a diagnosis of multiple sclerosis back in 2020 but notes having symptoms for several years prior to any official diagnosis.  She notes that she generally uses a rollator and has 2 of them available 1 for the house and the other for the car.  She does have a power chair as well for longer distances in the community as well as for appointments.  She endorses that on a good day she can ambulate half to 1 full block before needing to take a rest and on a bad day is limited to only household distance ambulation at best.  She notes weakness in the bilateral lower extremity that varies day by day but has pain as well that seems to be getting worse over time.  Most of the pain she experiences is in the buttocks and the legs to hurt mostly with bending.  She feels the bones are bending and popping and rubbing against each other.  She endorses that the pain is a 10/10 almost all the time every day except when sitting.    For pain and she has been taking Lyrica, Celebrex and occasionally ibuprofen which she admits to taking at the same time as  the Celebrex as well as the Suboxone.  We did discuss NSAID safety at this moment too.  She endorses that she did see pain and spine and had trigger point injections in her trapezius and levator Scap with some success but only for her neck and shoulder discomfort.  She was also recommended for Cymbalta but did not want to start any antidepressant medications for her pain.  She has had limited success with other modalities including therapies and other medications that we had reviewed.    She was sent and referred based on her needs for a potential brace however thought that this was an appointment for podiatry.        Review of Systems   Constitutional:  Negative for appetite change and fever.   HENT: Negative.  Negative for hearing loss, tinnitus, trouble swallowing and voice change.    Eyes: Negative.  Negative for photophobia and pain.   Respiratory: Negative.  Negative for shortness of breath.    Cardiovascular: Negative.  Negative for palpitations.   Gastrointestinal: Negative.  Negative for nausea and vomiting.   Endocrine: Negative.  Negative for cold intolerance.   Genitourinary: Negative.  Negative for dysuria, frequency and urgency.   Musculoskeletal:  Positive for back pain and neck pain. Negative for myalgias.        Pain throughout whole body but worse in buttocks and legs   Skin: Negative.  Negative for rash.   Neurological:  Positive for weakness (bilateral arms and legs), light-headedness and numbness (bilateral arms and hands). Negative for dizziness, tremors, seizures, syncope, facial asymmetry, speech difficulty and headaches.   Hematological: Negative.  Does not bruise/bleed easily.   Psychiatric/Behavioral: Negative.  Negative for confusion, hallucinations and sleep disturbance.    All other systems reviewed and are negative.   I have personally reviewed the MA's review of systems and made changes as necessary.    Medical History Reviewed by provider this encounter:     .  Current Outpatient  "Medications on File Prior to Visit   Medication Sig Dispense Refill    albendazole (ALBENZA) 200 mg tablet       ascorbic acid (VITAMIN C) 500 mg tablet Take 500 mg by mouth daily      b complex vitamins tablet Take 1 tablet by mouth daily      B-D HYPODERMIC NEEDLE 23GX1\" 23G X 1\" MISC USE ONE WEEKLY      BD Plastipak Syringe 21G X 1\" 3 ML MISC USE ONE WEEKLY      Biotin 93163 MCG TABS Take 1 tablet by mouth daily        buprenorphine-naloxone (SUBOXONE) 8-2 mg per SL tablet Place 1 tablet under the tongue daily      Calcium Carbonate (CALCIUM 600 PO) Take 600 mg by mouth daily pt reports not having & not taking      celecoxib (CeleBREX) 200 mg capsule Take 1 capsule (200 mg total) by mouth 2 (two) times a day With food 60 capsule 2    chlorhexidine (PERIDEX) 0.12 % solution SWISH AND SPIT 15MLS ONCE A DAY AS DIRECTED      Cholecalciferol (Vitamin D3) 125 MCG (5000 UT) TABS Take 5,000 Units by mouth daily pt reports taking 2 tab      clindamycin (CLEOCIN) 150 mg capsule Take 1 capsule by mouth 4 times a day      cloNIDine (CATAPRES) 0.2 mg tablet Take 0.2 mg by mouth daily at bedtime      clopidogrel (PLAVIX) 75 mg tablet Take 75 mg by mouth every morning      clotrimazole-betamethasone (LOTRISONE) 1-0.05 % cream Apply to affected area 2 times daily prn 45 g 1    Depo-Testosterone 100 MG/ML IM injection INJECT 25MG (0.25ML) INTO THE MUSCLE EVERY 2 WEEKS(VIAL ONLY GOOD FOR 28DAYS)      docusate sodium (COLACE) 100 mg capsule Take 100 mg by mouth 3 (three) times a day      flavoxATE (URISPAS) 100 mg tablet TAKE 1 TABLET BY MOUTH 3 TIMES A DAY AS NEEDED FOR BLADDER SPASMS. 90 tablet 3    furosemide (LASIX) 20 mg tablet Take 10 mg by mouth daily      Ginkgo Biloba 40 MG TABS Take 120 mg by mouth daily pt reports taking 2 tabs daily      ibuprofen (MOTRIN) 200 mg tablet Take 200 mg by mouth as needed for mild pain pt reports taking 3-4tabs every 6hrs.       lubiprostone (AMITIZA) 24 mcg capsule Take 24 mcg by mouth 2 " (two) times a day with meals pt reports not taking      lubiprostone (AMITIZA) 8 mcg capsule Take 8 mcg by mouth 3 (three) times a day      LUTEIN PO Take by mouth in the morning 20mg on bottle      methylPREDNISolone sodium succinate in sodium chloride 0.9 % 250 mL IVPB Inject 1,000 mg into a catheter in a vein every 30 (thirty) days. 1x/month, provided at medical office. per EPIC  Indications: MS      Misc Natural Products (GINSENG COMPLEX PO) Take 2 tablets by mouth daily 500mg on bottle.      Movantik 25 MG tablet Take 25 mg by mouth every evening      Multiple Vitamins-Minerals (ZINC PO) Take by mouth      naloxone (NARCAN) 4 mg/0.1 mL nasal spray ADMINISTER 1 SPRAY INTO ONE NOSTRIL. CALL 911. REPEAT AFTER 2-3 MIN IF NO OR MINIMAL RESPONSE      Nerve Stimulator (STANDARD TENS) VICKY by Does not apply route 2 (two) times a day 1 Device 0    Nerve Stimulator (TENS THERAPY REPLACE BACK PADS) MISC 30 pad by Does not apply route 2 (two) times a day 30 each 0    NON FORMULARY HEMPANOL BRAIN DETOX 200MG .  pt reports not taking per 5/18/23.      Omega-3 Fatty Acids (FISH OIL OMEGA-3 PO) Take 1,200 mg by mouth daily       phenazopyridine (PYRIDIUM) 200 mg tablet Take 1 tablet (200 mg total) by mouth 3 (three) times a day as needed for bladder spasms 10 tablet 0    Potassium 99 MG TABS Take 99 mg by mouth daily pt reports taking 3 tabs daily      potassium chloride (Klor-Con) 10 mEq tablet 1 TABLET DAILY      pregabalin (LYRICA) 25 mg capsule Take 1 capsule by mouth 2 (two) times a day      Probiotic Product (PROBIOTIC DAILY PO) Take 2 tablets by mouth daily        promethazine (PHENERGAN) 50 MG tablet daily at bedtime 1.5 tabs daily at bedtime  1    Restasis 0.05 % ophthalmic emulsion INSTILL 1 DROP BY OPHTHALMIC ROUTE EVERY 12 HOURS BOTH EYES      selenium sulfide (SELSUN) 2.5 % shampoo Apply topically daily as needed for dandruff 118 mL 0    testosterone cypionate (DEPO-TESTOSTERONE) 200 mg/mL SOLN 25 MG (0.125ML) IM  "ONCE EVERY 7 DAYS      topiramate (TOPAMAX) 50 MG tablet Take 50 mg by mouth daily    5    TURMERIC PO Take by mouth Turmeric Curcumin 500mg on bottle. 1 tab daily      ZINC-VITAMIN C PO Take by mouth pt not taking due to taking regular zinc per pt report on 5/18/23.      Acetaminophen Extra Strength 500 MG TABS Take 1 tablet by mouth every 8 (eight) hours as needed (Patient not taking: Reported on 1/9/2025)      buprenorphine-naloxone (SUBOXONE) 2-0.5 mg per SL tablet Place 0.5 tablets under the tongue 3 (three) times a day 2mg  three times a day (Patient not taking: Reported on 1/9/2025)      gabapentin (NEURONTIN) 100 mg capsule Take 1 capsule (100 mg total) by mouth 3 (three) times a day 90 capsule 1    ibuprofen (MOTRIN) 800 mg tablet Take 800 mg by mouth every 8 (eight) hours as needed      Nerve Stimulator VICKY by Does not apply route 2 (two) times a day 1 each 0    Nerve Stimulator Supplies MISC 30 pad by Does not apply route 2 (two) times a day 30 pad 0    Premarin 0.625 MG tablet Take 0.625 mg by mouth daily (Patient not taking: Reported on 1/9/2025)       No current facility-administered medications on file prior to visit.      Social History     Tobacco Use    Smoking status: Every Day     Current packs/day: 0.25     Average packs/day: 0.3 packs/day for 0.9 years (0.2 ttl pk-yrs)     Types: Cigarettes     Start date: 03/2024    Smokeless tobacco: Never   Vaping Use    Vaping status: Every Day    Substances: Nicotine, Flavoring   Substance and Sexual Activity    Alcohol use: Yes     Comment: 2 mixed drinks each night    Drug use: No    Sexual activity: Yes     Partners: Male        Objective   BP (!) 176/139 (BP Location: Left arm, Patient Position: Sitting, Cuff Size: Standard)   Pulse 97   Temp 98.1 °F (36.7 °C) (Temporal)   Ht 5' 6\" (1.676 m)   Wt 45.4 kg (100 lb)   BMI 16.14 kg/m²       I did personally review the images including an MRI of the lumbar spine without contrast completed on 8/29/2023 " and agreed with the overall read.  There is no significant degenerative changes to explain other organic causes for her pain and symptoms in the buttocks and legs.  The central canal as well as the neuroforamen are patent    Physical Exam  Vitals reviewed.   Constitutional:       General: She is not in acute distress.  HENT:      Head: Normocephalic and atraumatic.      Right Ear: External ear normal.      Left Ear: External ear normal.      Nose: Nose normal. No rhinorrhea.      Mouth/Throat:      Mouth: Mucous membranes are moist.      Pharynx: Oropharynx is clear.   Eyes:      General: No scleral icterus.  Cardiovascular:      Rate and Rhythm: Normal rate.   Pulmonary:      Effort: Pulmonary effort is normal. No respiratory distress.   Abdominal:      General: There is no distension.      Palpations: Abdomen is soft.   Musculoskeletal:      Comments: Weakness in the bilateral lower extremities approximately 3/5 throughout as she is able to provide antigravity strength without any resistance.   Skin:     General: Skin is warm and dry.   Neurological:      Mental Status: She is alert and oriented to person, place, and time.   Psychiatric:         Mood and Affect: Mood normal.         Behavior: Behavior normal.       Neurological Exam  Mental Status  Alert. Oriented to person, place, and time.      Roosevelt Ortiz, DO  Physical Medicine and Rehabilitation  [facility]

## 2025-01-09 NOTE — PATIENT INSTRUCTIONS
You are seen today for ongoing discomfort but also difficulty with ambulating due to weakness in the bilateral lower extremities.  We talked about different options in order to help maximize her overall function in the community and writing for a brace specifically a ankle-foot orthosis which would likely most likely be a anterior posterior leaf spring carbon fiber brace which is more light weight and easier to take on and off.  This would be only used while ambulating and not to be worn just at rest.  Make sure that if you get this brace that you check your skin periodically in order to make sure there is no skin breakdown.  A course of physical therapy has also been written in order to help measure and set up orders for this brace but also to learn how to use it appropriately and retrain some of the ambulation that we have been doing.  We also talked about different options for pain management including the pathway towards obtaining a card for medical cannabis if this is a direction you would like to consider.

## 2025-01-13 ENCOUNTER — TELEPHONE (OUTPATIENT)
Age: 45
End: 2025-01-13

## 2025-01-13 ENCOUNTER — TELEPHONE (OUTPATIENT)
Dept: NEUROLOGY | Facility: CLINIC | Age: 45
End: 2025-01-13

## 2025-01-13 NOTE — TELEPHONE ENCOUNTER
Faxed Dr Ortiz's visit notes and script for a brace to [facility] [phone].  Scanned into chart also on 1/13/25   Pre procedure Diagnosis: SI joint dysfunction    Post procedure Diagnosis: Same  Procedure performed: bilateral SI joint injection  Anesthesia: none  Complications: none  Operators: Bubba Montgomery MD     Indications:   Joel Pineda is a 51 year old male. The patient has a history of buttocks pain. Other conservative treatments prior to injection include meds/pt/injections.    Options/alternatives, benefits and risks were discussed with the patient including bleeding, infection, tissue trauma, exposure to radiation, reaction to medications including seizure, nerve injury, weakness, and numbness.  Questions were answered to his satisfaction and he agrees to proceed. Voluntary informed consent was obtained and signed.     Vitals were reviewed: Yes  Allergies were reviewed:  Yes   Medications were reviewed:  Yes   Pre-procedure pain score: 4/10    Procedure:  After obtaining signed informed consent, the patient was brought into the procedure suite and was placed in a prone position on the procedure table.   A Pause for the Cause was performed.  The patient was prepped and draped in the usual sterile fashion.     After identifying the bilateral SI joint, the C-arm was rotated obliquely to obtain the best view of the inferior angle of the joint.  Then 3 ml of Lidocaine 1%  was used to anesthetize the skin at a skin entry site coaxial with the fluoroscopy beam at this location.  A 22 gauge 3.5 inch needle was advanced under intermittent fluoroscopy until it was felt to enter the SI joint.  Aspiration was negative.    A total of 1ml of Omnipaque-300 was injected, confirming appropriate position, with spread into the intraarticular space, with no intravascular uptake noted.  9ml of Omnipaque was wasted. Location was verified in lateral view.    2 ml of 0.5% bupivacaine with 40mg of Kenalog was injected.  The needle was removed.     Hemostasis was achieved, the area was cleaned, and bandaids were placed when appropriate.   The patient tolerated the procedure well, and was taken to the recovery room.  Images were saved to PACS.    Post-procedure pain score: 0/10  Follow-up includes:     -f/u with referring Physician     Bubba Montgomery MD  [facility] [place]

## 2025-01-13 NOTE — TELEPHONE ENCOUNTER
Pt spoke with [facility] in [place] , [place] who informed her to obtain ankle-foot orthosis our office has to fax over the order and [place] to [facility] at fax#[phone].

## 2025-01-14 NOTE — ASSESSMENT & PLAN NOTE
Orders:    Brace    Ambulatory Referral to Physical Therapy; Future  Patient presents with weakness mostly in the bilateral lower extremity and although presents with only some trace movements in the ankle, there was some giveaway weakness with manual muscle testing.  I do think that she would benefit however from a light weight brace for the lower extremity and would also recommend a course of physical therapy in order to help obtain the brace and measure properly as well as teach how to ambulate utilizing it properly.  At this time I would not recommend getting braces for the bilateral lower extremities until reassessed after utilizing 1.  I did provide education that she does not need to wear this all the time is mostly for ambulation.  She should also continue to check her skin with the brace.

## 2025-01-14 NOTE — ASSESSMENT & PLAN NOTE
Patient already sees pain specialist and has had lack of success with other medication and therapies for her overall pain.  We did talk briefly about medical cannabis as an option as well as the pathway in order to legally obtain a medical card in the [facility].  She will take this into consideration.     Patient educated on not mixing NSAIDs

## 2025-01-14 NOTE — ASSESSMENT & PLAN NOTE
Orders:    Brace    Ambulatory Referral to Physical Therapy; Future  Patient with foot drop on the left and overall weakness in the bilateral lower extremities

## 2025-01-22 ENCOUNTER — TELEPHONE (OUTPATIENT)
Age: 45
End: 2025-01-22

## 2025-01-22 NOTE — TELEPHONE ENCOUNTER
Caller: Patient    Doctor: Dr Silva    Reason for call: Patient calling because she might need assistance with a wheelchair in the AM when she arrives for her appt.  Her MS is flared and her wheelchair isn't operating as it should with the cold.  She will call again in the AM if she needs assistance getting into the office.    Call back#: [phone]

## 2025-01-23 ENCOUNTER — PROCEDURE VISIT (OUTPATIENT)
Age: 45
End: 2025-01-23
Payer: COMMERCIAL

## 2025-01-23 VITALS — WEIGHT: 100 LBS | HEIGHT: 66 IN | BODY MASS INDEX: 16.07 KG/M2

## 2025-01-23 DIAGNOSIS — M54.9 MID BACK PAIN: ICD-10-CM

## 2025-01-23 DIAGNOSIS — M79.18 MYOFASCIAL PAIN SYNDROME: ICD-10-CM

## 2025-01-23 DIAGNOSIS — G89.4 CHRONIC PAIN SYNDROME: ICD-10-CM

## 2025-01-23 DIAGNOSIS — M54.2 NECK PAIN: Primary | ICD-10-CM

## 2025-01-23 PROCEDURE — 20553 NJX 1/MLT TRIGGER POINTS 3/>: CPT | Performed by: ANESTHESIOLOGY

## 2025-01-23 PROCEDURE — 76942 ECHO GUIDE FOR BIOPSY: CPT | Performed by: ANESTHESIOLOGY

## 2025-01-23 RX ORDER — TRIAMCINOLONE ACETONIDE 40 MG/ML
40 INJECTION, SUSPENSION INTRA-ARTICULAR; INTRAMUSCULAR
Status: COMPLETED | OUTPATIENT
Start: 2025-01-23 | End: 2025-01-23

## 2025-01-23 RX ORDER — BUPIVACAINE HYDROCHLORIDE 2.5 MG/ML
10 INJECTION, SOLUTION EPIDURAL; INFILTRATION; INTRACAUDAL
Status: COMPLETED | OUTPATIENT
Start: 2025-01-23 | End: 2025-01-23

## 2025-01-23 RX ADMIN — TRIAMCINOLONE ACETONIDE 40 MG: 40 INJECTION, SUSPENSION INTRA-ARTICULAR; INTRAMUSCULAR at 16:00

## 2025-01-23 RX ADMIN — BUPIVACAINE HYDROCHLORIDE 10 ML: 2.5 INJECTION, SOLUTION EPIDURAL; INFILTRATION; INTRACAUDAL at 16:00

## 2025-01-23 NOTE — PATIENT INSTRUCTIONS
Do not apply heat to any area that is numb. If you have discomfort or soreness at the injection site, you may apply ice today, 20 minutes on and 20 minutes off. Tomorrow you may use ice or warm, moist heat. Do not apply ice or heat directly to the skin.  If you experience severe shortness of breath, go to the Emergency Room.  You may have numbness for several hours from the local anesthetic. Please use caution and common sense, especially with weight-bearing activities.  You may have an increase or change in the discomfort for 36-48 hours after your treatment. Apply ice and continue with any pain medicine you have been prescribed.  Do not do anything strenuous today. You may shower, but no tub baths or hot tubs today. You may resume your normal activities tomorrow, but do not “overdo it”. Resume normal activities slowly when you are feeling better.  If you experience redness, drainage or swelling at the injection site, or if you develop a fever above 100 degrees, please call [facility] at [phone] or go to the Emergency Room.  Continue to take all routine medicines prescribed by your primary care physician unless otherwise instructed by our staff. Most blood thinners should be started again according to your regularly scheduled dosing. If you have any questions, please give our office a call.    As no general anesthesia was used in today's procedure, you should not experience any side effects related to anesthesia.       If you have a problem specifically related to your procedure, please call our office at [phone].  Problems not related to your procedure should be directed to your primary care physician.

## 2025-01-23 NOTE — PROGRESS NOTES
" Universal Protocol:  procedure performed by consultantConsent: Verbal consent obtained. Written consent obtained.  Risks and benefits: risks, benefits and alternatives were discussed  Consent given by: patient  Time out: Immediately prior to procedure a \"time out\" was called to verify the correct patient, procedure, equipment, support staff and site/side marked as required.  Timeout called at: 1/23/2025 4:42 PM.  Patient understanding: patient states understanding of the procedure being performed  Patient consent: the patient's understanding of the procedure matches consent given  Procedure consent: procedure consent matches procedure scheduled  Relevant documents: relevant documents present and verified  Test results: test results available and properly labeled  Site marked: the operative site was marked  Radiology Images displayed and confirmed. If images not available, report reviewed: imaging studies not available  Required items: required blood products, implants, devices, and special equipment available  Patient identity confirmed: verbally with patient  Supporting Documentation  Indications: pain   Trigger Point Injections: multiple trigger points: 3 or more muscle groups    Injection site identified by: ultrasound  Procedure Details  Location(s):    Neck/Upper Back: R upper trapezius, R levator scapulae, R rhomboid and R supraspinatus     Prep: patient was prepped and draped in usual sterile fashion  Needle size: 22 G  Medications: 10 mL bupivacaine (PF) 0.25 %; 40 mg triamcinolone acetonide 40 mg/mL  Patient tolerance: patient tolerated the procedure well with no immediate complications          "

## 2025-01-31 ENCOUNTER — HOSPITAL ENCOUNTER (OUTPATIENT)
Dept: INFUSION CENTER | Facility: HOSPITAL | Age: 45
End: 2025-01-31
Attending: PSYCHIATRY & NEUROLOGY
Payer: COMMERCIAL

## 2025-01-31 VITALS
SYSTOLIC BLOOD PRESSURE: 101 MMHG | OXYGEN SATURATION: 95 % | DIASTOLIC BLOOD PRESSURE: 53 MMHG | RESPIRATION RATE: 16 BRPM | TEMPERATURE: 97.2 F | HEART RATE: 75 BPM

## 2025-01-31 DIAGNOSIS — G35 MS (MULTIPLE SCLEROSIS) (HCC): Primary | ICD-10-CM

## 2025-01-31 PROCEDURE — 96365 THER/PROPH/DIAG IV INF INIT: CPT

## 2025-01-31 RX ORDER — SODIUM CHLORIDE 9 MG/ML
20 INJECTION, SOLUTION INTRAVENOUS ONCE
OUTPATIENT
Start: 2025-02-28

## 2025-01-31 RX ORDER — SODIUM CHLORIDE 9 MG/ML
20 INJECTION, SOLUTION INTRAVENOUS ONCE
Status: COMPLETED | OUTPATIENT
Start: 2025-01-31 | End: 2025-01-31

## 2025-01-31 RX ORDER — SODIUM CHLORIDE 9 MG/ML
20 INJECTION, SOLUTION INTRAVENOUS ONCE
Status: CANCELLED | OUTPATIENT
Start: 2025-01-31

## 2025-01-31 RX ADMIN — SODIUM CHLORIDE 1000 MG: 0.9 INJECTION, SOLUTION INTRAVENOUS at 11:58

## 2025-01-31 RX ADMIN — SODIUM CHLORIDE 20 ML/HR: 9 INJECTION, SOLUTION INTRAVENOUS at 11:42

## 2025-01-31 NOTE — PROGRESS NOTES
Christie Hemphill  tolerated Solumedrol treatment well with no complications.      Christie Hemphill is aware of future appt on 2/28 at 1PM.     AVS printed and given to Christie Hemphill: No (Declined by Christie Hemphill).

## 2025-02-03 ENCOUNTER — NURSE TRIAGE (OUTPATIENT)
Age: 45
End: 2025-02-03

## 2025-02-03 NOTE — TELEPHONE ENCOUNTER
"Patient called stating she did some research on the internet and would like to start a medication called: [id number]. Patient asked me to send a message to Dr. HILL rather than another provider.    # [phone]    Dr. HILL, please advise. Thank you!    Reason for Disposition   Caller has NON-URGENT medicine question about med that PCP or specialist prescribed and triager unable to answer question    Answer Assessment - Initial Assessment Questions  1. NAME of MEDICINE: \"What medicine(s) are you calling about?\"        [id number]    2. QUESTION: \"What is your question?\" (e.g., double dose of medicine, side effect)        Patient stated she did research on the internet and would like to start on [id number] as soon as possible for her MS. Patient stated she read that [id number] could make her MS, \"do a 180.\"     3. PRESCRIBER: \"Who prescribed the medicine?\" Reason: if prescribed by specialist, call should be referred to that group.        Patient wants Dr. HILL to prescribe [id number].    Protocols used: Medication Question Call-Adult-[place]    "

## 2025-02-03 NOTE — TELEPHONE ENCOUNTER
Patient would require to wait till some [facility] start enrollment for Phase III clinical trial to investigate efficacy of [id number] considering Phase II clinical trial just completed enrollment

## 2025-02-04 ENCOUNTER — TELEPHONE (OUTPATIENT)
Dept: OTHER | Facility: OTHER | Age: 45
End: 2025-02-04

## 2025-02-04 NOTE — TELEPHONE ENCOUNTER
Patient returned call;  discussed recommendation from Dr. HILL regarding Phase III Clinical Trial; patient said she will research enrollment in the program, asked if Dr. HILL was able to offer any assistance on participating in clinical trial.

## 2025-02-04 NOTE — TELEPHONE ENCOUNTER
Outbound call placed to pt, no answer. Left generic message due to no communication form on file.    Stated that a message would be sent via [employer] [place] or the pt can call the office back. Phone number provided.

## 2025-02-05 NOTE — TELEPHONE ENCOUNTER
Caller: Christie    Doctor: Dr. Matias     Reason for call: patient called last night to cancel appt on 2/5 please do not chloe as a NO show     Thank you

## 2025-02-05 NOTE — TELEPHONE ENCOUNTER
Patient is calling regarding cancelling an appointment.    Date/Time: 02/05 @1139a     Patient was rescheduled: YES [] NO [x]    Patient requesting call back to reschedule: YES [] NO [x]      Pt will call back to reschedule

## 2025-02-06 ENCOUNTER — TELEPHONE (OUTPATIENT)
Dept: PAIN MEDICINE | Facility: MEDICAL CENTER | Age: 45
End: 2025-02-06

## 2025-02-07 ENCOUNTER — TELEPHONE (OUTPATIENT)
Age: 45
End: 2025-02-07

## 2025-02-07 NOTE — TELEPHONE ENCOUNTER
Patient called to report that their PCP is requesting the LOV notes from the patients visit with Dr. Ortiz on 1/9/2025.    As PCP is looking to see the notes and se any way to assist in the care of the patients condition     Patient requests that the Office visit notes be Faxed to their PCP Office of Dr. Lowry ATTENTION: Ruchi DIAZ at Fax # [phone]                    Thank you!

## 2025-02-10 NOTE — TELEPHONE ENCOUNTER
Last office note faxed to Dr Lowry office attention Ruchi Bowman [phone]. Confirmation scanned into chart.

## 2025-02-11 ENCOUNTER — HOSPITAL ENCOUNTER (OUTPATIENT)
Dept: RADIOLOGY | Facility: HOSPITAL | Age: 45
Discharge: HOME | End: 2025-02-11
Payer: COMMERCIAL

## 2025-02-11 DIAGNOSIS — M25.552 BILATERAL HIP PAIN: ICD-10-CM

## 2025-02-11 DIAGNOSIS — M25.551 BILATERAL HIP PAIN: ICD-10-CM

## 2025-02-11 PROCEDURE — 73523 X-RAY EXAM HIPS BI 5/> VIEWS: CPT

## 2025-02-14 ENCOUNTER — APPOINTMENT (OUTPATIENT)
Dept: LAB | Facility: HOSPITAL | Age: 45
End: 2025-02-14
Payer: COMMERCIAL

## 2025-02-14 DIAGNOSIS — R63.2 POLYPHAGIA(783.6): ICD-10-CM

## 2025-02-14 DIAGNOSIS — M51.360 DISCOGENIC SYNDROME, LUMBAR: ICD-10-CM

## 2025-02-14 DIAGNOSIS — F11.20 OPIOID TYPE DEPENDENCE, CONTINUOUS (HCC): ICD-10-CM

## 2025-02-14 DIAGNOSIS — M62.81 MUSCLE WEAKNESS: ICD-10-CM

## 2025-02-14 DIAGNOSIS — N92.6 IRREGULAR MENSTRUAL CYCLE: ICD-10-CM

## 2025-02-14 DIAGNOSIS — G35 MULTIPLE SCLEROSIS (HCC): ICD-10-CM

## 2025-02-14 DIAGNOSIS — R26.89: ICD-10-CM

## 2025-02-14 DIAGNOSIS — M62.81 MUSCLE WEAKNESS (GENERALIZED): ICD-10-CM

## 2025-02-14 DIAGNOSIS — R26.9 GAIT ABNORMALITY: ICD-10-CM

## 2025-02-14 DIAGNOSIS — M21.379 FOOT-DROP, UNSPECIFIED LATERALITY: ICD-10-CM

## 2025-02-14 DIAGNOSIS — M79.673 PAIN OF FOOT, UNSPECIFIED LATERALITY: ICD-10-CM

## 2025-02-14 DIAGNOSIS — R80.9 MICROALBUMINURIA: ICD-10-CM

## 2025-02-14 DIAGNOSIS — K59.00 CONSTIPATION, UNSPECIFIED CONSTIPATION TYPE: ICD-10-CM

## 2025-02-14 DIAGNOSIS — M54.30 SCIATICA, UNSPECIFIED LATERALITY: ICD-10-CM

## 2025-02-14 DIAGNOSIS — R19.07 GENERALIZED ABDOMINAL SWELLING: ICD-10-CM

## 2025-02-14 DIAGNOSIS — G47.00 INSOMNIA, UNSPECIFIED TYPE: ICD-10-CM

## 2025-02-14 DIAGNOSIS — M62.50 MUSCULAR ATROPHY, UNSPECIFIED SITE: ICD-10-CM

## 2025-02-14 DIAGNOSIS — R90.82 WHITE MATTER DISEASE: ICD-10-CM

## 2025-02-14 LAB
25(OH)D3 SERPL-MCNC: >120 NG/ML (ref 30–100)
ALBUMIN SERPL BCG-MCNC: 4.1 G/DL (ref 3.5–5)
ALP SERPL-CCNC: 84 U/L (ref 34–104)
ALT SERPL W P-5'-P-CCNC: 18 U/L (ref 7–52)
ANION GAP SERPL CALCULATED.3IONS-SCNC: 11 MMOL/L (ref 4–13)
AST SERPL W P-5'-P-CCNC: 28 U/L (ref 13–39)
BASOPHILS # BLD AUTO: 0.03 THOUSANDS/ÂΜL (ref 0–0.1)
BASOPHILS NFR BLD AUTO: 1 % (ref 0–1)
BILIRUB SERPL-MCNC: 0.49 MG/DL (ref 0.2–1)
BUN SERPL-MCNC: 12 MG/DL (ref 5–25)
CALCIUM SERPL-MCNC: 9.2 MG/DL (ref 8.4–10.2)
CHLORIDE SERPL-SCNC: 104 MMOL/L (ref 96–108)
CHOLEST SERPL-MCNC: 154 MG/DL (ref ?–200)
CO2 SERPL-SCNC: 28 MMOL/L (ref 21–32)
CREAT SERPL-MCNC: 1.02 MG/DL (ref 0.6–1.3)
CRP SERPL QL: <1 MG/L
EOSINOPHIL # BLD AUTO: 0.08 THOUSAND/ÂΜL (ref 0–0.61)
EOSINOPHIL NFR BLD AUTO: 3 % (ref 0–6)
ERYTHROCYTE [DISTWIDTH] IN BLOOD BY AUTOMATED COUNT: 16.5 % (ref 11.6–15.1)
EST. AVERAGE GLUCOSE BLD GHB EST-MCNC: 105 MG/DL
GFR SERPL CREATININE-BSD FRML MDRD: 67 ML/MIN/1.73SQ M
GLUCOSE P FAST SERPL-MCNC: 79 MG/DL (ref 65–99)
HBA1C MFR BLD: 5.3 %
HCT VFR BLD AUTO: 35.3 % (ref 34.8–46.1)
HDLC SERPL-MCNC: 56 MG/DL
HGB BLD-MCNC: 10.7 G/DL (ref 11.5–15.4)
IMM GRANULOCYTES # BLD AUTO: 0.01 THOUSAND/UL (ref 0–0.2)
IMM GRANULOCYTES NFR BLD AUTO: 0 % (ref 0–2)
LDLC SERPL CALC-MCNC: 80 MG/DL (ref 0–100)
LYMPHOCYTES # BLD AUTO: 1.08 THOUSANDS/ÂΜL (ref 0.6–4.47)
LYMPHOCYTES NFR BLD AUTO: 33 % (ref 14–44)
MCH RBC QN AUTO: 26.3 PG (ref 26.8–34.3)
MCHC RBC AUTO-ENTMCNC: 30.3 G/DL (ref 31.4–37.4)
MCV RBC AUTO: 87 FL (ref 82–98)
MONOCYTES # BLD AUTO: 0.23 THOUSAND/ÂΜL (ref 0.17–1.22)
MONOCYTES NFR BLD AUTO: 7 % (ref 4–12)
NEUTROPHILS # BLD AUTO: 1.81 THOUSANDS/ÂΜL (ref 1.85–7.62)
NEUTS SEG NFR BLD AUTO: 56 % (ref 43–75)
NONHDLC SERPL-MCNC: 98 MG/DL
NRBC BLD AUTO-RTO: 0 /100 WBCS
PLATELET # BLD AUTO: 130 THOUSANDS/UL (ref 149–390)
PMV BLD AUTO: 10.3 FL (ref 8.9–12.7)
POTASSIUM SERPL-SCNC: 3.7 MMOL/L (ref 3.5–5.3)
PROT SERPL-MCNC: 6.2 G/DL (ref 6.4–8.4)
RBC # BLD AUTO: 4.07 MILLION/UL (ref 3.81–5.12)
SODIUM SERPL-SCNC: 143 MMOL/L (ref 135–147)
TRIGL SERPL-MCNC: 92 MG/DL (ref ?–150)
TSH SERPL DL<=0.05 MIU/L-ACNC: 0.97 UIU/ML (ref 0.45–4.5)
WBC # BLD AUTO: 3.24 THOUSAND/UL (ref 4.31–10.16)

## 2025-02-14 PROCEDURE — 85025 COMPLETE CBC W/AUTO DIFF WBC: CPT

## 2025-02-14 PROCEDURE — 86140 C-REACTIVE PROTEIN: CPT

## 2025-02-14 PROCEDURE — 36415 COLL VENOUS BLD VENIPUNCTURE: CPT

## 2025-02-14 PROCEDURE — 80053 COMPREHEN METABOLIC PANEL: CPT

## 2025-02-14 PROCEDURE — 80061 LIPID PANEL: CPT

## 2025-02-14 PROCEDURE — 83036 HEMOGLOBIN GLYCOSYLATED A1C: CPT

## 2025-02-14 PROCEDURE — 84443 ASSAY THYROID STIM HORMONE: CPT

## 2025-02-14 PROCEDURE — 82306 VITAMIN D 25 HYDROXY: CPT

## 2025-02-15 ENCOUNTER — APPOINTMENT (OUTPATIENT)
Dept: LAB | Facility: HOSPITAL | Age: 45
End: 2025-02-15
Payer: COMMERCIAL

## 2025-02-15 LAB
BACTERIA UR QL AUTO: ABNORMAL /HPF
BILIRUB UR QL STRIP: NEGATIVE
CLARITY UR: CLEAR
COLOR UR: ABNORMAL
CREAT UR-MCNC: 58.7 MG/DL
CREAT UR-MCNC: 60.1 MG/DL
GLUCOSE UR STRIP-MCNC: NEGATIVE MG/DL
HGB UR QL STRIP.AUTO: NEGATIVE
KETONES UR STRIP-MCNC: NEGATIVE MG/DL
LEUKOCYTE ESTERASE UR QL STRIP: ABNORMAL
MICROALBUMIN UR-MCNC: 18.7 MG/L
MICROALBUMIN/CREAT 24H UR: 31 MG/G CREATININE (ref 0–30)
NITRITE UR QL STRIP: NEGATIVE
NON-SQ EPI CELLS URNS QL MICRO: ABNORMAL /HPF
PH UR STRIP.AUTO: 5.5 [PH]
PROT UR STRIP-MCNC: NEGATIVE MG/DL
PROT UR-MCNC: 8.3 MG/DL
PROT/CREAT UR: 0.1 MG/G{CREAT} (ref 0–0.1)
RBC #/AREA URNS AUTO: ABNORMAL /HPF
SP GR UR STRIP.AUTO: 1.01 (ref 1–1.03)
UROBILINOGEN UR STRIP-ACNC: <2 MG/DL
WBC #/AREA URNS AUTO: ABNORMAL /HPF

## 2025-02-15 PROCEDURE — 82570 ASSAY OF URINE CREATININE: CPT

## 2025-02-15 PROCEDURE — 84156 ASSAY OF PROTEIN URINE: CPT

## 2025-02-15 PROCEDURE — 82043 UR ALBUMIN QUANTITATIVE: CPT

## 2025-02-15 PROCEDURE — 81001 URINALYSIS AUTO W/SCOPE: CPT

## 2025-02-15 PROCEDURE — 87086 URINE CULTURE/COLONY COUNT: CPT

## 2025-02-17 LAB — BACTERIA UR CULT: NORMAL

## 2025-02-25 ENCOUNTER — EVALUATION (OUTPATIENT)
Dept: PHYSICAL THERAPY | Facility: HOME HEALTHCARE | Age: 45
End: 2025-02-25
Payer: COMMERCIAL

## 2025-02-25 DIAGNOSIS — R29.898 WEAKNESS OF BOTH LOWER EXTREMITIES: ICD-10-CM

## 2025-02-25 DIAGNOSIS — R26.2 AMBULATORY DYSFUNCTION: ICD-10-CM

## 2025-02-25 DIAGNOSIS — G35 HX OF MULTIPLE SCLEROSIS (HCC): Primary | ICD-10-CM

## 2025-02-25 PROCEDURE — 97163 PT EVAL HIGH COMPLEX 45 MIN: CPT

## 2025-02-25 PROCEDURE — 97110 THERAPEUTIC EXERCISES: CPT

## 2025-02-25 PROCEDURE — 97140 MANUAL THERAPY 1/> REGIONS: CPT

## 2025-02-25 RX ORDER — NALOXEGOL OXALATE 25 MG/1
25 TABLET, FILM COATED ORAL EVERY MORNING
Qty: 30 TABLET | Refills: 1 | OUTPATIENT
Start: 2025-02-25

## 2025-02-25 NOTE — PROGRESS NOTES
PT Evaluation     Today's date: 2025  Patient name: Christie Hemphill  : 1980  MRN: [id number]  Referring provider: Roosevelt Ortzi DO  Dx:   Encounter Diagnosis     ICD-10-CM    1. Hx of multiple sclerosis (HCC)  G35       2. Ambulatory dysfunction  R26.2       3. Weakness of both lower extremities  R29.898           Start Time: 1345  Stop Time: 1445  Total time in clinic (min): 60 minutes    Assessment  Impairments: abnormal gait, abnormal or restricted ROM, abnormal movement, activity intolerance, impaired physical strength, lacks appropriate home exercise program, pain with function, weight-bearing intolerance, poor posture , participation limitations and activity limitations  Symptom irritability: high    Assessment details: Chrisite Hemphill is a 45 y.o. female presenting to OP PT clinic in [place] w/ referral for complications from MS, BLE weakness, and chronic pain.  Pt presents pain in LLE due to clonus during passive knee extension limiting sleep, decreased strength in BLE, decreased P/AROM in BLE mvmts, decreased ambulatory capacity w/ use of rollator, pain & difficulty w/ ambulation, stair climbing, and pain w/ functional activities.  Pt has difficulty performing ADLs and recreational activities due to above mentioned deficits.  Pt would benefit from skilled therapy to address impairments, allowing pt to perform ADLs and recreational activities w/o restriction or pain to improve QoL and return to PLOF.  PT gave pt HEP focusing on performing exercises/ activities outside of the clinic to further improve and address impairments.  Thank you for this referral!  Understanding of Dx/Px/POC: good     Prognosis: good    Goals  STG:  -Pt will improve pain from 10/10 to 7/10 to allow reduced difficulty performing ADLs due to pain in 4 weeks.  -Pt will increase L knee ext PROM from -5* to 0* to decrease painful w/ less difficulty in 4 weeks.  -Pt will increase B/L quad strength from poor to fair contraction to  "allow pt w/ improved ability of performing a STS tx w/ less difficulty in 4 weeks.    LTG:  -Pt will be d/c from OP PT w/ I HEP to allow pt to continue improving upon their impairments for improved ADLs/ recreational activities in 12 weeks.  -Pt will improve clonus sx in LLE from severe to minimal to allow improved ability to perform ADLs/ recreational activities w/o need for a rest break in 12 weeks.  -Pt will improve WB tolerance from 10mins to >15mins to show increased ability to perform ADLs/ work duties/ recreational activities w/ less difficulty in 12 weeks.     Plan  Patient would benefit from: skilled physical therapy  Planned modality interventions: thermotherapy: hydrocollator packs, cryotherapy and neuromuscular electric stimulation    Planned therapy interventions: abdominal trunk stabilization, manual therapy, massage, neuromuscular re-education, postural training, therapeutic activities, therapeutic exercise, home exercise program, functional ROM exercises, flexibility, balance/weight bearing training, balance, gait training, stretching and strengthening    Frequency: 2x week  Duration in weeks: 12  Plan of Care beginning date: 2/25/2025  Plan of Care expiration date: 5/20/2025  Treatment plan discussed with: patient  Plan details: Begin POC focusing on addressing & improving impairments listed in eval.        Subjective Evaluation    History of Present Illness  Date of onset: 1/23/2020  Mechanism of injury: From Dr. Ortiz note 1/9/2025: \"Ms. Hemphill is a 45-year-old female who has a diagnosis of multiple sclerosis back in 2020 but notes having symptoms for several years prior to any official diagnosis.  She notes that she generally uses a rollator and has 2 of them available 1 for the house and the other for the car.  She does have a power chair as well for longer distances in the community as well as for appointments.  She endorses that on a good day she can ambulate half to 1 full block before " "needing to take a rest and on a bad day is limited to only household distance ambulation at best.  She notes weakness in the bilateral lower extremity that varies day by day but has pain as well that seems to be getting worse over time.  Most of the pain she experiences is in the buttocks and the legs to hurt mostly with bending.  She feels the bones are bending and popping and rubbing against each other.  She endorses that the pain is a 10/10 almost all the time every day except when sitting.     For pain and she has been taking Lyrica, Celebrex and occasionally ibuprofen which she admits to taking at the same time as the Celebrex as well as the Suboxone.  We did discuss NSAID safety at this moment too.  She endorses that she did see pain and spine and had trigger point injections in her trapezius and levator Scap with some success but only for her neck and shoulder discomfort.  She was also recommended for Cymbalta but did not want to start any antidepressant medications for her pain.  She has had limited success with other modalities including therapies and other medications that we had reviewed.\"    Pt states that she is having a potential [employer] custom fit for her in the coming future but is waiting for  to contact her.  Pt frequently has enemas due to constipation every other day at home.  Pt is able to perform safe ambulation around the house w/ rollator.  Pt can stand for a tolerance of 10mins on most days.  Pt has stair lift and has first floor set up at home. Pt is having disturbed sleep due to L knee and is unable to straighten leg which is causing the most pain, roughly for 5 weeks and has not had this specific pain prior.                    Recurrent probem    Quality of life: poor    Patient Goals  Patient goals for therapy: increased strength, independence with ADLs/IADLs, decreased pain, improved balance, increased motion and return to sport/leisure activities  Patient goal: Decreased " "pain and ability to ambulate around house and community w/ less difficulty  Pain  Current pain ratin  At best pain ratin  At worst pain rating: 10  Location: \"everywhere\"  Quality: sharp, dull ache, knife-like, throbbing and needle-like  Aggravating factors: standing and walking  Progression: worsening    Social Support  Lives in: multiple-level home  Lives with: significant other and young children    Employment status: not working  Treatments  Previous treatment: physical therapy, injection treatment and medication  Current treatment: medication and physical therapy      Objective     Static Posture     Lumbar Spine   Increased lordosis.     Hip   Hip (Left): Increased flexion.   Hip (Right): Increased flexion.     Knee   Knee (Left): Flexed.   Knee (Right): Flexed.     Neurological Testing     Reflexes   Left   Clonus sign: positive    Additional Neurological Details  Clonus when performing L knee extension    Passive Range of Motion     Right Hip   Normal passive range of motion  Left Knee   Extension: -5 (able to achieve 0 after slow, passive extension w/ multiple instances of Clonus) degrees with pain    Strength/Myotome Testing     Left Hip   Planes of Motion   Flexion: 1  Abduction: 2+  Adduction: 2+    Right Hip   Planes of Motion   Flexion: 1  Abduction: 2+  Adduction: 2+    Left Knee   Flexion: 2-  Extension: 2-  Quadriceps contraction: fair    Right Knee   Flexion: 2-  Extension: 2-  Quadriceps contraction: fair    Ambulation     Observational Gait   Gait: crouched   Decreased walking speed and stride length.     Additional Observational Gait Details  Use of rollator in clinic and at home, longer distances use of scooter    Comments   TUG w/ rollator = 56.02s  Neuro Exam:     Functional outcomes   TU.02 (seconds)      Flowsheet Rows      Flowsheet Row Most Recent Value   PT/OT G-Codes    Current Score 34   Projected Score 49               Precautions: MS      Visit Count 1           Manuals " 2/25/2025           L knee Ext (HS stretching) DF 30' (static stretching once maximal L knee ext is achieved)                                           Neuro Re-Ed           Balance as appropriate                                 Ther Ex          NuStep           Ankle pumps           HR/TR        Standing Hip flex/abd/ext        Mini Squats        Step ups/ Step downs                        Heel Slides w/ towel          QS          Hip abd on sliding board           LAQ           Hip add          Hip abd          SLR          SAQ        S/L SLR                                        Education on clonus and stretching protocol w/ assistance at home DF                                          Ther Activity                                Gait Training                                Modalities          CP

## 2025-02-28 ENCOUNTER — HOSPITAL ENCOUNTER (OUTPATIENT)
Dept: INFUSION CENTER | Facility: HOSPITAL | Age: 45
End: 2025-02-28
Attending: PSYCHIATRY & NEUROLOGY
Payer: COMMERCIAL

## 2025-02-28 VITALS
HEART RATE: 68 BPM | DIASTOLIC BLOOD PRESSURE: 73 MMHG | TEMPERATURE: 97.8 F | OXYGEN SATURATION: 98 % | SYSTOLIC BLOOD PRESSURE: 108 MMHG | RESPIRATION RATE: 16 BRPM

## 2025-02-28 DIAGNOSIS — G35 MS (MULTIPLE SCLEROSIS) (HCC): Primary | ICD-10-CM

## 2025-02-28 PROCEDURE — 96365 THER/PROPH/DIAG IV INF INIT: CPT

## 2025-02-28 RX ORDER — SODIUM CHLORIDE 9 MG/ML
20 INJECTION, SOLUTION INTRAVENOUS ONCE
OUTPATIENT
Start: 2025-03-26

## 2025-02-28 RX ORDER — SODIUM CHLORIDE 9 MG/ML
20 INJECTION, SOLUTION INTRAVENOUS ONCE
Status: COMPLETED | OUTPATIENT
Start: 2025-02-28 | End: 2025-02-28

## 2025-02-28 RX ADMIN — SODIUM CHLORIDE 20 ML/HR: 9 INJECTION, SOLUTION INTRAVENOUS at 13:20

## 2025-02-28 RX ADMIN — SODIUM CHLORIDE 1000 MG: 0.9 INJECTION, SOLUTION INTRAVENOUS at 13:17

## 2025-02-28 NOTE — PROGRESS NOTES
Christie Hemphill  tolerated solumedrol treatment well with no complications.      Christie Hemphill is aware of future appt on 3/28/25 at 1200.     AVS printed and given to Christie Hemphill:  No (Declined by Christie Hemphill)

## 2025-03-04 ENCOUNTER — OFFICE VISIT (OUTPATIENT)
Dept: PHYSICAL THERAPY | Facility: HOME HEALTHCARE | Age: 45
End: 2025-03-04
Payer: COMMERCIAL

## 2025-03-04 DIAGNOSIS — R26.2 AMBULATORY DYSFUNCTION: ICD-10-CM

## 2025-03-04 DIAGNOSIS — G35 HX OF MULTIPLE SCLEROSIS (HCC): Primary | ICD-10-CM

## 2025-03-04 DIAGNOSIS — R29.898 WEAKNESS OF BOTH LOWER EXTREMITIES: ICD-10-CM

## 2025-03-04 PROCEDURE — 97112 NEUROMUSCULAR REEDUCATION: CPT

## 2025-03-04 NOTE — PROGRESS NOTES
Daily Note     Today's date: 3/4/2025  Patient name: Christie Hemphill  : 1980  MRN: [id number]  Referring provider: Roosevelt Ortiz DO  Dx:   Encounter Diagnosis     ICD-10-CM    1. Hx of multiple sclerosis (HCC)  G35       2. Weakness of both lower extremities  R29.898       3. Ambulatory dysfunction  R26.2           Start Time: 1349  Stop Time: 1431  Total time in clinic (min): 42 minutes    Subjective: Pt presents to clinic using rollator independently.  Pt states that she hasn't had much change in L knee pain due to similar activity levels during the day and inability to straighten LLE at night or during the day.       Objective: See treatment diary below      Assessment: Pt tolerated treatment session w/o decreased instances of spasticity and clonus during L knee extension stretching.  PT performing static L knee extension stretch to decrease spasticity in L HS due to years of contraction and clonus sx.  Pt tolerates L knee extension for 15' before pain isn't tolerable anymore.  PT performed PNF techniques for relaxation of BLE spacticity.      Plan: Continue per plan of care.      Precautions: MS      Visit Count 1 2          Manuals 2025   3/4/2025          L knee Ext (HS stretching) DF 30' (static stretching once maximal L knee ext is achieved)                                          Neuro Re-Ed   3/4/2025          Balance as appropriate                      L knee Extension on Table to decrease clonus and rigidity/ spasticity  DF -15' x2      Rhythmic initiation of BLEs w/ knees flexed  DF - 10'                                 Ther Ex  3/4/2025          NuStep           Ankle pumps           HR/TR        Standing Hip flex/abd/ext        Mini Squats        Step ups/ Step downs                        Heel Slides w/ towel          QS          Hip abd on sliding board           LAQ           Hip add          Hip abd          SLR          SAQ        S/L SLR                                        Education  on clonus and stretching protocol w/ assistance at home DF                                          Ther Activity                                Gait Training                                Modalities          CP

## 2025-03-07 ENCOUNTER — APPOINTMENT (OUTPATIENT)
Dept: RADIOLOGY | Facility: MEDICAL CENTER | Age: 45
End: 2025-03-07
Payer: COMMERCIAL

## 2025-03-07 ENCOUNTER — OFFICE VISIT (OUTPATIENT)
Dept: PHYSICAL THERAPY | Facility: HOME HEALTHCARE | Age: 45
End: 2025-03-07
Payer: COMMERCIAL

## 2025-03-07 ENCOUNTER — OFFICE VISIT (OUTPATIENT)
Dept: PAIN MEDICINE | Facility: CLINIC | Age: 45
End: 2025-03-07
Payer: COMMERCIAL

## 2025-03-07 VITALS
BODY MASS INDEX: 16.07 KG/M2 | TEMPERATURE: 98.7 F | WEIGHT: 100 LBS | HEART RATE: 70 BPM | RESPIRATION RATE: 16 BRPM | HEIGHT: 66 IN

## 2025-03-07 DIAGNOSIS — G89.4 CHRONIC PAIN SYNDROME: ICD-10-CM

## 2025-03-07 DIAGNOSIS — M16.0 PRIMARY OSTEOARTHRITIS OF BOTH HIPS: ICD-10-CM

## 2025-03-07 DIAGNOSIS — G89.29 CHRONIC PAIN OF BOTH KNEES: Primary | ICD-10-CM

## 2025-03-07 DIAGNOSIS — R29.898 WEAKNESS OF BOTH LOWER EXTREMITIES: Primary | ICD-10-CM

## 2025-03-07 DIAGNOSIS — M25.561 CHRONIC PAIN OF BOTH KNEES: Primary | ICD-10-CM

## 2025-03-07 DIAGNOSIS — R26.2 AMBULATORY DYSFUNCTION: ICD-10-CM

## 2025-03-07 DIAGNOSIS — M25.562 CHRONIC PAIN OF BOTH KNEES: Primary | ICD-10-CM

## 2025-03-07 DIAGNOSIS — M25.562 CHRONIC PAIN OF BOTH KNEES: ICD-10-CM

## 2025-03-07 DIAGNOSIS — G89.29 CHRONIC PAIN OF BOTH KNEES: ICD-10-CM

## 2025-03-07 DIAGNOSIS — G35 HX OF MULTIPLE SCLEROSIS (HCC): ICD-10-CM

## 2025-03-07 DIAGNOSIS — M25.561 CHRONIC PAIN OF BOTH KNEES: ICD-10-CM

## 2025-03-07 PROCEDURE — 97110 THERAPEUTIC EXERCISES: CPT

## 2025-03-07 PROCEDURE — 99214 OFFICE O/P EST MOD 30 MIN: CPT | Performed by: ANESTHESIOLOGY

## 2025-03-07 PROCEDURE — 97032 APPL MODALITY 1+ESTIM EA 15: CPT

## 2025-03-07 PROCEDURE — 73562 X-RAY EXAM OF KNEE 3: CPT

## 2025-03-07 RX ORDER — PREGABALIN 75 MG/1
CAPSULE ORAL
COMMUNITY
Start: 2025-03-06

## 2025-03-07 NOTE — PROGRESS NOTES
Assessment:  1. Chronic pain of both knees    2. Primary osteoarthritis of both hips        Plan:  Patient is a 44-year-old female complains of neck pain, mid back pain, low back pain and bilateral thigh pain with chronic pain since second lumbar spine recent, lumbar radiculopathy, MS, question psychiatric history presents to office for follow-up visit.  Patient does have significant hip pain and groin pain and will be seeing orthopedic in regards to her right hip.  1.  We will schedule patient for bilateral hip intra-articular steroid injection  2.  Follow-up 1 month after injection    Complete risks and benefits including bleeding, infection, tissue reaction, nerve injury and allergic reaction were discussed. The approach was demonstrated using models and literature was provided. Verbal and written consent was obtained.    [place] Prescription Drug Monitoring Program report was reviewed and was appropriate       History of Present Illness:  The patient is a 45 y.o. female who presents for a follow up office visit in regards to No chief complaint on file..   The patient’s current symptoms include 10 out of 10 constant dosaging, sharp, cramping, shooting, notes complains  Particular time pattern.    Current pain medications includes: Cymbalta 30 mg p.o. nightly.  The patient reports that this regimen is providing 20% pain relief.  The patient is reporting no side effects from this pain medication regimen.    I have personally reviewed and/or updated the patient's past medical history, past surgical history, family history, social history, current medications, allergies, and vital signs today.         Review of Systems  Review of Systems   Constitutional: Negative.    HENT: Negative.     Eyes: Negative.    Respiratory: Negative.     Cardiovascular: Negative.    Gastrointestinal:  Positive for constipation.   Endocrine: Negative.    Genitourinary: Negative.    Musculoskeletal:  Positive for gait problem and joint  swelling.        Decrease ROM  Pain in extremity  Joint stiffness   Skin: Negative.    Allergic/Immunologic: Negative.    Neurological:  Positive for weakness.        Memory loss   Hematological: Negative.    Psychiatric/Behavioral: Negative.             Past Medical History:   Diagnosis Date    Back pain     Chronic constipation 6/27/2016    Chronic pain disorder     Collar bone fracture     right side    Crutches as ambulation aid     Depression     Dyssynergic defecation     Type 1    ETOH abuse     Head injury 2014    Infectious viral hepatitis     Lyme disease     Meningitis spinal 2007?    Multiple sclerosis (HCC)     Opioid abuse (HCC)     Rib fractures     Thrombocytopenia (HCC)     Use of cane as ambulatory aid        Past Surgical History:   Procedure Laterality Date    APPENDECTOMY      CLAVICLE SURGERY      COLONOSCOPY      FL LUMBAR PUNCTURE DIAGNOSTIC  11/25/2020    KNEE ARTHROSCOPY      MULTIPLE TOOTH EXTRACTIONS      MD COLONOSCOPY FLX DX W/COLLJ SPEC WHEN PFRMD N/A 6/27/2016    Procedure: COLONOSCOPY;  Surgeon: Jason Rogers MD;  Location: [facility];  Service: Colorectal    MD OPEN IMPLANTATION CHARITY SACRAL NERVE N/A 11/15/2021    Procedure: INSERTION OF NEUROSTIMULATOR ELECTRODE ARRAY SACRAL NERVE; FLUOROSCOPY; ELECTRONICN ANALYSIS;  Surgeon: Abdulaziz Guevara MD;  Location: [facility];  Service: UroGynecology           TONSILLECTOMY AND ADENOIDECTOMY      TUBAL LIGATION         Family History   Problem Relation Age of Onset    Skin cancer Mother     No Known Problems Brother        Social History     Occupational History    Not on file   Tobacco Use    Smoking status: Every Day     Current packs/day: 0.25     Average packs/day: 0.3 packs/day for 1 year (0.3 ttl pk-yrs)     Types: Cigarettes     Start date: 03/2024    Smokeless tobacco: Never   Vaping Use    Vaping status: Every Day    Substances: Nicotine, Flavoring   Substance and Sexual Activity    Alcohol use: Yes     Comment: 2 mixed drinks  "each night    Drug use: No    Sexual activity: Yes     Partners: Male         Current Outpatient Medications:     Acetaminophen Extra Strength 500 MG TABS, Take 1 tablet by mouth every 8 (eight) hours as needed (Patient not taking: Reported on 1/9/2025), Disp: , Rfl:     albendazole (ALBENZA) 200 mg tablet, , Disp: , Rfl:     ascorbic acid (VITAMIN C) 500 mg tablet, Take 500 mg by mouth daily, Disp: , Rfl:     b complex vitamins tablet, Take 1 tablet by mouth daily, Disp: , Rfl:     B-D HYPODERMIC NEEDLE 23GX1\" 23G X 1\" MISC, USE ONE WEEKLY, Disp: , Rfl:     BD Plastipak Syringe 21G X 1\" 3 ML MISC, USE ONE WEEKLY, Disp: , Rfl:     Biotin 74479 MCG TABS, Take 1 tablet by mouth daily  , Disp: , Rfl:     buprenorphine-naloxone (SUBOXONE) 2-0.5 mg per SL tablet, Place 0.5 tablets under the tongue 3 (three) times a day 2mg  three times a day (Patient not taking: Reported on 1/9/2025), Disp: , Rfl:     buprenorphine-naloxone (SUBOXONE) 8-2 mg per SL tablet, Place 1 tablet under the tongue daily, Disp: , Rfl:     Calcium Carbonate (CALCIUM 600 PO), Take 600 mg by mouth daily pt reports not having & not taking, Disp: , Rfl:     celecoxib (CeleBREX) 200 mg capsule, Take 1 capsule (200 mg total) by mouth 2 (two) times a day With food, Disp: 60 capsule, Rfl: 2    chlorhexidine (PERIDEX) 0.12 % solution, SWISH AND SPIT 15MLS ONCE A DAY AS DIRECTED, Disp: , Rfl:     Cholecalciferol (Vitamin D3) 125 MCG (5000 UT) TABS, Take 5,000 Units by mouth daily pt reports taking 2 tab, Disp: , Rfl:     clindamycin (CLEOCIN) 150 mg capsule, Take 1 capsule by mouth 4 times a day, Disp: , Rfl:     cloNIDine (CATAPRES) 0.2 mg tablet, Take 0.2 mg by mouth daily at bedtime, Disp: , Rfl:     clopidogrel (PLAVIX) 75 mg tablet, Take 75 mg by mouth every morning, Disp: , Rfl:     clotrimazole-betamethasone (LOTRISONE) 1-0.05 % cream, Apply to affected area 2 times daily prn, Disp: 45 g, Rfl: 1    Depo-Testosterone 100 MG/ML IM injection, INJECT 25MG " (0.25ML) INTO THE MUSCLE EVERY 2 WEEKS(VIAL ONLY GOOD FOR 28DAYS), Disp: , Rfl:     docusate sodium (COLACE) 100 mg capsule, Take 100 mg by mouth 3 (three) times a day, Disp: , Rfl:     flavoxATE (URISPAS) 100 mg tablet, TAKE 1 TABLET BY MOUTH 3 TIMES A DAY AS NEEDED FOR BLADDER SPASMS., Disp: 90 tablet, Rfl: 3    furosemide (LASIX) 20 mg tablet, Take 10 mg by mouth daily, Disp: , Rfl:     gabapentin (NEURONTIN) 100 mg capsule, Take 1 capsule (100 mg total) by mouth 3 (three) times a day, Disp: 90 capsule, Rfl: 1    Ginkgo Biloba 40 MG TABS, Take 120 mg by mouth daily pt reports taking 2 tabs daily, Disp: , Rfl:     ibuprofen (MOTRIN) 200 mg tablet, Take 200 mg by mouth as needed for mild pain pt reports taking 3-4tabs every 6hrs. , Disp: , Rfl:     ibuprofen (MOTRIN) 800 mg tablet, Take 800 mg by mouth every 8 (eight) hours as needed, Disp: , Rfl:     lubiprostone (AMITIZA) 24 mcg capsule, Take 24 mcg by mouth 2 (two) times a day with meals pt reports not taking, Disp: , Rfl:     lubiprostone (AMITIZA) 8 mcg capsule, Take 8 mcg by mouth 3 (three) times a day, Disp: , Rfl:     LUTEIN PO, Take by mouth in the morning 20mg on bottle, Disp: , Rfl:     methylPREDNISolone sodium succinate in sodium chloride 0.9 % 250 mL IVPB, Inject 1,000 mg into a catheter in a vein every 30 (thirty) days. 1x/month, provided at medical office. per EPIC  Indications: MS, Disp: , Rfl:     Misc Natural Products (GINSENG COMPLEX PO), Take 2 tablets by mouth daily 500mg on bottle., Disp: , Rfl:     Movantik 25 MG tablet, Take 25 mg by mouth every evening, Disp: , Rfl:     Multiple Vitamins-Minerals (ZINC PO), Take by mouth, Disp: , Rfl:     naloxone (NARCAN) 4 mg/0.1 mL nasal spray, ADMINISTER 1 SPRAY INTO ONE NOSTRIL. CALL 911. REPEAT AFTER 2-3 MIN IF NO OR MINIMAL RESPONSE, Disp: , Rfl:     Nerve Stimulator (STANDARD TENS) VICKY, by Does not apply route 2 (two) times a day, Disp: 1 Device, Rfl: 0    Nerve Stimulator (TENS THERAPY REPLACE  BACK PADS) MISC, 30 pad by Does not apply route 2 (two) times a day, Disp: 30 each, Rfl: 0    Nerve Stimulator VICKY, by Does not apply route 2 (two) times a day, Disp: 1 each, Rfl: 0    Nerve Stimulator Supplies MISC, 30 pad by Does not apply route 2 (two) times a day, Disp: 30 pad, Rfl: 0    NON FORMULARY, HEMPANOL BRAIN DETOX 200MG . pt reports not taking per 5/18/23., Disp: , Rfl:     Omega-3 Fatty Acids (FISH OIL OMEGA-3 PO), Take 1,200 mg by mouth daily , Disp: , Rfl:     phenazopyridine (PYRIDIUM) 200 mg tablet, Take 1 tablet (200 mg total) by mouth 3 (three) times a day as needed for bladder spasms, Disp: 10 tablet, Rfl: 0    Potassium 99 MG TABS, Take 99 mg by mouth daily pt reports taking 3 tabs daily, Disp: , Rfl:     potassium chloride (Klor-Con) 10 mEq tablet, 1 TABLET DAILY, Disp: , Rfl:     pregabalin (LYRICA) 25 mg capsule, Take 1 capsule by mouth 2 (two) times a day, Disp: , Rfl:     Premarin 0.625 MG tablet, Take 0.625 mg by mouth daily (Patient not taking: Reported on 1/9/2025), Disp: , Rfl:     Probiotic Product (PROBIOTIC DAILY PO), Take 2 tablets by mouth daily  , Disp: , Rfl:     promethazine (PHENERGAN) 50 MG tablet, daily at bedtime 1.5 tabs daily at bedtime, Disp: , Rfl: 1    Restasis 0.05 % ophthalmic emulsion, INSTILL 1 DROP BY OPHTHALMIC ROUTE EVERY 12 HOURS BOTH EYES, Disp: , Rfl:     selenium sulfide (SELSUN) 2.5 % shampoo, Apply topically daily as needed for dandruff, Disp: 118 mL, Rfl: 0    testosterone cypionate (DEPO-TESTOSTERONE) 200 mg/mL SOLN, 25 MG (0.125ML) IM ONCE EVERY 7 DAYS, Disp: , Rfl:     topiramate (TOPAMAX) 50 MG tablet, Take 50 mg by mouth daily  , Disp: , Rfl: 5    TURMERIC PO, Take by mouth Turmeric Curcumin 500mg on bottle. 1 tab daily, Disp: , Rfl:     ZINC-VITAMIN C PO, Take by mouth pt not taking due to taking regular zinc per pt report on 5/18/23., Disp: , Rfl:     Allergies   Allergen Reactions    Penicillins GI Intolerance and Nausea Only     Other  "reaction(s): Unknown Reaction       Physical Exam:    Pulse 70   Temp 98.7 °F (37.1 °C) (Temporal)   Resp 16   Ht 5' 6\" (1.676 m)   Wt 45.4 kg (100 lb)   BMI 16.14 kg/m²     Constitutional:normal, well developed, well nourished, alert, in no distress and non-toxic and no overt pain behavior.  Eyes:anicteric  HEENT:grossly intact  Neck:supple, symmetric, trachea midline and no masses   Pulmonary:even and unlabored  Cardiovascular:No edema or pitting edema present  Skin:Normal without rashes or lesions and well hydrated  Psychiatric:Mood and affect appropriate  Neurologic:Cranial Nerves II-XII grossly intact  Musculoskeletal:antalgic    Imaging    MRI LUMBAR SPINE WITHOUT CONTRAST     INDICATION: M54.16: Radiculopathy, lumbar region  M47.816: Spondylosis without myelopathy or radiculopathy, lumbar region  G89.4: Chronic pain syndrome.     COMPARISON: Lumbar spine radiograph 8/7/2023., 11/28/2018 sacrum and coccyx radiograph 11/15/2021. Lumbar puncture 11/25/2020. MRI lumbar spine with and without contrast 7/11/2020.     TECHNIQUE:  Multiplanar, multisequence imaging of the lumbar spine was performed. .        IMAGE QUALITY:  Diagnostic     FINDINGS:     VERTEBRAL BODIES:  There are 5 lumbar type vertebral bodies.  Normal alignment of the lumbar spine.  No spondylolysis or spondylolisthesis. No scoliosis.  No compression fracture.     Mildly bone marrow edema in right L4 and right L5 pedicles, may represent stress reaction.     L3 and L5 intraosseous vertebral body hemangiomas. Otherwise, normal bone marrow signal.     SACRUM:  Normal signal within the sacrum. No evidence of insufficiency or stress fracture.     DISTAL CORD AND CONUS:  Normal size and signal within the distal cord and conus.     PARASPINAL SOFT TISSUES:  Paraspinal soft tissues are unremarkable.     LOWER THORACIC DISC SPACES: Mild noncompressive lower thoracic degenerative change.     LUMBAR DISC SPACES: Mild multilevel disc height loss, worse at " L5-S1.     L1-L2: Normal.     L2-L3: Normal.     L3-L4: Mild facet arthropathy, trace left facet joint effusion. No significant canal stenosis or foraminal narrowing.     L4-L5: Mild diffuse disc bulge. Mild facet arthropathy, small bilateral facet joint effusions (right greater than left). No significant canal stenosis or foraminal narrowing.     L5-S1: Mild diffuse disc bulge, tiny posterior annular fissure. No significant canal stenosis or foraminal narrowing.     OTHER FINDINGS: Small right renal cyst.     IMPRESSION:     Mildly bone marrow edema in right L4 and right L5 pedicles, may represent stress reaction.     Mild degenerative changes of the lumbar spine, as detailed above. No significant canal stenosis or foraminal narrowing.     The study was marked in EPIC for significant notification.  No orders to display       No orders of the defined types were placed in this encounter.

## 2025-03-07 NOTE — H&P (VIEW-ONLY)
Assessment:  1. Chronic pain of both knees    2. Primary osteoarthritis of both hips        Plan:  Patient is a 44-year-old female complains of neck pain, mid back pain, low back pain and bilateral thigh pain with chronic pain since second lumbar spine recent, lumbar radiculopathy, MS, question psychiatric history presents to office for follow-up visit.  Patient does have significant hip pain and groin pain and will be seeing orthopedic in regards to her right hip.  1.  We will schedule patient for bilateral hip intra-articular steroid injection  2.  Follow-up 1 month after injection    Complete risks and benefits including bleeding, infection, tissue reaction, nerve injury and allergic reaction were discussed. The approach was demonstrated using models and literature was provided. Verbal and written consent was obtained.    [place] Prescription Drug Monitoring Program report was reviewed and was appropriate       History of Present Illness:  The patient is a 45 y.o. female who presents for a follow up office visit in regards to No chief complaint on file..   The patient’s current symptoms include 10 out of 10 constant dosaging, sharp, cramping, shooting, notes complains  Particular time pattern.    Current pain medications includes: Cymbalta 30 mg p.o. nightly.  The patient reports that this regimen is providing 20% pain relief.  The patient is reporting no side effects from this pain medication regimen.    I have personally reviewed and/or updated the patient's past medical history, past surgical history, family history, social history, current medications, allergies, and vital signs today.         Review of Systems  Review of Systems   Constitutional: Negative.    HENT: Negative.     Eyes: Negative.    Respiratory: Negative.     Cardiovascular: Negative.    Gastrointestinal:  Positive for constipation.   Endocrine: Negative.    Genitourinary: Negative.    Musculoskeletal:  Positive for gait problem and joint  swelling.        Decrease ROM  Pain in extremity  Joint stiffness   Skin: Negative.    Allergic/Immunologic: Negative.    Neurological:  Positive for weakness.        Memory loss   Hematological: Negative.    Psychiatric/Behavioral: Negative.             Past Medical History:   Diagnosis Date    Back pain     Chronic constipation 6/27/2016    Chronic pain disorder     Collar bone fracture     right side    Crutches as ambulation aid     Depression     Dyssynergic defecation     Type 1    ETOH abuse     Head injury 2014    Infectious viral hepatitis     Lyme disease     Meningitis spinal 2007?    Multiple sclerosis (HCC)     Opioid abuse (HCC)     Rib fractures     Thrombocytopenia (HCC)     Use of cane as ambulatory aid        Past Surgical History:   Procedure Laterality Date    APPENDECTOMY      CLAVICLE SURGERY      COLONOSCOPY      FL LUMBAR PUNCTURE DIAGNOSTIC  11/25/2020    KNEE ARTHROSCOPY      MULTIPLE TOOTH EXTRACTIONS      AK COLONOSCOPY FLX DX W/COLLJ SPEC WHEN PFRMD N/A 6/27/2016    Procedure: COLONOSCOPY;  Surgeon: Jasno Rogers MD;  Location: [facility];  Service: Colorectal    AK OPEN IMPLANTATION CHARITY SACRAL NERVE N/A 11/15/2021    Procedure: INSERTION OF NEUROSTIMULATOR ELECTRODE ARRAY SACRAL NERVE; FLUOROSCOPY; ELECTRONICN ANALYSIS;  Surgeon: Abdulaziz Guevara MD;  Location: [facility];  Service: UroGynecology           TONSILLECTOMY AND ADENOIDECTOMY      TUBAL LIGATION         Family History   Problem Relation Age of Onset    Skin cancer Mother     No Known Problems Brother        Social History     Occupational History    Not on file   Tobacco Use    Smoking status: Every Day     Current packs/day: 0.25     Average packs/day: 0.3 packs/day for 1 year (0.3 ttl pk-yrs)     Types: Cigarettes     Start date: 03/2024    Smokeless tobacco: Never   Vaping Use    Vaping status: Every Day    Substances: Nicotine, Flavoring   Substance and Sexual Activity    Alcohol use: Yes     Comment: 2 mixed drinks  "each night    Drug use: No    Sexual activity: Yes     Partners: Male         Current Outpatient Medications:     Acetaminophen Extra Strength 500 MG TABS, Take 1 tablet by mouth every 8 (eight) hours as needed (Patient not taking: Reported on 1/9/2025), Disp: , Rfl:     albendazole (ALBENZA) 200 mg tablet, , Disp: , Rfl:     ascorbic acid (VITAMIN C) 500 mg tablet, Take 500 mg by mouth daily, Disp: , Rfl:     b complex vitamins tablet, Take 1 tablet by mouth daily, Disp: , Rfl:     B-D HYPODERMIC NEEDLE 23GX1\" 23G X 1\" MISC, USE ONE WEEKLY, Disp: , Rfl:     BD Plastipak Syringe 21G X 1\" 3 ML MISC, USE ONE WEEKLY, Disp: , Rfl:     Biotin 31182 MCG TABS, Take 1 tablet by mouth daily  , Disp: , Rfl:     buprenorphine-naloxone (SUBOXONE) 2-0.5 mg per SL tablet, Place 0.5 tablets under the tongue 3 (three) times a day 2mg  three times a day (Patient not taking: Reported on 1/9/2025), Disp: , Rfl:     buprenorphine-naloxone (SUBOXONE) 8-2 mg per SL tablet, Place 1 tablet under the tongue daily, Disp: , Rfl:     Calcium Carbonate (CALCIUM 600 PO), Take 600 mg by mouth daily pt reports not having & not taking, Disp: , Rfl:     celecoxib (CeleBREX) 200 mg capsule, Take 1 capsule (200 mg total) by mouth 2 (two) times a day With food, Disp: 60 capsule, Rfl: 2    chlorhexidine (PERIDEX) 0.12 % solution, SWISH AND SPIT 15MLS ONCE A DAY AS DIRECTED, Disp: , Rfl:     Cholecalciferol (Vitamin D3) 125 MCG (5000 UT) TABS, Take 5,000 Units by mouth daily pt reports taking 2 tab, Disp: , Rfl:     clindamycin (CLEOCIN) 150 mg capsule, Take 1 capsule by mouth 4 times a day, Disp: , Rfl:     cloNIDine (CATAPRES) 0.2 mg tablet, Take 0.2 mg by mouth daily at bedtime, Disp: , Rfl:     clopidogrel (PLAVIX) 75 mg tablet, Take 75 mg by mouth every morning, Disp: , Rfl:     clotrimazole-betamethasone (LOTRISONE) 1-0.05 % cream, Apply to affected area 2 times daily prn, Disp: 45 g, Rfl: 1    Depo-Testosterone 100 MG/ML IM injection, INJECT 25MG " (0.25ML) INTO THE MUSCLE EVERY 2 WEEKS(VIAL ONLY GOOD FOR 28DAYS), Disp: , Rfl:     docusate sodium (COLACE) 100 mg capsule, Take 100 mg by mouth 3 (three) times a day, Disp: , Rfl:     flavoxATE (URISPAS) 100 mg tablet, TAKE 1 TABLET BY MOUTH 3 TIMES A DAY AS NEEDED FOR BLADDER SPASMS., Disp: 90 tablet, Rfl: 3    furosemide (LASIX) 20 mg tablet, Take 10 mg by mouth daily, Disp: , Rfl:     gabapentin (NEURONTIN) 100 mg capsule, Take 1 capsule (100 mg total) by mouth 3 (three) times a day, Disp: 90 capsule, Rfl: 1    Ginkgo Biloba 40 MG TABS, Take 120 mg by mouth daily pt reports taking 2 tabs daily, Disp: , Rfl:     ibuprofen (MOTRIN) 200 mg tablet, Take 200 mg by mouth as needed for mild pain pt reports taking 3-4tabs every 6hrs. , Disp: , Rfl:     ibuprofen (MOTRIN) 800 mg tablet, Take 800 mg by mouth every 8 (eight) hours as needed, Disp: , Rfl:     lubiprostone (AMITIZA) 24 mcg capsule, Take 24 mcg by mouth 2 (two) times a day with meals pt reports not taking, Disp: , Rfl:     lubiprostone (AMITIZA) 8 mcg capsule, Take 8 mcg by mouth 3 (three) times a day, Disp: , Rfl:     LUTEIN PO, Take by mouth in the morning 20mg on bottle, Disp: , Rfl:     methylPREDNISolone sodium succinate in sodium chloride 0.9 % 250 mL IVPB, Inject 1,000 mg into a catheter in a vein every 30 (thirty) days. 1x/month, provided at medical office. per EPIC  Indications: MS, Disp: , Rfl:     Misc Natural Products (GINSENG COMPLEX PO), Take 2 tablets by mouth daily 500mg on bottle., Disp: , Rfl:     Movantik 25 MG tablet, Take 25 mg by mouth every evening, Disp: , Rfl:     Multiple Vitamins-Minerals (ZINC PO), Take by mouth, Disp: , Rfl:     naloxone (NARCAN) 4 mg/0.1 mL nasal spray, ADMINISTER 1 SPRAY INTO ONE NOSTRIL. CALL 911. REPEAT AFTER 2-3 MIN IF NO OR MINIMAL RESPONSE, Disp: , Rfl:     Nerve Stimulator (STANDARD TENS) VICKY, by Does not apply route 2 (two) times a day, Disp: 1 Device, Rfl: 0    Nerve Stimulator (TENS THERAPY REPLACE  BACK PADS) MISC, 30 pad by Does not apply route 2 (two) times a day, Disp: 30 each, Rfl: 0    Nerve Stimulator VICKY, by Does not apply route 2 (two) times a day, Disp: 1 each, Rfl: 0    Nerve Stimulator Supplies MISC, 30 pad by Does not apply route 2 (two) times a day, Disp: 30 pad, Rfl: 0    NON FORMULARY, HEMPANOL BRAIN DETOX 200MG . pt reports not taking per 5/18/23., Disp: , Rfl:     Omega-3 Fatty Acids (FISH OIL OMEGA-3 PO), Take 1,200 mg by mouth daily , Disp: , Rfl:     phenazopyridine (PYRIDIUM) 200 mg tablet, Take 1 tablet (200 mg total) by mouth 3 (three) times a day as needed for bladder spasms, Disp: 10 tablet, Rfl: 0    Potassium 99 MG TABS, Take 99 mg by mouth daily pt reports taking 3 tabs daily, Disp: , Rfl:     potassium chloride (Klor-Con) 10 mEq tablet, 1 TABLET DAILY, Disp: , Rfl:     pregabalin (LYRICA) 25 mg capsule, Take 1 capsule by mouth 2 (two) times a day, Disp: , Rfl:     Premarin 0.625 MG tablet, Take 0.625 mg by mouth daily (Patient not taking: Reported on 1/9/2025), Disp: , Rfl:     Probiotic Product (PROBIOTIC DAILY PO), Take 2 tablets by mouth daily  , Disp: , Rfl:     promethazine (PHENERGAN) 50 MG tablet, daily at bedtime 1.5 tabs daily at bedtime, Disp: , Rfl: 1    Restasis 0.05 % ophthalmic emulsion, INSTILL 1 DROP BY OPHTHALMIC ROUTE EVERY 12 HOURS BOTH EYES, Disp: , Rfl:     selenium sulfide (SELSUN) 2.5 % shampoo, Apply topically daily as needed for dandruff, Disp: 118 mL, Rfl: 0    testosterone cypionate (DEPO-TESTOSTERONE) 200 mg/mL SOLN, 25 MG (0.125ML) IM ONCE EVERY 7 DAYS, Disp: , Rfl:     topiramate (TOPAMAX) 50 MG tablet, Take 50 mg by mouth daily  , Disp: , Rfl: 5    TURMERIC PO, Take by mouth Turmeric Curcumin 500mg on bottle. 1 tab daily, Disp: , Rfl:     ZINC-VITAMIN C PO, Take by mouth pt not taking due to taking regular zinc per pt report on 5/18/23., Disp: , Rfl:     Allergies   Allergen Reactions    Penicillins GI Intolerance and Nausea Only     Other  "reaction(s): Unknown Reaction       Physical Exam:    Pulse 70   Temp 98.7 °F (37.1 °C) (Temporal)   Resp 16   Ht 5' 6\" (1.676 m)   Wt 45.4 kg (100 lb)   BMI 16.14 kg/m²     Constitutional:normal, well developed, well nourished, alert, in no distress and non-toxic and no overt pain behavior.  Eyes:anicteric  HEENT:grossly intact  Neck:supple, symmetric, trachea midline and no masses   Pulmonary:even and unlabored  Cardiovascular:No edema or pitting edema present  Skin:Normal without rashes or lesions and well hydrated  Psychiatric:Mood and affect appropriate  Neurologic:Cranial Nerves II-XII grossly intact  Musculoskeletal:antalgic    Imaging    MRI LUMBAR SPINE WITHOUT CONTRAST     INDICATION: M54.16: Radiculopathy, lumbar region  M47.816: Spondylosis without myelopathy or radiculopathy, lumbar region  G89.4: Chronic pain syndrome.     COMPARISON: Lumbar spine radiograph 8/7/2023., 11/28/2018 sacrum and coccyx radiograph 11/15/2021. Lumbar puncture 11/25/2020. MRI lumbar spine with and without contrast 7/11/2020.     TECHNIQUE:  Multiplanar, multisequence imaging of the lumbar spine was performed. .        IMAGE QUALITY:  Diagnostic     FINDINGS:     VERTEBRAL BODIES:  There are 5 lumbar type vertebral bodies.  Normal alignment of the lumbar spine.  No spondylolysis or spondylolisthesis. No scoliosis.  No compression fracture.     Mildly bone marrow edema in right L4 and right L5 pedicles, may represent stress reaction.     L3 and L5 intraosseous vertebral body hemangiomas. Otherwise, normal bone marrow signal.     SACRUM:  Normal signal within the sacrum. No evidence of insufficiency or stress fracture.     DISTAL CORD AND CONUS:  Normal size and signal within the distal cord and conus.     PARASPINAL SOFT TISSUES:  Paraspinal soft tissues are unremarkable.     LOWER THORACIC DISC SPACES: Mild noncompressive lower thoracic degenerative change.     LUMBAR DISC SPACES: Mild multilevel disc height loss, worse at " L5-S1.     L1-L2: Normal.     L2-L3: Normal.     L3-L4: Mild facet arthropathy, trace left facet joint effusion. No significant canal stenosis or foraminal narrowing.     L4-L5: Mild diffuse disc bulge. Mild facet arthropathy, small bilateral facet joint effusions (right greater than left). No significant canal stenosis or foraminal narrowing.     L5-S1: Mild diffuse disc bulge, tiny posterior annular fissure. No significant canal stenosis or foraminal narrowing.     OTHER FINDINGS: Small right renal cyst.     IMPRESSION:     Mildly bone marrow edema in right L4 and right L5 pedicles, may represent stress reaction.     Mild degenerative changes of the lumbar spine, as detailed above. No significant canal stenosis or foraminal narrowing.     The study was marked in EPIC for significant notification.  No orders to display       No orders of the defined types were placed in this encounter.

## 2025-03-07 NOTE — PROGRESS NOTES
Daily Note     Today's date: 3/7/2025  Patient name: Christie Hemphill  : 1980  MRN: [id number]  Referring provider: Roosevelt Ortiz DO  Dx:   Encounter Diagnosis     ICD-10-CM    1. Weakness of both lower extremities  R29.898       2. Ambulatory dysfunction  R26.2       3. Hx of multiple sclerosis (HCC)  G35           Start Time: 1120  Stop Time: 1200  Total time in clinic (min): 40 minutes    Subjective: Pt stated that she was in a lot of pain today due to overactivity and running around to doctor's appts.  Pt stated that she wasn't sure if she was going to come to skilled therapy today due to pain and sx.  Pt stated that was still willing to participate in skilled therapy just w/ a focus on relaxation techniques for LLE.       Objective: See treatment diary below      Assessment:  Pt tolerated treatment session focusing on use of e-stim unit on LLE to reduce painful stimuli for pt due to increased pain.  PT had pt maintain 65* of L knee flexion to provide slight stretch w/ support from bolster.  PT further educated pt on effects of overactivity and the side effects it has due to MS.  PT educated pt on use of home TENS unit that pt had previously used for LS.  Pt understood and stated that she will perform at home.        Plan: Continue per plan of care.      Precautions: MS      Visit Count 1 2 3         Manuals 2025   3/4/2025   3       L knee Ext (HS stretching) DF 30' (static stretching once maximal L knee ext is achieved)  L knee to 65* for static stretching w/ large bolster for support during E-Stim                                        Neuro Re-Ed   3/4/2025   3/6       Balance as appropriate                      L knee Extension on Table to decrease clonus and rigidity/ spasticity  DF -15' x2      Rhythmic initiation of BLEs w/ knees flexed  DF - 10'                                 Ther Ex  3/4/2025          NuStep           Ankle pumps           HR/TR        Standing Hip flex/abd/ext        Mini  Squats        Step ups/ Step downs                        Heel Slides w/ towel          QS          Hip abd on sliding board           LAQ           Hip add          Hip abd          SLR          SAQ        S/L SLR                                        Education on clonus and stretching protocol w/ assistance at home DF          Education on effects of MS on body w/ over activity.    Education on use of TENS unit at home for LLE contracture of HS musculature.                                    Ther Activity                                Gait Training                                Modalities          CP           E-Stim Attended by PT   DF - Premod  (Suggested to pt to wear loose fitting clothes to achieve better placement of electrodes

## 2025-03-11 ENCOUNTER — TELEPHONE (OUTPATIENT)
Age: 45
End: 2025-03-11

## 2025-03-11 ENCOUNTER — OFFICE VISIT (OUTPATIENT)
Dept: PHYSICAL THERAPY | Facility: HOME HEALTHCARE | Age: 45
End: 2025-03-11
Payer: COMMERCIAL

## 2025-03-11 DIAGNOSIS — R29.898 WEAKNESS OF BOTH LOWER EXTREMITIES: Primary | ICD-10-CM

## 2025-03-11 DIAGNOSIS — R26.2 AMBULATORY DYSFUNCTION: ICD-10-CM

## 2025-03-11 DIAGNOSIS — G35 HX OF MULTIPLE SCLEROSIS (HCC): ICD-10-CM

## 2025-03-11 PROCEDURE — 97110 THERAPEUTIC EXERCISES: CPT

## 2025-03-11 PROCEDURE — 97140 MANUAL THERAPY 1/> REGIONS: CPT

## 2025-03-11 NOTE — PROGRESS NOTES
Daily Note     Today's date: 3/11/2025  Patient name: Christie Hemphill  : 1980  MRN: [id number]  Referring provider: Roosevelt Ortiz DO  Dx:   Encounter Diagnosis     ICD-10-CM    1. Weakness of both lower extremities  R29.898       2. Ambulatory dysfunction  R26.2       3. Hx of multiple sclerosis (HCC)  G35           Start Time: 1433  Stop Time: 1526  Total time in clinic (min): 53 minutes    Subjective: Pt presented to clinic discussing about potential transition to 1x a week for skilled therapy due to burden of presenting to many other appts that pt has during the week that conflict w/ skilled therapy.  Pt stated that she has decreased pain in L knee since beginning skilled therapy and decrease in contraction of L HS w/ ambulation and mvmt.  Pt stated that she is receiving an injection this Friday from Dr. Silva.      Objective: See treatment diary below      Assessment: Pt was able to tolerate treatment session w/ increased time of static stretching w/ decreased clonus contraction of L HS.  Pt was able to relax w/ breathing method taught by PT.  PT stressed importance of stretching at home and relaxing to limit fatigue.  PT asked pt to bring in family member so PT can teach proper positioning and stretching for pt's L HS.       Plan: Continue per plan of care.      Precautions: MS      Visit Count 1 2 3 4        Manuals 2025   3/4/2025   3/6  3/11     L knee Ext (HS stretching) DF 30' (static stretching once maximal L knee ext is achieved)  L knee to 65* for static stretching w/ large bolster for support during E-Stim  L knee to 0* static stretch for 20' for one bout, 5' for 2nd bout    Slight distraction of L hip during stretch to reduce pain in L hip                                      Neuro Re-Ed   3/4/2025   3/6       Balance as appropriate                      L knee Extension on Table to decrease clonus and rigidity/ spasticity  DF -15' x2      Rhythmic initiation of BLEs w/ knees flexed  DF -  10'                                 Ther Ex  3/4/2025          NuStep           Ankle pumps           HR/TR        Standing Hip flex/abd/ext        Mini Squats        Step ups/ Step downs                        Heel Slides w/ towel          QS          Hip abd on sliding board           LAQ           Hip add          Hip abd          SLR          SAQ        S/L SLR                                        Education on clonus and stretching protocol w/ assistance at home DF          Education on effects of MS on body w/ over activity.    Education on use of TENS unit at home for LLE contracture of HS musculature.                         Discussion on differentiation between stretching pain, sharp pain    Also discussion on differentiation between what a stretch is and muscle activation and how walking causes muscle activation vs stretching of HS    DF                       Ther Activity                                Gait Training                                Modalities          CP           E-Stim Attended by PT   DF - Premod  (Suggested to pt to wear loose fitting clothes to achieve better placement of electrodes

## 2025-03-11 NOTE — TELEPHONE ENCOUNTER
Patient aware of instructions. No food/drink one hour prior. Wear comfortable clothing. A  is required. Denies blood thinners. Continue all other prescribed medications on day of procedure. Call if prescribed an antibiotic or become sick. Refrain from vaccinations 48 hours  prior and  48 hour after. Patient aware . Call with questions.

## 2025-03-12 ENCOUNTER — TELEPHONE (OUTPATIENT)
Dept: PAIN MEDICINE | Facility: CLINIC | Age: 45
End: 2025-03-12

## 2025-03-12 NOTE — TELEPHONE ENCOUNTER
Patient aware of instructions. No food/drink one hour prior. Wear comfortable clothing. A  is required. Denies blood thinners. Continue all other prescribed medications on day of procedure. Call if prescribed an antibiotic or become sick. Refrain from vaccinations 48 hours  prior and  48 hour after. Emailed instruction sheet. Patient aware . Call with questions.

## 2025-03-13 ENCOUNTER — OFFICE VISIT (OUTPATIENT)
Dept: PHYSICAL THERAPY | Facility: HOME HEALTHCARE | Age: 45
End: 2025-03-13
Payer: COMMERCIAL

## 2025-03-13 DIAGNOSIS — G35 HX OF MULTIPLE SCLEROSIS (HCC): ICD-10-CM

## 2025-03-13 DIAGNOSIS — R29.898 WEAKNESS OF BOTH LOWER EXTREMITIES: ICD-10-CM

## 2025-03-13 DIAGNOSIS — R26.2 AMBULATORY DYSFUNCTION: Primary | ICD-10-CM

## 2025-03-13 PROCEDURE — 97530 THERAPEUTIC ACTIVITIES: CPT

## 2025-03-13 PROCEDURE — 97140 MANUAL THERAPY 1/> REGIONS: CPT

## 2025-03-13 NOTE — PROGRESS NOTES
Daily Note     Today's date: 3/13/2025  Patient name: Christie Hemphill  : 1980  MRN: [id number]  Referring provider: Roosevelt Ortiz DO  Dx:   Encounter Diagnosis     ICD-10-CM    1. Ambulatory dysfunction  R26.2       2. Weakness of both lower extremities  R29.898       3. Hx of multiple sclerosis (HCC)  G35           Start Time: 1300  Stop Time: 1325  Total time in clinic (min): 25 minutes    Subjective: Pt presented to clinic w/ daughter for family training on how to appropriately perform HEP stretching of LLE.       Objective: See treatment diary below      Assessment:  Pt tolerated treatment session w/ only one instance of clonus/ contracture during LLE extension performed by PT and pt's daughter for family training.  Pt's daughter was able to appropriately perform and agreed w/ PT that pt needs to decrease amount of activity and increase relaxation periods to focus on reducing contracture of L Hamstring.      Plan: Continue per plan of care.      Precautions: MS      Visit Count 1 2 3 4 5       Manuals 2025   3/4/2025   3/6  3/11  3/13   L knee Ext (HS stretching) DF 30' (static stretching once maximal L knee ext is achieved)  L knee to 65* for static stretching w/ large bolster for support during E-Stim  L knee to 0* static stretch for 20' for one bout, 5' for 2nd bout    Slight distraction of L hip during stretch to reduce pain in L hip  L knee to 0* static stretch for 10'                                    Neuro Re-Ed   3/4/2025   3/6       Balance as appropriate                      L knee Extension on Table to decrease clonus and rigidity/ spasticity  DF -15' x2      Rhythmic initiation of BLEs w/ knees flexed  DF - 10'                                 Ther Ex  3/4/2025          NuStep           Ankle pumps           HR/TR        Standing Hip flex/abd/ext        Mini Squats        Step ups/ Step downs                        Heel Slides w/ towel          QS          Hip abd on sliding board            LAQ           Hip add          Hip abd          SLR          SAQ        S/L SLR                                        Education on clonus and stretching protocol w/ assistance at home DF          Education on effects of MS on body w/ over activity.    Education on use of TENS unit at home for LLE contracture of HS musculature.                         Discussion on differentiation between stretching pain, sharp pain    Also discussion on differentiation between what a stretch is and muscle activation and how walking causes muscle activation vs stretching of HS    DF                       Ther Activity                     Family Training     PT taught pt's daughter how to appropriately perform LLE extension w/ reduced amounts of contracture/ clonus of LLE.  Pt's daughter was able to perform for PT how to safely perform.                        Gait Training                                Modalities          CP           E-Stim Attended by PT   DF - Premod  (Suggested to pt to wear loose fitting clothes to achieve better placement of electrodes

## 2025-03-14 ENCOUNTER — HOSPITAL ENCOUNTER (OUTPATIENT)
Dept: RADIOLOGY | Facility: HOSPITAL | Age: 45
End: 2025-03-14
Payer: COMMERCIAL

## 2025-03-14 VITALS
HEART RATE: 71 BPM | SYSTOLIC BLOOD PRESSURE: 110 MMHG | OXYGEN SATURATION: 96 % | TEMPERATURE: 98.2 F | RESPIRATION RATE: 18 BRPM | DIASTOLIC BLOOD PRESSURE: 54 MMHG

## 2025-03-14 DIAGNOSIS — M16.0 PRIMARY OSTEOARTHRITIS OF BOTH HIPS: ICD-10-CM

## 2025-03-14 PROCEDURE — 77002 NEEDLE LOCALIZATION BY XRAY: CPT | Performed by: ANESTHESIOLOGY

## 2025-03-14 PROCEDURE — 20610 DRAIN/INJ JOINT/BURSA W/O US: CPT | Performed by: ANESTHESIOLOGY

## 2025-03-14 PROCEDURE — 77002 NEEDLE LOCALIZATION BY XRAY: CPT

## 2025-03-14 RX ORDER — LIDOCAINE HYDROCHLORIDE 10 MG/ML
5 INJECTION, SOLUTION EPIDURAL; INFILTRATION; INTRACAUDAL; PERINEURAL ONCE
Status: COMPLETED | OUTPATIENT
Start: 2025-03-14 | End: 2025-03-14

## 2025-03-14 RX ORDER — ROPIVACAINE HYDROCHLORIDE 2 MG/ML
6 INJECTION, SOLUTION EPIDURAL; INFILTRATION; PERINEURAL ONCE
Status: COMPLETED | OUTPATIENT
Start: 2025-03-14 | End: 2025-03-14

## 2025-03-14 RX ORDER — METHYLPREDNISOLONE ACETATE 40 MG/ML
80 INJECTION, SUSPENSION INTRA-ARTICULAR; INTRALESIONAL; INTRAMUSCULAR; PARENTERAL; SOFT TISSUE ONCE
Status: COMPLETED | OUTPATIENT
Start: 2025-03-14 | End: 2025-03-14

## 2025-03-14 RX ADMIN — ROPIVACAINE HYDROCHLORIDE 6 ML: 2 INJECTION, SOLUTION EPIDURAL; INFILTRATION; PERINEURAL at 13:36

## 2025-03-14 RX ADMIN — IOHEXOL 2 ML: 300 INJECTION, SOLUTION INTRAVENOUS at 13:36

## 2025-03-14 RX ADMIN — METHYLPREDNISOLONE ACETATE 80 MG: 40 INJECTION, SUSPENSION INTRA-ARTICULAR; INTRALESIONAL; INTRAMUSCULAR; PARENTERAL; SOFT TISSUE at 13:36

## 2025-03-14 RX ADMIN — LIDOCAINE HYDROCHLORIDE 5 ML: 10 INJECTION, SOLUTION EPIDURAL; INFILTRATION; INTRACAUDAL; PERINEURAL at 13:35

## 2025-03-14 NOTE — DISCHARGE INSTR - LAB

## 2025-03-14 NOTE — INTERVAL H&P NOTE
Update: (This section must be completed if the H&P was completed greater than 24 hrs to procedure or admission)    H&P reviewed. After examining the patient, I find no changed to the H&P since it had been written.    Patient re-evaluated. Accept as history and physical.    Domingo Silva MD/March 14, 2025/1:25 PM

## 2025-03-18 ENCOUNTER — OFFICE VISIT (OUTPATIENT)
Dept: PHYSICAL THERAPY | Facility: HOME HEALTHCARE | Age: 45
End: 2025-03-18
Payer: COMMERCIAL

## 2025-03-18 ENCOUNTER — TELEPHONE (OUTPATIENT)
Age: 45
End: 2025-03-18

## 2025-03-18 DIAGNOSIS — M25.362 KNEE INSTABILITY, LEFT: ICD-10-CM

## 2025-03-18 DIAGNOSIS — R26.2 AMBULATORY DYSFUNCTION: ICD-10-CM

## 2025-03-18 DIAGNOSIS — G35 HX OF MULTIPLE SCLEROSIS (HCC): Primary | ICD-10-CM

## 2025-03-18 DIAGNOSIS — R29.898 WEAKNESS OF BOTH LOWER EXTREMITIES: ICD-10-CM

## 2025-03-18 DIAGNOSIS — G35 MS (MULTIPLE SCLEROSIS) (HCC): Primary | ICD-10-CM

## 2025-03-18 PROCEDURE — 97112 NEUROMUSCULAR REEDUCATION: CPT

## 2025-03-18 NOTE — PROGRESS NOTES
Daily Note     Today's date: 3/18/2025  Patient name: Christie Hemphill  : 1980  MRN: [id number]  Referring provider: Roosevelt Ortiz DO  Dx:   Encounter Diagnosis     ICD-10-CM    1. Hx of multiple sclerosis (HCC)  G35       2. Weakness of both lower extremities  R29.898       3. Ambulatory dysfunction  R26.2           Start Time: 1302          Subjective: Pt presented to treatment session w/ decreased pain in L hip since injection from this past Friday, 3/14/25.  Pt states that she has been unable to perform L HS stretching due to lack of support at home.        Objective: See treatment diary below      Assessment: Pt tolerated treatment session w/ improvements during each session regarding L HS contracture and improved tolerance of supine position w/ BLEs extended.  Pt was able to perform for 2 bouts of 10-12 mins, w/ only experiencing 2 contractures during this time.  PT further enforced breathing techniques to relax body and to reduce anticipation of contracture, pt was successful in relaxing and distracting mind reducing the number of contractures.  PT discussed potential of home TENS unit that pt can use, PT gave pt the paperwork regarding TENS unit and is discussing w/ third party vendor if pt would qualify for at home unit.       Plan: Continue per plan of care.      Precautions: MS      Visit Count 6 2 3 4 5       Manuals 3/18   3/4/2025   3/6  3/11  3/13   L knee Ext (HS stretching) DF   12' x1  10'x1    2 experiences of L HS contracture    L knee to 65* for static stretching w/ large bolster for support during E-Stim  L knee to 0* static stretch for 20' for one bout, 5' for 2nd bout    Slight distraction of L hip during stretch to reduce pain in L hip  L knee to 0* static stretch for 10'                                    Neuro Re-Ed   3/4/2025   3/6       Balance as appropriate                      L knee Extension on Table to decrease clonus and rigidity/ spasticity  DF -15' x2      Rhythmic initiation  of BLEs w/ knees flexed  DF - 10'                                 Ther Ex  3/4/2025          NuStep           Ankle pumps           HR/TR        Standing Hip flex/abd/ext        Mini Squats        Step ups/ Step downs                        Heel Slides w/ towel          QS          Hip abd on sliding board           LAQ           Hip add          Hip abd          SLR          SAQ        S/L SLR                                        Education on clonus and stretching protocol w/ assistance at home           Education on effects of MS on body w/ over activity.    Education on use of TENS unit at home for LLE contracture of HS musculature.                         Discussion on differentiation between stretching pain, sharp pain    Also discussion on differentiation between what a stretch is and muscle activation and how walking causes muscle activation vs stretching of HS    DF                       Ther Activity                     Family Training     PT taught pt's daughter how to appropriately perform LLE extension w/ reduced amounts of contracture/ clonus of LLE.  Pt's daughter was able to perform for PT how to safely perform.                        Gait Training                                Modalities          CP           E-Stim Attended by PT   DF - Premod  (Suggested to pt to wear loose fitting clothes to achieve better placement of electrodes

## 2025-03-18 NOTE — PROGRESS NOTES
I discussed the case with the prosthetists/orthotist and the overall and trialing of the initial brace that was ordered for the ankle and foot.  Based on her inability to successfully clear the foot and leg in addition to the overall weight of the brace there was some difficulty progressing in the left leg in a successful manner that would be compatible with a gait pattern required to navigate her community as well as home setting.  Based on our discussion we will remove that section and address the knee instability on the left first and I did order a left I[employer]us Ascender brace with ext dial to hopefully get her improved mobility and clearance.  She would benefit from this type of brace due to her weakness secondary to her multiple sclerosis that is providing a level of weakness, proprioceptive deficits and knee buckling in order to improve ambulatory quality and help prevent falls    Roosevelt Ortiz, DO  Physical Medicine and Rehabilitation  [facility]

## 2025-03-18 NOTE — TELEPHONE ENCOUNTER
Inbound call received from Silvano at [facility] to request Dr. Ortiz call him to discuss a prescription for a left knee brace. Patient went to [facility] for an AFO brace, she ran into an issue where she could not move and her left knee elsy. They are going to do a left brace.     Dr. Ortiz please call Silvano at [facility] to discuss:   Silvano's cell number [phone]

## 2025-03-24 ENCOUNTER — TELEPHONE (OUTPATIENT)
Age: 45
End: 2025-03-24

## 2025-03-24 NOTE — TELEPHONE ENCOUNTER
Patient called to report that her mother saw something on the internet about Stem cell therapy or a shot to help MS patients     Patient did not know the name of the shot and was hoping the provider may know of any new treatments out their for MS patients    Patient asking for advise on the matter       Patient asked for a Call back to discuss                           Thank you!

## 2025-03-25 NOTE — TELEPHONE ENCOUNTER
"Stem cell therapy is not FDA approved for MS in the [place].  If she has further questions about it, can be discussed at her next visit.  No reason for a sooner appt as this is not something that can be \"prescribed\".    "

## 2025-03-25 NOTE — TELEPHONE ENCOUNTER
REASON FOR CONVERSATION: Advice Only    Please review and advise. Pt had FU with Dr. HILL 6/3/25. Please advise if pt should come in for sooner appt to discuss.     Shilo BEDOLLA 12/3/24 with Janelle: She has been adamant that she does not want any further DMTs, as she feels they caused her MS to become worse, although we have reviewed she has been on high-efficacy therapies with radiographic stability.

## 2025-03-25 NOTE — PROGRESS NOTES
Received call from [facility] requesting order for left knee brace to be faxed to : [facility]  fax # [phone]    Faxed as requested.

## 2025-03-27 ENCOUNTER — OFFICE VISIT (OUTPATIENT)
Dept: PHYSICAL THERAPY | Facility: HOME HEALTHCARE | Age: 45
End: 2025-03-27
Payer: COMMERCIAL

## 2025-03-27 DIAGNOSIS — R26.2 AMBULATORY DYSFUNCTION: ICD-10-CM

## 2025-03-27 DIAGNOSIS — G35 HX OF MULTIPLE SCLEROSIS (HCC): Primary | ICD-10-CM

## 2025-03-27 DIAGNOSIS — R29.898 WEAKNESS OF BOTH LOWER EXTREMITIES: ICD-10-CM

## 2025-03-27 PROCEDURE — 97140 MANUAL THERAPY 1/> REGIONS: CPT

## 2025-03-27 PROCEDURE — 97112 NEUROMUSCULAR REEDUCATION: CPT

## 2025-03-27 NOTE — PROGRESS NOTES
Daily Note     Today's date: 3/27/2025  Patient name: hCristie Hemphill  : 1980  MRN: [id number]  Referring provider: Roosevelt Ortiz DO  Dx:   Encounter Diagnosis     ICD-10-CM    1. Hx of multiple sclerosis (HCC)  G35       2. Weakness of both lower extremities  R29.898       3. Ambulatory dysfunction  R26.2           Start Time: 1301  Stop Time: 1345  Total time in clinic (min): 44 minutes    Subjective: Pt presents to clinic w/ use of rollator.  Pt states that she is just starting her day, spent yesterday doing activities w/ her oldest son.       Objective: See treatment diary below      Assessment:  Pt tolerated treatment session after modifications to table for pt comfort during L knee stretching.  Pt w/ contracture multiple times during session of L HS, relaxation techniques of breathing and music were used.  For future sessions, pt needs pillow under pelvis, small bolster roll under knees, and pillow under ankle to maintain near neutral in knees and prevent hyperextension of knees.        Plan: Continue per plan of care.      Precautions: MS      Visit Count 6 7 3 4 5       Manuals 3/18   3/27   3/6  3/11  3/13   L knee Ext (HS stretching) DF   12' x1  10'x1    2 experiences of L HS contracture   DF  5' x1  10'x1  Modifications for comfort L knee to 65* for static stretching w/ large bolster for support during E-Stim  L knee to 0* static stretch for 20' for one bout, 5' for 2nd bout    Slight distraction of L hip during stretch to reduce pain in L hip  L knee to 0* static stretch for 10'                                    Neuro Re-Ed   3/4/2025   3/6       Balance as appropriate                      L knee Extension on Table to decrease clonus and rigidity/ spasticity  DF - Static L knee extension to reduce contracture of L HS    2x - 10'      Rhythmic initiation of BLEs w/ knees flexed                                   Ther Ex  3/4/2025          NuStep           Ankle pumps           HR/TR        Standing  Hip flex/abd/ext        Mini Squats        Step ups/ Step downs                        Heel Slides w/ towel          QS          Hip abd on sliding board           LAQ           Hip add          Hip abd          SLR          SAQ        S/L SLR                                        Education on clonus and stretching protocol w/ assistance at home           Education on effects of MS on body w/ over activity.    Education on use of TENS unit at home for LLE contracture of HS musculature.                         Discussion on differentiation between stretching pain, sharp pain    Also discussion on differentiation between what a stretch is and muscle activation and how walking causes muscle activation vs stretching of HS    DF                       Ther Activity                     Family Training     PT taught pt's daughter how to appropriately perform LLE extension w/ reduced amounts of contracture/ clonus of LLE.  Pt's daughter was able to perform for PT how to safely perform.                        Gait Training                                Modalities          CP           E-Stim Attended by PT   DF - Premod  (Suggested to pt to wear loose fitting clothes to achieve better placement of electrodes

## 2025-03-28 ENCOUNTER — TELEPHONE (OUTPATIENT)
Dept: PAIN MEDICINE | Facility: CLINIC | Age: 45
End: 2025-03-28

## 2025-03-28 ENCOUNTER — HOSPITAL ENCOUNTER (OUTPATIENT)
Dept: INFUSION CENTER | Facility: HOSPITAL | Age: 45
End: 2025-03-28
Attending: PSYCHIATRY & NEUROLOGY
Payer: COMMERCIAL

## 2025-03-28 VITALS
DIASTOLIC BLOOD PRESSURE: 63 MMHG | TEMPERATURE: 98.1 F | OXYGEN SATURATION: 98 % | SYSTOLIC BLOOD PRESSURE: 100 MMHG | RESPIRATION RATE: 18 BRPM | HEART RATE: 77 BPM

## 2025-03-28 DIAGNOSIS — G35 MS (MULTIPLE SCLEROSIS) (HCC): Primary | ICD-10-CM

## 2025-03-28 PROCEDURE — 96365 THER/PROPH/DIAG IV INF INIT: CPT

## 2025-03-28 RX ORDER — SODIUM CHLORIDE 9 MG/ML
20 INJECTION, SOLUTION INTRAVENOUS ONCE
OUTPATIENT
Start: 2025-04-25

## 2025-03-28 RX ORDER — SODIUM CHLORIDE 9 MG/ML
20 INJECTION, SOLUTION INTRAVENOUS ONCE
Status: COMPLETED | OUTPATIENT
Start: 2025-03-28 | End: 2025-03-28

## 2025-03-28 RX ADMIN — SODIUM CHLORIDE 1000 MG: 0.9 INJECTION, SOLUTION INTRAVENOUS at 12:38

## 2025-03-28 RX ADMIN — SODIUM CHLORIDE 20 ML/HR: 0.9 INJECTION, SOLUTION INTRAVENOUS at 12:38

## 2025-03-28 NOTE — PROGRESS NOTES
Christie Hemphill  tolerated solumedrol treatment well with no complications.      Christie Hemphill is aware of future appt on 4/25/25 at 12pm.     AVS printed and given to Christie Hemphill:  No (Declined by Christie Hemphill)

## 2025-03-31 NOTE — TELEPHONE ENCOUNTER
Pt called in with % improvement and pain level /10.    Pt felt the most relief within the first couple of days. Pt stated because of her MS it is difficult to say how much improvement she actually has.  Pt goes to bed in pain and wakes up in pain

## 2025-04-01 ENCOUNTER — OFFICE VISIT (OUTPATIENT)
Dept: PHYSICAL THERAPY | Facility: HOME HEALTHCARE | Age: 45
End: 2025-04-01
Payer: COMMERCIAL

## 2025-04-01 DIAGNOSIS — R26.2 AMBULATORY DYSFUNCTION: ICD-10-CM

## 2025-04-01 DIAGNOSIS — G35 HX OF MULTIPLE SCLEROSIS (HCC): Primary | ICD-10-CM

## 2025-04-01 DIAGNOSIS — R29.898 WEAKNESS OF BOTH LOWER EXTREMITIES: ICD-10-CM

## 2025-04-01 PROCEDURE — 97110 THERAPEUTIC EXERCISES: CPT

## 2025-04-01 PROCEDURE — 97140 MANUAL THERAPY 1/> REGIONS: CPT

## 2025-04-01 NOTE — PROGRESS NOTES
Daily Note     Today's date: 2025  Patient name: Christie Hemphill  : 1980  MRN: [id number]  Referring provider: Roosevelt Ortiz DO  Dx:   Encounter Diagnosis     ICD-10-CM    1. Hx of multiple sclerosis (HCC)  G35       2. Weakness of both lower extremities  R29.898       3. Ambulatory dysfunction  R26.2           Start Time: 1516  Stop Time: 1600  Total time in clinic (min): 44 minutes    Subjective: Pt presents to clinic wearing stylish jacket and ready to begin skilled therapy session focusing on reducing L HS contracture.      Objective: See treatment diary below      Assessment: Pt tolerated treatment session w/ longer duration of prolonged static stretch to eliminate L HS contracture.  PT set up table to reduce discomfort for pt during stretching.  Pt was able to reduce number of instances of contracture to only 1x during session.        Plan: Continue per plan of care.      Precautions: MS      Visit Count 6 7 8 4 5       Manuals 3/18   3/27   4/1  3/11  3/13   L knee Ext (HS stretching) DF   12' x1  10'x1    2 experiences of L HS contracture   DF  5' x1  10'x1  Modifications for comfort DF  2x to 12* flexion  Modifications for comfort  L knee to 0* static stretch for 20' for one bout, 5' for 2nd bout    Slight distraction of L hip during stretch to reduce pain in L hip  L knee to 0* static stretch for 10'                                    Neuro Re-Ed   3/4/2025   3/       Balance as appropriate                      L knee Extension on Table to decrease clonus and rigidity/ spasticity  DF - Static L knee extension to reduce contracture of L HS    2x - 10' DF - Static L knee extension to reduce contracture of L HS    1st - 18'   2nd - 5'     Rhythmic initiation of BLEs w/ knees flexed                                   Ther Ex  3/4/2025          NuStep           Ankle pumps           HR/TR        Standing Hip flex/abd/ext        Mini Squats        Step ups/ Step downs                        Heel Slides  w/ towel          QS          Hip abd on sliding board           LAQ           Hip add          Hip abd          SLR          SAQ        S/L SLR                                        Education on clonus and stretching protocol w/ assistance at home           Education on effects of MS on body w/ over activity.    Education on use of TENS unit at home for LLE contracture of HS musculature.                         Discussion on differentiation between stretching pain, sharp pain    Also discussion on differentiation between what a stretch is and muscle activation and how walking causes muscle activation vs stretching of HS                           Ther Activity                     Family Training     PT taught pt's daughter how to appropriately perform LLE extension w/ reduced amounts of contracture/ clonus of LLE.  Pt's daughter was able to perform for PT how to safely perform.                        Gait Training                                Modalities          CP           E-Stim Attended by PT   DF - Premod  (Suggested to pt to wear loose fitting clothes to achieve better placement of electrodes

## 2025-04-03 ENCOUNTER — OFFICE VISIT (OUTPATIENT)
Dept: OBGYN CLINIC | Facility: CLINIC | Age: 45
End: 2025-04-03
Payer: COMMERCIAL

## 2025-04-03 VITALS — HEIGHT: 66 IN | BODY MASS INDEX: 16.14 KG/M2 | TEMPERATURE: 98 F

## 2025-04-03 DIAGNOSIS — M16.12 PRIMARY OSTEOARTHRITIS OF LEFT HIP: ICD-10-CM

## 2025-04-03 DIAGNOSIS — M16.11 PRIMARY OSTEOARTHRITIS OF RIGHT HIP: Primary | ICD-10-CM

## 2025-04-03 PROCEDURE — 99203 OFFICE O/P NEW LOW 30 MIN: CPT | Performed by: ORTHOPAEDIC SURGERY

## 2025-04-03 NOTE — PROGRESS NOTES
Encounter Provider: Anand Thomas DO   Encounter Date: 04/03/25  Encounter department: [facility] [place]         ASSESSMENT & PLAN:  Assessment & Plan  Primary osteoarthritis of right hip  See other diagnoses     Primary osteoarthritis of left hip  Patient has history of MS with significant atrophy of bilateral lower extremity  Current symptoms of left > right anterior groin pain  Radiograph displays left severe and right moderate arthritic changes  On exam patient has significant atrophy of bilateral LE with weakness  We did discuss potential total hip arthroplasty yet patient would need to increase her strength and muscle mass prior to consideration  Patient is currently in physical therapy and is encouraged to continue  Follow up in 2 months  Discuss potential total hip arthroplasty if patient has increased LE strength to adequate levels  Plan:  The patient has fairly significant arthritis of her left hip as compared to the right.  There is some atrophy present along her lower legs secondary to her MS.  At this point, it is my opinion she is not a surgical candidate until her muscles get a little stronger.  I would recommend the patient discuss this with her neurologist in further detail.  She should continue therapy to work on strengthening.  She should ambulate is much as possible.  Return back in 3 months for reevaluation            To do next visit:  Return in about 2 months (around 6/3/2025).    Scribe Attestation      I,:  Bishnu Madsen MA am acting as a scribe while in the presence of the attending physician.:       I,:  Anand Thomas DO personally performed the services described in this documentation    as scribed in my presence.:             ____________________________________________________  CHIEF COMPLAINT:  Chief Complaint   Patient presents with    Left Hip - Follow-up    Right Hip - Follow-up         SUBJECTIVE:  Christie Hemphill is a 45 y.o. female who presents  for initial evaluation of bilateral hips with history of MS.  Today she complains of bilateral anterior groin pain.  Prolonged sitting and weight bearing aggravates while rest can alleviates.  She does walk yet does use electronic wheel chair on days she has flare of MS symptoms.  She is currently in physical therapy for general deconditioning.  She has had IA hip injections with about 2 days of relief.  She does work with neurologist and has follow up in next 1-2 months.        PAST MEDICAL HISTORY:  Past Medical History:   Diagnosis Date    Back pain     Chronic constipation 6/27/2016    Chronic pain disorder     Collar bone fracture     right side    Crutches as ambulation aid     Depression     Dyssynergic defecation     Type 1    ETOH abuse     Head injury 2014    Infectious viral hepatitis     Lyme disease     Meningitis spinal 2007?    Multiple sclerosis (HCC)     Opioid abuse (HCC)     Rib fractures     Thrombocytopenia (HCC)     Use of cane as ambulatory aid        PAST SURGICAL HISTORY:  Past Surgical History:   Procedure Laterality Date    APPENDECTOMY      CLAVICLE SURGERY      COLONOSCOPY      FL LUMBAR PUNCTURE DIAGNOSTIC  11/25/2020    KNEE ARTHROSCOPY      MULTIPLE TOOTH EXTRACTIONS      NE COLONOSCOPY FLX DX W/COLLJ SPEC WHEN PFRMD N/A 6/27/2016    Procedure: COLONOSCOPY;  Surgeon: Jason Rogers MD;  Location: [facility];  Service: Colorectal    NE OPEN IMPLANTATION CHARITY SACRAL NERVE N/A 11/15/2021    Procedure: INSERTION OF NEUROSTIMULATOR ELECTRODE ARRAY SACRAL NERVE; FLUOROSCOPY; ELECTRONICN ANALYSIS;  Surgeon: Abdulaziz Guevara MD;  Location: [facility];  Service: UroGynecology           TONSILLECTOMY AND ADENOIDECTOMY      TUBAL LIGATION         FAMILY HISTORY:  Family History   Problem Relation Age of Onset    Skin cancer Mother     No Known Problems Brother        SOCIAL HISTORY:  Social History     Tobacco Use    Smoking status: Every Day     Current packs/day: 0.25     Average packs/day:  "0.3 packs/day for 1.1 years (0.3 ttl pk-yrs)     Types: Cigarettes     Start date: 03/2024    Smokeless tobacco: Never   Vaping Use    Vaping status: Every Day    Substances: Nicotine, Flavoring   Substance Use Topics    Alcohol use: Yes     Comment: 2 mixed drinks each night    Drug use: No       MEDICATIONS:    Current Outpatient Medications:     albendazole (ALBENZA) 200 mg tablet, , Disp: , Rfl:     ascorbic acid (VITAMIN C) 500 mg tablet, Take 500 mg by mouth daily, Disp: , Rfl:     b complex vitamins tablet, Take 1 tablet by mouth daily, Disp: , Rfl:     B-D HYPODERMIC NEEDLE 23GX1\" 23G X 1\" MISC, USE ONE WEEKLY, Disp: , Rfl:     BD Plastipak Syringe 21G X 1\" 3 ML MISC, USE ONE WEEKLY, Disp: , Rfl:     Biotin 36943 MCG TABS, Take 1 tablet by mouth daily  , Disp: , Rfl:     buprenorphine-naloxone (SUBOXONE) 8-2 mg per SL tablet, Place 1 tablet under the tongue daily, Disp: , Rfl:     Calcium Carbonate (CALCIUM 600 PO), Take 600 mg by mouth daily pt reports not having & not taking, Disp: , Rfl:     celecoxib (CeleBREX) 200 mg capsule, Take 1 capsule (200 mg total) by mouth 2 (two) times a day With food, Disp: 60 capsule, Rfl: 2    chlorhexidine (PERIDEX) 0.12 % solution, SWISH AND SPIT 15MLS ONCE A DAY AS DIRECTED, Disp: , Rfl:     Cholecalciferol (Vitamin D3) 125 MCG (5000 UT) TABS, Take 5,000 Units by mouth daily pt reports taking 2 tab, Disp: , Rfl:     clindamycin (CLEOCIN) 150 mg capsule, Take 1 capsule by mouth 4 times a day, Disp: , Rfl:     cloNIDine (CATAPRES) 0.2 mg tablet, Take 0.2 mg by mouth daily at bedtime, Disp: , Rfl:     clopidogrel (PLAVIX) 75 mg tablet, Take 75 mg by mouth every morning, Disp: , Rfl:     clotrimazole-betamethasone (LOTRISONE) 1-0.05 % cream, Apply to affected area 2 times daily prn, Disp: 45 g, Rfl: 1    Depo-Testosterone 100 MG/ML IM injection, INJECT 25MG (0.25ML) INTO THE MUSCLE EVERY 2 WEEKS(VIAL ONLY GOOD FOR 28DAYS), Disp: , Rfl:     docusate sodium (COLACE) 100 mg " capsule, Take 100 mg by mouth 3 (three) times a day, Disp: , Rfl:     flavoxATE (URISPAS) 100 mg tablet, TAKE 1 TABLET BY MOUTH 3 TIMES A DAY AS NEEDED FOR BLADDER SPASMS., Disp: 90 tablet, Rfl: 3    furosemide (LASIX) 20 mg tablet, Take 10 mg by mouth daily, Disp: , Rfl:     Ginkgo Biloba 40 MG TABS, Take 120 mg by mouth daily pt reports taking 2 tabs daily, Disp: , Rfl:     ibuprofen (MOTRIN) 200 mg tablet, Take 200 mg by mouth as needed for mild pain pt reports taking 3-4tabs every 6hrs. , Disp: , Rfl:     ibuprofen (MOTRIN) 800 mg tablet, Take 800 mg by mouth every 8 (eight) hours as needed, Disp: , Rfl:     lubiprostone (AMITIZA) 24 mcg capsule, Take 24 mcg by mouth 2 (two) times a day with meals pt reports not taking, Disp: , Rfl:     lubiprostone (AMITIZA) 8 mcg capsule, Take 8 mcg by mouth 3 (three) times a day, Disp: , Rfl:     LUTEIN PO, Take by mouth in the morning 20mg on bottle, Disp: , Rfl:     methylPREDNISolone sodium succinate in sodium chloride 0.9 % 250 mL IVPB, Inject 1,000 mg into a catheter in a vein every 30 (thirty) days. 1x/month, provided at medical office. per EPIC  Indications: MS, Disp: , Rfl:     Misc Natural Products (GINSENG COMPLEX PO), Take 2 tablets by mouth daily 500mg on bottle., Disp: , Rfl:     Movantik 25 MG tablet, Take 25 mg by mouth every evening, Disp: , Rfl:     Multiple Vitamins-Minerals (ZINC PO), Take by mouth, Disp: , Rfl:     naloxone (NARCAN) 4 mg/0.1 mL nasal spray, ADMINISTER 1 SPRAY INTO ONE NOSTRIL. CALL 911. REPEAT AFTER 2-3 MIN IF NO OR MINIMAL RESPONSE, Disp: , Rfl:     Nerve Stimulator (STANDARD TENS) VICKY, by Does not apply route 2 (two) times a day, Disp: 1 Device, Rfl: 0    Nerve Stimulator (TENS THERAPY REPLACE BACK PADS) MISC, 30 pad by Does not apply route 2 (two) times a day, Disp: 30 each, Rfl: 0    Nerve Stimulator VICKY, by Does not apply route 2 (two) times a day, Disp: 1 each, Rfl: 0    Nerve Stimulator Supplies MISC, 30 pad by Does not apply  route 2 (two) times a day, Disp: 30 pad, Rfl: 0    NON FORMULARY, HEMPANOL BRAIN DETOX 200MG . pt reports not taking per 5/18/23., Disp: , Rfl:     Omega-3 Fatty Acids (FISH OIL OMEGA-3 PO), Take 1,200 mg by mouth daily , Disp: , Rfl:     phenazopyridine (PYRIDIUM) 200 mg tablet, Take 1 tablet (200 mg total) by mouth 3 (three) times a day as needed for bladder spasms, Disp: 10 tablet, Rfl: 0    Potassium 99 MG TABS, Take 99 mg by mouth daily pt reports taking 3 tabs daily, Disp: , Rfl:     potassium chloride (Klor-Con) 10 mEq tablet, 1 TABLET DAILY, Disp: , Rfl:     pregabalin (LYRICA) 75 mg capsule, , Disp: , Rfl:     Probiotic Product (PROBIOTIC DAILY PO), Take 2 tablets by mouth daily  , Disp: , Rfl:     promethazine (PHENERGAN) 50 MG tablet, daily at bedtime 1.5 tabs daily at bedtime, Disp: , Rfl: 1    Restasis 0.05 % ophthalmic emulsion, INSTILL 1 DROP BY OPHTHALMIC ROUTE EVERY 12 HOURS BOTH EYES, Disp: , Rfl:     selenium sulfide (SELSUN) 2.5 % shampoo, Apply topically daily as needed for dandruff, Disp: 118 mL, Rfl: 0    testosterone cypionate (DEPO-TESTOSTERONE) 200 mg/mL SOLN, 25 MG (0.125ML) IM ONCE EVERY 7 DAYS, Disp: , Rfl:     topiramate (TOPAMAX) 50 MG tablet, Take 50 mg by mouth daily  , Disp: , Rfl: 5    TURMERIC PO, Take by mouth Turmeric Curcumin 500mg on bottle. 1 tab daily, Disp: , Rfl:     ZINC-VITAMIN C PO, Take by mouth pt not taking due to taking regular zinc per pt report on 5/18/23., Disp: , Rfl:     gabapentin (NEURONTIN) 100 mg capsule, Take 1 capsule (100 mg total) by mouth 3 (three) times a day, Disp: 90 capsule, Rfl: 1    ALLERGIES:  Allergies   Allergen Reactions    Penicillins GI Intolerance and Nausea Only     Other reaction(s): Unknown Reaction       LABS:  HgA1c:   Lab Results   Component Value Date    HGBA1C 5.3 02/14/2025     BMP:   Lab Results   Component Value Date    GLUCOSE 81 11/12/2015    CALCIUM 9.2 02/14/2025     11/12/2015    K 3.7 02/14/2025    CO2 28 02/14/2025  "    02/14/2025    BUN 12 02/14/2025    CREATININE 1.02 02/14/2025     CBC: No components found for: \"CBC\"    _____________________________________________________  PHYSICAL EXAMINATION:  Vital signs: Temp 98 °F (36.7 °C) (Temporal)   Ht 5' 6\" (1.676 m)   BMI 16.14 kg/m²   General: No acute distress, awake and alert  Psychiatric: Mood and affect appear appropriate  HEENT: Trachea Midline, No torticollis, no apparent facial trauma  Cardiovascular: No audible murmurs; Extremities appear perfused  Pulmonary: No audible wheezing or stridor  Skin: No open lesions; see further details (if any) below    Ortho Exam:  Left hip:  TTP over greater trochanter  Apprehension with ROM  Groin pain with IR  ER with flexion  Calf compartments soft and supple  Sensation intact  Toes are warm sensate and mobile    Right hip:  Apprehension with ROM  Groin pain with IR  ER with flexion  Calf compartments soft and supple  Sensation intact  Toes are warm sensate and mobile      _____________________________________________________  STUDIES REVIEWED:    The attending physician has personally reviewed the pertinent films in PACS and interpretation is as follows:  Bilateral hip x-rays of 2/11/2025:  Severe left hip and moderate right hip arthritic changes with left bone on bone apposition.      PROCEDURES PERFORMED:  Procedures      None Preformed       Portions of the record may have been created with voice recognition software.  Occasional wrong word or \"sound a like\" substitutions may have occurred due to the inherent limitations of voice recognition software.  Read the chart carefully and recognize, using context, where substitutions have occurred.        "

## 2025-04-08 ENCOUNTER — OFFICE VISIT (OUTPATIENT)
Dept: PHYSICAL THERAPY | Facility: HOME HEALTHCARE | Age: 45
End: 2025-04-08
Payer: COMMERCIAL

## 2025-04-08 DIAGNOSIS — R29.898 WEAKNESS OF BOTH LOWER EXTREMITIES: ICD-10-CM

## 2025-04-08 DIAGNOSIS — R26.2 AMBULATORY DYSFUNCTION: ICD-10-CM

## 2025-04-08 DIAGNOSIS — G35 HX OF MULTIPLE SCLEROSIS (HCC): Primary | ICD-10-CM

## 2025-04-08 PROCEDURE — 97110 THERAPEUTIC EXERCISES: CPT

## 2025-04-08 NOTE — PROGRESS NOTES
PT Re-Evaluation     Today's date: 2025  Patient name: Christie Hemphill  : 1980  MRN: [id number]  Referring provider: Roosevetl Ortiz DO  Dx:   Encounter Diagnosis     ICD-10-CM    1. Hx of multiple sclerosis (HCC)  G35       2. Weakness of both lower extremities  R29.898       3. Ambulatory dysfunction  R26.2           Start Time: 1350  Stop Time: 1429  Total time in clinic (min): 39 minutes    Assessment  Impairments: abnormal gait, abnormal or restricted ROM, abnormal movement, activity intolerance, impaired physical strength, lacks appropriate home exercise program, pain with function, weight-bearing intolerance, poor posture , participation limitations and activity limitations  Symptom irritability: high    Assessment details: Re-Eval 25:  Christie Hemphill is a 45 y.o. female presenting to OP PT clinic in [place] w/ referral for complications from MS, BLE weakness, and chronic pain.   When presenting for IE on 25, pt had severe HS contracture of LLE.  Since beginning skilled therapy, Pt and PT have been able to achieve PROM stretching of L HS to decrease intensity and severity of L HS contracture.  Pt is able to lie supine for 10-20 mins depending on pain in other areas of body to allow L HS stretching to occur.  Pt is unable to perform this at home due to lack of support at home.  Pt states that she feels the best during her week after skilled therapy and is able to perform increased activity.  Due to MS, pt has increased pain and fatigue after ambulation in b/l hips (L>R) and is seeing orthopedic doctors.  Pt has difficulty performing ADLs and recreational activities due to above mentioned deficits.  Pt would benefit from continued skilled therapy to address impairments, allowing pt to perform ADLs and recreational activities w/o restriction or pain to improve QoL and return to PLOF.  Thank you for this referral!    Christie Hemphill is a 45 y.o. female presenting to OP PT clinic in [place] w/ referral  for complications from MS, BLE weakness, and chronic pain.  Pt presents pain in LLE due to clonus during passive knee extension limiting sleep, decreased strength in BLE, decreased P/AROM in BLE mvmts, decreased ambulatory capacity w/ use of rollator, pain & difficulty w/ ambulation, stair climbing, and pain w/ functional activities.  Pt has difficulty performing ADLs and recreational activities due to above mentioned deficits.  Pt would benefit from skilled therapy to address impairments, allowing pt to perform ADLs and recreational activities w/o restriction or pain to improve QoL and return to PLOF.  PT gave pt HEP focusing on performing exercises/ activities outside of the clinic to further improve and address impairments.  Thank you for this referral!  Understanding of Dx/Px/POC: good     Prognosis: good    Goals  STG:  -Pt will improve pain from 10/10 to 7/10 to allow reduced difficulty performing ADLs due to pain in 4 weeks. -PROGRESSING  -Pt will increase L knee ext PROM from -5* to 0* to decrease painful w/ less difficulty in 4 weeks. -MET  -Pt will increase B/L quad strength from poor to fair contraction to allow pt w/ improved ability of performing a STS tx w/ less difficulty in 4 weeks. -PROGRESSING    LTG:  -Pt will be d/c from OP PT w/ I HEP to allow pt to continue improving upon their impairments for improved ADLs/ recreational activities in 12 weeks.  -PROGRESSING  -Pt will improve clonus sx in LLE from severe to minimal to allow improved ability to perform ADLs/ recreational activities w/o need for a rest break in 12 weeks. -PROGRESSING  -Pt will improve WB tolerance from 10mins to >15mins to show increased ability to perform ADLs/ work duties/ recreational activities w/ less difficulty in 12 weeks.   -PROGRESSING    Plan  Patient would benefit from: skilled physical therapy  Planned modality interventions: thermotherapy: hydrocollator packs, cryotherapy and neuromuscular electric  "stimulation    Planned therapy interventions: abdominal trunk stabilization, manual therapy, massage, neuromuscular re-education, postural training, therapeutic activities, therapeutic exercise, home exercise program, functional ROM exercises, flexibility, balance/weight bearing training, balance, gait training, stretching and strengthening    Frequency: 2x week  Duration in weeks: 12  Plan of Care beginning date: 2/25/2025  Plan of Care expiration date: 5/20/2025  Treatment plan discussed with: patient  Plan details: Continue POC focusing on addressing & improving impairments listed in eval.        Subjective Evaluation    History of Present Illness  Date of onset: 1/23/2020  Mechanism of injury: Re-Eval 4/8/25:  Pt states that she feels overall she has decreased in functionality due to pain in b/l hip (L>R).  Since start of skilled therapy, pt received cortisone shots in b/l hips 3 weeks ago, had relief but pain returned.  Pt continues to ambulate around clinic w/ rollator.  Pt states that the only time she feels good is after a skilled therapy appt, per pt.  Pt states that during prior skilled therapy encounters, she feels as though she has decreased in functionality due to the aggressiveness of therapy and the negative affects that occur in relation to MS.    From Dr. Ortiz note 1/9/2025: \"Ms. Hemphill is a 45-year-old female who has a diagnosis of multiple sclerosis back in 2020 but notes having symptoms for several years prior to any official diagnosis.  She notes that she generally uses a rollator and has 2 of them available 1 for the house and the other for the car.  She does have a power chair as well for longer distances in the community as well as for appointments.  She endorses that on a good day she can ambulate half to 1 full block before needing to take a rest and on a bad day is limited to only household distance ambulation at best.  She notes weakness in the bilateral lower extremity that varies day " "by day but has pain as well that seems to be getting worse over time.  Most of the pain she experiences is in the buttocks and the legs to hurt mostly with bending.  She feels the bones are bending and popping and rubbing against each other.  She endorses that the pain is a 10/10 almost all the time every day except when sitting.     For pain and she has been taking Lyrica, Celebrex and occasionally ibuprofen which she admits to taking at the same time as the Celebrex as well as the Suboxone.  We did discuss NSAID safety at this moment too.  She endorses that she did see pain and spine and had trigger point injections in her trapezius and levator Scap with some success but only for her neck and shoulder discomfort.  She was also recommended for Cymbalta but did not want to start any antidepressant medications for her pain.  She has had limited success with other modalities including therapies and other medications that we had reviewed.\"    Pt states that she is having a potential [employer] custom fit for her in the coming future but is waiting for  to contact her.  Pt frequently has enemas due to constipation every other day at home.  Pt is able to perform safe ambulation around the house w/ rollator.  Pt can stand for a tolerance of 10mins on most days.  Pt has stair lift and has first floor set up at home. Pt is having disturbed sleep due to L knee and is unable to straighten leg which is causing the most pain, roughly for 5 weeks and has not had this specific pain prior.                    Recurrent probem    Quality of life: poor    Patient Goals  Patient goals for therapy: increased strength, independence with ADLs/IADLs, decreased pain, improved balance, increased motion and return to sport/leisure activities  Patient goal: Decreased pain and ability to ambulate around house and community w/ less difficulty  Pain  Current pain ratin  At best pain ratin  At worst pain rating: 10  Location: " "\"everywhere\"  Quality: sharp, dull ache, knife-like, throbbing and needle-like  Aggravating factors: standing and walking  Progression: worsening    Social Support  Lives in: multiple-level home  Lives with: significant other and young children    Employment status: not working  Treatments  Previous treatment: physical therapy, injection treatment and medication  Current treatment: medication and physical therapy      Objective     Static Posture     Lumbar Spine   Increased lordosis.     Hip   Hip (Left): Increased flexion.   Hip (Right): Increased flexion.     Knee   Knee (Left): Flexed.   Knee (Right): Flexed.     Neurological Testing     Reflexes   Left   Clonus sign: positive    Additional Neurological Details  L HS contracture present when pt achieves L knee extension of -18*, is able to combat contracture w/ slow, passive extension of L knee.  PT implemented breathing and distraction techniques, techniques are an improvement for pt.      Clonus when performing L knee extension    Passive Range of Motion   Left Hip   Normal passive range of motion    Right Hip   Normal passive range of motion  Left Knee   Extension: -5 (able to achieve 0 after slow, passive extension w/ multiple instances of Clonus) degrees with pain    Strength/Myotome Testing     Left Hip   Planes of Motion   Flexion: 1  Abduction: 2+  Adduction: 2+    Right Hip   Planes of Motion   Flexion: 1  Abduction: 2+  Adduction: 2+    Left Knee   Flexion: 2-  Extension: 2-  Quadriceps contraction: fair    Right Knee   Flexion: 2-  Extension: 2-  Quadriceps contraction: fair    Ambulation     Observational Gait   Gait: crouched   Decreased walking speed and stride length.     Additional Observational Gait Details  Use of rollator in clinic and at home, longer distances use of scooter    Comments   Re-Eval 25:  TUG w/ Rollator = 1:06.31s    TUG w/ rollator = 56.02s  Neuro Exam:     Functional outcomes   TU.02 (seconds)             "   Precautions: MS    Visit Count 6 7 8 1 5       Manuals 3/18   3/27   4/1  4/8  3/13   L knee Ext (HS stretching) DF   12' x1  10'x1    2 experiences of L HS contracture   DF  5' x1  10'x1  Modifications for comfort DF  2x to 12* flexion  Modifications for comfort    L knee to 0* static stretch for 10'                                    Neuro Re-Ed   3/4/2025   3/6       Balance as appropriate                      L knee Extension on Table to decrease clonus and rigidity/ spasticity  DF - Static L knee extension to reduce contracture of L HS    2x - 10' DF - Static L knee extension to reduce contracture of L HS    1st - 18'   2nd - 5'     Rhythmic initiation of BLEs w/ knees flexed                                   Ther Ex  3/4/2025                  Re-Eval Tests and Measures    Discussion on next steps for skilled therapy and what has been benefiting pt.  Pt's sx.      DF    NuStep           Ankle pumps           HR/TR        Standing Hip flex/abd/ext        Mini Squats        Step ups/ Step downs                        Heel Slides w/ towel          QS          Hip abd on sliding board           LAQ           Hip add          Hip abd          SLR          SAQ        S/L SLR                                        Education on clonus and stretching protocol w/ assistance at home           Education on effects of MS on body w/ over activity.    Education on use of TENS unit at home for LLE contracture of HS musculature.                         Discussion on differentiation between stretching pain, sharp pain    Also discussion on differentiation between what a stretch is and muscle activation and how walking causes muscle activation vs stretching of HS                           Ther Activity                     Family Training     PT taught pt's daughter how to appropriately perform LLE extension w/ reduced amounts of contracture/ clonus of LLE.  Pt's daughter was able to perform for PT how to safely perform.                         Gait Training                                Modalities          CP           E-Stim Attended by PT   DF - Premod  (Suggested to pt to wear loose fitting clothes to achieve better placement of electrodes

## 2025-04-14 ENCOUNTER — APPOINTMENT (OUTPATIENT)
Dept: PHYSICAL THERAPY | Facility: HOME HEALTHCARE | Age: 45
End: 2025-04-14
Payer: COMMERCIAL

## 2025-04-15 ENCOUNTER — OFFICE VISIT (OUTPATIENT)
Dept: PHYSICAL THERAPY | Facility: HOME HEALTHCARE | Age: 45
End: 2025-04-15
Payer: COMMERCIAL

## 2025-04-15 DIAGNOSIS — R26.2 AMBULATORY DYSFUNCTION: ICD-10-CM

## 2025-04-15 DIAGNOSIS — G35 HX OF MULTIPLE SCLEROSIS (HCC): Primary | ICD-10-CM

## 2025-04-15 DIAGNOSIS — R29.898 WEAKNESS OF BOTH LOWER EXTREMITIES: ICD-10-CM

## 2025-04-15 PROCEDURE — 97140 MANUAL THERAPY 1/> REGIONS: CPT

## 2025-04-15 PROCEDURE — 97112 NEUROMUSCULAR REEDUCATION: CPT

## 2025-04-15 NOTE — PROGRESS NOTES
Daily Note     Today's date: 4/15/2025  Patient name: Christie Hemphill  : 1980  MRN: [id number]  Referring provider: Roosevelt Ortiz DO  Dx:   Encounter Diagnosis     ICD-10-CM    1. Hx of multiple sclerosis (HCC)  G35       2. Weakness of both lower extremities  R29.898       3. Ambulatory dysfunction  R26.2           Start Time: 1345  Stop Time: 1435  Total time in clinic (min): 50 minutes    Subjective: Pt states that her L hip has been worse over the past couple weeks. Pt states that she experienced increased contractions 2 nights ago in b/l legs when standing to go to the bathroom, pt stated that it was unbearable and the pain was worse in RLE compared to LLE.        Objective: See treatment diary below      Assessment: Pt tolerated longest bout of static stretching to reduce L HS contracture, yet pt experienced quickest onset of contracture in L HS since starting skilled therapy.  PT began slow, passive stretch of L HS from flexion position of roughly 90*, at 85* of knee flexion contracture began and severe crepitus of L hip was heard multiple times by PT during contracture.  PT was able to control pt's contracture and was able to perform knee extension and pt tolerated 38 mins of knee extension w/o complaint of pain.  Next visit, PT will have pt begin on NuStep for BLE strengthening if tolerated.        Plan: Continue per plan of care.      Precautions: MS    Visit Count 6 7 8 1 2       Manuals 3/18   3/27   4/1  4/8  4/15   L knee Ext (HS stretching) DF   12' x1  10'x1    2 experiences of L HS contracture   DF  5' x1  10'x1  Modifications for comfort DF  2x to 12* flexion  Modifications for comfort    BLE static stretch  to achieve extension position                                    Neuro Re-Ed   3/4/2025   3       Balance as appropriate                      L knee Extension on Table to decrease clonus and rigidity/ spasticity  DF - Static L knee extension to reduce contracture of L HS    2x - 10' DF -  Static L knee extension to reduce contracture of L HS    1st - 18'   2nd - 5'  DF - Static L knee extension to reduce contracture of L HS    1st - 38'   Rhythmic initiation of BLEs w/ knees flexed                                   Ther Ex  3/4/2025                  Re-Eval Tests and Measures    Discussion on next steps for skilled therapy and what has been benefiting pt.  Pt's sx.      DF    NuStep           Ankle pumps           HR/TR        Standing Hip flex/abd/ext        Mini Squats        Step ups/ Step downs                        Heel Slides w/ towel          QS          Hip abd on sliding board           LAQ           Hip add          Hip abd          SLR          SAQ        S/L SLR                                        Education on clonus and stretching protocol w/ assistance at home           Education on effects of MS on body w/ over activity.    Education on use of TENS unit at home for LLE contracture of HS musculature.                         Discussion on differentiation between stretching pain, sharp pain    Also discussion on differentiation between what a stretch is and muscle activation and how walking causes muscle activation vs stretching of HS                           Ther Activity                     Family Training                           Gait Training                                Modalities          CP           E-Stim Attended by PT   DF - Premod  (Suggested to pt to wear loose fitting clothes to achieve better placement of electrodes

## 2025-04-21 ENCOUNTER — TELEPHONE (OUTPATIENT)
Age: 45
End: 2025-04-21

## 2025-04-21 NOTE — TELEPHONE ENCOUNTER
Caller: shannon Soriano    Doctor: Dr. Silva    Reason for call: pt called stating she has MS and is in a tremendous amt of hip & leg pain.  Pt would like to know is she could get the nerve endings burned to reduce her pain?    Call back#: [phone]

## 2025-04-22 ENCOUNTER — OFFICE VISIT (OUTPATIENT)
Dept: PHYSICAL THERAPY | Facility: HOME HEALTHCARE | Age: 45
End: 2025-04-22
Payer: COMMERCIAL

## 2025-04-22 ENCOUNTER — TELEPHONE (OUTPATIENT)
Age: 45
End: 2025-04-22

## 2025-04-22 DIAGNOSIS — R29.898 WEAKNESS OF BOTH LOWER EXTREMITIES: ICD-10-CM

## 2025-04-22 DIAGNOSIS — R26.2 AMBULATORY DYSFUNCTION: ICD-10-CM

## 2025-04-22 DIAGNOSIS — G35 HX OF MULTIPLE SCLEROSIS (HCC): Primary | ICD-10-CM

## 2025-04-22 PROCEDURE — 97110 THERAPEUTIC EXERCISES: CPT

## 2025-04-22 NOTE — PROGRESS NOTES
Daily Note     Today's date: 2025  Patient name: Christie Hemphill  : 1980  MRN: [id number]  Referring provider: Roosevelt Ortiz DO  Dx:   Encounter Diagnosis     ICD-10-CM    1. Hx of multiple sclerosis (HCC)  G35       2. Weakness of both lower extremities  R29.898       3. Ambulatory dysfunction  R26.2           Start Time: 1347  Stop Time: 1430  Total time in clinic (min): 43 minutes    Subjective: Pt presents w/ rollator to clinic.  Pt w/ decreased ambulatory mika and speed when walking back from waiting room, pt in pain visually.      Objective: See treatment diary below      Assessment:   Pt tolerated session w/ min increase in b/l hip pain from presenting to clinic.  Pt was only able to tolerate 1 bout of walking in // bars <> before needing a seated rest break due to pain.  PT's discussed potential POC changes for pt to limit increase in pain, but still perform functional activities. Throughout all motion (ambulation, seated exercises, and transfers from seating to standing) pt w/ severe crepitus in b/l hips that causes pain, PT had pt stop performing TE activities due to this.  Pt has crepitus sx constantly w/ all movement and has gotten worse in the past 3-4 weeks.  Pt states that she has appt w/ Ortho MD Dr. Nicole on May 6th, PT urged pt to reach out to ortho to schedule earlier appt if possible.  PT told pt to contact skilled therapy clinic once she reached out to Ortho clinic.   PT discussed w/ patient about NexWave system that was placed through [employer], pt states that she has not received any info from [employer] despite PT reaching out to rep, PT will follow up w/ rep.  Pt brought in portable home TENS unit w/o instruction manual and w/o knowledge on how to use it.        Plan: Continue per plan of care.      Precautions: MS    Visit Count 3 7 8 1 2       Manuals 4/22   3/27   4/1  4/8  4/15   L knee Ext (HS stretching)  DF  5' x1  10'x1  Modifications for comfort DF  2x to 12*  "flexion  Modifications for comfort    BLE static stretch  to achieve extension position                                    Neuro Re-Ed   3/4/2025   3/6       Balance as appropriate                      L knee Extension on Table to decrease clonus and rigidity/ spasticity  DF - Static L knee extension to reduce contracture of L HS    2x - 10' DF - Static L knee extension to reduce contracture of L HS    1st - 18'   2nd - 5'  DF - Static L knee extension to reduce contracture of L HS    1st - 38'   Rhythmic initiation of BLEs w/ knees flexed                                   Ther Ex  3/4/2025                  Re-Eval Tests and Measures    Discussion on next steps for skilled therapy and what has been benefiting pt.  Pt's sx.      DF    NuStep           Ankle pumps           HR/TR        Standing Hip flex/abd/ext        Mini Squats        Step ups/ Step downs        Standing Back extensions in // bars 10x               Heel Slides w/ towel          QS          Hip abd on sliding board           LAQ           Hip add W/ ball 3\" hold  2x10         Hip abd W/o resistance 3\" hold 2x10         SLR          SAQ        S/L SLR                    Amb in // bars 3 sets of 1x <>         Discussion on use of Russian stim next session to further contract and strengthen quads, everters, DF DF & HZ                 Education on clonus and stretching protocol w/ assistance at home           Education on effects of MS on body w/ over activity.    Education on use of TENS unit at home for LLE contracture of HS musculature.                         Discussion on differentiation between stretching pain, sharp pain    Also discussion on differentiation between what a stretch is and muscle activation and how walking causes muscle activation vs stretching of HS                           Ther Activity                     Family Training                           Gait Training                                Modalities          CP         "   E-Stim Attended by PT   DF - Premod  (Suggested to pt to wear loose fitting clothes to achieve better placement of electrodes

## 2025-04-22 NOTE — TELEPHONE ENCOUNTER
Hello,    Please advise if a forced appointment can be accommodated for the patient:    Call back #: [phone]    Insurance: [employer]     Reason for appointment: N2U // BL hip pain, no sx, PPR Corey, bridget pt of [facility] // [facility]     Patient said that Physical Therapy recommends she be seen asap.     Requested doctor and/or location: Dr Nicole either office      Thank you.

## 2025-04-23 NOTE — TELEPHONE ENCOUNTER
Caller: Christie     Doctor: Dr. Silva     Reason for call: Patient calling to see if any updates on next steps in regards to message left on 04/21 please advise     Call back#: [phone]

## 2025-04-24 NOTE — TELEPHONE ENCOUNTER
S/w pt, she is having really bad hip and leg pain. Worse than she has experienced in the past. The past 2 weeks have been worse. She has multiple upcoming appts but doesn't have anyone to assist her to get out of the home into her car to go to these appointments. Family members work when she needs the help.  She does have a steroid infusion tomorrow at [facility] and her mother will assist her so she can get to that appointment.  I did suggest going to the ED while there to be evaluated as she feels something more is going on.  She is taking celebrex as prescribed also has the suboxone from another provider.  Any other suggestions?

## 2025-04-25 ENCOUNTER — HOSPITAL ENCOUNTER (OUTPATIENT)
Dept: INFUSION CENTER | Facility: HOSPITAL | Age: 45
End: 2025-04-25
Attending: PSYCHIATRY & NEUROLOGY
Payer: COMMERCIAL

## 2025-04-25 VITALS
DIASTOLIC BLOOD PRESSURE: 53 MMHG | HEART RATE: 73 BPM | RESPIRATION RATE: 16 BRPM | TEMPERATURE: 98.4 F | SYSTOLIC BLOOD PRESSURE: 112 MMHG

## 2025-04-25 DIAGNOSIS — G35 MS (MULTIPLE SCLEROSIS) (HCC): Primary | ICD-10-CM

## 2025-04-25 PROCEDURE — 96365 THER/PROPH/DIAG IV INF INIT: CPT

## 2025-04-25 RX ORDER — SODIUM CHLORIDE 9 MG/ML
20 INJECTION, SOLUTION INTRAVENOUS ONCE
Status: COMPLETED | OUTPATIENT
Start: 2025-04-25 | End: 2025-04-25

## 2025-04-25 RX ORDER — SODIUM CHLORIDE 9 MG/ML
20 INJECTION, SOLUTION INTRAVENOUS ONCE
Status: CANCELLED | OUTPATIENT
Start: 2025-05-23

## 2025-04-25 RX ADMIN — SODIUM CHLORIDE 20 ML/HR: 0.9 INJECTION, SOLUTION INTRAVENOUS at 12:28

## 2025-04-25 RX ADMIN — SODIUM CHLORIDE 1000 MG: 0.9 INJECTION, SOLUTION INTRAVENOUS at 12:21

## 2025-04-25 NOTE — TELEPHONE ENCOUNTER
Caller: Patient    Doctor: Dr. MULLEN    Reason for call: Patient is in sever pain and would like to know if her OV 4/30 can be a virtual    Patient is having MS Flare up, and Can leave a VM if patient is not able to answer       Call back#:

## 2025-04-25 NOTE — PROGRESS NOTES
Christie Joby tolerated Solumedrol treatment well with no complications.      Christie Hemphill is aware of future appt on 5/23/25 at 1400.     AVS declined.

## 2025-04-28 NOTE — TELEPHONE ENCOUNTER
Caller: Christie     Doctor: Dr. Silva     Reason for call: Patient calling asking if Virtual appointment was approved due to having MS and leg pain having trouble walking please advise     Call back#: [phone]

## 2025-04-29 ENCOUNTER — APPOINTMENT (OUTPATIENT)
Dept: RADIOLOGY | Facility: MEDICAL CENTER | Age: 45
End: 2025-04-29
Attending: STUDENT IN AN ORGANIZED HEALTH CARE EDUCATION/TRAINING PROGRAM
Payer: COMMERCIAL

## 2025-04-29 ENCOUNTER — HOSPITAL ENCOUNTER (OUTPATIENT)
Dept: RADIOLOGY | Facility: HOSPITAL | Age: 45
Discharge: HOME | End: 2025-04-29
Payer: COMMERCIAL

## 2025-04-29 ENCOUNTER — OFFICE VISIT (OUTPATIENT)
Dept: PHYSICAL THERAPY | Facility: HOME HEALTHCARE | Age: 45
End: 2025-04-29
Payer: COMMERCIAL

## 2025-04-29 ENCOUNTER — TELEPHONE (OUTPATIENT)
Age: 45
End: 2025-04-29

## 2025-04-29 ENCOUNTER — OFFICE VISIT (OUTPATIENT)
Dept: OBGYN CLINIC | Facility: CLINIC | Age: 45
End: 2025-04-29
Payer: COMMERCIAL

## 2025-04-29 VITALS — BODY MASS INDEX: 16.88 KG/M2 | TEMPERATURE: 101 F | WEIGHT: 105 LBS | HEIGHT: 66 IN | HEART RATE: 73 BPM

## 2025-04-29 DIAGNOSIS — E46 MALNUTRITION COMPROMISING BODILY FUNCTION (HCC): ICD-10-CM

## 2025-04-29 DIAGNOSIS — F11.11 NONDEPENDENT OPIOID ABUSE IN REMISSION (HCC): ICD-10-CM

## 2025-04-29 DIAGNOSIS — M16.11 PRIMARY OSTEOARTHRITIS OF RIGHT HIP: ICD-10-CM

## 2025-04-29 DIAGNOSIS — M16.12 PRIMARY OSTEOARTHRITIS OF LEFT HIP: ICD-10-CM

## 2025-04-29 DIAGNOSIS — G35 MS (MULTIPLE SCLEROSIS) (HCC): ICD-10-CM

## 2025-04-29 DIAGNOSIS — R26.2 AMBULATORY DYSFUNCTION: ICD-10-CM

## 2025-04-29 DIAGNOSIS — D69.6 THROMBOCYTOPENIA (HCC): ICD-10-CM

## 2025-04-29 DIAGNOSIS — G89.29 CHRONIC PAIN OF BOTH KNEES: ICD-10-CM

## 2025-04-29 DIAGNOSIS — M25.561 CHRONIC PAIN OF BOTH KNEES: ICD-10-CM

## 2025-04-29 DIAGNOSIS — M16.12 PRIMARY OSTEOARTHRITIS OF LEFT HIP: Primary | ICD-10-CM

## 2025-04-29 DIAGNOSIS — F10.20 ALCOHOLISM (HCC): ICD-10-CM

## 2025-04-29 DIAGNOSIS — M25.562 CHRONIC PAIN OF BOTH KNEES: ICD-10-CM

## 2025-04-29 DIAGNOSIS — G35 HX OF MULTIPLE SCLEROSIS (HCC): Primary | ICD-10-CM

## 2025-04-29 DIAGNOSIS — R29.898 WEAKNESS OF BOTH LOWER EXTREMITIES: ICD-10-CM

## 2025-04-29 PROCEDURE — 73522 X-RAY EXAM HIPS BI 3-4 VIEWS: CPT

## 2025-04-29 PROCEDURE — 97112 NEUROMUSCULAR REEDUCATION: CPT

## 2025-04-29 PROCEDURE — 72110 X-RAY EXAM L-2 SPINE 4/>VWS: CPT

## 2025-04-29 PROCEDURE — 99214 OFFICE O/P EST MOD 30 MIN: CPT | Performed by: STUDENT IN AN ORGANIZED HEALTH CARE EDUCATION/TRAINING PROGRAM

## 2025-04-29 NOTE — TELEPHONE ENCOUNTER
Caller: patient    Doctor: Dr. Nicole    Reason for call: patients insurance is requesting prior auth for nutrition services   Please call patient once it is complete     Call back#: [phone]

## 2025-04-29 NOTE — PROGRESS NOTES
Daily Note     Today's date: 2025  Patient name: Christie Hemphill  : 1980  MRN: [id number]  Referring provider: Roosevelt Ortiz DO  Dx:   Encounter Diagnosis     ICD-10-CM    1. Hx of multiple sclerosis (HCC)  G35       2. Weakness of both lower extremities  R29.898       3. Ambulatory dysfunction  R26.2           Start Time: 1431  Stop Time: 1515  Total time in clinic (min): 44 minutes    Subjective: Pt states that she is tired from her day already, woke up to get ready for appts for the day at 6:45 AM.  Pt states that sx have been the same but she received her second opinion from orthopedics for hip today.  Pt was contacted by [employer] regarding NexWave prior to session.       Objective: See treatment diary below      Assessment:  Pt tolerated treatment session consisting of education on Egyptian stimulation to help stimulate/ provide strengthening cues of quad today.  Pt tolerated Russian stim on R quad for 15 mins today.  Pt was able to provide repetitions of R quad contractions during 10 second intervals of stimulation to muscle belly with a 20 second rest period.  PT provided tactile feedback and manual resistance to pt to facilitate a quad set.  PT will apply 2nd channel to L quad next session, monitoring pt's L HS to avoid contracture.      Plan: Continue per plan of care.      Precautions: MS    Visit Count 3 4 8 1 2       Manuals 4/22   4/29   4/1  4/8  4/15   L knee Ext (HS stretching)   DF  2x to 12* flexion  Modifications for comfort    BLE static stretch  to achieve extension position                                    Neuro Re-Ed    3/6       Balance as appropriate                      L knee Extension on Table to decrease clonus and rigidity/ spasticity   DF - Static L knee extension to reduce contracture of L HS    1st - 18'   2nd - 5'  DF - Static L knee extension to reduce contracture of L HS    1st - 38'   Rhythmic initiation of BLEs w/ knees flexed        E[place]ian stimulation education & use.    "Initial Trial w/ smaller pads due to less muscle mass, switched to larger pads for effectivness.        Once contraction was achieved for pt in comfortable position (seated w/ RLE propped on stool for Knee ext), stim was performed at...    10s on, 20s off  Intensity @ 70 for 15 mins                         Ther Ex                  Re-Eval Tests and Measures    Discussion on next steps for skilled therapy and what has been benefiting pt.  Pt's sx.      DF    NuStep           Ankle pumps           HR/TR        Standing Hip flex/abd/ext        Mini Squats        Step ups/ Step downs        Standing Back extensions in // bars 10x               Heel Slides w/ towel          QS          Hip abd on sliding board           LAQ           Hip add W/ ball 3\" hold  2x10         Hip abd W/o resistance 3\" hold 2x10         SLR          SAQ        S/L SLR                    Amb in // bars 3 sets of 1x <>         Discussion on use of Russian stim next session to further contract and strengthen quads, everters, DF DF & HZ                 Education on clonus and stretching protocol w/ assistance at home           Education on effects of MS on body w/ over activity.    Education on use of TENS unit at home for LLE contracture of HS musculature.                         Discussion on differentiation between stretching pain, sharp pain    Also discussion on differentiation between what a stretch is and muscle activation and how walking causes muscle activation vs stretching of HS                           Ther Activity                     Family Training                           Gait Training                                Modalities          CP           E-Stim Attended by PT   DF - Premod  (Suggested to pt to wear loose fitting clothes to achieve better placement of electrodes                                  "

## 2025-04-29 NOTE — PATIENT INSTRUCTIONS
Dr. Ryan Nicole DO, Orthopedic Surgeon  [facility]   Phone:[phone] Fax:[phone]

## 2025-04-29 NOTE — ASSESSMENT & PLAN NOTE
Malnutrition Findings:                           BMI Findings:        Body mass index is 16.95 kg/m².

## 2025-04-29 NOTE — PROGRESS NOTES
Orthopedic Surgery Office Note  Christie Hemphill (45 y.o. female)   : 1980   MRN: [id number]   Encounter Date: 2025 with Dr. Ryan Nicole, DO  Encounter department: [facility] [place]  Chief Complaint   Patient presents with    Right Hip - Pain, Clicking, Locking    Left Hip - Clicking, Pain, Locking       Assessment, Plan, & Discussion:     Assessment & Plan  Primary osteoarthritis of left hip  Continue WBAT  Explained importance of muscular firing  Reviewed balance of hip in light of indications and looking to indications/contraindications to hip surgery  Reviewed risk of dislocation for the hip following surgery- important to note given contractures and neuromuscular pathology  Reviewed importance of muscular balance in consideration of reducing risk of dislocation  Reviewed anatomy and function of lumbar spine in light of constant state of hip flexion, pelvic/lumbar/hip balance  We will coordinate with joint arthroplasty colleagues to consider anterior approach vs posterior if most appropriate given comorbidity   Reviewed current work with physical therapy considering contractures/hip flexion contracture   Answered question regarding bilateral total joint procedure, reviewed risk of mortality; to be considered based on approach   Answered questions regarding recovery, consideration of muscle release, atrophy, surgical approach  Reviewed XR with patient and mother  Follow up with Dr. Nicole vs Dr. Sharpe based on conversation   Orders:    XR hips bilateral 3-4 vw w pelvis if performed; Future    Primary osteoarthritis of right hip  Reviewed bilateral pain; wishes to focus on the left hip at this time, while considerations apply to both   Orders:    XR hips bilateral 3-4 vw w pelvis if performed; Future    Nondependent opioid abuse in remission (HCC)  Continue current management and pain control  Will review prior to surgical consideration        Alcoholism (HCC)  Reviewed  importance of nutrition optimization        Thrombocytopenia (HCC)  Reviewed optimization, follow with PCP       MS (multiple sclerosis) (HCC)  Hx of interferon use/other medications  Follows with neurology       Malnutrition compromising bodily function ([facility])  Malnutrition Findings:                           BMI Findings:        Body mass index is 16.95 kg/m².        Chronic pain of both knees  Continue braces/PT        Ambulatory dysfunction  Reviewed history; plan for upcoming knee braces   Ongoing physical therapy          No follow-ups on file.    Surgery:   No surgery planned at this time  Future surgical planning based on imaging results      History of Present Illness:     Christie Hemphill is a 45 y.o. female who presents for consultation at the request of Crispin Lowry DO  regarding significant hip osteoarthritis and groin pain     Walks with walker, ambulates at baseline activity. Seen in wheelchair today.  Daily activity includes walking around stores, outside, around home.   Doing PT to stretch and mobiliae     Localizes pain to groin mostly. Describes spredding down hamstrings bilaterally     Bilateral hip joint injectiosn with Dr. Silva with 2 days of relief of groin pain. Follows with Dr. Silva for back/joint pain. Last seen 3/7/25, seen for other nerve pain as well.     Most recent back films from 2023    Physical therapy 4/22/25  Pt tolerated session w/ min increase in b/l hip pain from presenting to clinic.  Pt was only able to tolerate 1 bout of walking in // bars <> before needing a seated rest break due to pain.  PT's discussed potential POC changes for pt to limit increase in pain, but still perform functional activities. Throughout all motion (ambulation, seated exercises, and transfers from seating to standing) pt w/ severe crepitus in b/l hips that causes pain, PT had pt stop performing TE activities due to this.  Pt has crepitus sx constantly w/ all movement and has gotten worse in  the past 3-4 weeks.  Pt states that she has appt w/ Ortho MD Dr. Nicole on May 6th, PT urged pt to reach out to ortho to schedule earlier appt if possible.  PT told pt to contact skilled therapy clinic once she reached out to Ortho clinic.   PT discussed w/ patient about [employer] system that was placed through [employer], pt states that she has not received any info from [employer] despite PT reaching out to rep, PT will follow up w/ rep.  Pt brought in portable home TENS unit w/o instruction manual and w/o knowledge on how to use it.      Neurology 1/9/25  Ms. Hemphill is a 45-year-old female who has a diagnosis of multiple sclerosis back in 2020 but notes having symptoms for several years prior to any official diagnosis.  She notes that she generally uses a rollator and has 2 of them available 1 for the house and the other for the car.  She does have a power chair as well for longer distances in the community as well as for appointments.  She endorses that on a good day she can ambulate half to 1 full block before needing to take a rest and on a bad day is limited to only household distance ambulation at best.  She notes weakness in the bilateral lower extremity that varies day by day but has pain as well that seems to be getting worse over time.  Most of the pain she experiences is in the buttocks and the legs to hurt mostly with bending.  She feels the bones are bending and popping and rubbing against each other.  She endorses that the pain is a 10/10 almost all the time every day except when sitting.     For pain and she has been taking Lyrica, Celebrex and occasionally ibuprofen which she admits to taking at the same time as the Celebrex as well as the Suboxone.  We did discuss NSAID safety at this moment too.  She endorses that she did see pain and spine and had trigger point injections in her trapezius and levator Scap with some success but only for her neck and shoulder discomfort.  She was also recommended for Cymbalta  "but did not want to start any antidepressant medications for her pain.  She has had limited success with other modalities including therapies and other medications that we had reviewed.     She was sent and referred based on her needs for a potential brace however thought that this was an appointment for podiatry.       Review of Systems  Constitutional: Negative for fatigue, fever or loss of appetite.   HENT: Negative.    Respiratory: Negative for shortness of breath, dyspnea.    Cardiovascular: Negative for chest pain/tightness.   Gastrointestinal: Negative for abdominal pain, N/V.   Endocrine: Negative for cold/heat intolerance, unexplained weight loss/gain.   Genitourinary: Negative for flank pain, dysuria  Skin: Negative for rash.    Psychiatric/Behavioral: Negative for agitation.  All else negative unless otherwise noted in HPI    Physical Exam:   General:  Pulse 73   Temp (!) 101 °F (38.3 °C) (Temporal)   Ht 5' 6\" (1.676 m)   Wt 47.6 kg (105 lb)   BMI 16.95 kg/m²   Estimated body mass index is 16.95 kg/m² as calculated from the following:    Height as of this encounter: 5' 6\" (1.676 m).    Weight as of this encounter: 47.6 kg (105 lb).  Cons: Appears well.  No apparent distress.  Psych: Alert. Oriented.  Mood and affect normal.  HEENT: PERRLA, EOMI, Eyes clear, moist mucus membranes, hearing intact  Resp: Normal effort.  No audible wheezing or stridor. equal symmetric chest expansion.  CV: Extremities warm and well perfused. no chest pain, no palpitations, no discernable arrhythmia  Well Perfused peripherally, palpable distal pulse  Abd:    No distention or guarding. no peritoneal signs  Skin: Warm. No visible lesions.no clubbing, no cyanosis  Neuro: Normal muscle tone.     Orthopedic Exam:   Left Hip Exam  Alignment / Posture:  hip flexion    Walking with rollator: flexion at hip; left knee flexed on standing to 30 degrees. , recurvatum of right knee   Extension to 30 degrees, 80 degrees of hip flexion " while walking     10 degrees hip flexion contracture    Ambulates with hip flexion gait    Severe atrophy bilateral lower extremities      Hip abduction: left abduction active intact; palpable firing of muscles     Imaging/Studies:     Study: XR lumbar   Date: 4/29/25  Report: No radiologist report was available at this time.  and I have personally reviewed the imaging in PACS and my impression is as follows:  Impression: no significant spondylosis, awaiting final read. No acute osseous abnormality. Noted horizontal sacrum     Study: XR bilateral hip  Date: 4/29/25  Report: No radiologist report was available at this time.  and I have personally reviewed the imaging in PACS and my impression is as follows:  Impression: significant osteoarthritic changes with suchondral sclerosis and subchondral cyst formation, narrowed joint space more significant on the left than right       Medical, Surgical, Family, and Social History    The patient's medical history, family history, and social history, were reviewed and updated as appropriate.  Past Medical History:   Diagnosis Date    Back pain     Chronic constipation 6/27/2016    Chronic pain disorder     Collar bone fracture     right side    Crutches as ambulation aid     Depression     Dyssynergic defecation     Type 1    ETOH abuse     Head injury 2014    Infectious viral hepatitis     Lyme disease     Meningitis spinal 2007?    Multiple sclerosis (HCC)     Opioid abuse (HCC)     Rib fractures     Thrombocytopenia (HCC)     Use of cane as ambulatory aid      Past Surgical History:   Procedure Laterality Date    APPENDECTOMY      CLAVICLE SURGERY      COLONOSCOPY      FL LUMBAR PUNCTURE DIAGNOSTIC  11/25/2020    KNEE ARTHROSCOPY      MULTIPLE TOOTH EXTRACTIONS      NV COLONOSCOPY FLX DX W/COLLJ SPEC WHEN PFRMD N/A 6/27/2016    Procedure: COLONOSCOPY;  Surgeon: Jason Rogers MD;  Location: [facility];  Service: Colorectal    NV OPEN IMPLANTATION CHARITY SACRAL NERVE N/A  "11/15/2021    Procedure: INSERTION OF NEUROSTIMULATOR ELECTRODE ARRAY SACRAL NERVE; FLUOROSCOPY; ELECTRONICN ANALYSIS;  Surgeon: Abdulaziz Guevara MD;  Location: [facility];  Service: UroGynecology           TONSILLECTOMY AND ADENOIDECTOMY      TUBAL LIGATION       Family History   Problem Relation Age of Onset    Skin cancer Mother     No Known Problems Brother      Social History     Occupational History    Not on file   Tobacco Use    Smoking status: Every Day     Current packs/day: 0.25     Average packs/day: 0.3 packs/day for 1.2 years (0.3 ttl pk-yrs)     Types: Cigarettes     Start date: 03/2024    Smokeless tobacco: Never   Vaping Use    Vaping status: Every Day    Substances: Nicotine, Flavoring   Substance and Sexual Activity    Alcohol use: Yes     Comment: 2 mixed drinks each night    Drug use: No    Sexual activity: Yes     Partners: Male     Allergies   Allergen Reactions    Penicillins GI Intolerance and Nausea Only     Other reaction(s): Unknown Reaction       Current Outpatient Medications:     albendazole (ALBENZA) 200 mg tablet, , Disp: , Rfl:     ascorbic acid (VITAMIN C) 500 mg tablet, Take 500 mg by mouth daily, Disp: , Rfl:     b complex vitamins tablet, Take 1 tablet by mouth daily, Disp: , Rfl:     B-D HYPODERMIC NEEDLE 23GX1\" 23G X 1\" MISC, USE ONE WEEKLY, Disp: , Rfl:     BD Plastipak Syringe 21G X 1\" 3 ML MISC, USE ONE WEEKLY, Disp: , Rfl:     Biotin 51352 MCG TABS, Take 1 tablet by mouth daily  , Disp: , Rfl:     buprenorphine-naloxone (SUBOXONE) 8-2 mg per SL tablet, Place 1 tablet under the tongue daily, Disp: , Rfl:     Calcium Carbonate (CALCIUM 600 PO), Take 600 mg by mouth daily pt reports not having & not taking, Disp: , Rfl:     celecoxib (CeleBREX) 200 mg capsule, Take 1 capsule (200 mg total) by mouth 2 (two) times a day With food, Disp: 60 capsule, Rfl: 2    chlorhexidine (PERIDEX) 0.12 % solution, SWISH AND SPIT 15MLS ONCE A DAY AS DIRECTED, Disp: , Rfl:     Cholecalciferol " (Vitamin D3) 125 MCG (5000 UT) TABS, Take 5,000 Units by mouth daily pt reports taking 2 tab, Disp: , Rfl:     clindamycin (CLEOCIN) 150 mg capsule, Take 1 capsule by mouth 4 times a day, Disp: , Rfl:     cloNIDine (CATAPRES) 0.2 mg tablet, Take 0.2 mg by mouth daily at bedtime, Disp: , Rfl:     clopidogrel (PLAVIX) 75 mg tablet, Take 75 mg by mouth every morning, Disp: , Rfl:     clotrimazole-betamethasone (LOTRISONE) 1-0.05 % cream, Apply to affected area 2 times daily prn, Disp: 45 g, Rfl: 1    Depo-Testosterone 100 MG/ML IM injection, INJECT 25MG (0.25ML) INTO THE MUSCLE EVERY 2 WEEKS(VIAL ONLY GOOD FOR 28DAYS), Disp: , Rfl:     docusate sodium (COLACE) 100 mg capsule, Take 100 mg by mouth 3 (three) times a day, Disp: , Rfl:     flavoxATE (URISPAS) 100 mg tablet, TAKE 1 TABLET BY MOUTH 3 TIMES A DAY AS NEEDED FOR BLADDER SPASMS., Disp: 90 tablet, Rfl: 3    furosemide (LASIX) 20 mg tablet, Take 10 mg by mouth daily, Disp: , Rfl:     Ginkgo Biloba 40 MG TABS, Take 120 mg by mouth daily pt reports taking 2 tabs daily, Disp: , Rfl:     ibuprofen (MOTRIN) 200 mg tablet, Take 200 mg by mouth as needed for mild pain pt reports taking 3-4tabs every 6hrs. , Disp: , Rfl:     ibuprofen (MOTRIN) 800 mg tablet, Take 800 mg by mouth every 8 (eight) hours as needed, Disp: , Rfl:     lubiprostone (AMITIZA) 24 mcg capsule, Take 24 mcg by mouth 2 (two) times a day with meals pt reports not taking, Disp: , Rfl:     lubiprostone (AMITIZA) 8 mcg capsule, Take 8 mcg by mouth 3 (three) times a day, Disp: , Rfl:     LUTEIN PO, Take by mouth in the morning 20mg on bottle, Disp: , Rfl:     methylPREDNISolone sodium succinate in sodium chloride 0.9 % 250 mL IVPB, Inject 1,000 mg into a catheter in a vein every 30 (thirty) days. 1x/month, provided at medical office. per EPIC  Indications: MS, Disp: , Rfl:     Misc Natural Products (GINSENG COMPLEX PO), Take 2 tablets by mouth daily 500mg on bottle., Disp: , Rfl:     Movantik 25 MG tablet,  Take 25 mg by mouth every evening, Disp: , Rfl:     Multiple Vitamins-Minerals (ZINC PO), Take by mouth, Disp: , Rfl:     naloxone (NARCAN) 4 mg/0.1 mL nasal spray, ADMINISTER 1 SPRAY INTO ONE NOSTRIL. CALL 911. REPEAT AFTER 2-3 MIN IF NO OR MINIMAL RESPONSE, Disp: , Rfl:     Nerve Stimulator (STANDARD TENS) VICKY, by Does not apply route 2 (two) times a day, Disp: 1 Device, Rfl: 0    Nerve Stimulator (TENS THERAPY REPLACE BACK PADS) MISC, 30 pad by Does not apply route 2 (two) times a day, Disp: 30 each, Rfl: 0    Nerve Stimulator VICKY, by Does not apply route 2 (two) times a day, Disp: 1 each, Rfl: 0    Nerve Stimulator Supplies MISC, 30 pad by Does not apply route 2 (two) times a day, Disp: 30 pad, Rfl: 0    NON FORMULARY, HEMPANOL BRAIN DETOX 200MG . pt reports not taking per 5/18/23., Disp: , Rfl:     Omega-3 Fatty Acids (FISH OIL OMEGA-3 PO), Take 1,200 mg by mouth daily , Disp: , Rfl:     phenazopyridine (PYRIDIUM) 200 mg tablet, Take 1 tablet (200 mg total) by mouth 3 (three) times a day as needed for bladder spasms, Disp: 10 tablet, Rfl: 0    Potassium 99 MG TABS, Take 99 mg by mouth daily pt reports taking 3 tabs daily, Disp: , Rfl:     potassium chloride (Klor-Con) 10 mEq tablet, 1 TABLET DAILY, Disp: , Rfl:     pregabalin (LYRICA) 75 mg capsule, , Disp: , Rfl:     Probiotic Product (PROBIOTIC DAILY PO), Take 2 tablets by mouth daily  , Disp: , Rfl:     promethazine (PHENERGAN) 50 MG tablet, daily at bedtime 1.5 tabs daily at bedtime, Disp: , Rfl: 1    Restasis 0.05 % ophthalmic emulsion, INSTILL 1 DROP BY OPHTHALMIC ROUTE EVERY 12 HOURS BOTH EYES, Disp: , Rfl:     selenium sulfide (SELSUN) 2.5 % shampoo, Apply topically daily as needed for dandruff, Disp: 118 mL, Rfl: 0    testosterone cypionate (DEPO-TESTOSTERONE) 200 mg/mL SOLN, 25 MG (0.125ML) IM ONCE EVERY 7 DAYS, Disp: , Rfl:     topiramate (TOPAMAX) 50 MG tablet, Take 50 mg by mouth daily  , Disp: , Rfl: 5    TURMERIC PO, Take by mouth Turmeric  Curcumin 500mg on bottle. 1 tab daily, Disp: , Rfl:     ZINC-VITAMIN C PO, Take by mouth pt not taking due to taking regular zinc per pt report on 5/18/23., Disp: , Rfl:     gabapentin (NEURONTIN) 100 mg capsule, Take 1 capsule (100 mg total) by mouth 3 (three) times a day, Disp: 90 capsule, Rfl: 1  This Visit:     [facility] PA-C, scribed this note in conjunction with and in the presence of Dr. Ryan Nicole DO, who performed parts of the services as described in this documentation    Dr. Ryan Nicole DO, Orthopedic Surgeon  Orthopedic Oncology & [facility] Surgery

## 2025-04-30 ENCOUNTER — TELEMEDICINE (OUTPATIENT)
Dept: PAIN MEDICINE | Facility: CLINIC | Age: 45
End: 2025-04-30
Payer: COMMERCIAL

## 2025-04-30 VITALS — WEIGHT: 105 LBS | BODY MASS INDEX: 16.88 KG/M2 | HEIGHT: 66 IN

## 2025-04-30 DIAGNOSIS — M54.9 MID BACK PAIN: ICD-10-CM

## 2025-04-30 DIAGNOSIS — M54.50 CHRONIC MIDLINE LOW BACK PAIN WITHOUT SCIATICA: ICD-10-CM

## 2025-04-30 DIAGNOSIS — G89.4 CHRONIC PAIN SYNDROME: ICD-10-CM

## 2025-04-30 DIAGNOSIS — M25.562 CHRONIC PAIN OF BOTH KNEES: ICD-10-CM

## 2025-04-30 DIAGNOSIS — G89.29 CHRONIC PAIN OF BOTH KNEES: ICD-10-CM

## 2025-04-30 DIAGNOSIS — M25.561 CHRONIC PAIN OF BOTH KNEES: ICD-10-CM

## 2025-04-30 DIAGNOSIS — M47.816 LUMBAR SPONDYLOSIS: ICD-10-CM

## 2025-04-30 DIAGNOSIS — G35 MS (MULTIPLE SCLEROSIS) (HCC): ICD-10-CM

## 2025-04-30 DIAGNOSIS — R29.898 WEAKNESS OF RIGHT LEG: ICD-10-CM

## 2025-04-30 DIAGNOSIS — G89.29 CHRONIC MIDLINE LOW BACK PAIN WITHOUT SCIATICA: ICD-10-CM

## 2025-04-30 DIAGNOSIS — M16.0 PRIMARY OSTEOARTHRITIS OF BOTH HIPS: Primary | ICD-10-CM

## 2025-04-30 PROCEDURE — 99214 OFFICE O/P EST MOD 30 MIN: CPT | Performed by: ANESTHESIOLOGY

## 2025-04-30 RX ORDER — SODIUM CHLORIDE, SODIUM LACTATE, POTASSIUM CHLORIDE, CALCIUM CHLORIDE 600; 310; 30; 20 MG/100ML; MG/100ML; MG/100ML; MG/100ML
20 INJECTION, SOLUTION INTRAVENOUS CONTINUOUS
OUTPATIENT
Start: 2025-04-30

## 2025-04-30 RX ORDER — NALOXEGOL OXALATE 25 MG/1
25 TABLET, FILM COATED ORAL EVERY MORNING
Qty: 30 TABLET | Refills: 1 | OUTPATIENT
Start: 2025-04-30

## 2025-04-30 NOTE — PROGRESS NOTES
"Virtual Regular VisitName: Christie Hemphill      : 1980      MRN: [id number]  Encounter Provider: Domingo Silva MD  Encounter Date: 2025   Encounter department: [facility] [place]  :  Assessment & Plan  Primary osteoarthritis of both hips         Weakness of right leg         MS (multiple sclerosis) (HCC)         Chronic midline low back pain without sciatica         Chronic pain of both knees         Mid back pain         Chronic pain syndrome               History of Present Illness {?Quick Links Encounters * My Last Note * Last Note in Specialty * Snapshot * Since Last Visit * History :83730}    HPI  Review of Systems    Objective {?Quick Links Trend Vitals * Enter New Vitals * Results Review * Timeline (Adult) * Labs * Imaging * Cardiology * Procedures * Lung Cancer Screening * Surgical eConsent :14225}  Ht 5' 6\" (1.676 m)   Wt 47.6 kg (105 lb)   BMI 16.95 kg/m²     Physical Exam    Administrative Statements   Encounter provider Domingo Silva MD    The Patient is located at {[facility] Virtual Patient Location:[address]68} and in the following state in which I hold an active license {[facility] virtual patient location:[address]}.    The patient was identified by name and date of birth. Christie Hemphill was informed that this is a telemedicine visit and that the visit is being conducted through {[facility] VIRTUAL VISIT MEDIUM:24763}.  {Telemedicine confidentiality :23797} {Telemedicine participants:81042}  She acknowledged consent and understanding of privacy and security of the video platform. The patient has agreed to participate and understands they can discontinue the visit at any time.    I have spent a total time of *** minutes in caring for this patient on the day of the visit/encounter including {[facility] Counseling Topics:[id number]}, not including the time spent for establishing the audio/video connection.    "

## 2025-04-30 NOTE — PROGRESS NOTES
Virtual Regular VisitName: Christie Hemphill      : 1980      MRN: [id number]  Encounter Provider: Domingo Silva MD  Encounter Date: 2025   Encounter department: [facility] [place]    Assessment:  1. Primary osteoarthritis of both hips    2. Weakness of right leg    3. MS (multiple sclerosis) (HCC)    4. Chronic midline low back pain without sciatica    5. Chronic pain of both knees    6. Mid back pain    7. Chronic pain syndrome        Plan:  Patient is a 44-year-old female complains of neck pain, mid back pain, low back pain and bilateral thigh pain with chronic pain secondary to lumbar spondylosis, MS, presents to office for follow-up visit.  Patient does have significant hip pain and groin pain and will be seeing orthopedic in regards to her right hip.  Patient had a bilateral hip steroid injection with provided her with 80 to 90% relief but only lasted 2 days.  Patient follow-up with Dr. Nicole who feels that patient is a surgical candidate for bilateral hip replacements.  At this time we will try to address patient's facet agenic low back pain which is elicited by standing up straight, rotating left and right causing sharp, stabbing pain which reduces patient's range of motion.  1.  We will schedule pt for a B/L L3-4 and L4-5  MBB#1 with sedation  2.  Follow-up 1 month after injection        Complete risks and benefits including bleeding, infection, tissue reaction, nerve injury and allergic reaction were discussed. The approach was demonstrated using models and literature was provided. Verbal and written consent was obtained.    History of Present Illness:  The patient is a 45 y.o. female who presents for a follow up office visit in regards to Back Pain.   The patient’s current symptoms include 10/10 constant sharp, stabbing, throbbing pain without particular time pattern.    Current pain medications includes:  none.     I have personally reviewed and/or updated the patient's past  medical history, past surgical history, family history, social history, current medications, allergies, and vital signs today.         Review of Systems  Review of Systems   Musculoskeletal:  Positive for back pain and gait problem.        Leg pain    Neurological:  Positive for weakness.   All other systems reviewed and are negative.          Past Medical History:   Diagnosis Date    Back pain     Chronic constipation 6/27/2016    Chronic pain disorder     Collar bone fracture     right side    Crutches as ambulation aid     Depression     Dyssynergic defecation     Type 1    ETOH abuse     Head injury 2014    Infectious viral hepatitis     Lyme disease     Meningitis spinal 2007?    Multiple sclerosis (HCC)     Opioid abuse (HCC)     Rib fractures     Thrombocytopenia (HCC)     Use of cane as ambulatory aid        Past Surgical History:   Procedure Laterality Date    APPENDECTOMY      CLAVICLE SURGERY      COLONOSCOPY      FL LUMBAR PUNCTURE DIAGNOSTIC  11/25/2020    KNEE ARTHROSCOPY      MULTIPLE TOOTH EXTRACTIONS      MI COLONOSCOPY FLX DX W/COLLJ SPEC WHEN PFRMD N/A 6/27/2016    Procedure: COLONOSCOPY;  Surgeon: Jason Rogers MD;  Location: [facility];  Service: Colorectal    MI OPEN IMPLANTATION CHARITY SACRAL NERVE N/A 11/15/2021    Procedure: INSERTION OF NEUROSTIMULATOR ELECTRODE ARRAY SACRAL NERVE; FLUOROSCOPY; ELECTRONICN ANALYSIS;  Surgeon: Abdulaziz Guevara MD;  Location: [facility];  Service: UroGynecology           TONSILLECTOMY AND ADENOIDECTOMY      TUBAL LIGATION         Family History   Problem Relation Age of Onset    Skin cancer Mother     No Known Problems Brother        Social History     Occupational History    Not on file   Tobacco Use    Smoking status: Every Day     Current packs/day: 0.25     Average packs/day: 0.3 packs/day for 1.2 years (0.3 ttl pk-yrs)     Types: Cigarettes     Start date: 03/2024    Smokeless tobacco: Never   Vaping Use    Vaping status: Every Day    Substances:  "Nicotine, Flavoring   Substance and Sexual Activity    Alcohol use: Yes     Comment: 2 mixed drinks each night    Drug use: No    Sexual activity: Yes     Partners: Male         Current Outpatient Medications:     albendazole (ALBENZA) 200 mg tablet, , Disp: , Rfl:     ascorbic acid (VITAMIN C) 500 mg tablet, Take 500 mg by mouth daily, Disp: , Rfl:     b complex vitamins tablet, Take 1 tablet by mouth daily, Disp: , Rfl:     B-D HYPODERMIC NEEDLE 23GX1\" 23G X 1\" MISC, USE ONE WEEKLY, Disp: , Rfl:     BD Plastipak Syringe 21G X 1\" 3 ML MISC, USE ONE WEEKLY, Disp: , Rfl:     Biotin 62783 MCG TABS, Take 1 tablet by mouth daily  , Disp: , Rfl:     buprenorphine-naloxone (SUBOXONE) 8-2 mg per SL tablet, Place 1 tablet under the tongue daily, Disp: , Rfl:     Calcium Carbonate (CALCIUM 600 PO), Take 600 mg by mouth daily pt reports not having & not taking, Disp: , Rfl:     celecoxib (CeleBREX) 200 mg capsule, Take 1 capsule (200 mg total) by mouth 2 (two) times a day With food, Disp: 60 capsule, Rfl: 2    chlorhexidine (PERIDEX) 0.12 % solution, SWISH AND SPIT 15MLS ONCE A DAY AS DIRECTED, Disp: , Rfl:     Cholecalciferol (Vitamin D3) 125 MCG (5000 UT) TABS, Take 5,000 Units by mouth daily pt reports taking 2 tab, Disp: , Rfl:     clindamycin (CLEOCIN) 150 mg capsule, Take 1 capsule by mouth 4 times a day, Disp: , Rfl:     cloNIDine (CATAPRES) 0.2 mg tablet, Take 0.2 mg by mouth daily at bedtime, Disp: , Rfl:     clopidogrel (PLAVIX) 75 mg tablet, Take 75 mg by mouth every morning, Disp: , Rfl:     clotrimazole-betamethasone (LOTRISONE) 1-0.05 % cream, Apply to affected area 2 times daily prn, Disp: 45 g, Rfl: 1    Depo-Testosterone 100 MG/ML IM injection, INJECT 25MG (0.25ML) INTO THE MUSCLE EVERY 2 WEEKS(VIAL ONLY GOOD FOR 28DAYS), Disp: , Rfl:     docusate sodium (COLACE) 100 mg capsule, Take 100 mg by mouth 3 (three) times a day, Disp: , Rfl:     flavoxATE (URISPAS) 100 mg tablet, TAKE 1 TABLET BY MOUTH 3 TIMES A DAY " AS NEEDED FOR BLADDER SPASMS., Disp: 90 tablet, Rfl: 3    furosemide (LASIX) 20 mg tablet, Take 10 mg by mouth daily, Disp: , Rfl:     gabapentin (NEURONTIN) 100 mg capsule, Take 1 capsule (100 mg total) by mouth 3 (three) times a day, Disp: 90 capsule, Rfl: 1    Ginkgo Biloba 40 MG TABS, Take 120 mg by mouth daily pt reports taking 2 tabs daily, Disp: , Rfl:     ibuprofen (MOTRIN) 200 mg tablet, Take 200 mg by mouth as needed for mild pain pt reports taking 3-4tabs every 6hrs. , Disp: , Rfl:     ibuprofen (MOTRIN) 800 mg tablet, Take 800 mg by mouth every 8 (eight) hours as needed, Disp: , Rfl:     lubiprostone (AMITIZA) 24 mcg capsule, Take 24 mcg by mouth 2 (two) times a day with meals pt reports not taking, Disp: , Rfl:     lubiprostone (AMITIZA) 8 mcg capsule, Take 8 mcg by mouth 3 (three) times a day, Disp: , Rfl:     LUTEIN PO, Take by mouth in the morning 20mg on bottle, Disp: , Rfl:     methylPREDNISolone sodium succinate in sodium chloride 0.9 % 250 mL IVPB, Inject 1,000 mg into a catheter in a vein every 30 (thirty) days. 1x/month, provided at medical office. per EPIC  Indications: MS, Disp: , Rfl:     Misc Natural Products (GINSENG COMPLEX PO), Take 2 tablets by mouth daily 500mg on bottle., Disp: , Rfl:     Movantik 25 MG tablet, Take 25 mg by mouth every evening, Disp: , Rfl:     Multiple Vitamins-Minerals (ZINC PO), Take by mouth, Disp: , Rfl:     naloxone (NARCAN) 4 mg/0.1 mL nasal spray, ADMINISTER 1 SPRAY INTO ONE NOSTRIL. CALL 911. REPEAT AFTER 2-3 MIN IF NO OR MINIMAL RESPONSE, Disp: , Rfl:     Nerve Stimulator (STANDARD TENS) VICKY, by Does not apply route 2 (two) times a day, Disp: 1 Device, Rfl: 0    Nerve Stimulator (TENS THERAPY REPLACE BACK PADS) MISC, 30 pad by Does not apply route 2 (two) times a day, Disp: 30 each, Rfl: 0    Nerve Stimulator VICKY, by Does not apply route 2 (two) times a day, Disp: 1 each, Rfl: 0    Nerve Stimulator Supplies MISC, 30 pad by Does not apply route 2 (two)  "times a day, Disp: 30 pad, Rfl: 0    NON FORMULARY, HEMPANOL BRAIN DETOX 200MG . pt reports not taking per 5/18/23., Disp: , Rfl:     Omega-3 Fatty Acids (FISH OIL OMEGA-3 PO), Take 1,200 mg by mouth daily , Disp: , Rfl:     phenazopyridine (PYRIDIUM) 200 mg tablet, Take 1 tablet (200 mg total) by mouth 3 (three) times a day as needed for bladder spasms, Disp: 10 tablet, Rfl: 0    Potassium 99 MG TABS, Take 99 mg by mouth daily pt reports taking 3 tabs daily, Disp: , Rfl:     potassium chloride (Klor-Con) 10 mEq tablet, 1 TABLET DAILY, Disp: , Rfl:     pregabalin (LYRICA) 75 mg capsule, , Disp: , Rfl:     Probiotic Product (PROBIOTIC DAILY PO), Take 2 tablets by mouth daily  , Disp: , Rfl:     promethazine (PHENERGAN) 50 MG tablet, daily at bedtime 1.5 tabs daily at bedtime, Disp: , Rfl: 1    Restasis 0.05 % ophthalmic emulsion, INSTILL 1 DROP BY OPHTHALMIC ROUTE EVERY 12 HOURS BOTH EYES, Disp: , Rfl:     selenium sulfide (SELSUN) 2.5 % shampoo, Apply topically daily as needed for dandruff, Disp: 118 mL, Rfl: 0    testosterone cypionate (DEPO-TESTOSTERONE) 200 mg/mL SOLN, 25 MG (0.125ML) IM ONCE EVERY 7 DAYS, Disp: , Rfl:     topiramate (TOPAMAX) 50 MG tablet, Take 50 mg by mouth daily  , Disp: , Rfl: 5    TURMERIC PO, Take by mouth Turmeric Curcumin 500mg on bottle. 1 tab daily, Disp: , Rfl:     ZINC-VITAMIN C PO, Take by mouth pt not taking due to taking regular zinc per pt report on 5/18/23., Disp: , Rfl:     Allergies   Allergen Reactions    Penicillins GI Intolerance and Nausea Only     Other reaction(s): Unknown Reaction       Physical Exam:    Ht 5' 6\" (1.676 m)   Wt 47.6 kg (105 lb)   BMI 16.95 kg/m²     Constitutional:normal, well developed, well nourished, alert, in no distress and non-toxic and no overt pain behavior. and underweight  Eyes:anicteric  HEENT:grossly intact  Neck:supple, symmetric, trachea midline and no masses   Pulmonary:even and unlabored  Cardiovascular:No edema or pitting edema " present  Skin:Normal without rashes or lesions and well hydrated  Psychiatric:Mood and affect appropriate  Neurologic:Cranial Nerves II-XII grossly intact  Musculoskeletal:antalgic    Lumbar/Sacral Spine examination demonstrates.  Decreased range of motion lumbar spine with pain upon: flexion, lateral rotation to the left/right, and bending to the left/right.  Bilateral lumbar paraspinals tender to palpation. Muscle spasms noted in the lumbar area bilaterally. 4/5 lower extremity strength in all muscle groups bilaterally. Positive seated straight leg raise for bilateral lower extremities.  Sensitivity to light touch intact bilateral lower extremities. 2+ reflexes in the patella and Achilles.  No ankle clonus    Imaging    LUMBAR SPINE     INDICATION:   Unilateral primary osteoarthritis, left hip. Unilateral primary osteoarthritis, right hip. Multiple sclerosis.      COMPARISON: 8-7-2023 x-rays.     VIEWS:  XR SPINE LUMBAR MINIMUM 4 VIEWS NON INJURY     FINDINGS:     There are 5 non rib bearing lumbar vertebral bodies.      There is no destructive osseous lesion.     Slight thoracolumbar curvature.     Multilevel degenerative facet hypertrophy and disc space narrowing in the lower lumbar spine.     Degenerative changes both hips.     The pedicles appear intact.     Soft tissues are unremarkable.     IMPRESSION:        Multilevel degenerative facet hypertrophy and disc space narrowing in the lower lumbar spine.     Degenerative changes both hips.     Electronically signed: 04/29/2025 11:04 PM Juan Nicole MD      Administrative Statements        Encounter provider Domingo Silva MD     The Patient is located at Home and in the following state in which I hold an active license [place].     The patient was identified by name and date of birth. Christie Hemphill was informed that this is a telemedicine visit and that the visit is being conducted through the Epic Embedded platform. She agrees to proceed..  My office door was  closed. No one else was in the room.  She acknowledged consent and understanding of privacy and security of the video platform. The patient has agreed to participate and understands they can discontinue the visit at any time.     I have spent a total time of 15 minutes in caring for this patient on the day of the visit/encounter including Diagnostic results, Prognosis, Risks and benefits of tx options, Instructions for management, and Patient and family education, not including the time spent for establishing the audio/video connection.

## 2025-05-02 ENCOUNTER — TELEPHONE (OUTPATIENT)
Age: 45
End: 2025-05-02

## 2025-05-02 ENCOUNTER — TELEPHONE (OUTPATIENT)
Dept: PAIN MEDICINE | Facility: CLINIC | Age: 45
End: 2025-05-02

## 2025-05-02 NOTE — TELEPHONE ENCOUNTER
Spoke to insurance does need auth because she is over 21. I did submit auth. Its pending. Auth Number #[id number]

## 2025-05-02 NOTE — TELEPHONE ENCOUNTER
Kiki with [facility] ([phone]) called to advised referral for nutritional services has been approved.  Approval #[id number].

## 2025-05-05 ENCOUNTER — OFFICE VISIT (OUTPATIENT)
Dept: PHYSICAL THERAPY | Facility: HOME HEALTHCARE | Age: 45
End: 2025-05-05
Payer: COMMERCIAL

## 2025-05-05 DIAGNOSIS — G35 HX OF MULTIPLE SCLEROSIS (HCC): Primary | ICD-10-CM

## 2025-05-05 DIAGNOSIS — R26.2 AMBULATORY DYSFUNCTION: ICD-10-CM

## 2025-05-05 DIAGNOSIS — R29.898 WEAKNESS OF BOTH LOWER EXTREMITIES: ICD-10-CM

## 2025-05-05 PROCEDURE — 97112 NEUROMUSCULAR REEDUCATION: CPT

## 2025-05-05 NOTE — PROGRESS NOTES
Daily Note     Today's date: 2025  Patient name: Christie Hemphill  : 1980  MRN: [id number]  Referring provider: Roosevelt Ortiz DO  Dx:   Encounter Diagnosis     ICD-10-CM    1. Hx of multiple sclerosis (HCC)  G35       2. Weakness of both lower extremities  R29.898       3. Ambulatory dysfunction  R26.2           Start Time: 1300  Stop Time: 1340  Total time in clinic (min): 40 minutes    Subjective: Pt states that she has not received NexWave unit from [employer] yet but will bring it to appt after she receives it.  Pt presents w/ son and daughter.        Objective: See treatment diary below      Assessment: Pt tolerated treatment session w/ bout of Russian stimulation of B/L quads.  PT took time to set up space for pt comfort, PT needed multiple trials of intensity and electrode placement for pt comfort during 10s contraction time.  Once pt comfort and effectiveness was achieved, pt tolerated 15 mins of Russian stim to B/L quads w/ a quad set during contraction period.  At end of session, pt was able to ambulate around clinic w/ rollator w/o increase in LLE pain, discomfort still present from hip OA.      Plan: Continue per plan of care.      Precautions: MS    Visit Count 3 4 5 1 2       Manuals 4/22   4/29   5/5  4/8  4/15   L knee Ext (HS stretching)       BLE static stretch  to achieve extension position                                    Neuro Re-Ed           Balance as appropriate                      L knee Extension on Table to decrease clonus and rigidity/ spasticity     DF - Static L knee extension to reduce contracture of L HS     - '   Rhythmic initiation of BLEs w/ knees flexed        Honduran stimulation education & use.   Initial Trial w/ smaller pads due to less muscle mass, switched to larger pads for effectivness.        Once contraction was achieved for pt in comfortable position (seated w/ RLE propped on stool for Knee ext), stim was performed at...    10s on, 20s off  Intensity @ 70 for  "15 mins  Pt in comfortable position (seated w/ RLE propped on stool for Knee ext), stim was performed at...    Trial of electrode position, had to be replaced for comfort of pt during ocntraction.     Pt w/ Quad set during contraction    RLE:  10s on, 20s off  Intensity @ 64 for 15 mins    LLE:  10s on, 20s off Intensity @ 55 for 15 mins                        Ther Ex                  Re-Eval Tests and Measures    Discussion on next steps for skilled therapy and what has been benefiting pt.  Pt's sx.      DF    NuStep           Ankle pumps           HR/TR        Standing Hip flex/abd/ext        Mini Squats        Step ups/ Step downs        Standing Back extensions in // bars 10x               Heel Slides w/ towel          QS          Hip abd on sliding board           LAQ           Hip add W/ ball 3\" hold  2x10         Hip abd W/o resistance 3\" hold 2x10         SLR          SAQ        S/L SLR                    Amb in // bars 3 sets of 1x <>         Discussion on use of Russian stim next session to further contract and strengthen quads, everters, DF DF & HZ                 Education on clonus and stretching protocol w/ assistance at home           Education on effects of MS on body w/ over activity.    Education on use of TENS unit at home for LLE contracture of HS musculature.                         Discussion on differentiation between stretching pain, sharp pain    Also discussion on differentiation between what a stretch is and muscle activation and how walking causes muscle activation vs stretching of HS                           Ther Activity                     Family Training                           Gait Training           Ambulation around clinic   Pre & post Russian stimulation 50ft ea                   Modalities          CP           E-Stim Attended by PT                                      "

## 2025-05-07 ENCOUNTER — PREP FOR PROCEDURE (OUTPATIENT)
Dept: PAIN MEDICINE | Facility: CLINIC | Age: 45
End: 2025-05-07

## 2025-05-07 DIAGNOSIS — M47.816 LUMBAR SPONDYLOSIS: Primary | ICD-10-CM

## 2025-05-12 DIAGNOSIS — N31.9 NEUROGENIC BLADDER: ICD-10-CM

## 2025-05-12 DIAGNOSIS — R39.11 URINARY HESITANCY: ICD-10-CM

## 2025-05-12 RX ORDER — FLAVOXATE HYDROCHLORIDE 100 MG/1
100 TABLET ORAL 3 TIMES DAILY PRN
Qty: 90 TABLET | Refills: 3 | Status: SHIPPED | OUTPATIENT
Start: 2025-05-12

## 2025-05-13 ENCOUNTER — APPOINTMENT (OUTPATIENT)
Dept: PHYSICAL THERAPY | Facility: HOME HEALTHCARE | Age: 45
End: 2025-05-13
Payer: COMMERCIAL

## 2025-05-15 ENCOUNTER — OFFICE VISIT (OUTPATIENT)
Dept: PHYSICAL THERAPY | Facility: HOME HEALTHCARE | Age: 45
End: 2025-05-15
Attending: STUDENT IN AN ORGANIZED HEALTH CARE EDUCATION/TRAINING PROGRAM
Payer: COMMERCIAL

## 2025-05-15 DIAGNOSIS — G35 HX OF MULTIPLE SCLEROSIS (HCC): Primary | ICD-10-CM

## 2025-05-15 DIAGNOSIS — R26.2 AMBULATORY DYSFUNCTION: ICD-10-CM

## 2025-05-15 DIAGNOSIS — R29.898 WEAKNESS OF BOTH LOWER EXTREMITIES: ICD-10-CM

## 2025-05-15 PROCEDURE — 97014 ELECTRIC STIMULATION THERAPY: CPT

## 2025-05-15 PROCEDURE — 97110 THERAPEUTIC EXERCISES: CPT

## 2025-05-15 PROCEDURE — 97112 NEUROMUSCULAR REEDUCATION: CPT

## 2025-05-15 NOTE — PROGRESS NOTES
Daily Note     Today's date: 5/15/2025  Patient name: Christie Hemphill  : 1980  MRN: [id number]  Referring provider: Roosevelt Ortiz DO  Dx:   Encounter Diagnosis     ICD-10-CM    1. Hx of multiple sclerosis (HCC)  G35       2. Weakness of both lower extremities  R29.898       3. Ambulatory dysfunction  R26.2           Start Time: 1346  Stop Time: 1450  Total time in clinic (min): 64 minutes    Subjective: Pt states that she had severe soreness in BLEs for multiple days after last session.  Last session consisted of first instance of B/L quad strengthening w/ assistance from [employer].  Pt presents for education on NexWave portable TENS, IFC, & NMES unit from PT.        Objective: See treatment diary below      Assessment: Pt tolerated treatment session of NMES and TENS w/ use of pt's NexWave device post-education from PT.  PT educated pt on functionality of NexWave, differing settings and affects they have, and how pt is able to set up device independently w/ proper treatment times and settings.  Pt was able to tolerate NMES on BLEs but w/ each LE independently receiving NMES to avoid over-exertion of body at once.  At end of session, PT set up pt for TENS w/ NexWave device, pt was able to perform w/ pain-relief.      Plan: Continue per plan of care.      Precautions: MS    Visit Count 3 4 5 6 2       Manuals    5/5  5/15  4/15   L knee Ext (HS stretching)       BLE static stretch  to achieve extension position                                    Neuro Re-Ed           Balance as appropriate                      L knee Extension on Table to decrease clonus and rigidity/ spasticity     DF - Static L knee extension to reduce contracture of L HS     - '   Rhythmic initiation of BLEs w/ knees flexed        Mauritian stimulation education & use.   Initial Trial w/ smaller pads due to less muscle mass, switched to larger pads for effectivness.        Once contraction was achieved for pt in  "comfortable position (seated w/ RLE propped on stool for Knee ext), stim was performed at...    10s on, 20s off  Intensity @ 70 for 15 mins  Pt in comfortable position (seated w/ RLE propped on stool for Knee ext), stim was performed at...    Trial of electrode position, had to be replaced for comfort of pt during ocntraction.     Pt w/ Quad set during contraction    RLE:  10s on, 20s off  Intensity @ 64 for 15 mins    LLE:  10s on, 20s off Intensity @ 55 for 15 mins NMES from [employer]    30s off: 10s on  For total of 10 mins ea LE individually w/ QS during 10s on time    RLE amplitude = 9mA    LLE amplitude = 7mA    Total of 20 mins of NMES                           Ther Ex                  Re-Eval Tests and Measures    Discussion on next steps for skilled therapy and what has been benefiting pt.  Pt's sx.      DF    NuStep           Ankle pumps           HR/TR        Standing Hip flex/abd/ext        Mini Squats        Step ups/ Step downs        Standing Back extensions in // bars 10x               Heel Slides w/ towel          QS          Hip abd on sliding board           LAQ           Hip add W/ ball 3\" hold  2x10         Hip abd W/o resistance 3\" hold 2x10         SLR          SAQ        S/L SLR                    Amb in // bars 3 sets of 1x <>         Discussion on use of Russian stim next session to further contract and strengthen quads, maddy, DF DF & HZ                 Education on clonus and stretching protocol w/ assistance at home           Education on effects of MS on body w/ over activity.    Education on use of TENS unit at home for LLE contracture of HS musculature.                         Discussion on differentiation between stretching pain, sharp pain    Also discussion on differentiation between what a stretch is and muscle activation and how walking causes muscle activation vs stretching of HS                Education on NexWave (different settings TENS, IFC, NMES) proper settings, different " effects (pain modulation vs strength faciliation), etc.    DF               Ther Activity                     Family Training                           Gait Training           Ambulation around clinic   Pre & post Russian stimulation 50ft ea                   Modalities          CP           TENS Unattended by PT    10' total                                     same name as above

## 2025-05-20 ENCOUNTER — TELEPHONE (OUTPATIENT)
Dept: NEUROLOGY | Facility: CLINIC | Age: 45
End: 2025-05-20

## 2025-05-20 ENCOUNTER — OFFICE VISIT (OUTPATIENT)
Dept: OBGYN CLINIC | Facility: CLINIC | Age: 45
End: 2025-05-20
Payer: COMMERCIAL

## 2025-05-20 VITALS
WEIGHT: 105 LBS | HEIGHT: 66 IN | OXYGEN SATURATION: 98 % | HEART RATE: 77 BPM | BODY MASS INDEX: 16.88 KG/M2 | TEMPERATURE: 98.6 F

## 2025-05-20 DIAGNOSIS — E46 MALNUTRITION COMPROMISING BODILY FUNCTION (HCC): ICD-10-CM

## 2025-05-20 DIAGNOSIS — G35 MS (MULTIPLE SCLEROSIS) (HCC): ICD-10-CM

## 2025-05-20 DIAGNOSIS — M16.11 PRIMARY OSTEOARTHRITIS OF RIGHT HIP: ICD-10-CM

## 2025-05-20 DIAGNOSIS — F10.20 ALCOHOLISM (HCC): ICD-10-CM

## 2025-05-20 DIAGNOSIS — D69.6 THROMBOCYTOPENIA (HCC): ICD-10-CM

## 2025-05-20 DIAGNOSIS — Z51.81 ENCOUNTER FOR MONITORING SUBOXONE MAINTENANCE THERAPY: ICD-10-CM

## 2025-05-20 DIAGNOSIS — M16.12 PRIMARY OSTEOARTHRITIS OF LEFT HIP: Primary | ICD-10-CM

## 2025-05-20 DIAGNOSIS — Z79.891 ENCOUNTER FOR MONITORING SUBOXONE MAINTENANCE THERAPY: ICD-10-CM

## 2025-05-20 PROCEDURE — 99214 OFFICE O/P EST MOD 30 MIN: CPT | Performed by: STUDENT IN AN ORGANIZED HEALTH CARE EDUCATION/TRAINING PROGRAM

## 2025-05-20 RX ORDER — DICLOFENAC POTASSIUM 50 MG/1
TABLET, FILM COATED ORAL
COMMUNITY
Start: 2025-05-13

## 2025-05-20 RX ORDER — TRAMADOL HYDROCHLORIDE 50 MG/1
TABLET ORAL
COMMUNITY
Start: 2025-05-07

## 2025-05-20 NOTE — ASSESSMENT & PLAN NOTE
Malnutrition Findings:                        Referral provided previously   Continue management with PCP       BMI Findings:           Body mass index is 16.95 kg/m².

## 2025-05-20 NOTE — ASSESSMENT & PLAN NOTE
Continue management with Dr. Concepcion Beth MD with  [facility]   Orders:    Ambulatory Referral to Orthopedic Surgery; Future

## 2025-05-20 NOTE — PROGRESS NOTES
Orthopedic Surgery Office Note  Christie Hemphill (45 y.o. female)   : 1980   MRN: [id number]   Encounter Date: 2025 with Dr. Ryan Nicole,   Encounter department: [facility] [place]  Chief Complaint   Patient presents with    Left Hip - Pain, Clicking, Follow-up, Locking       Assessment, Plan, & Discussion:     Assessment & Plan  Primary osteoarthritis of left hip  On today's evaluation, reviewed physical exam   Reviewed discussion with Dr. Sharpe, who specializes in anterior approach to total hip arthroplasty where we do not perform that approach  Plan for consultation with Dr. Sharpe  Continue physical therapy   Answered questions regarding hip vs knee in addressing. Knee XR reviewed   Recommend continuing physical therapy   Encouraged discussion of anti- spasmotics with MS provider   Patient has significant episodes of spasm where her antagonistic muscles are working against each other.  This creates almost a gear wrenching movement pattern that sounds like clicking/catching.  She should ensure that she is under the appropriate medical treatment by her primary MS physician.    Orders:    Ambulatory Referral to Orthopedic Surgery; Future    Primary osteoarthritis of right hip  Same as above, bilateral hip consideration   Orders:    Ambulatory Referral to Orthopedic Surgery; Future    MS (multiple sclerosis) (HCC)  Continue management with Dr. Concepcion Beth MD with  [facility]   Orders:    Ambulatory Referral to Orthopedic Surgery; Future    Encounter for monitoring Suboxone maintenance therapy  Has been on suboxone for long period of time   Advised to work with Dr. Sharpe's team and Ruchi's team to determine postoperative pain management     Suboxone clinic:   [facility] Bowman, MSN, CRNP, CCRN, APRN-BC  Crispin Lowry DO  [address] [place] [address]  Phone number: [phone]   [facility]  (HCC)  Continue management with PCP       Thrombocytopenia (HCC)  Continue management with PCP       Malnutrition compromising bodily function (HCC)  Malnutrition Findings:                        Referral provided previously   Continue management with PCP       BMI Findings:           Body mass index is 16.95 kg/m².              No follow-ups on file.    Surgery:   To be discussed with Dr. Sharpe      History of Present Illness:     Today, upcoming plan for injections with spine and pain in July (3rd and 17th)    She reports falling asleep with legs straight, wakes up to them being flexed at hip and knees, with time required in the morning to straighten them back out.     Prior HPI:  Christie Hemphill is a 45 y.o. female who presents for consultation at the request of No ref. provider found  regarding significant hip osteoarthritis and groin pain     Walks with walker, ambulates at baseline activity. Seen in wheelchair today.  Daily activity includes walking around stores, outside, around home.   Doing PT to stretch and mobiliae     Localizes pain to groin mostly. Describes spredding down hamstrings bilaterally     Bilateral hip joint injectiosn with Dr. Silva with 2 days of relief of groin pain. Follows with Dr. Silva for back/joint pain. Last seen 3/7/25, seen for other nerve pain as well.     Most recent back films from 2023    Physical therapy 5/15/25  Pt tolerated treatment session of NMES and TENS w/ use of pt's NexWave device post-education from PT. PT educated pt on functionality of NexWave, differing settings and affects they have, and how pt is able to set up device independently w/ proper treatment times and settings. Pt was able to tolerate NMES on BLEs but w/ each LE independently receiving NMES to avoid over-exertion of body at once. At end of session, PT set up pt for TENS w/ NexWave device, pt was able to perform w/ pain-relief.     Neurology 1/9/25  Ms. Hemphill is a 45-year-old female who has a  diagnosis of multiple sclerosis back in 2020 but notes having symptoms for several years prior to any official diagnosis.  She notes that she generally uses a rollator and has 2 of them available 1 for the house and the other for the car.  She does have a power chair as well for longer distances in the community as well as for appointments.  She endorses that on a good day she can ambulate half to 1 full block before needing to take a rest and on a bad day is limited to only household distance ambulation at best.  She notes weakness in the bilateral lower extremity that varies day by day but has pain as well that seems to be getting worse over time.  Most of the pain she experiences is in the buttocks and the legs to hurt mostly with bending.  She feels the bones are bending and popping and rubbing against each other.  She endorses that the pain is a 10/10 almost all the time every day except when sitting.     For pain and she has been taking Lyrica, Celebrex and occasionally ibuprofen which she admits to taking at the same time as the Celebrex as well as the Suboxone.  We did discuss NSAID safety at this moment too.  She endorses that she did see pain and spine and had trigger point injections in her trapezius and levator Scap with some success but only for her neck and shoulder discomfort.  She was also recommended for Cymbalta but did not want to start any antidepressant medications for her pain.  She has had limited success with other modalities including therapies and other medications that we had reviewed.     She was sent and referred based on her needs for a potential brace however thought that this was an appointment for podiatry.       Review of Systems  Constitutional: Negative for fatigue, fever or loss of appetite.   HENT: Negative.    Respiratory: Negative for shortness of breath, dyspnea.    Cardiovascular: Negative for chest pain/tightness.   Gastrointestinal: Negative for abdominal pain, N/V.  "  Endocrine: Negative for cold/heat intolerance, unexplained weight loss/gain.   Genitourinary: Negative for flank pain, dysuria  Skin: Negative for rash.    Psychiatric/Behavioral: Negative for agitation.  All else negative unless otherwise noted in HPI    Physical Exam:   General:  Pulse 77   Temp 98.6 °F (37 °C) (Temporal)   Ht 5' 6\" (1.676 m)   Wt 47.6 kg (105 lb)   SpO2 98%   BMI 16.95 kg/m²   Estimated body mass index is 16.95 kg/m² as calculated from the following:    Height as of this encounter: 5' 6\" (1.676 m).    Weight as of this encounter: 47.6 kg (105 lb).  Cons: Appears well.  No apparent distress.  Psych: Alert. Oriented.  Mood and affect normal.  HEENT: PERRLA, EOMI, Eyes clear, moist mucus membranes, hearing intact  Resp: Normal effort.  No audible wheezing or stridor. equal symmetric chest expansion.  CV: Extremities warm and well perfused. no chest pain, no palpitations, no discernable arrhythmia  Well Perfused peripherally, palpable distal pulse  Abd:    No distention or guarding. no peritoneal signs  Skin: Warm. No visible lesions.no clubbing, no cyanosis  Neuro: Normal muscle tone.     Orthopedic Exam:   Left Hip Exam    5/20/25  Right hip: able to extend to laying position without flexion contracture  Left hip: crepitus to hip and knee, appearing spasm in nature, flexion contracture 0-5  Continued atrophy     Bilateral knees 5/20  Left >0-120 degrees ROM to PROM, with spasm   Right 0-120 ROM AROM and PROM    Exam 4/29/25  hip flexion  Walking with rollator: flexion at hip; left knee flexed on standing to 30 degrees. , recurvatum of right knee   Extension to 30 degrees, 80 degrees of hip flexion while walking   10 degrees hip flexion contracture  Ambulates with hip flexion gait  Severe atrophy bilateral lower extremities     Hip abduction: left abduction active intact; palpable firing of muscles     Imaging/Studies:     Study: XR lumbar   Date: 4/29/25  Report: No radiologist report was " available at this time.  and I have personally reviewed the imaging in PACS and my impression is as follows:  Impression: no significant spondylosis, awaiting final read. No acute osseous abnormality. Noted horizontal sacrum     Study: XR bilateral hip  Date: 4/29/25  Report: No radiologist report was available at this time.  and I have personally reviewed the imaging in PACS and my impression is as follows:  Impression: significant osteoarthritic changes with suchondral sclerosis and subchondral cyst formation, narrowed joint space more significant on the left than right     XR bilateral knee  3/7/25  Dr. Nicole reviewed these images in PACs, and read and agree with radiology report   Left:   There is no acute fracture or dislocation.  There is no joint effusion.  No significant degenerative changes.  No lytic or blastic osseous lesion.  Soft tissues are unremarkable.  Right:   There is no acute fracture or dislocation.  There is no joint effusion.  No significant degenerative changes.  Small osseous protuberance at the proximal tibial metaphysis cyst suggestive of a small osteochondroma. No lytic or blastic osseous lesion.  Soft tissues are unremarkable.    Medical, Surgical, Family, and Social History    The patient's medical history, family history, and social history, were reviewed and updated as appropriate.  Past Medical History:   Diagnosis Date    Back pain     Chronic constipation 6/27/2016    Chronic pain disorder     Collar bone fracture     right side    Crutches as ambulation aid     Depression     Dyssynergic defecation     Type 1    ETOH abuse     Head injury 2014    Infectious viral hepatitis     Lyme disease     Meningitis spinal 2007?    Multiple sclerosis (HCC)     Opioid abuse (HCC)     Rib fractures     Thrombocytopenia (HCC)     Use of cane as ambulatory aid      Past Surgical History:   Procedure Laterality Date    APPENDECTOMY      CLAVICLE SURGERY      COLONOSCOPY      FL LUMBAR PUNCTURE  "DIAGNOSTIC  11/25/2020    KNEE ARTHROSCOPY      MULTIPLE TOOTH EXTRACTIONS      NC COLONOSCOPY FLX DX W/COLLJ SPEC WHEN PFRMD N/A 6/27/2016    Procedure: COLONOSCOPY;  Surgeon: Jason Rogers MD;  Location: [facility];  Service: Colorectal    NC OPEN IMPLANTATION CHARITY SACRAL NERVE N/A 11/15/2021    Procedure: INSERTION OF NEUROSTIMULATOR ELECTRODE ARRAY SACRAL NERVE; FLUOROSCOPY; ELECTRONICN ANALYSIS;  Surgeon: Abdulaziz Guevara MD;  Location: [facility];  Service: UroGynecology           TONSILLECTOMY AND ADENOIDECTOMY      TUBAL LIGATION       Family History   Problem Relation Age of Onset    Skin cancer Mother     No Known Problems Brother      Social History     Occupational History    Not on file   Tobacco Use    Smoking status: Every Day     Current packs/day: 0.25     Average packs/day: 0.3 packs/day for 1.2 years (0.3 ttl pk-yrs)     Types: Cigarettes     Start date: 03/2024    Smokeless tobacco: Never   Vaping Use    Vaping status: Every Day    Substances: Nicotine, Flavoring   Substance and Sexual Activity    Alcohol use: Yes     Comment: 2 mixed drinks each night    Drug use: No    Sexual activity: Yes     Partners: Male     Allergies   Allergen Reactions    Penicillins GI Intolerance and Nausea Only     Other reaction(s): Unknown Reaction       Current Outpatient Medications:     albendazole (ALBENZA) 200 mg tablet, , Disp: , Rfl:     ascorbic acid (VITAMIN C) 500 mg tablet, Take 500 mg by mouth in the morning., Disp: , Rfl:     b complex vitamins tablet, Take 1 tablet by mouth in the morning., Disp: , Rfl:     B-D HYPODERMIC NEEDLE 23GX1\" 23G X 1\" MISC, , Disp: , Rfl:     BD Plastipak Syringe 21G X 1\" 3 ML MISC, , Disp: , Rfl:     Biotin 65741 MCG TABS, Take 1 tablet by mouth in the morning., Disp: , Rfl:     buprenorphine-naloxone (SUBOXONE) 8-2 mg per SL tablet, Place 1 tablet under the tongue in the morning., Disp: , Rfl:     Calcium Carbonate (CALCIUM 600 PO), Take 600 mg by mouth in the morning. " pt reports not having & not taking., Disp: , Rfl:     celecoxib (CeleBREX) 200 mg capsule, Take 1 capsule (200 mg total) by mouth 2 (two) times a day With food, Disp: 60 capsule, Rfl: 2    chlorhexidine (PERIDEX) 0.12 % solution, , Disp: , Rfl:     Cholecalciferol (Vitamin D3) 125 MCG (5000 UT) TABS, Take 5,000 Units by mouth in the morning. pt reports taking 2 tab., Disp: , Rfl:     clindamycin (CLEOCIN) 150 mg capsule, Take 1 capsule by mouth in the morning and 1 capsule at noon and 1 capsule in the evening and 1 capsule before bedtime., Disp: , Rfl:     cloNIDine (CATAPRES) 0.2 mg tablet, Take 0.2 mg by mouth daily at bedtime, Disp: , Rfl:     clopidogrel (PLAVIX) 75 mg tablet, Take 75 mg by mouth every morning, Disp: , Rfl:     clotrimazole-betamethasone (LOTRISONE) 1-0.05 % cream, Apply to affected area 2 times daily prn, Disp: 45 g, Rfl: 1    Depo-Testosterone 100 MG/ML IM injection, , Disp: , Rfl:     diclofenac potassium (CATAFLAM) 50 mg tablet, Take by mouth, Disp: , Rfl:     docusate sodium (COLACE) 100 mg capsule, Take 100 mg by mouth in the morning and 100 mg in the evening and 100 mg before bedtime., Disp: , Rfl:     flavoxATE (URISPAS) 100 mg tablet, TAKE 1 TABLET BY MOUTH 3 TIMES A DAY AS NEEDED FOR BLADDER SPASMS., Disp: 90 tablet, Rfl: 3    furosemide (LASIX) 20 mg tablet, Take 10 mg by mouth in the morning., Disp: , Rfl:     Ginkgo Biloba 40 MG TABS, Take 120 mg by mouth in the morning. pt reports taking 2 tabs daily., Disp: , Rfl:     ibuprofen (MOTRIN) 200 mg tablet, Take 200 mg by mouth as needed for mild pain pt reports taking 3-4tabs every 6hrs., Disp: , Rfl:     ibuprofen (MOTRIN) 800 mg tablet, Take 800 mg by mouth every 8 (eight) hours as needed, Disp: , Rfl:     lubiprostone (AMITIZA) 24 mcg capsule, Take 24 mcg by mouth in the morning and 24 mcg in the evening. Take with meals. pt reports not taking., Disp: , Rfl:     lubiprostone (AMITIZA) 8 mcg capsule, Take 8 mcg by mouth in the  morning and 8 mcg in the evening and 8 mcg before bedtime., Disp: , Rfl:     LUTEIN PO, Take by mouth in the morning 20mg on bottle, Disp: , Rfl:     methylPREDNISolone sodium succinate in sodium chloride 0.9 % 250 mL IVPB, Inject 1,000 mg into a catheter in a vein every 30 (thirty) days 1x/month, provided at medical office. per EPIC, Disp: , Rfl:     Misc Natural Products (GINSENG COMPLEX PO), Take 2 tablets by mouth in the morning. 500mg on bottle., Disp: , Rfl:     Movantik 25 MG tablet, Take 25 mg by mouth every evening, Disp: , Rfl:     Multiple Vitamins-Minerals (ZINC PO), Take by mouth, Disp: , Rfl:     naloxone (NARCAN) 4 mg/0.1 mL nasal spray, , Disp: , Rfl:     Nerve Stimulator (STANDARD TENS) VICKY, by Does not apply route 2 (two) times a day, Disp: 1 Device, Rfl: 0    Nerve Stimulator (TENS THERAPY REPLACE BACK PADS) MISC, 30 pad by Does not apply route 2 (two) times a day, Disp: 30 each, Rfl: 0    Nerve Stimulator VICKY, by Does not apply route 2 (two) times a day, Disp: 1 each, Rfl: 0    Nerve Stimulator Supplies MISC, 30 pad by Does not apply route 2 (two) times a day, Disp: 30 pad, Rfl: 0    NON FORMULARY, HEMPANOL BRAIN DETOX 200MG . pt reports not taking per 5/18/23., Disp: , Rfl:     Omega-3 Fatty Acids (FISH OIL OMEGA-3 PO), Take 1,200 mg by mouth in the morning., Disp: , Rfl:     phenazopyridine (PYRIDIUM) 200 mg tablet, Take 1 tablet (200 mg total) by mouth 3 (three) times a day as needed for bladder spasms, Disp: 10 tablet, Rfl: 0    Potassium 99 MG TABS, Take 99 mg by mouth in the morning. pt reports taking 3 tabs daily., Disp: , Rfl:     potassium chloride (Klor-Con) 10 mEq tablet, , Disp: , Rfl:     pregabalin (LYRICA) 75 mg capsule, , Disp: , Rfl:     Probiotic Product (PROBIOTIC DAILY PO), Take 2 tablets by mouth in the morning., Disp: , Rfl:     promethazine (PHENERGAN) 50 MG tablet, daily at bedtime 1.5 tabs daily at bedtime, Disp: , Rfl: 1    Restasis 0.05 % ophthalmic emulsion, , Disp:  , Rfl:     selenium sulfide (SELSUN) 2.5 % shampoo, Apply topically daily as needed for dandruff, Disp: 118 mL, Rfl: 0    testosterone cypionate (DEPO-TESTOSTERONE) 200 mg/mL SOLN, , Disp: , Rfl:     topiramate (TOPAMAX) 50 MG tablet, Take 50 mg by mouth in the morning., Disp: , Rfl: 5    traMADol (ULTRAM) 50 mg tablet, 1 1/2 TABS TWICE A DAY, Disp: , Rfl:     TURMERIC PO, Take by mouth Turmeric Curcumin 500mg on bottle. 1 tab daily, Disp: , Rfl:     ZINC-VITAMIN C PO, Take by mouth pt not taking due to taking regular zinc per pt report on 5/18/23., Disp: , Rfl:     gabapentin (NEURONTIN) 100 mg capsule, Take 1 capsule (100 mg total) by mouth 3 (three) times a day, Disp: 90 capsule, Rfl: 1  This Visit:     [facility] PA-C, scribed this note in conjunction with and in the presence of Dr. Ryan Nicole DO, who performed parts of the services as described in this documentation    Dr. Ryan Nicole DO, Orthopedic Surgeon  [facility]

## 2025-05-20 NOTE — TELEPHONE ENCOUNTER
LMOM confirming and reminding her of her virtual visit appt on 6/3/25 at 1:30pm with Dr Beth at the [place] office. I asked she come 15mins prior to appt to allow us time to update her chart as needed. I also asked if she is unable to make the appt and needs to reschedule or cancel to please call the office.    Sent appt reminder card to home address.

## 2025-05-22 ENCOUNTER — APPOINTMENT (OUTPATIENT)
Dept: PHYSICAL THERAPY | Facility: HOME HEALTHCARE | Age: 45
End: 2025-05-22
Payer: COMMERCIAL

## 2025-05-23 ENCOUNTER — HOSPITAL ENCOUNTER (OUTPATIENT)
Dept: INFUSION CENTER | Facility: HOSPITAL | Age: 45
End: 2025-05-23
Attending: PSYCHIATRY & NEUROLOGY
Payer: COMMERCIAL

## 2025-05-23 VITALS
TEMPERATURE: 98.9 F | OXYGEN SATURATION: 97 % | HEART RATE: 75 BPM | SYSTOLIC BLOOD PRESSURE: 105 MMHG | RESPIRATION RATE: 16 BRPM | DIASTOLIC BLOOD PRESSURE: 53 MMHG

## 2025-05-23 DIAGNOSIS — G35 MS (MULTIPLE SCLEROSIS) (HCC): Primary | ICD-10-CM

## 2025-05-23 PROCEDURE — 96365 THER/PROPH/DIAG IV INF INIT: CPT

## 2025-05-23 RX ORDER — SODIUM CHLORIDE 9 MG/ML
20 INJECTION, SOLUTION INTRAVENOUS ONCE
OUTPATIENT
Start: 2025-06-20

## 2025-05-23 RX ORDER — SODIUM CHLORIDE 9 MG/ML
20 INJECTION, SOLUTION INTRAVENOUS ONCE
Status: COMPLETED | OUTPATIENT
Start: 2025-05-23 | End: 2025-05-23

## 2025-05-23 RX ORDER — SODIUM CHLORIDE 9 MG/ML
20 INJECTION, SOLUTION INTRAVENOUS ONCE
Status: CANCELLED | OUTPATIENT
Start: 2025-05-23

## 2025-05-23 RX ADMIN — SODIUM CHLORIDE 1000 MG: 0.9 INJECTION, SOLUTION INTRAVENOUS at 14:11

## 2025-05-23 RX ADMIN — SODIUM CHLORIDE 20 ML/HR: 0.9 INJECTION, SOLUTION INTRAVENOUS at 14:00

## 2025-05-23 NOTE — PROGRESS NOTES
Christie Hemphill  tolerated solumedrol treatment well with no complications.      Christie Hemphill is aware of future appt on 6/20/25 at 1430.     AVS printed and given to Christie Hemphill:   No (Declined by Christie Hemphill)

## 2025-05-23 NOTE — PLAN OF CARE
Problem: Potential for Falls  Goal: Patient will remain free of falls  Description: INTERVENTIONS:  - Educate patient/family on patient safety including physical limitations  - Instruct patient to call for assistance with activity   - Consider consulting OT/PT to assist with strengthening/mobility based on AM PAC & JH-HLM score  - Consult OT/PT to assist with strengthening/mobility   - Keep Call bell within reach  - Keep bed low and locked with side rails adjusted as appropriate  - Keep care items and personal belongings within reach  - Initiate and maintain comfort rounds    Outcome: Progressing     Problem: Knowledge Deficit  Goal: Patient/family/caregiver demonstrates understanding of disease process, treatment plan, medications, and discharge instructions  Description: Complete learning assessment and assess knowledge base.  Interventions:  - Provide teaching at level of understanding  - Provide teaching via preferred learning methods  Outcome: Progressing

## 2025-05-27 ENCOUNTER — CLINICAL SUPPORT (OUTPATIENT)
Dept: NUTRITION | Facility: HOSPITAL | Age: 45
End: 2025-05-27
Payer: COMMERCIAL

## 2025-05-27 VITALS — WEIGHT: 105 LBS | BODY MASS INDEX: 16.88 KG/M2 | HEIGHT: 66 IN

## 2025-05-27 DIAGNOSIS — E46 MALNUTRITION COMPROMISING BODILY FUNCTION (HCC): Primary | ICD-10-CM

## 2025-05-27 PROCEDURE — 97802 MEDICAL NUTRITION INDIV IN: CPT

## 2025-05-27 NOTE — PROGRESS NOTES
"Initial Nutrition Assessment Form    Patient Name: Christie Hemphill    YOB: 1980    Sex: Female     Assessment Date: 5/27/2025  Start Time: 1:30 pm Stop Time: 2:30 pm Total Minutes: 60     Data:  Present at session: self   Parent Concerns: Patient scheduled appointment on recommendation of ortho for upcoming surgery.  Patient is for hip replacement due to osteoarthritis.  She reports she needs to have nutrition assessment completed for surgery.  She does have chronic stomach issues.  She stated several times \"I do not wish to get into it\".  She states that she has been worked up for multiple years without any significant findings but still suffers from abd pain, bloating and discomfort especially after eating.  She has found the only remedy is to avoid many types of foods.  She has chronic constipation.  She was dx with MS in 2020.  Since, she has lost weight.  UBW was around 130#.  Currently around 105#.  She contributes weight loss due to muscle deterioration.       Medical Dx/Reason for Referral: M16.12 (ICD-10-CM) - Primary osteoarthritis of left hip   F10.20 (ICD-10-CM) - Alcoholism (HCC)   D69.6 (ICD-10-CM) - Thrombocytopenia (HCC)   G35 (ICD-10-CM) - MS (multiple sclerosis) (HCC)   E46 (ICD-10-CM) - Malnutrition compromising bodily function (HCC)      Past Medical History[1]    Current Medications[2]     Additional Meds/Supplements: Medications reviewed.  Patient also reports taking OTC supplements but did not have her list with her.     Special Learning Needs: none   Height: Ht Readings from Last 5 Encounters:   05/20/25 5' 6\" (1.676 m)   04/30/25 5' 6\" (1.676 m)   04/29/25 5' 6\" (1.676 m)   04/03/25 5' 6\" (1.676 m)   03/07/25 5' 6\" (1.676 m)     HC Readings from Last 3 Encounters:   No data found for HC      Weight: Wt Readings from Last 5 Encounters:   05/20/25 47.6 kg (105 lb)   04/30/25 47.6 kg (105 lb)   04/29/25 47.6 kg (105 lb)   03/07/25 45.4 kg (100 lb)   01/23/25 45.4 kg (100 lb)     There " is no height or weight on file to calculate BMI.   Recent Weight Change: [x]Yes     []No  Amount: Per patient, weight has decreased since dx of MS.  Most recently, weight 93# (6/14/2024).  Weighed 100# (3/7/2025) and current weight 105# (5/27), self-reported.  Slow weight gain noted if current weight correct.        Energy Needs: 9657-9792 calories day (30-35 justina/kg)   Allergies[3]    Social History     Substance and Sexual Activity   Alcohol Use Yes    Comment: 2 mixed drinks each night       Tobacco Use History[4]    Who shops? Patient,  and kids   Who cooks? Patient,  and kids   Exercise: Currently in PT 1 x per week.  Does use weights for upper body strength.  Limited with ambulation due to MS and OA of hips.     Prior Counseling? []Yes     [x]No  When:      Why:         Diet Hx:  Breakfast: Breakfast is vitamins, olive oil (2 tbsp), occasionally applesauce, lactose free milk, juice watered down    Lunch: 2 bites hogie, spinach dip, whip cream, carrot soup, egg whites          Dinner: Spring roll, spinach dip, kit lars, carrot soup, protein shake  Sprite, rum and lemonade          Snacks:         Nutrition Diagnosis:   Underweight  related to Inadequate energy intake as  evidenced by BMI <18.5 (adults)       Medical Nutrition Therapy Intervention:  [x]Individualized Meal Plan:  Reviewed balance meals and importance of protein intake.  Discussed the importance of protein for healing and prevention of muscle wasting.  Encouraged patient to consider LOWFOD MAP diet if GI symptoms persists, limiting po intake and variety.  Suggested lactase enzyme for dairy consumption to ease GI symptoms.   []Understanding Lab Values   []Basic Pathophysiology of Disease []Food/Medication Interactions   []Food Diary []Exercise   [x]Lifestyle/Behavior Modification Techniques:  consider increasing portions at meals, more variety and scheduled meals.   []Medication, Mechanism of Action   [x]Label Reading:  reviewed label  "reading for caloric and protein intake []Self Blood Glucose Monitoring   [x]Weight/BMI Goals:  recommend weight gain to achieve BMI >18.5 [x]Other - although patient did not wish to elaborate on her GI problems and journey, provided book on LOWFOD MAP diet for consideration.     Other Notes:        Comprehension: []Excellent  []Very Good  [x]Good  []Fair   []Poor    Receptivity: []Excellent  []Very Good  [x]Good  []Fair   []Poor    Expected Compliance: []Excellent  []Very Good  [x]Good  []Fair   []Poor        Goals:  Try lactase enzyme with dairy products   2.    Consider at least 1 protein drink with 30 g protein daily   3.    Consider LOWFOD MAP diet if GI symptoms continue       Follow up visit to be determined.  Provided RD contact.    Labs:  CMP  Lab Results   Component Value Date     11/12/2015    K 3.7 02/14/2025     02/14/2025    CO2 28 02/14/2025    ANIONGAP 11 11/12/2015    BUN 12 02/14/2025    CREATININE 1.02 02/14/2025    GLUCOSE 81 11/12/2015    GLUF 79 02/14/2025    CALCIUM 9.2 02/14/2025    AST 28 02/14/2025    ALT 18 02/14/2025    ALKPHOS 84 02/14/2025    PROT 8.1 11/12/2015    BILITOT 0.52 11/12/2015    EGFR 67 02/14/2025       BMP  Lab Results   Component Value Date    GLUCOSE 81 11/12/2015    CALCIUM 9.2 02/14/2025     11/12/2015    K 3.7 02/14/2025    CO2 28 02/14/2025     02/14/2025    BUN 12 02/14/2025    CREATININE 1.02 02/14/2025       Lipids  No results found for: \"CHOL\"  Lab Results   Component Value Date    HDL 56 02/14/2025     Lab Results   Component Value Date    LDLCALC 80 02/14/2025     Lab Results   Component Value Date    TRIG 92 02/14/2025    TRIG 66 09/09/2021     No results found for: \"CHOLHDL\"    Hemoglobin A1C  Lab Results   Component Value Date    HGBA1C 5.3 02/14/2025       Fasting Glucose  Lab Results   Component Value Date    GLUF 79 02/14/2025       Insulin     Thyroid  Lab Results   Component Value Date    TSH 0.65 09/09/2021       Hepatic Function " "Panel  Lab Results   Component Value Date    ALT 18 02/14/2025    AST 28 02/14/2025    GGT 42 04/17/2014    ALKPHOS 84 02/14/2025    BILITOT 0.52 11/12/2015       Celiac Disease Antibody Panel  Endomysial IgA   Date Value Ref Range Status   04/17/2019 Negative Negative Final     Gliadin IgA   Date Value Ref Range Status   04/17/2019 4 0 - 19 units Final     Comment:                        Negative                   0 - 19                     Weak Positive             20 - 30                     Moderate to Strong Positive   >30     Gliadin IgG   Date Value Ref Range Status   04/17/2019 4 0 - 19 units Final     Comment:                        Negative                   0 - 19                     Weak Positive             20 - 30                     Moderate to Strong Positive   >30     IGA   Date Value Ref Range Status   03/15/2022 192.0 70.0 - 400.0 mg/dL Final     IgA   Date Value Ref Range Status   04/17/2019 155 87 - 352 mg/dL Final     TISSUE TRANSGLUTAMINASE IGA   Date Value Ref Range Status   09/23/2019 <2 0 - 3 U/mL Final     Comment:                                   Negative        0 -  3                                Weak Positive   4 - 10                                Positive           >10   Tissue Transglutaminase (tTG) has been identified   as the endomysial antigen.  Studies have demonstr-   ated that endomysial IgA antibodies have over 99%   specificity for gluten sensitive enteropathy.      Iron  Lab Results   Component Value Date    IRON 156 01/27/2021    TIBC 360 01/27/2021    FERRITIN 26 01/27/2021       Vitamins  No results found for: \"VITAMIN B2\"   No results found for: \"NICOTINAMIDE\", \"NICOTINIC ACID\"   No results found for: \"VITAMINB6\"  Lab Results   Component Value Date    PTYQWBSE64 597 04/26/2021     No results found for: \"VITB5\"  No results found for: \"X6WAMNFB\"  No results found for: \"THYROGLB\"  No results found for: \"VITAMIN K\"   No results found for: \"25-HYDROXY VIT D\"   No " "components found for: \"VITAMINE\"     Shonda Rosa RD  [facility]  [address] [place] [id number]           [1]   Past Medical History:  Diagnosis Date    Back pain     Chronic constipation 6/27/2016    Chronic pain disorder     Collar bone fracture     right side    Crutches as ambulation aid     Depression     Dyssynergic defecation     Type 1    ETOH abuse     Head injury 2014    Infectious viral hepatitis     Lyme disease     Meningitis spinal 2007?    Multiple sclerosis (HCC)     Opioid abuse (HCC)     Rib fractures     Thrombocytopenia (HCC)     Use of cane as ambulatory aid    [2]   Current Outpatient Medications   Medication Sig Dispense Refill    albendazole (ALBENZA) 200 mg tablet       ascorbic acid (VITAMIN C) 500 mg tablet Take 500 mg by mouth in the morning.      b complex vitamins tablet Take 1 tablet by mouth in the morning.      B-D HYPODERMIC NEEDLE 23GX1\" 23G X 1\" MISC       BD Plastipak Syringe 21G X 1\" 3 ML MISC       Biotin 85755 MCG TABS Take 1 tablet by mouth in the morning.      buprenorphine-naloxone (SUBOXONE) 8-2 mg per SL tablet Place 1 tablet under the tongue in the morning.      Calcium Carbonate (CALCIUM 600 PO) Take 600 mg by mouth in the morning. pt reports not having & not taking.      celecoxib (CeleBREX) 200 mg capsule Take 1 capsule (200 mg total) by mouth 2 (two) times a day With food 60 capsule 2    chlorhexidine (PERIDEX) 0.12 % solution       Cholecalciferol (Vitamin D3) 125 MCG (5000 UT) TABS Take 5,000 Units by mouth in the morning. pt reports taking 2 tab.      clindamycin (CLEOCIN) 150 mg capsule Take 1 capsule by mouth in the morning and 1 capsule at noon and 1 capsule in the evening and 1 capsule before bedtime.      cloNIDine (CATAPRES) 0.2 mg tablet Take 0.2 mg by mouth daily at bedtime      clopidogrel (PLAVIX) 75 mg tablet Take 75 mg by mouth every morning      " clotrimazole-betamethasone (LOTRISONE) 1-0.05 % cream Apply to affected area 2 times daily prn 45 g 1    Depo-Testosterone 100 MG/ML IM injection       diclofenac potassium (CATAFLAM) 50 mg tablet Take by mouth      docusate sodium (COLACE) 100 mg capsule Take 100 mg by mouth in the morning and 100 mg in the evening and 100 mg before bedtime.      flavoxATE (URISPAS) 100 mg tablet TAKE 1 TABLET BY MOUTH 3 TIMES A DAY AS NEEDED FOR BLADDER SPASMS. 90 tablet 3    furosemide (LASIX) 20 mg tablet Take 10 mg by mouth in the morning.      gabapentin (NEURONTIN) 100 mg capsule Take 1 capsule (100 mg total) by mouth 3 (three) times a day 90 capsule 1    Ginkgo Biloba 40 MG TABS Take 120 mg by mouth in the morning. pt reports taking 2 tabs daily.      ibuprofen (MOTRIN) 200 mg tablet Take 200 mg by mouth as needed for mild pain pt reports taking 3-4tabs every 6hrs.      ibuprofen (MOTRIN) 800 mg tablet Take 800 mg by mouth every 8 (eight) hours as needed      lubiprostone (AMITIZA) 24 mcg capsule Take 24 mcg by mouth in the morning and 24 mcg in the evening. Take with meals. pt reports not taking.      lubiprostone (AMITIZA) 8 mcg capsule Take 8 mcg by mouth in the morning and 8 mcg in the evening and 8 mcg before bedtime.      LUTEIN PO Take by mouth in the morning 20mg on bottle      methylPREDNISolone sodium succinate in sodium chloride 0.9 % 250 mL IVPB Inject 1,000 mg into a catheter in a vein every 30 (thirty) days 1x/month, provided at medical office. per EPIC      Misc Natural Products (GINSENG COMPLEX PO) Take 2 tablets by mouth in the morning. 500mg on bottle.      Movantik 25 MG tablet Take 25 mg by mouth every evening      Multiple Vitamins-Minerals (ZINC PO) Take by mouth      naloxone (NARCAN) 4 mg/0.1 mL nasal spray       Nerve Stimulator (STANDARD TENS) VICKY by Does not apply route 2 (two) times a day 1 Device 0    Nerve Stimulator (TENS THERAPY REPLACE BACK PADS) MISC 30 pad by Does not apply route 2 (two)  times a day 30 each 0    Nerve Stimulator VICKY by Does not apply route 2 (two) times a day 1 each 0    Nerve Stimulator Supplies MISC 30 pad by Does not apply route 2 (two) times a day 30 pad 0    NON FORMULARY HEMPANOL BRAIN DETOX 200MG .  pt reports not taking per 5/18/23.      Omega-3 Fatty Acids (FISH OIL OMEGA-3 PO) Take 1,200 mg by mouth in the morning.      phenazopyridine (PYRIDIUM) 200 mg tablet Take 1 tablet (200 mg total) by mouth 3 (three) times a day as needed for bladder spasms 10 tablet 0    Potassium 99 MG TABS Take 99 mg by mouth in the morning. pt reports taking 3 tabs daily.      potassium chloride (Klor-Con) 10 mEq tablet       pregabalin (LYRICA) 75 mg capsule       Probiotic Product (PROBIOTIC DAILY PO) Take 2 tablets by mouth in the morning.      promethazine (PHENERGAN) 50 MG tablet daily at bedtime 1.5 tabs daily at bedtime  1    Restasis 0.05 % ophthalmic emulsion       selenium sulfide (SELSUN) 2.5 % shampoo Apply topically daily as needed for dandruff 118 mL 0    testosterone cypionate (DEPO-TESTOSTERONE) 200 mg/mL SOLN       topiramate (TOPAMAX) 50 MG tablet Take 50 mg by mouth in the morning.  5    traMADol (ULTRAM) 50 mg tablet 1 1/2 TABS TWICE A DAY      TURMERIC PO Take by mouth Turmeric Curcumin 500mg on bottle. 1 tab daily      ZINC-VITAMIN C PO Take by mouth pt not taking due to taking regular zinc per pt report on 5/18/23.       No current facility-administered medications for this visit.   [3]   Allergies  Allergen Reactions    Penicillins GI Intolerance and Nausea Only     Other reaction(s): Unknown Reaction   [4]   Social History  Tobacco Use   Smoking Status Every Day    Current packs/day: 0.25    Average packs/day: 0.3 packs/day for 1.2 years (0.3 ttl pk-yrs)    Types: Cigarettes    Start date: 03/2024   Smokeless Tobacco Never

## 2025-05-29 ENCOUNTER — OFFICE VISIT (OUTPATIENT)
Dept: PHYSICAL THERAPY | Facility: HOME HEALTHCARE | Age: 45
End: 2025-05-29
Payer: COMMERCIAL

## 2025-05-29 DIAGNOSIS — G35 HX OF MULTIPLE SCLEROSIS (HCC): Primary | ICD-10-CM

## 2025-05-29 DIAGNOSIS — R29.898 WEAKNESS OF BOTH LOWER EXTREMITIES: ICD-10-CM

## 2025-05-29 DIAGNOSIS — R26.2 AMBULATORY DYSFUNCTION: ICD-10-CM

## 2025-05-29 PROCEDURE — 97140 MANUAL THERAPY 1/> REGIONS: CPT

## 2025-05-29 PROCEDURE — 97110 THERAPEUTIC EXERCISES: CPT

## 2025-05-29 NOTE — PROGRESS NOTES
Daily Note     Today's date: 2025  Patient name: Christie Hemphill  : 1980  MRN: [id number]  Referring provider: Roosevelt Ortiz DO  Dx:   Encounter Diagnosis     ICD-10-CM    1. Hx of multiple sclerosis (HCC)  G35       2. Weakness of both lower extremities  R29.898       3. Ambulatory dysfunction  R26.2           Start Time: 1300  Stop Time: 1345  Total time in clinic (min): 45 minutes    Subjective: Pt states that she brought her sonAdan,  w/ her this session for PT to educate son on LE stretching to prevent further contracture at night.        Objective: See treatment diary below      Assessment: Pt tolerated treatment session w/ focus on family training for son, Adan, to learn how to properly and safely perform manual therapy w/ focus on stretching BLEs, specifically pt's L HS to reduce muscle contracture.  PT first explained the potential reason why this contracture is occurring and the volatile nature of MS and how it impacts his mother's daily activities.  PT then demonstrated and verbalized how to perform stretching, emphasizing pt comfort to reduce pain, and the proper technique.  Pt and pt's son both reciprocated the hands-on process and pt's son demonstrated for PT 2x of how to properly and safely stretch pt.  PT answered all questions asked by pt and pt's son.  Pt to bring Chirag LEO to [place].      Plan: Continue per plan of care.      Precautions: MS    Visit Count 3 4 5 6 7       Manuals    5/5  5/15  5/29   L knee Ext (HS stretching)       BLE static stretch  to achieve extension position    Family education on how to properly/ safely performing stretching       DF  -Education, explanation, and demonstration of B/L HS Stretches    - Pt maintained position for 8 mins, 2x    - Pt's son was able to perform multiple repetitions properly to reduce contraction of pt's L HS and reduce caregiver burden w/ proper form                         Neuro Re-Ed           Balance as appropriate      "                 L knee Extension on Table to decrease clonus and rigidity/ spasticity        Rhythmic initiation of BLEs w/ knees flexed        Serbian stimulation education & use.   Initial Trial w/ smaller pads due to less muscle mass, switched to larger pads for effectivness.        Once contraction was achieved for pt in comfortable position (seated w/ RLE propped on stool for Knee ext), stim was performed at...    10s on, 20s off  Intensity @ 70 for 15 mins  Pt in comfortable position (seated w/ RLE propped on stool for Knee ext), stim was performed at...    Trial of electrode position, had to be replaced for comfort of pt during ocntraction.     Pt w/ Quad set during contraction    RLE:  10s on, 20s off  Intensity @ 64 for 15 mins    LLE:  10s on, 20s off Intensity @ 55 for 15 mins NMES from NexWave    30s off: 10s on  For total of 10 mins ea LE individually w/ QS during 10s on time    RLE amplitude = 9mA    LLE amplitude = 7mA    Total of 20 mins of NMES                           Ther Ex                  Re-Eval Tests and Measures    Discussion on next steps for skilled therapy and what has been benefiting pt.  Pt's sx.      DF    NuStep           Ankle pumps           HR/TR        Standing Hip flex/abd/ext        Mini Squats        Step ups/ Step downs        Standing Back extensions in // bars 10x               Heel Slides w/ towel          QS          Hip abd on sliding board           LAQ           Hip add W/ ball 3\" hold  2x10         Hip abd W/o resistance 3\" hold 2x10         SLR          SAQ        S/L SLR                    Amb in // bars 3 sets of 1x <>         Discussion on use of Russian stim next session to further contract and strengthen quads, maddy DF DF & HZ                 Education on clonus and stretching protocol w/ assistance at home       DF w/ pt's son    Education on effects of MS on body w/ over activity.          DF w/ pt's son                              Education on NexWave " (different settings TENS, IFC, NMES) proper settings, different effects (pain modulation vs strength faciliation), etc.    DF               Ther Activity                     Family Training                           Gait Training           Ambulation around clinic   Pre & post Russian stimulation 50ft ea                   Modalities          CP           TENS Unattended by PT    10' total

## 2025-06-03 ENCOUNTER — TELEMEDICINE (OUTPATIENT)
Dept: NEUROLOGY | Facility: CLINIC | Age: 45
End: 2025-06-03
Payer: COMMERCIAL

## 2025-06-03 VITALS — BODY MASS INDEX: 16.88 KG/M2 | WEIGHT: 105 LBS | HEIGHT: 66 IN

## 2025-06-03 DIAGNOSIS — R26.2 AMBULATORY DYSFUNCTION: ICD-10-CM

## 2025-06-03 DIAGNOSIS — G35 MS (MULTIPLE SCLEROSIS) (HCC): Primary | ICD-10-CM

## 2025-06-03 PROCEDURE — 99213 OFFICE O/P EST LOW 20 MIN: CPT | Performed by: PSYCHIATRY & NEUROLOGY

## 2025-06-03 RX ORDER — BUPRENORPHINE HYDROCHLORIDE AND NALOXONE HYDROCHLORIDE DIHYDRATE 2; .5 MG/1; MG/1
TABLET SUBLINGUAL
COMMUNITY
Start: 2025-05-30

## 2025-06-03 NOTE — PROGRESS NOTES
Virtual Regular Visit  Name: Christie Hemphill      : 1980      MRN: [id number]  Encounter Provider: Concepcion Beth MD  Encounter Date: 6/3/2025   Encounter department: [facility] [place]  :  Assessment & Plan  MS (multiple sclerosis) ([facility])  Mrs. Hemphill has presented to [facility] for follow-up on multiple sclerosis related concern.  Patient continued describing worsening ambulatory function with generalized body fatigue in the setting of profound malnutrition/diffuse muscle wasting and moderate burden of demyelination due to multiple sclerosis.  Patient is not on disease modifying regimen, patient had Tysabri for 8 months and Kesimpta for 6 months as she has been taking monthly Solu-Medrol infusions.  No significant benefits of monthly steroids reported at this time as patient has multiple medical comorbidities in addition to MS.    Patient is scheduled for hip replacement on 2025.  If patient has any steroid treatment around the time, patient is to avoid immunosuppressive regimen around the time of the surgery not tolerated due to risk of infection but also risk of avascular necrosis with chronic steroid use.    We discussed with the patient vitamin D toxicity symptoms and related concerns as patient has critical high level of vitamin D despite she is no longer taking supplements.  Patient was advised to avoid dairy at this time and discuss further this issue with primary team.         Ambulatory dysfunction  Patient has been trying to continue regular physical therapy and she is planning to be more outside during the summertime.  Once patient completes her surgical recovery, more physical therapy advised during summer-fall time.    Patient is to follow with [facility] neurology within 6 months.           History of Present Illness     HPI    Mrs. Hemphill has presented to [facility] [place] for follow-up on multiple sclerosis and  related concerns.  Patient describes sensorimotor dysfunction but patient has profound malnutrition with diffuse muscle wasting which resulted of progressive weakness in upper lower extremity and muscle atrophy.    Patient is not on disease modifying therapy for multiple sclerosis.  Mildly steroid infusion provides some benefit, as per the patient.  Patient believes significant pain in her hips causing weakness in her lower extremity.  Patient stated wintertime makes her feel worse as she had suffered multiple falls.  Patient has preserved function in upper extremity and she is using walker on a regular basis.    Patient stated using testosterone treatment helps with some energy level.     Patient stated to using Tysabri within 8 months and then follow-up with ofatumumab for 6 months maid her disabled, despite regimens has high efficacy and protect the patient from disability progression as well as MS relapses.     Patient is aware of underlying liver disorder due to alcoholism.  Patient was taking Suboxone with GI dysfunction reported.        Review of Systems   Constitutional:  Negative for appetite change, fatigue and fever.   HENT: Negative.  Negative for hearing loss, tinnitus, trouble swallowing and voice change.    Eyes: Negative.  Negative for photophobia, pain and visual disturbance.   Respiratory: Negative.  Negative for shortness of breath.    Cardiovascular: Negative.  Negative for palpitations.   Gastrointestinal: Negative.  Negative for nausea and vomiting.   Endocrine: Negative.  Negative for cold intolerance.   Genitourinary: Negative.  Negative for dysuria, frequency and urgency.   Musculoskeletal:  Negative for back pain, gait problem, myalgias, neck pain and neck stiffness.   Skin: Negative.  Negative for rash.   Allergic/Immunologic: Negative.    Neurological:  Negative for dizziness, tremors, seizures, syncope, facial asymmetry, speech difficulty, weakness, light-headedness, numbness and  "headaches.   Hematological: Negative.  Does not bruise/bleed easily.   Psychiatric/Behavioral: Negative.  Negative for confusion, hallucinations and sleep disturbance.    All other systems reviewed and are negative.      Objective   Ht 5' 6\" (1.676 m)   Wt 47.6 kg (105 lb)   BMI 16.95 kg/m²     Physical Exam    Administrative Statements   Encounter provider Concepcion Beth MD    The Patient is located at Home and in the following state in which I hold an active license [place].    The patient was identified by name and date of birth. Christie Hemphill was informed that this is a telemedicine visit and that the visit is being conducted through Telephone.  My office door was closed. No one else was in the room.  She acknowledged consent and understanding of privacy and security of the video platform. The patient has agreed to participate and understands they can discontinue the visit at any time.    I have spent a total time of 13 minutes in caring for this patient on the day of the visit/encounter including Counseling / Coordination of care, Documenting in the medical record, Reviewing/placing orders in the medical record (including tests, medications, and/or procedures), and Obtaining or reviewing history  , not including the time spent for establishing the audio/video connection.    "

## 2025-06-03 NOTE — ASSESSMENT & PLAN NOTE
Patient has been trying to continue regular physical therapy and she is planning to be more outside during the summertime.  Once patient completes her surgical recovery, more physical therapy advised during summer-fall time.    Patient is to follow with [facility]'s neurology within 6 months.

## 2025-06-03 NOTE — ASSESSMENT & PLAN NOTE
Mrs. Hemphill has presented to [facility] multiple sclerosis center for follow-up on multiple sclerosis related concern.  Patient continued describing worsening ambulatory function with generalized body fatigue in the setting of profound malnutrition/diffuse muscle wasting and moderate burden of demyelination due to multiple sclerosis.  Patient is not on disease modifying regimen, patient had Tysabri for 8 months and Kesimpta for 6 months as she has been taking monthly Solu-Medrol infusions.  No significant benefits of monthly steroids reported at this time as patient has multiple medical comorbidities in addition to MS.    Patient is scheduled for hip replacement on July 24, 2025.  If patient has any steroid treatment around the time, patient is to avoid immunosuppressive regimen around the time of the surgery not tolerated due to risk of infection but also risk of avascular necrosis with chronic steroid use.    We discussed with the patient vitamin D toxicity symptoms and related concerns as patient has critical high level of vitamin D despite she is no longer taking supplements.  Patient was advised to avoid dairy at this time and discuss further this issue with primary team.

## 2025-06-05 ENCOUNTER — OFFICE VISIT (OUTPATIENT)
Dept: PHYSICAL THERAPY | Facility: HOME HEALTHCARE | Age: 45
End: 2025-06-05
Payer: COMMERCIAL

## 2025-06-05 ENCOUNTER — TELEPHONE (OUTPATIENT)
Age: 45
End: 2025-06-05

## 2025-06-05 DIAGNOSIS — R26.2 AMBULATORY DYSFUNCTION: ICD-10-CM

## 2025-06-05 DIAGNOSIS — R29.898 WEAKNESS OF BOTH LOWER EXTREMITIES: ICD-10-CM

## 2025-06-05 DIAGNOSIS — G35 HX OF MULTIPLE SCLEROSIS (HCC): Primary | ICD-10-CM

## 2025-06-05 PROCEDURE — 97110 THERAPEUTIC EXERCISES: CPT

## 2025-06-05 NOTE — PROGRESS NOTES
Daily Note     Today's date: 2025  Patient name: Christie Hemphill  : 1980  MRN: [id number]  Referring provider: Roosevelt Ortiz DO  Dx:   Encounter Diagnosis     ICD-10-CM    1. Hx of multiple sclerosis (HCC)  G35       2. Weakness of both lower extremities  R29.898       3. Ambulatory dysfunction  R26.2           Start Time: 1345  Stop Time: 1410  Total time in clinic (min): 25 minutes    Subjective: Pt presents to clinic w/ use of rollator and hercules knee brace.  Pt had severe crepitus throughout hip, pelvis, and sacral complex leading to severe pain.        Objective: See treatment diary below      Assessment: PT modified treatment session due to pt's severe pain in L hip and knee.  Pt discussed use of hercules brace to pt's tolerance while ambulating and during rest periods.  Pt states that she has used the brace primarily during her rest periods/ night time while trying to sleep.  States that she locks the knee brace in extension but during her L knee flexion contracture, her knee bends the brace due to the severity of her contracture.  PT had pt ambulate w/ [place] brace donned, but had pt stop ambulating due to severe pain throughout LLE.  PT and pt discussed potential outcomes post-op L TOMMY due to pt's current level of function and QoL.  Pt understands that their may be residual complications regarding TOMMY due to current strength in LLE, pt states that as of late she feels she has progressively gotten weaker.       Plan: Continue per plan of care.      Precautions: MS    Visit Count 8 4 5 6 7       Manuals 6/5   4/29   5/5  5/15  5/29   L knee Ext (HS stretching)       BLE static stretch  to achieve extension position    Family education on how to properly/ safely performing stretching       DF  -Education, explanation, and demonstration of B/L HS Stretches    - Pt maintained position for 8 mins, 2x    - Pt's son was able to perform multiple repetitions properly to reduce contraction of pt's L HS and  "reduce caregiver burden w/ proper form                         Neuro Re-Ed           Balance as appropriate                   Discussion on predicted outcomes post- L TOMYM and L/S ablasion DF       Use of hercules brace during ambulation & during rest periods DF - donning & doffing of brace w/ 10 ft of ambulation using rollator                 L knee Extension on Table to decrease clonus and rigidity/ spasticity        Rhythmic initiation of BLEs w/ knees flexed        Maltese stimulation education & use.   Initial Trial w/ smaller pads due to less muscle mass, switched to larger pads for effectivness.        Once contraction was achieved for pt in comfortable position (seated w/ RLE propped on stool for Knee ext), stim was performed at...    10s on, 20s off  Intensity @ 70 for 15 mins  Pt in comfortable position (seated w/ RLE propped on stool for Knee ext), stim was performed at...    Trial of electrode position, had to be replaced for comfort of pt during ocntraction.     Pt w/ Quad set during contraction    RLE:  10s on, 20s off  Intensity @ 64 for 15 mins    LLE:  10s on, 20s off Intensity @ 55 for 15 mins NMES from NexWave    30s off: 10s on  For total of 10 mins ea LE individually w/ QS during 10s on time    RLE amplitude = 9mA    LLE amplitude = 7mA    Total of 20 mins of NMES                           Ther Ex                  Re-Eval Tests and Measures    Discussion on next steps for skilled therapy and what has been benefiting pt.  Pt's sx.      DF    NuStep           Ankle pumps           HR/TR        Standing Hip flex/abd/ext        Mini Squats        Step ups/ Step downs        Standing Back extensions in // bars 10x               Heel Slides w/ towel          QS          Hip abd on sliding board           LAQ           Hip add W/ ball 3\" hold  2x10         Hip abd W/o resistance 3\" hold 2x10         SLR          SAQ        S/L SLR                    Amb in // bars 3 sets of 1x <>         Discussion on " use of Russian stim next session to further contract and strengthen quads, everters, DF DF & HZ                 Education on clonus and stretching protocol w/ assistance at home       DF w/ pt's son    Education on effects of MS on body w/ over activity.          DF w/ pt's son                              Education on NexWave (different settings TENS, IFC, NMES) proper settings, different effects (pain modulation vs strength faciliation), etc.    DF               Ther Activity                     Family Training                           Gait Training           Ambulation around clinic   Pre & post Russian stimulation 50ft ea                   Modalities          CP           TENS Unattended by PT    10' total

## 2025-06-05 NOTE — TELEPHONE ENCOUNTER
Caller: Christie (Patient)    Doctor: Dr. Sharpe    Reason for call: Patient is calling stating you are working with Dr. Nicole. Stating PT wants her to see you sooner, and that she calls and asked.  She said she has surgery scheduled with you in July, but I do not see anything in her chart.   I advised she is scheduled as a NP no openings till Middle of July at this point and she is currently scheduled 06/24/25.    Advised I would leave the message and have someone contact her about this either way, PPR.     Call back#: [phone]

## 2025-06-06 NOTE — TELEPHONE ENCOUNTER
Rosalind - I called her and moved her hold date to 7/21. I explained she does not need to come in until her scheduled appt on 6/24, as it wont change the date of her surgery since she has the soonest date on hold. She was very appreciative of the call. I advised her to call if there is anything we can do for her in the meantime.

## 2025-06-06 NOTE — TELEPHONE ENCOUNTER
We do not need to see her sooner than 2 weeks, I believe she has a surgical date already picked out.  Thanks!

## 2025-06-12 ENCOUNTER — APPOINTMENT (OUTPATIENT)
Dept: PHYSICAL THERAPY | Facility: HOME HEALTHCARE | Age: 45
End: 2025-06-12
Payer: COMMERCIAL

## 2025-06-13 ENCOUNTER — TELEPHONE (OUTPATIENT)
Dept: UROLOGY | Facility: AMBULATORY SURGERY CENTER | Age: 45
End: 2025-06-13

## 2025-06-13 NOTE — TELEPHONE ENCOUNTER
[id number] - [place] - [address]        flavoxATE (URISPAS) 100 mg tablet

## 2025-06-13 NOTE — TELEPHONE ENCOUNTER
PA for FLAVOXATE 100 MG SUBMITTED to MEDICAID    via      [x][employer]-Case ID # [id number]       [x]PA sent as URGENT    All office notes, labs and other pertaining documents and studies sent. Clinical questions answered. Awaiting determination from insurance company.     Turnaround time for your insurance to make a decision on your Prior Authorization can take 7-21 business days.

## 2025-06-13 NOTE — TELEPHONE ENCOUNTER
PA for FLAVOXATE 100 MG  APPROVED     Date(s) approved UNTIL 06/13/2026        Patient advised by          []MyChart Message  []Phone call   [x]LMOM  []L/M to call office as no active Communication consent on file  []Unable to leave detailed message as VM not approved on Communication consent       Pharmacy advised by    [x]Fax  []Phone call  []Secure Chat    Specialty Pharmacy    []     Approval letter scanned into Media No WILL SCAN UPON RECEIPT

## 2025-06-17 ENCOUNTER — TELEPHONE (OUTPATIENT)
Dept: NEUROLOGY | Facility: CLINIC | Age: 45
End: 2025-06-17

## 2025-06-18 ENCOUNTER — TELEPHONE (OUTPATIENT)
Age: 45
End: 2025-06-18

## 2025-06-18 NOTE — TELEPHONE ENCOUNTER
Caller: Patient    Doctor: Dr. MULLEN    Reason for call: Patient is schedule for a procedure and recently changed her insurance to [employer]# [id number] [facility]      Call back#:

## 2025-06-18 NOTE — TELEPHONE ENCOUNTER
Caller: Christie    Doctor/Office: [facility]    Call regarding :  Insurance Change/ Injection coming up SPA     Call was transferred to: Warm Transfer To SPA

## 2025-06-19 ENCOUNTER — APPOINTMENT (OUTPATIENT)
Dept: PHYSICAL THERAPY | Facility: HOME HEALTHCARE | Age: 45
End: 2025-06-19
Attending: STUDENT IN AN ORGANIZED HEALTH CARE EDUCATION/TRAINING PROGRAM
Payer: COMMERCIAL

## 2025-06-19 DIAGNOSIS — G35 MS (MULTIPLE SCLEROSIS) (HCC): Primary | ICD-10-CM

## 2025-06-19 NOTE — TELEPHONE ENCOUNTER
Patient called stated she now has [employer] insurance and if she needed AP for infusions.  I informed patient per FREDA Carcamo no PA is required.

## 2025-06-20 ENCOUNTER — HOSPITAL ENCOUNTER (OUTPATIENT)
Dept: INFUSION CENTER | Facility: HOSPITAL | Age: 45
End: 2025-06-20
Attending: PSYCHIATRY & NEUROLOGY
Payer: COMMERCIAL

## 2025-06-20 VITALS — HEART RATE: 91 BPM | DIASTOLIC BLOOD PRESSURE: 73 MMHG | TEMPERATURE: 98.4 F | SYSTOLIC BLOOD PRESSURE: 109 MMHG

## 2025-06-20 DIAGNOSIS — G35 MS (MULTIPLE SCLEROSIS) (HCC): Primary | ICD-10-CM

## 2025-06-20 PROCEDURE — 96365 THER/PROPH/DIAG IV INF INIT: CPT

## 2025-06-20 RX ORDER — SODIUM CHLORIDE 9 MG/ML
20 INJECTION, SOLUTION INTRAVENOUS ONCE
Status: COMPLETED | OUTPATIENT
Start: 2025-06-20 | End: 2025-06-20

## 2025-06-20 RX ORDER — SODIUM CHLORIDE 9 MG/ML
20 INJECTION, SOLUTION INTRAVENOUS ONCE
OUTPATIENT
Start: 2025-07-18

## 2025-06-20 RX ADMIN — SODIUM CHLORIDE 20 ML/HR: 0.9 INJECTION, SOLUTION INTRAVENOUS at 14:59

## 2025-06-20 RX ADMIN — SODIUM CHLORIDE 1000 MG: 0.9 INJECTION, SOLUTION INTRAVENOUS at 14:52

## 2025-06-20 NOTE — PROGRESS NOTES
Christie Joby tolerated Solumedrol treatment well with no complications.      Christie Hemphill is aware of future appt on 7/18/25 at 1330.     AVS declined.

## 2025-06-24 ENCOUNTER — APPOINTMENT (OUTPATIENT)
Dept: LAB | Facility: HOSPITAL | Age: 45
End: 2025-06-24
Payer: COMMERCIAL

## 2025-06-24 ENCOUNTER — PREP FOR PROCEDURE (OUTPATIENT)
Age: 45
End: 2025-06-24

## 2025-06-24 ENCOUNTER — OFFICE VISIT (OUTPATIENT)
Age: 45
End: 2025-06-24
Payer: COMMERCIAL

## 2025-06-24 ENCOUNTER — APPOINTMENT (OUTPATIENT)
Dept: LAB | Facility: HOSPITAL | Age: 45
End: 2025-06-24
Attending: PHYSICIAN ASSISTANT
Payer: COMMERCIAL

## 2025-06-24 VITALS — BODY MASS INDEX: 16.88 KG/M2 | HEIGHT: 66 IN | WEIGHT: 105 LBS

## 2025-06-24 DIAGNOSIS — M16.12 PRIMARY OSTEOARTHRITIS OF LEFT HIP: ICD-10-CM

## 2025-06-24 DIAGNOSIS — M16.11 PRIMARY OSTEOARTHRITIS OF RIGHT HIP: ICD-10-CM

## 2025-06-24 DIAGNOSIS — M16.12 PRIMARY OSTEOARTHRITIS OF LEFT HIP: Primary | ICD-10-CM

## 2025-06-24 DIAGNOSIS — G35 MS (MULTIPLE SCLEROSIS) (HCC): ICD-10-CM

## 2025-06-24 LAB
ALBUMIN SERPL BCG-MCNC: 4.2 G/DL (ref 3.5–5)
ALP SERPL-CCNC: 126 U/L (ref 34–104)
ALT SERPL W P-5'-P-CCNC: 38 U/L (ref 7–52)
ANION GAP SERPL CALCULATED.3IONS-SCNC: 9 MMOL/L (ref 4–13)
APTT PPP: 31 SECONDS (ref 23–34)
AST SERPL W P-5'-P-CCNC: 46 U/L (ref 13–39)
BASOPHILS # BLD AUTO: 0.02 THOUSANDS/ÂΜL (ref 0–0.1)
BASOPHILS NFR BLD AUTO: 0 % (ref 0–1)
BILIRUB SERPL-MCNC: 0.61 MG/DL (ref 0.2–1)
BUN SERPL-MCNC: 16 MG/DL (ref 5–25)
CALCIUM SERPL-MCNC: 9.3 MG/DL (ref 8.4–10.2)
CHLORIDE SERPL-SCNC: 101 MMOL/L (ref 96–108)
CO2 SERPL-SCNC: 29 MMOL/L (ref 21–32)
CREAT SERPL-MCNC: 0.85 MG/DL (ref 0.6–1.3)
EOSINOPHIL # BLD AUTO: 0.22 THOUSAND/ÂΜL (ref 0–0.61)
EOSINOPHIL NFR BLD AUTO: 5 % (ref 0–6)
ERYTHROCYTE [DISTWIDTH] IN BLOOD BY AUTOMATED COUNT: 16.4 % (ref 11.6–15.1)
EST. AVERAGE GLUCOSE BLD GHB EST-MCNC: 114 MG/DL
GFR SERPL CREATININE-BSD FRML MDRD: 83 ML/MIN/1.73SQ M
GLUCOSE P FAST SERPL-MCNC: 97 MG/DL (ref 65–99)
HBA1C MFR BLD: 5.6 %
HCT VFR BLD AUTO: 35.7 % (ref 34.8–46.1)
HGB BLD-MCNC: 11.2 G/DL (ref 11.5–15.4)
IMM GRANULOCYTES # BLD AUTO: 0.01 THOUSAND/UL (ref 0–0.2)
IMM GRANULOCYTES NFR BLD AUTO: 0 % (ref 0–2)
INR PPP: 1.04 (ref 0.85–1.19)
LYMPHOCYTES # BLD AUTO: 1.79 THOUSANDS/ÂΜL (ref 0.6–4.47)
LYMPHOCYTES NFR BLD AUTO: 39 % (ref 14–44)
MCH RBC QN AUTO: 26.7 PG (ref 26.8–34.3)
MCHC RBC AUTO-ENTMCNC: 31.4 G/DL (ref 31.4–37.4)
MCV RBC AUTO: 85 FL (ref 82–98)
MONOCYTES # BLD AUTO: 0.3 THOUSAND/ÂΜL (ref 0.17–1.22)
MONOCYTES NFR BLD AUTO: 7 % (ref 4–12)
NEUTROPHILS # BLD AUTO: 2.22 THOUSANDS/ÂΜL (ref 1.85–7.62)
NEUTS SEG NFR BLD AUTO: 49 % (ref 43–75)
NRBC BLD AUTO-RTO: 0 /100 WBCS
PLATELET # BLD AUTO: 94 THOUSANDS/UL (ref 149–390)
PMV BLD AUTO: 10.5 FL (ref 8.9–12.7)
POTASSIUM SERPL-SCNC: 3.8 MMOL/L (ref 3.5–5.3)
PROT SERPL-MCNC: 6.6 G/DL (ref 6.4–8.4)
PROTHROMBIN TIME: 14.1 SECONDS (ref 12.3–15)
RBC # BLD AUTO: 4.19 MILLION/UL (ref 3.81–5.12)
RETICS # AUTO: NORMAL 10*3/UL (ref 14097–95744)
RETICS # CALC: 0.8 % (ref 0.37–1.87)
SODIUM SERPL-SCNC: 139 MMOL/L (ref 135–147)
WBC # BLD AUTO: 4.56 THOUSAND/UL (ref 4.31–10.16)

## 2025-06-24 PROCEDURE — 83550 IRON BINDING TEST: CPT

## 2025-06-24 PROCEDURE — 99215 OFFICE O/P EST HI 40 MIN: CPT | Performed by: STUDENT IN AN ORGANIZED HEALTH CARE EDUCATION/TRAINING PROGRAM

## 2025-06-24 PROCEDURE — 80053 COMPREHEN METABOLIC PANEL: CPT

## 2025-06-24 PROCEDURE — 85045 AUTOMATED RETICULOCYTE COUNT: CPT

## 2025-06-24 PROCEDURE — 87081 CULTURE SCREEN ONLY: CPT

## 2025-06-24 PROCEDURE — 82728 ASSAY OF FERRITIN: CPT

## 2025-06-24 PROCEDURE — 83540 ASSAY OF IRON: CPT

## 2025-06-24 PROCEDURE — 85730 THROMBOPLASTIN TIME PARTIAL: CPT

## 2025-06-24 PROCEDURE — 36415 COLL VENOUS BLD VENIPUNCTURE: CPT

## 2025-06-24 PROCEDURE — 85025 COMPLETE CBC W/AUTO DIFF WBC: CPT

## 2025-06-24 PROCEDURE — 85610 PROTHROMBIN TIME: CPT

## 2025-06-24 RX ORDER — CHLORHEXIDINE GLUCONATE ORAL RINSE 1.2 MG/ML
15 SOLUTION DENTAL ONCE
OUTPATIENT
Start: 2025-06-24 | End: 2025-06-24

## 2025-06-24 RX ORDER — MUPIROCIN 2 %
OINTMENT (GRAM) TOPICAL
Qty: 15 G | Refills: 1 | Status: SHIPPED | OUTPATIENT
Start: 2025-06-24

## 2025-06-24 RX ORDER — SODIUM CHLORIDE, SODIUM LACTATE, POTASSIUM CHLORIDE, CALCIUM CHLORIDE 600; 310; 30; 20 MG/100ML; MG/100ML; MG/100ML; MG/100ML
20 INJECTION, SOLUTION INTRAVENOUS CONTINUOUS
OUTPATIENT
Start: 2025-06-24

## 2025-06-24 RX ORDER — CHLORHEXIDINE GLUCONATE 40 MG/ML
SOLUTION TOPICAL DAILY PRN
OUTPATIENT
Start: 2025-06-24

## 2025-06-24 RX ORDER — SODIUM CHLORIDE, SODIUM LACTATE, POTASSIUM CHLORIDE, CALCIUM CHLORIDE 600; 310; 30; 20 MG/100ML; MG/100ML; MG/100ML; MG/100ML
125 INJECTION, SOLUTION INTRAVENOUS CONTINUOUS
OUTPATIENT
Start: 2025-06-24

## 2025-06-24 RX ORDER — ACETAMINOPHEN 325 MG/1
975 TABLET ORAL ONCE
OUTPATIENT
Start: 2025-06-24 | End: 2025-06-24

## 2025-06-24 RX ORDER — FOLIC ACID 1 MG/1
1 TABLET ORAL DAILY
Qty: 30 TABLET | Refills: 1 | Status: SHIPPED | OUTPATIENT
Start: 2025-06-24

## 2025-06-24 RX ORDER — MULTIVIT-MIN/IRON FUM/FOLIC AC 7.5 MG-4
1 TABLET ORAL DAILY
Qty: 30 TABLET | Refills: 1 | Status: SHIPPED | OUTPATIENT
Start: 2025-06-24

## 2025-06-24 NOTE — PROGRESS NOTES
Hip New Office Note    Assessment:     1. Primary osteoarthritis of left hip    2. Primary osteoarthritis of right hip    3. MS (multiple sclerosis) ([facility])        Plan:  Assessment & Plan  Primary osteoarthritis of left hip  We discussed the patient has severe degenerative changes of the left hip with lateral subluxation of the femoral head.  She also has right hip bone-on-bone degenerative changes which are less symptomatic than left.  She has an audible crepitus with range of motion of the left hip which then the pain causes spasms.  She has a history of multiple sclerosis and has difficulty ambulating due to her hip pain.  We discussed that anterior based approach is the safest from a stability standpoint.  We discussed that instability will be her biggest risk factor due to her underlying MS.  We discussed that she does have bilateral hip osteoarthritis and her left leg will be longer than her right immediately postoperatively.  We will then equalize her leg lengths with her bilateral hips at a future date.    The patient has elected to proceed with left TOMMY through the anterior approach. Risks and benefits of surgery to include but not limited to bleeding, infection, damage to surrounding structures, hardware failure, instability, fracture, dislocation, leg length inequality, need for further surgery, continued pain, stiffness, blood clots, stroke, heart attack, and LFCN numbness was discussed with the patient. Informed consent was signed today in the office. The patient has met with our surgical schedulers and our preoperative joint replacement pathway has been initiated. All questions were answered. Patient will follow-up 2 weeks post operatively.  Patient is a smoker and acceptable BMI of 16.95. She is ceasing nicotine for surgical intervention.        Orders:  •  Ambulatory Referral to Orthopedic Surgery  •  Case request operating room: ARTHROPLASTY HIP TOTAL ANTERIOR,NAVIGATED; Standing  •  folic acid  (FOLVITE) 1 mg tablet; Take 1 tablet (1 mg total) by mouth daily  •  Multiple Vitamins-Minerals (multivitamin with minerals) tablet; Take 1 tablet by mouth daily  •  mupirocin (BACTROBAN) 2 % ointment; Apply topically to the inside of the left and right nostrils twice daily for 5 days before surgery, including the morning of surgery.  •  Comprehensive metabolic panel; Future  •  Hemoglobin A1C W/EAG Estimation; Future  •  CBC and differential; Future  •  MRSA culture; Future  •  Anemia Panel w/Reflex; Future  •  Protime-INR; Future  •  APTT; Future  •  Ambulatory Referral to [facility]; Future  •  Ambulatory referral to Physical Therapy; Future  •  EKG 12 lead; Future    Primary osteoarthritis of right hip    Orders:  •  Ambulatory Referral to Orthopedic Surgery    MS (multiple sclerosis) (Formerly [facility])    Orders:  •  Ambulatory Referral to Orthopedic Surgery    Subjective:     Patient ID: Christie Hemphill is a 45 y.o. female.  Chief Complaint:  HPI:  45 y.o. female who presents for left hip evaluation. She reports several years of hip and groin pain worse on the left then the right. She reports pain with activity as well as rest.  She reports that the only thing that has significantly helped with her pain is diclofenac.  She is continue to take this for pain relief.  She presents to clinic today in a wheelchair, but reports that when she is home she is able to use a walker for ambulation.  The patient has a history of MS.  She is able to extend bilateral knees, but reports spasms in her lower extremities, that she is unable to control.  She reports that these are worse at night than during the day.  She does have a history of lower back pain, that is being treated by spine and pain.  She does have lumbar injections scheduled, denies being diabetic.     Audible and palpable creak with hip ROM.     She currently takes Plavix.      Allergy:  Allergies[1]  Medications:  all current active meds have been  "reviewed  Past Medical History:  Past Medical History[2]  Past Surgical History:  Past Surgical History[3]  Family History:  Family History[4]  Social History:  Social History     Substance and Sexual Activity   Alcohol Use Yes    Comment: 2 mixed drinks each night     Social History     Substance and Sexual Activity   Drug Use No     Tobacco Use History[5]        ROS:  General: Per HPI  Skin: Negative, except if noted below  HEENT: Negative  Respiratory: Negative  Cardiovascular: Negative  Gastrointestinal: Negative  Urinary: Negative  Vascular: Negative  Musculoskeletal: Positive per HPI   Neurologic: Positive per HPI  Endocrine: Negative    Objective:  BP Readings from Last 1 Encounters:   06/20/25 109/73      Wt Readings from Last 1 Encounters:   06/24/25 47.6 kg (105 lb)        Respiratory:   non-labored respirations    Lymphatics:  no palpable lymph nodes    Gait and Station:   Able to ambulate with assistive devices   Uses wheel chair for long distances     Neurologic:   Alert and oriented times 3  Patient with normal sensation except as noted below  Deep tendon reflexes 2+ except as noted in MSK exam    Bilateral Lower Extremity:  Left Hip     Inspection: skin intact    Range of Motion: flexion 120, IR and ER limited with pain and crepitus     Motor: 4/5 Q/HS/TA/GS/P    Pulses: 2+ DP / 2+ PT    SILT DP/SP/S/S/TN    Right Hip     Inspection: sin intact    Range of Motion: flexion 120, IR and ER limited with pain    Motor: 4/5 Q/HS/TA/GS/P    Pulses: 2+ DP / 2+ PT    SILT DP/SP/S/S/TN    Able to extend both knees to 0     Imaging:  My interpretation XR AP pelvis/ bilateral hip: severe joint space narrowing, subchondral sclerosis, subchondral cysts, osteophyte formation. Subluxation of left hip. No fracture or dislocation.     BMI:   Estimated body mass index is 16.95 kg/m² as calculated from the following:    Height as of this encounter: 5' 6\" (1.676 m).    Weight as of this encounter: 47.6 kg (105 lb).  BSA: " "  Estimated body surface area is 1.52 meters squared as calculated from the following:    Height as of this encounter: 5' 6\" (1.676 m).    Weight as of this encounter: 47.6 kg (105 lb).           Scribe Attestation    I,:  Lillian Norris am acting as a scribe while in the presence of the attending physician.:       I,:  Todd Sharpe, DO personally performed the services described in this documentation    as scribed in my presence.:                   [1]  Allergies  Allergen Reactions   • Penicillins GI Intolerance and Nausea Only     Other reaction(s): Unknown Reaction   [2]  Past Medical History:  Diagnosis Date   • Back pain    • Chronic constipation 6/27/2016   • Chronic pain disorder    • Collar bone fracture     right side   • Crutches as ambulation aid    • Depression    • Dyssynergic defecation     Type 1   • ETOH abuse    • Head injury 2014   • Infectious viral hepatitis    • Lyme disease    • Meningitis spinal 2007?   • Multiple sclerosis (HCC)    • Opioid abuse (HCC)    • Rib fractures    • Thrombocytopenia (HCC)    • Use of cane as ambulatory aid    [3]  Past Surgical History:  Procedure Laterality Date   • APPENDECTOMY     • CLAVICLE SURGERY     • COLONOSCOPY     • FL LUMBAR PUNCTURE DIAGNOSTIC  11/25/2020   • KNEE ARTHROSCOPY     • MULTIPLE TOOTH EXTRACTIONS     • VA COLONOSCOPY FLX DX W/COLLJ SPEC WHEN PFRMD N/A 6/27/2016    Procedure: COLONOSCOPY;  Surgeon: Jason Rogers MD;  Location: [facility];  Service: Colorectal   • VA OPEN IMPLANTATION CHARITY SACRAL NERVE N/A 11/15/2021    Procedure: INSERTION OF NEUROSTIMULATOR ELECTRODE ARRAY SACRAL NERVE; FLUOROSCOPY; ELECTRONICN ANALYSIS;  Surgeon: Abdulaziz Guevara MD;  Location: [facility];  Service: UroGynecology          • TONSILLECTOMY AND ADENOIDECTOMY     • TUBAL LIGATION     [4]  Family History  Problem Relation Name Age of Onset   • Skin cancer Mother     • No Known Problems Brother     [5]  Social History  Tobacco Use   Smoking Status Every " Day   • Current packs/day: 0.25   • Average packs/day: 0.3 packs/day for 1.3 years (0.3 ttl pk-yrs)   • Types: Cigarettes   • Start date: 03/2024   Smokeless Tobacco Never

## 2025-06-25 LAB
FERRITIN SERPL-MCNC: 19 NG/ML (ref 30–307)
IRON SATN MFR SERPL: 11 % (ref 15–50)
IRON SERPL-MCNC: 53 UG/DL (ref 50–212)
TIBC SERPL-MCNC: 495.6 UG/DL (ref 250–450)
TRANSFERRIN SERPL-MCNC: 354 MG/DL (ref 203–362)
UIBC SERPL-MCNC: 443 UG/DL (ref 155–355)

## 2025-06-26 ENCOUNTER — OFFICE VISIT (OUTPATIENT)
Dept: PHYSICAL THERAPY | Facility: HOME HEALTHCARE | Age: 45
End: 2025-06-26
Payer: COMMERCIAL

## 2025-06-26 DIAGNOSIS — G35 HX OF MULTIPLE SCLEROSIS (HCC): Primary | ICD-10-CM

## 2025-06-26 DIAGNOSIS — R26.2 AMBULATORY DYSFUNCTION: ICD-10-CM

## 2025-06-26 DIAGNOSIS — R29.898 WEAKNESS OF BOTH LOWER EXTREMITIES: ICD-10-CM

## 2025-06-26 LAB — MRSA NOSE QL CULT: NORMAL

## 2025-06-26 PROCEDURE — 97140 MANUAL THERAPY 1/> REGIONS: CPT

## 2025-06-26 PROCEDURE — 97110 THERAPEUTIC EXERCISES: CPT

## 2025-06-26 NOTE — PROGRESS NOTES
Daily Note     Today's date: 2025  Patient name: Christie Hemphill  : 1980  MRN: [id number]  Referring provider: Roosevelt Ortiz DO  Dx:   Encounter Diagnosis     ICD-10-CM    1. Hx of multiple sclerosis (HCC)  G35       2. Weakness of both lower extremities  R29.898       3. Ambulatory dysfunction  R26.2           Start Time: 1300  Stop Time: 1345  Total time in clinic (min): 45 minutes    Subjective: Pt states that she has been unable to perform L HS stretching w/ assistance from her family due to time constraints and limitations in availability of family members.  Pt just saw Ortho MD on  for approval and discussion on L TOMMY.        Objective: See treatment diary below      Assessment:  Pt was able to tolerate bouts of static HS stretch on table in supine position for a total of 30 mins, (1st bout = 15 mins, 2nd bout = 15 mins).  Pt stated that her L HS had decreased pain from when ambulating into the clinic.  Pt had decreased occurrences of hip snapping and L HS contracture.  Pt ambulated w/ use of axillary crutches forcing pt to be in a more upright position which is more comfortable for pt, however more fatiguing.       Plan: Continue per plan of care.      Precautions: MS    Visit Count 8 9 5 6 7       Manuals 6/5   6/26   5/5  5/15  5/29   L knee Ext (HS stretching)  BLE static stretch  to achieve extension position         BLE static stretch  to achieve extension position    Family education on how to properly/ safely performing stretching       DF  -Education, explanation, and demonstration of B/L HS Stretches    - Pt maintained position for 8 mins, 2x    - Pt's son was able to perform multiple repetitions properly to reduce contraction of pt's L HS and reduce caregiver burden w/ proper form                         Neuro Re-Ed           Balance as appropriate                   Discussion on predicted outcomes post- L TOMMY and L/S ablasion DF       Use of hercules brace during ambulation & during  "rest periods DF - donning & doffing of brace w/ 10 ft of ambulation using rollator                 L knee Extension on Table to decrease clonus and rigidity/ spasticity  DF    1:15 -       Rhythmic initiation of BLEs w/ knees flexed        Latvian stimulation education & use.   Initial Trial w/ smaller pads due to less muscle mass, switched to larger pads for effectivness.        Once contraction was achieved for pt in comfortable position (seated w/ RLE propped on stool for Knee ext), stim was performed at...    10s on, 20s off  Intensity @ 70 for 15 mins  Pt in comfortable position (seated w/ RLE propped on stool for Knee ext), stim was performed at...    Trial of electrode position, had to be replaced for comfort of pt during ocntraction.     Pt w/ Quad set during contraction    RLE:  10s on, 20s off  Intensity @ 64 for 15 mins    LLE:  10s on, 20s off Intensity @ 55 for 15 mins NMES from NexWave    30s off: 10s on  For total of 10 mins ea LE individually w/ QS during 10s on time    RLE amplitude = 9mA    LLE amplitude = 7mA    Total of 20 mins of NMES                           Ther Ex                  Re-Eval Tests and Measures    Discussion on next steps for skilled therapy and what has been benefiting pt.  Pt's sx.      DF    NuStep           Ankle pumps           HR/TR        Standing Hip flex/abd/ext        Mini Squats        Step ups/ Step downs        Standing Back extensions in // bars 10x               Heel Slides w/ towel          QS          Hip abd on sliding board           LAQ           Hip add W/ ball 3\" hold  2x10         Hip abd W/o resistance 3\" hold 2x10         SLR          SAQ        S/L SLR                    Amb in // bars 3 sets of 1x <>         Discussion on use of Russian stim next session to further contract and strengthen quads, maddy, DF DF & HZ                 Education on clonus and stretching protocol w/ assistance at home       DF w/ pt's son    Education on effects of MS on " body w/ over activity.          DF w/ pt's son                              Education on NexWave (different settings TENS, IFC, NMES) proper settings, different effects (pain modulation vs strength faciliation), etc.    DF               Ther Activity                     Family Training                           Gait Training           Ambulation around clinic   Pre & post Russian stimulation 50ft ea                   Modalities          CP           TENS Unattended by PT    10' total

## 2025-06-27 ENCOUNTER — TELEPHONE (OUTPATIENT)
Dept: OBGYN CLINIC | Facility: HOSPITAL | Age: 45
End: 2025-06-27

## 2025-06-27 NOTE — TELEPHONE ENCOUNTER
Preoperative Elective Admission Assessment    EKG/LAB/MRSA SWAB/CXR date: completed      Patient currently has MS and rarely walks    Living Situation:    Who does pt live with: spouse  What kind of home: multi-level  How do they enter the home: front  How many levels in home: 2   # of steps to enter home: 2  # of steps to second floor: stays on first floor  Are there handrails: Yes  Are there landings: No  Sleeping arrangement: first/entry floor  Where is Bathroom: entry level  Where is the tub or shower: entry level walk in shower w/ grab bars  Toilet height? Concerns for low toilets standard  Dogs or ther pets: n/a     First Floor Setup:   Is there a bathroom: Yes  Where would pt sleep: couch     DME: rolling walker. Instructed to bring DOS  We discussed clearing pathways in the home and making sure there is accessibly to use the walker, for example, removing throw rugs.      Patient's Current Level of Function: Ambulates with walker and ADLs: Independent    Post-op Caregiver: spouse, children, parents  Caregiver Name and phone number for Inpatient discharge needs: Star  Currently receive any HHC/aides/community supports: No     Post-op Transport: spouse or relative  To/from hospital: spouseor relative  To/from PT 2-3x/week: spouse or relative  Uses community transport now: No     Outpatient Physical Therapy Site:  Site: TBD  pre and post-op appts scheduled? No     Medication Management: self, pillbox, and out of bottle  Preferred Pharmacy for Post-op Medications: [facility]  Blood Management Vitamin Regimen: Pt confirms taking as prescribed  Post-op anticoagulant: to be determined by surgical team postoperatively  Has Bactroban for 5 days preop: Yes  Educated on Preoperative Bathing Instructions, and use of Soap for 5 days before surgery.      DC Plan: Pt plans to be discharged home    Barriers to DC identified preoperatively: none identified    BMI: 16.95    Patient Education:  Pt educated on post-op  pain, early mobilization (POD0), LOS goals, OP PT goals, and preoperative bathing. Patient educated that our goal is to appropriately discharge patient based off their post-op function while striving to maintain maximal independence. The goal is to discharge patient to home and for them to attend outpatient physical therapy.    Assigned to care team? Yes

## 2025-07-01 ENCOUNTER — OFFICE VISIT (OUTPATIENT)
Dept: PHYSICAL THERAPY | Facility: HOME HEALTHCARE | Age: 45
End: 2025-07-01
Attending: STUDENT IN AN ORGANIZED HEALTH CARE EDUCATION/TRAINING PROGRAM
Payer: COMMERCIAL

## 2025-07-01 DIAGNOSIS — R29.898 WEAKNESS OF BOTH LOWER EXTREMITIES: ICD-10-CM

## 2025-07-01 DIAGNOSIS — R26.2 AMBULATORY DYSFUNCTION: ICD-10-CM

## 2025-07-01 DIAGNOSIS — G35 HX OF MULTIPLE SCLEROSIS (HCC): Primary | ICD-10-CM

## 2025-07-01 PROCEDURE — 97110 THERAPEUTIC EXERCISES: CPT

## 2025-07-01 NOTE — PROGRESS NOTES
PT Re-Evaluation     Today's date: 2025  Patient name: Christie Hemphill  : 1980  MRN: [id number]  Referring provider: Roosevelt Ortiz DO  Dx:   Encounter Diagnosis     ICD-10-CM    1. Hx of multiple sclerosis (HCC)  G35       2. Weakness of both lower extremities  R29.898       3. Ambulatory dysfunction  R26.2           Start Time: 1257  Stop Time: 1345  Total time in clinic (min): 48 minutes    Assessment  Impairments: abnormal gait, abnormal or restricted ROM, abnormal movement, activity intolerance, impaired physical strength, lacks appropriate home exercise program, pain with function, weight-bearing intolerance, poor posture , participation limitations and activity limitations  Symptom irritability: high    Assessment details: Re-Eval 25:  Christie Hemphill is a 45 y.o. female presenting to OP PT clinic in [place] w/ referral for complications from MS, BLE weakness, and chronic pain.  Pt has L TOMMY scheduled for 25 performed by Dr. Sharpe.  When presenting for IE on 25, pt had severe HS contracture of LLE.  Since beginning skilled therapy, Pt and PT have been able to achieve PROM stretching of L HS to decrease intensity and severity of L HS contracture.  Pt is able to lie supine for 15-50 mins depending on pain in other areas of body to allow L HS stretching to occur which is an improvement from last RE which pt was only able to tolerate a total of 10-20 mins in supine position.  Pt is unable to perform this at home due to lack of support at home.  Pt states that she feels the best during her week after skilled therapy and is able to perform increased activity.  Due to MS, pt has increased pain and fatigue after ambulation in b/l hips (L>R) and is seeing orthopedic doctors.  Pt has difficulty performing ADLs and recreational activities due to above mentioned deficits.  Pt would benefit from continued skilled therapy to address impairments, allowing pt to perform ADLs and recreational activities w/o  restriction or pain to improve QoL and return to PLOF.  Thank you for this referral!    Christie Hemphill is a 45 y.o. female presenting to OP PT clinic in [place] w/ referral for complications from MS, BLE weakness, and chronic pain.  Pt presents pain in LLE due to clonus during passive knee extension limiting sleep, decreased strength in BLE, decreased P/AROM in BLE mvmts, decreased ambulatory capacity w/ use of rollator, pain & difficulty w/ ambulation, stair climbing, and pain w/ functional activities.  Pt has difficulty performing ADLs and recreational activities due to above mentioned deficits.  Pt would benefit from skilled therapy to address impairments, allowing pt to perform ADLs and recreational activities w/o restriction or pain to improve QoL and return to PLOF.  PT gave pt HEP focusing on performing exercises/ activities outside of the clinic to further improve and address impairments.  Thank you for this referral!  Understanding of Dx/Px/POC: good     Prognosis: good    Goals  STG:  -Pt will improve pain from 10/10 to 7/10 to allow reduced difficulty performing ADLs due to pain in 4 weeks. -PROGRESSING  -Pt will increase L knee ext PROM from -5* to 0* to decrease painful w/ less difficulty in 4 weeks. -MET  -Pt will increase B/L quad strength from poor to fair contraction to allow pt w/ improved ability of performing a STS tx w/ less difficulty in 4 weeks. -PROGRESSING    LTG:  -Pt will be d/c from OP PT w/ I HEP to allow pt to continue improving upon their impairments for improved ADLs/ recreational activities in 12 weeks.  -PROGRESSING  -Pt will improve clonus sx in LLE from severe to minimal to allow improved ability to perform ADLs/ recreational activities w/o need for a rest break in 12 weeks. -PROGRESSING  -Pt will improve WB tolerance from 10mins to >15mins to show increased ability to perform ADLs/ work duties/ recreational activities w/ less difficulty in 12 weeks.   -PROGRESSING    Plan  Patient  "would benefit from: skilled physical therapy  Planned modality interventions: thermotherapy: hydrocollator packs, cryotherapy and neuromuscular electric stimulation    Planned therapy interventions: abdominal trunk stabilization, manual therapy, massage, neuromuscular re-education, postural training, therapeutic activities, therapeutic exercise, home exercise program, functional ROM exercises, flexibility, balance/weight bearing training, balance, gait training, stretching and strengthening    Frequency: 2x week  Duration in weeks: 12  Plan of Care beginning date: 2/25/2025  Plan of Care expiration date: 8/26/2025  Treatment plan discussed with: patient  Plan details: Continue POC focusing on addressing & improving impairments listed in eval.      Subjective Evaluation    History of Present Illness  Date of onset: 1/23/2020  Mechanism of injury: RE 7/1/25:  Pt states that she was placed on a new medication, Cataflam, which has helped improve pt's sx, however pt is still having chronic pain and L HS contracture, pt is able to maintain L knee extension for longer periods of time however still lacks family support at home to perform HEP.  Pt has L TOMMY scheduled for 7/21/25 by Dr. Sharpe.      Re-Eval 4/8/25:  Pt states that she feels overall she has decreased in functionality due to pain in b/l hip (L>R).  Since start of skilled therapy, pt received cortisone shots in b/l hips 3 weeks ago, had relief but pain returned.  Pt continues to ambulate around clinic w/ rollator.  Pt states that the only time she feels good is after a skilled therapy appt, per pt.  Pt states that during prior skilled therapy encounters, she feels as though she has decreased in functionality due to the aggressiveness of therapy and the negative affects that occur in relation to MS.    From Dr. Ortiz note 1/9/2025: \"Ms. Hemphill is a 45-year-old female who has a diagnosis of multiple sclerosis back in 2020 but notes having symptoms for several " "years prior to any official diagnosis.  She notes that she generally uses a rollator and has 2 of them available 1 for the house and the other for the car.  She does have a power chair as well for longer distances in the community as well as for appointments.  She endorses that on a good day she can ambulate half to 1 full block before needing to take a rest and on a bad day is limited to only household distance ambulation at best.  She notes weakness in the bilateral lower extremity that varies day by day but has pain as well that seems to be getting worse over time.  Most of the pain she experiences is in the buttocks and the legs to hurt mostly with bending.  She feels the bones are bending and popping and rubbing against each other.  She endorses that the pain is a 10/10 almost all the time every day except when sitting.     For pain and she has been taking Lyrica, Celebrex and occasionally ibuprofen which she admits to taking at the same time as the Celebrex as well as the Suboxone.  We did discuss NSAID safety at this moment too.  She endorses that she did see pain and spine and had trigger point injections in her trapezius and levator Scap with some success but only for her neck and shoulder discomfort.  She was also recommended for Cymbalta but did not want to start any antidepressant medications for her pain.  She has had limited success with other modalities including therapies and other medications that we had reviewed.\"    Pt states that she is having a potential [employer] custom fit for her in the coming future but is waiting for  to contact her.  Pt frequently has enemas due to constipation every other day at home.  Pt is able to perform safe ambulation around the house w/ rollator.  Pt can stand for a tolerance of 10mins on most days.  Pt has stair lift and has first floor set up at home. Pt is having disturbed sleep due to L knee and is unable to straighten leg which is causing the most " "pain, roughly for 5 weeks and has not had this specific pain prior.                    Recurrent probem    Quality of life: poor    Patient Goals  Patient goals for therapy: increased strength, independence with ADLs/IADLs, decreased pain, improved balance, increased motion and return to sport/leisure activities  Patient goal: Decreased pain and ability to ambulate around house and community w/ less difficulty  Pain  Current pain ratin  At best pain ratin  At worst pain rating: 10  Location: \"everywhere\"  Quality: sharp, dull ache, knife-like, throbbing and needle-like  Aggravating factors: standing and walking  Progression: worsening    Social Support  Lives in: multiple-level home  Lives with: significant other and young children    Employment status: not working  Treatments  Previous treatment: physical therapy, injection treatment and medication  Current treatment: medication and physical therapy    Objective     Static Posture     Lumbar Spine   Increased lordosis.     Hip   Hip (Left): Increased flexion.   Hip (Right): Increased flexion.     Knee   Knee (Left): Flexed.   Knee (Right): Flexed.     Neurological Testing     Reflexes   Left   Clonus sign: positive    Additional Neurological Details  L HS contracture present when pt achieves L knee extension of -18*, is able to combat contracture w/ slow, passive extension of L knee.  PT implemented breathing and distraction techniques, techniques are an improvement for pt.      Clonus when performing L knee extension    Passive Range of Motion   Left Hip   Normal passive range of motion    Right Hip   Normal passive range of motion  Left Knee   Extension: -5 (able to achieve 0 after slow, passive extension w/ multiple instances of Clonus) degrees with pain    Strength/Myotome Testing     Left Hip   Planes of Motion   Flexion: 1  Abduction: 2+  Adduction: 2+    Right Hip   Planes of Motion   Flexion: 1  Abduction: 2+  Adduction: 2+    Left Knee   Flexion: " 2-  Extension: 2-  Quadriceps contraction: fair    Right Knee   Flexion: 2-  Extension: 2-  Quadriceps contraction: fair    Ambulation     Observational Gait   Gait: crouched   Decreased walking speed and stride length.     Additional Observational Gait Details  Use of rollator in clinic and at home, longer distances use of scooter    Comments   RE 25: TUG w/ Rollator = 58.25s    Re-Eval 25:  TUG w/ Rollator = 1:06.31s    TUG w/ rollator = 56.02s  Neuro Exam:     Functional outcomes   TU.02 (seconds)               Precautions: MS    Visit Count 8 9 1 RE 6 7       Manuals 6/5   6/26   7/1  5/15  5/29   L knee Ext (HS stretching)  BLE static stretch  to achieve extension position     BLE static stretch  to achieve extension position    BLE static stretch  to achieve extension position    Family education on how to properly/ safely performing stretching       DF  -Education, explanation, and demonstration of B/L HS Stretches    - Pt maintained position for 8 mins, 2x    - Pt's son was able to perform multiple repetitions properly to reduce contraction of pt's L HS and reduce caregiver burden w/ proper form                         Neuro Re-Ed           Balance as appropriate                   Discussion on predicted outcomes post- L TOMMY and L/S ablasion DF       Use of hercules brace during ambulation & during rest periods DF - donning & doffing of brace w/ 10 ft of ambulation using rollator                 L knee Extension on Table to decrease clonus and rigidity/ spasticity        Rhythmic initiation of BLEs w/ knees flexed        Chinese stimulation education & use.      NMES from [employer]    30s off: 10s on  For total of 10 mins ea LE individually w/ QS during 10s on time    RLE amplitude = 9mA    LLE amplitude = 7mA    Total of 20 mins of NMES                           Ther Ex                  L knee Extension on Table to decrease clonus and rigidity/ spasticity  1:15-1:25 = 10mins      1:30-1:40 =  "10mins    Total of 20 mins 1:06 - 1:14 = 8 mins    1:20 - 1:35 = 15 mins    Total of 23 mins     Re-Eval Tests and Measures    Discussion on next steps for skilled therapy and what has been benefiting pt.  Pt's sx.     DF     NuStep           Ankle pumps           HR/TR        Standing Hip flex/abd/ext        Mini Squats        Step ups/ Step downs        Standing Back extensions in // bars 10x               Heel Slides w/ towel          QS          Hip abd on sliding board           LAQ           Hip add W/ ball 3\" hold  2x10         Hip abd W/o resistance 3\" hold 2x10         SLR          SAQ        S/L SLR                    Amb in // bars 3 sets of 1x <>         Discussion on use of Russian stim next session to further contract and strengthen quads, everters, DF DF & HZ                 Education on clonus and stretching protocol w/ assistance at home       DF w/ pt's son    Education on effects of MS on body w/ over activity.          DF w/ pt's son                              Education on [employer] (different settings TENS, IFC, NMES) proper settings, different effects (pain modulation vs strength faciliation), etc.    DF               Ther Activity                     Family Training                           Gait Training           Ambulation around clinic                     Modalities          CP           TENS Unattended by PT    10' total                            "

## 2025-07-01 NOTE — PROGRESS NOTES
Daily Note     Today's date: 2025  Patient name: Christie Hemphill  : 1980  MRN: [id number]  Referring provider: Roosevelt Ortiz DO  Dx:   Encounter Diagnosis     ICD-10-CM    1. Hx of multiple sclerosis (HCC)  G35       2. Weakness of both lower extremities  R29.898       3. Ambulatory dysfunction  R26.2           Start Time: 1257          Subjective: ***      Objective: See treatment diary below      Assessment: Tolerated treatment {Tolerated treatment :[id number]}. Patient {assessment:[id number]}      Plan: Continue per plan of care.      Precautions: MS    Visit Count 8 9 10 6 7       Manuals 6/5   6/26   7/1  5/15  5/29   L knee Ext (HS stretching)  BLE static stretch  to achieve extension position         BLE static stretch  to achieve extension position    Family education on how to properly/ safely performing stretching       DF  -Education, explanation, and demonstration of B/L HS Stretches    - Pt maintained position for 8 mins, 2x    - Pt's son was able to perform multiple repetitions properly to reduce contraction of pt's L HS and reduce caregiver burden w/ proper form                         Neuro Re-Ed           Balance as appropriate                   Discussion on predicted outcomes post- L TOMMY and L/S ablasion DF       Use of hercules brace during ambulation & during rest periods DF - donning & doffing of brace w/ 10 ft of ambulation using rollator                 L knee Extension on Table to decrease clonus and rigidity/ spasticity  DF    1:15 -       Rhythmic initiation of BLEs w/ knees flexed        Norwegian stimulation education & use.   Initial Trial w/ smaller pads due to less muscle mass, switched to larger pads for effectivness.        Once contraction was achieved for pt in comfortable position (seated w/ RLE propped on stool for Knee ext), stim was performed at...    10s on, 20s off  Intensity @ 70 for 15 mins  Pt in comfortable position (seated w/ RLE propped on stool for Knee ext),  "stim was performed at...    Trial of electrode position, had to be replaced for comfort of pt during ocntraction.     Pt w/ Quad set during contraction    RLE:  10s on, 20s off  Intensity @ 64 for 15 mins    LLE:  10s on, 20s off Intensity @ 55 for 15 mins NMES from [employer]    30s off: 10s on  For total of 10 mins ea LE individually w/ QS during 10s on time    RLE amplitude = 9mA    LLE amplitude = 7mA    Total of 20 mins of NMES                           Ther Ex                  Re-Eval Tests and Measures    Discussion on next steps for skilled therapy and what has been benefiting pt.  Pt's sx.      DF    NuStep           Ankle pumps           HR/TR        Standing Hip flex/abd/ext        Mini Squats        Step ups/ Step downs        Standing Back extensions in // bars 10x               Heel Slides w/ towel          QS          Hip abd on sliding board           LAQ           Hip add W/ ball 3\" hold  2x10         Hip abd W/o resistance 3\" hold 2x10         SLR          SAQ        S/L SLR                    Amb in // bars 3 sets of 1x <>         Discussion on use of Russian stim next session to further contract and strengthen quads, everters, DF DF & HZ                 Education on clonus and stretching protocol w/ assistance at home       DF w/ pt's son    Education on effects of MS on body w/ over activity.          DF w/ pt's son                              Education on [employer] (different settings TENS, IFC, NMES) proper settings, different effects (pain modulation vs strength faciliation), etc.    DF               Ther Activity                     Family Training                           Gait Training           Ambulation around clinic   Pre & post Russian stimulation 50ft ea                   Modalities          CP           TENS Unattended by PT    10' total                                            "

## 2025-07-02 ENCOUNTER — ANESTHESIA EVENT (OUTPATIENT)
Dept: PERIOP | Facility: HOSPITAL | Age: 45
End: 2025-07-02
Payer: COMMERCIAL

## 2025-07-03 ENCOUNTER — ANESTHESIA (OUTPATIENT)
Dept: PERIOP | Facility: HOSPITAL | Age: 45
End: 2025-07-03
Payer: COMMERCIAL

## 2025-07-03 ENCOUNTER — TELEPHONE (OUTPATIENT)
Age: 45
End: 2025-07-03

## 2025-07-03 ENCOUNTER — HOSPITAL ENCOUNTER (OUTPATIENT)
Facility: HOSPITAL | Age: 45
Setting detail: OUTPATIENT SURGERY
Discharge: HOME | End: 2025-07-03
Attending: ANESTHESIOLOGY | Admitting: ANESTHESIOLOGY
Payer: COMMERCIAL

## 2025-07-03 ENCOUNTER — APPOINTMENT (OUTPATIENT)
Dept: RADIOLOGY | Facility: HOSPITAL | Age: 45
End: 2025-07-03
Payer: COMMERCIAL

## 2025-07-03 VITALS
DIASTOLIC BLOOD PRESSURE: 55 MMHG | HEART RATE: 66 BPM | OXYGEN SATURATION: 96 % | TEMPERATURE: 97.9 F | RESPIRATION RATE: 18 BRPM | SYSTOLIC BLOOD PRESSURE: 88 MMHG

## 2025-07-03 RX ORDER — AMOXICILLIN 500 MG/1
500 CAPSULE ORAL EVERY 8 HOURS SCHEDULED
COMMUNITY

## 2025-07-03 RX ORDER — SODIUM CHLORIDE, SODIUM LACTATE, POTASSIUM CHLORIDE, CALCIUM CHLORIDE 600; 310; 30; 20 MG/100ML; MG/100ML; MG/100ML; MG/100ML
125 INJECTION, SOLUTION INTRAVENOUS CONTINUOUS
Status: DISCONTINUED | OUTPATIENT
Start: 2025-07-03 | End: 2025-07-03 | Stop reason: HOSPADM

## 2025-07-03 RX ORDER — SODIUM CHLORIDE, SODIUM LACTATE, POTASSIUM CHLORIDE, CALCIUM CHLORIDE 600; 310; 30; 20 MG/100ML; MG/100ML; MG/100ML; MG/100ML
20 INJECTION, SOLUTION INTRAVENOUS CONTINUOUS
Status: DISCONTINUED | OUTPATIENT
Start: 2025-07-03 | End: 2025-07-03 | Stop reason: HOSPADM

## 2025-07-03 RX ADMIN — SODIUM CHLORIDE, SODIUM LACTATE, POTASSIUM CHLORIDE, AND CALCIUM CHLORIDE 125 ML/HR: .6; .31; .03; .02 INJECTION, SOLUTION INTRAVENOUS at 07:50

## 2025-07-03 NOTE — TELEPHONE ENCOUNTER
Caller: Christie     Doctor: Dr. Sharpe    Reason for call: Patient would like to cancel her scheduled surgery with Dr Sharpe. Please return call and advise a new surgery date.     Call back#: [phone]

## 2025-07-08 ENCOUNTER — TELEPHONE (OUTPATIENT)
Age: 45
End: 2025-07-08

## 2025-07-08 NOTE — TELEPHONE ENCOUNTER
Caller: Christie    Doctor/Office: Dr Silva    Call regarding :  Wanted to Speak to Spine and Pain. Did not give details.      Call was transferred to: Warm Transferred to Spine [facility] Pain

## 2025-07-15 ENCOUNTER — OFFICE VISIT (OUTPATIENT)
Dept: PHYSICAL THERAPY | Facility: HOME HEALTHCARE | Age: 45
End: 2025-07-15
Attending: STUDENT IN AN ORGANIZED HEALTH CARE EDUCATION/TRAINING PROGRAM
Payer: COMMERCIAL

## 2025-07-15 DIAGNOSIS — R26.2 AMBULATORY DYSFUNCTION: ICD-10-CM

## 2025-07-15 DIAGNOSIS — R29.898 WEAKNESS OF BOTH LOWER EXTREMITIES: ICD-10-CM

## 2025-07-15 DIAGNOSIS — G35 HX OF MULTIPLE SCLEROSIS (HCC): Primary | ICD-10-CM

## 2025-07-15 PROCEDURE — 97110 THERAPEUTIC EXERCISES: CPT

## 2025-07-15 PROCEDURE — 97140 MANUAL THERAPY 1/> REGIONS: CPT

## 2025-07-18 ENCOUNTER — HOSPITAL ENCOUNTER (OUTPATIENT)
Dept: INFUSION CENTER | Facility: HOSPITAL | Age: 45
End: 2025-07-18
Attending: PSYCHIATRY & NEUROLOGY
Payer: COMMERCIAL

## 2025-07-18 VITALS
RESPIRATION RATE: 16 BRPM | HEART RATE: 73 BPM | OXYGEN SATURATION: 95 % | SYSTOLIC BLOOD PRESSURE: 90 MMHG | DIASTOLIC BLOOD PRESSURE: 56 MMHG | TEMPERATURE: 97.6 F

## 2025-07-18 DIAGNOSIS — G35 MS (MULTIPLE SCLEROSIS) (HCC): Primary | ICD-10-CM

## 2025-07-18 PROCEDURE — 96365 THER/PROPH/DIAG IV INF INIT: CPT

## 2025-07-18 RX ORDER — SODIUM CHLORIDE 9 MG/ML
20 INJECTION, SOLUTION INTRAVENOUS ONCE
Status: COMPLETED | OUTPATIENT
Start: 2025-07-18 | End: 2025-07-18

## 2025-07-18 RX ORDER — SODIUM CHLORIDE 9 MG/ML
20 INJECTION, SOLUTION INTRAVENOUS ONCE
OUTPATIENT
Start: 2025-08-15

## 2025-07-18 RX ADMIN — SODIUM CHLORIDE 20 ML/HR: 0.9 INJECTION, SOLUTION INTRAVENOUS at 13:52

## 2025-07-18 RX ADMIN — SODIUM CHLORIDE 1000 MG: 0.9 INJECTION, SOLUTION INTRAVENOUS at 13:48

## 2025-07-18 NOTE — PROGRESS NOTES
Pt tolerated IV Solumedrol well with no issues. Peripheral IV removed without incident.      Christie Hemphill is aware of future appt on 8/15/25 at 1:30PM.      AVS declined by Christie Hemphill.     Pt discharged off unit in w/c by RN in stable condition with all personal belongings.

## 2025-07-22 ENCOUNTER — OFFICE VISIT (OUTPATIENT)
Dept: PHYSICAL THERAPY | Facility: HOME HEALTHCARE | Age: 45
End: 2025-07-22
Attending: STUDENT IN AN ORGANIZED HEALTH CARE EDUCATION/TRAINING PROGRAM
Payer: COMMERCIAL

## 2025-07-22 DIAGNOSIS — R26.2 AMBULATORY DYSFUNCTION: ICD-10-CM

## 2025-07-22 DIAGNOSIS — R29.898 WEAKNESS OF BOTH LOWER EXTREMITIES: ICD-10-CM

## 2025-07-22 DIAGNOSIS — G35 HX OF MULTIPLE SCLEROSIS (HCC): Primary | ICD-10-CM

## 2025-07-22 PROCEDURE — 97110 THERAPEUTIC EXERCISES: CPT

## 2025-07-22 PROCEDURE — 97140 MANUAL THERAPY 1/> REGIONS: CPT

## 2025-07-22 NOTE — PROGRESS NOTES
Daily Note     Today's date: 2025  Patient name: Christie Hemphill  : 1980  MRN: [id number]  Referring provider: Roosevelt Ortiz DO  Dx:   Encounter Diagnosis     ICD-10-CM    1. Hx of multiple sclerosis (HCC)  G35       2. Weakness of both lower extremities  R29.898       3. Ambulatory dysfunction  R26.2           Start Time: 1300  Stop Time: 1345  Total time in clinic (min): 45 minutes    Subjective: Pt states that she is having a better day than usual due to the heat and humidity not being as high as it has been presenting to clinic.        Objective: See treatment diary below      Assessment: Pt tolerated treatment session w/ longest total period of time of having B/L knee extension in supine position.  Pt also experienced decreased amounts of HS contractures in BLE, experiences contracture at roughly 15-20* of knee flexion, duration has decreased as well.  Continue increasing duration of static stretch of BLEs to decrease tightness/ contracture.        Plan: Continue per plan of care.      Precautions: MS    Visit Count 8 9 1 RE 2 3       Manuals 6/5   6/26   7/1  7/15  7/22   L knee Ext (HS stretching)  BLE static stretch  to achieve extension position     BLE static stretch  to achieve extension position  BLE static stretch  to achieve extension position  BLE static stretch  to achieve extension position    Family education on how to properly/ safely performing stretching                              Neuro Re-Ed           Balance as appropriate                   Discussion on predicted outcomes post- L TOMMY and L/S ablasion DF       Use of hercules brace during ambulation & during rest periods DF - donning & doffing of brace w/ 10 ft of ambulation using rollator                 L knee Extension on Table to decrease clonus and rigidity/ spasticity        Rhythmic initiation of BLEs w/ knees flexed        Iraqi stimulation education & use.                             Ther Ex                  L knee  "Extension on Table to decrease clonus and rigidity/ spasticity  1:15-1:25 = 10mins      1:30-1:40 = 10mins    Total of 20 mins 1:06 - 1:14 = 8 mins    1:20 - 1:35 = 15 mins    Total of 23 mins 1:03 - 1:11 = 7.5 mins    1:18 - 1:27 = 9 mins    1:30 - 1:35 = 5 mins    Total of 21.5 mins 1:12 - 1:28 = 16 mins    1:32 - 1:42 = 10 mins    Total of 26 mins       Re-Eval Tests and Measures    Discussion on next steps for skilled therapy and what has been benefiting pt.  Pt's sx.     DF     NuStep           Ankle pumps           HR/TR        Standing Hip flex/abd/ext        Mini Squats        Step ups/ Step downs        Standing Back extensions in // bars 10x               Heel Slides w/ towel          QS          Hip abd on sliding board           LAQ           Hip add W/ ball 3\" hold  2x10         Hip abd W/o resistance 3\" hold 2x10         SLR          SAQ        S/L SLR                    Amb in // bars 3 sets of 1x <>         Discussion on use of Russian stim next session to further contract and strengthen quads, everters, DF DF & HZ                 Education on clonus and stretching protocol w/ assistance at home       DF w/ pt's son    Education on effects of MS on body w/ over activity.          DF w/ pt's son                              Education on [employer] (different settings TENS, IFC, NMES) proper settings, different effects (pain modulation vs strength faciliation), etc.    DF               Ther Activity                     Family Training                           Gait Training           Ambulation around clinic                     Modalities          CP           TENS Unattended by PT    10' total                            "

## 2025-07-31 ENCOUNTER — OFFICE VISIT (OUTPATIENT)
Dept: PHYSICAL THERAPY | Facility: HOME HEALTHCARE | Age: 45
End: 2025-07-31
Attending: STUDENT IN AN ORGANIZED HEALTH CARE EDUCATION/TRAINING PROGRAM
Payer: COMMERCIAL

## 2025-07-31 DIAGNOSIS — R29.898 WEAKNESS OF BOTH LOWER EXTREMITIES: ICD-10-CM

## 2025-07-31 DIAGNOSIS — G35 HX OF MULTIPLE SCLEROSIS (HCC): Primary | ICD-10-CM

## 2025-07-31 DIAGNOSIS — R26.2 AMBULATORY DYSFUNCTION: ICD-10-CM

## 2025-07-31 PROCEDURE — 97140 MANUAL THERAPY 1/> REGIONS: CPT

## 2025-07-31 PROCEDURE — 97110 THERAPEUTIC EXERCISES: CPT

## 2025-08-11 ENCOUNTER — TELEPHONE (OUTPATIENT)
Dept: OBGYN CLINIC | Facility: HOSPITAL | Age: 45
End: 2025-08-11

## 2025-08-12 ENCOUNTER — OFFICE VISIT (OUTPATIENT)
Dept: PHYSICAL THERAPY | Facility: HOME HEALTHCARE | Age: 45
End: 2025-08-12
Attending: STUDENT IN AN ORGANIZED HEALTH CARE EDUCATION/TRAINING PROGRAM
Payer: COMMERCIAL

## 2025-08-15 ENCOUNTER — HOSPITAL ENCOUNTER (OUTPATIENT)
Dept: INFUSION CENTER | Facility: HOSPITAL | Age: 45
End: 2025-08-15
Attending: PSYCHIATRY & NEUROLOGY
Payer: COMMERCIAL

## 2025-08-17 DIAGNOSIS — M16.12 PRIMARY OSTEOARTHRITIS OF LEFT HIP: ICD-10-CM

## 2025-08-18 RX ORDER — FOLIC ACID 1 MG/1
1000 TABLET ORAL DAILY
Qty: 30 TABLET | Refills: 1 | OUTPATIENT
Start: 2025-08-18

## 2025-08-19 ENCOUNTER — OFFICE VISIT (OUTPATIENT)
Dept: PHYSICAL THERAPY | Facility: HOME HEALTHCARE | Age: 45
End: 2025-08-19
Attending: STUDENT IN AN ORGANIZED HEALTH CARE EDUCATION/TRAINING PROGRAM
Payer: COMMERCIAL

## 2025-08-19 DIAGNOSIS — G35 HX OF MULTIPLE SCLEROSIS (HCC): Primary | ICD-10-CM

## 2025-08-19 DIAGNOSIS — R29.898 WEAKNESS OF BOTH LOWER EXTREMITIES: ICD-10-CM

## 2025-08-19 DIAGNOSIS — R26.2 AMBULATORY DYSFUNCTION: ICD-10-CM

## 2025-08-19 PROCEDURE — 97110 THERAPEUTIC EXERCISES: CPT

## 2025-08-19 PROCEDURE — 97140 MANUAL THERAPY 1/> REGIONS: CPT

## 2025-08-20 ENCOUNTER — TELEPHONE (OUTPATIENT)
Age: 45
End: 2025-08-20

## 2025-08-20 DIAGNOSIS — M17.12 PRIMARY OSTEOARTHRITIS OF LEFT KNEE: ICD-10-CM

## 2025-08-20 DIAGNOSIS — M16.12 PRIMARY LOCALIZED OSTEOARTHRITIS OF LEFT HIP: Primary | ICD-10-CM

## 2025-08-21 ENCOUNTER — TELEPHONE (OUTPATIENT)
Dept: NEUROLOGY | Facility: CLINIC | Age: 45
End: 2025-08-21

## 2025-08-22 DIAGNOSIS — G35 MS (MULTIPLE SCLEROSIS) (HCC): Primary | ICD-10-CM

## 2025-08-22 RX ORDER — SODIUM CHLORIDE 9 MG/ML
20 INJECTION, SOLUTION INTRAVENOUS ONCE
OUTPATIENT
Start: 2025-09-12

## (undated) DEVICE — SCD SEQUENTIAL COMPRESSION COMFORT SLEEVE MEDIUM KNEE LENGTH: Brand: KENDALL SCD

## (undated) DEVICE — LEAD IMPLANT KIT: Brand: AXONICS

## (undated) DEVICE — MEDI-VAC YANKAUER SUCTION HANDLE W/BULBOUS AND CONTROL VENT: Brand: CARDINAL HEALTH

## (undated) DEVICE — 3M™ TEGADERM™ TRANSPARENT FILM DRESSING FRAME STYLE, 1626W, 4 IN X 4-3/4 IN (10 CM X 12 CM), 50/CT 4CT/CASE: Brand: 3M™ TEGADERM™

## (undated) DEVICE — ADHESIVE SKIN CLSR DERMABOND NX

## (undated) DEVICE — BETHLEHEM UNIVERSAL MINOR GEN: Brand: CARDINAL HEALTH

## (undated) DEVICE — SKIN MARKER DUAL TIP WITH RULER CAP, FLEXIBLE RULER AND LABELS: Brand: DEVON

## (undated) DEVICE — DRAPE SHEET THREE QUARTER

## (undated) DEVICE — NEEDLE 25G X 1 1/2

## (undated) DEVICE — CHEST/BREAST DRAPE: Brand: CONVERTORS

## (undated) DEVICE — SUT VICRYL 2-0 SH 27 IN UNDYED J417H

## (undated) DEVICE — SUT MONOCRYL 4-0 PS-2 27 IN Y426H

## (undated) DEVICE — INTENDED FOR TISSUE SEPARATION, AND OTHER PROCEDURES THAT REQUIRE A SHARP SURGICAL BLADE TO PUNCTURE OR CUT.: Brand: BARD-PARKER SAFETY BLADES SIZE 15, STERILE

## (undated) DEVICE — TUBING SUCTION 5MM X 12 FT

## (undated) DEVICE — INTENDED FOR TISSUE SEPARATION, AND OTHER PROCEDURES THAT REQUIRE A SHARP SURGICAL BLADE TO PUNCTURE OR CUT.: Brand: BARD-PARKER SAFETY BLADES SIZE 11, STERILE

## (undated) DEVICE — 3M™ IOBAN™ 2 ANTIMICROBIAL INCISE DRAPE 6650EZ: Brand: IOBAN™ 2

## (undated) DEVICE — DRAPE C-ARM X-RAY

## (undated) DEVICE — 2000CC GUARDIAN II: Brand: GUARDIAN

## (undated) DEVICE — PROVE COVER: Brand: UNBRANDED

## (undated) DEVICE — PLUMEPEN PRO 10FT

## (undated) DEVICE — TRIAL STIMULATOR: Brand: AXONICS

## (undated) DEVICE — GLOVE PI ULTRA TOUCH SZ.7.0